# Patient Record
Sex: MALE | Race: BLACK OR AFRICAN AMERICAN | Employment: OTHER | URBAN - METROPOLITAN AREA
[De-identification: names, ages, dates, MRNs, and addresses within clinical notes are randomized per-mention and may not be internally consistent; named-entity substitution may affect disease eponyms.]

---

## 2018-09-12 ENCOUNTER — HOSPITAL ENCOUNTER (EMERGENCY)
Age: 58
Discharge: HOME | End: 2018-09-12
Attending: EMERGENCY MEDICINE
Payer: COMMERCIAL

## 2018-09-12 ENCOUNTER — APPOINTMENT (OUTPATIENT)
Dept: GENERAL RADIOLOGY | Age: 58
End: 2018-09-12
Payer: COMMERCIAL

## 2018-09-12 VITALS
SYSTOLIC BLOOD PRESSURE: 152 MMHG | TEMPERATURE: 97.9 F | HEART RATE: 81 BPM | OXYGEN SATURATION: 99 % | RESPIRATION RATE: 18 BRPM | DIASTOLIC BLOOD PRESSURE: 72 MMHG

## 2018-09-12 DIAGNOSIS — I10 ESSENTIAL HYPERTENSION: Primary | ICD-10-CM

## 2018-09-12 DIAGNOSIS — E11.65 TYPE 2 DIABETES MELLITUS WITH HYPERGLYCEMIA, WITHOUT LONG-TERM CURRENT USE OF INSULIN (HCC): ICD-10-CM

## 2018-09-12 DIAGNOSIS — A59.9 TRICHOMONIASIS: ICD-10-CM

## 2018-09-12 DIAGNOSIS — R60.9 PERIPHERAL EDEMA: ICD-10-CM

## 2018-09-12 LAB
ANION GAP SERPL CALCULATED.3IONS-SCNC: 11 MMOL/L (ref 3–16)
BACTERIA: ABNORMAL /HPF
BASE EXCESS VENOUS: 5 (ref -3–3)
BASOPHILS ABSOLUTE: 0.1 K/UL (ref 0–0.2)
BASOPHILS RELATIVE PERCENT: 0.6 %
BILIRUBIN URINE: NEGATIVE MG/DL
BLOOD, URINE: ABNORMAL
BUN BLDV-MCNC: 15 MG/DL (ref 7–20)
CALCIUM SERPL-MCNC: 9 MG/DL (ref 8.3–10.6)
CHLORIDE BLD-SCNC: 96 MMOL/L (ref 99–110)
CLARITY: ABNORMAL
CO2: 27 MMOL/L (ref 21–32)
COLOR: ABNORMAL
CREAT SERPL-MCNC: 0.8 MG/DL (ref 0.8–1.3)
EOSINOPHILS ABSOLUTE: 0.2 K/UL (ref 0–0.6)
EOSINOPHILS RELATIVE PERCENT: 1.7 %
EPITHELIAL CELLS, UA: ABNORMAL /HPF
GFR AFRICAN AMERICAN: >60
GFR NON-AFRICAN AMERICAN: >60
GLUCOSE BLD-MCNC: 266 MG/DL (ref 70–99)
GLUCOSE BLD-MCNC: 294 MG/DL (ref 70–99)
GLUCOSE BLD-MCNC: 362 MG/DL (ref 70–99)
GLUCOSE URINE: >=1000 MG/DL
HCO3 VENOUS: 30.7 MMOL/L (ref 23–29)
HCT VFR BLD CALC: 39.1 % (ref 40.5–52.5)
HEMOGLOBIN: 12.5 G/DL (ref 13.5–17.5)
INR BLD: 0.93 (ref 0.86–1.14)
KETONES, URINE: NEGATIVE MG/DL
LACTATE: 2.17 MMOL/L (ref 0.4–2)
LEUKOCYTE ESTERASE, URINE: NEGATIVE
LYMPHOCYTES ABSOLUTE: 1.4 K/UL (ref 1–5.1)
LYMPHOCYTES RELATIVE PERCENT: 10.4 %
MCH RBC QN AUTO: 28.2 PG (ref 26–34)
MCHC RBC AUTO-ENTMCNC: 32 G/DL (ref 31–36)
MCV RBC AUTO: 88.1 FL (ref 80–100)
MICROSCOPIC EXAMINATION: YES
MONOCYTES ABSOLUTE: 0.9 K/UL (ref 0–1.3)
MONOCYTES RELATIVE PERCENT: 6.5 %
NEUTROPHILS ABSOLUTE: 11 K/UL (ref 1.7–7.7)
NEUTROPHILS RELATIVE PERCENT: 80.8 %
NITRITE, URINE: NEGATIVE
O2 SAT, VEN: 63 %
PCO2, VEN: 54.2 MM HG (ref 40–50)
PDW BLD-RTO: 14.9 % (ref 12.4–15.4)
PERFORMED ON: ABNORMAL
PERFORMED ON: NORMAL
PH UA: 5.5
PH VENOUS: 7.36 (ref 7.35–7.45)
PLATELET # BLD: 293 K/UL (ref 135–450)
PMV BLD AUTO: 6.8 FL (ref 5–10.5)
PO2, VEN: 35 MM HG
POC SAMPLE TYPE: ABNORMAL
POC SAMPLE TYPE: NORMAL
POC TROPONIN I: 0.04 NG/ML (ref 0–0.1)
POTASSIUM SERPL-SCNC: 4.2 MMOL/L (ref 3.5–5.1)
PRO-BNP: 136 PG/ML (ref 0–124)
PROTEIN UA: 30 MG/DL
PROTHROMBIN TIME: 10.6 SEC (ref 9.8–13)
RBC # BLD: 4.44 M/UL (ref 4.2–5.9)
RBC UA: ABNORMAL /HPF (ref 0–2)
SODIUM BLD-SCNC: 134 MMOL/L (ref 136–145)
SPECIFIC GRAVITY UA: 1.01
TCO2 CALC VENOUS: 32 MMOL/L
TRICHOMONAS: PRESENT /HPF
URINE TRICHOMONAS EVALUATION: PRESENT
UROBILINOGEN, URINE: 0.2 E.U./DL
WBC # BLD: 13.6 K/UL (ref 4–11)
WBC UA: ABNORMAL /HPF (ref 0–5)

## 2018-09-12 PROCEDURE — 81001 URINALYSIS AUTO W/SCOPE: CPT

## 2018-09-12 PROCEDURE — 82803 BLOOD GASES ANY COMBINATION: CPT

## 2018-09-12 PROCEDURE — 84484 ASSAY OF TROPONIN QUANT: CPT

## 2018-09-12 PROCEDURE — 85025 COMPLETE CBC W/AUTO DIFF WBC: CPT

## 2018-09-12 PROCEDURE — 83605 ASSAY OF LACTIC ACID: CPT

## 2018-09-12 PROCEDURE — 99284 EMERGENCY DEPT VISIT MOD MDM: CPT

## 2018-09-12 PROCEDURE — 93005 ELECTROCARDIOGRAM TRACING: CPT | Performed by: EMERGENCY MEDICINE

## 2018-09-12 PROCEDURE — 83880 ASSAY OF NATRIURETIC PEPTIDE: CPT

## 2018-09-12 PROCEDURE — 96375 TX/PRO/DX INJ NEW DRUG ADDON: CPT

## 2018-09-12 PROCEDURE — 96374 THER/PROPH/DIAG INJ IV PUSH: CPT

## 2018-09-12 PROCEDURE — 87491 CHLMYD TRACH DNA AMP PROBE: CPT

## 2018-09-12 PROCEDURE — 85610 PROTHROMBIN TIME: CPT

## 2018-09-12 PROCEDURE — 96372 THER/PROPH/DIAG INJ SC/IM: CPT

## 2018-09-12 PROCEDURE — 71046 X-RAY EXAM CHEST 2 VIEWS: CPT

## 2018-09-12 PROCEDURE — 6360000002 HC RX W HCPCS: Performed by: EMERGENCY MEDICINE

## 2018-09-12 PROCEDURE — 80048 BASIC METABOLIC PNL TOTAL CA: CPT

## 2018-09-12 PROCEDURE — 87591 N.GONORRHOEAE DNA AMP PROB: CPT

## 2018-09-12 PROCEDURE — 6370000000 HC RX 637 (ALT 250 FOR IP): Performed by: EMERGENCY MEDICINE

## 2018-09-12 RX ORDER — CEFTRIAXONE SODIUM 250 MG/1
250 INJECTION, POWDER, FOR SOLUTION INTRAMUSCULAR; INTRAVENOUS ONCE
Status: COMPLETED | OUTPATIENT
Start: 2018-09-12 | End: 2018-09-12

## 2018-09-12 RX ORDER — METRONIDAZOLE 500 MG/1
2000 TABLET ORAL ONCE
Status: COMPLETED | OUTPATIENT
Start: 2018-09-12 | End: 2018-09-12

## 2018-09-12 RX ORDER — HYDROCHLOROTHIAZIDE 25 MG/1
25 TABLET ORAL DAILY
Qty: 30 TABLET | Refills: 1 | Status: SHIPPED | OUTPATIENT
Start: 2018-09-12 | End: 2019-01-11

## 2018-09-12 RX ORDER — FUROSEMIDE 10 MG/ML
20 INJECTION INTRAMUSCULAR; INTRAVENOUS ONCE
Status: COMPLETED | OUTPATIENT
Start: 2018-09-12 | End: 2018-09-12

## 2018-09-12 RX ORDER — AZITHROMYCIN 250 MG/1
1000 TABLET, FILM COATED ORAL ONCE
Status: COMPLETED | OUTPATIENT
Start: 2018-09-12 | End: 2018-09-12

## 2018-09-12 RX ORDER — HYDROCHLOROTHIAZIDE 25 MG/1
50 TABLET ORAL ONCE
Status: COMPLETED | OUTPATIENT
Start: 2018-09-12 | End: 2018-09-12

## 2018-09-12 RX ADMIN — HYDROCHLOROTHIAZIDE 50 MG: 25 TABLET ORAL at 19:29

## 2018-09-12 RX ADMIN — CEFTRIAXONE SODIUM 250 MG: 250 INJECTION, POWDER, FOR SOLUTION INTRAMUSCULAR; INTRAVENOUS at 19:29

## 2018-09-12 RX ADMIN — METRONIDAZOLE 2000 MG: 500 TABLET ORAL at 19:46

## 2018-09-12 RX ADMIN — FUROSEMIDE 20 MG: 10 INJECTION, SOLUTION INTRAVENOUS at 19:28

## 2018-09-12 RX ADMIN — AZITHROMYCIN 1000 MG: 250 TABLET, FILM COATED ORAL at 19:28

## 2018-09-12 RX ADMIN — INSULIN HUMAN 5 UNITS: 100 INJECTION, SOLUTION PARENTERAL at 19:49

## 2018-09-12 ASSESSMENT — PAIN SCALES - GENERAL: PAINLEVEL_OUTOF10: 0

## 2018-09-12 ASSESSMENT — ENCOUNTER SYMPTOMS
CHEST TIGHTNESS: 0
VOMITING: 0
ALLERGIC/IMMUNOLOGIC NEGATIVE: 1
GASTROINTESTINAL NEGATIVE: 1
DIARRHEA: 0
ABDOMINAL PAIN: 0
SHORTNESS OF BREATH: 1
COUGH: 0
NAUSEA: 0
CONSTIPATION: 0
EYES NEGATIVE: 1

## 2018-09-12 NOTE — ED PROVIDER NOTES
hepatosplenomegaly. Skin: Warm and dry, without rashes or ecchymoses, lacerations or abrasions. Neuro: Alert and oriented x3. No focal neurologic deficits are noted. Extremities: Warm and well-perfused, without clubbing or cyanosis. The patient moves all extremities equally. There is 2+ pitting edema in the feet, ankles, and to the mid tibia, symmetric bilaterally, without warmth or erythema. Psych: The patient's mood and affect are generally within normal limits for their presentation. Diagnostic Results     EKG   Normal sinus rhythm with a ventricular rate of 80 beats per minute. Occasional PVCs are noted. Normal axis. Normal intervals, with ID interval 170 ms, QRS duration 96 ms,  ms. There is somewhat poor R-wave progression in the anterior precordial leads. There is T-wave inversion in leads 1 and aVL, with some flattening in leads V5 and V6. No ST elevations are noted. There is no prior EKG in our system to compare to. RADIOLOGY:  XR CHEST STANDARD (2 VW)   Final Result      1. No acute process or active consolidation. 2. Tortuous aorta stable.              LABS:   Results for orders placed or performed during the hospital encounter of 09/12/18   CBC auto differential   Result Value Ref Range    WBC 13.6 (H) 4.0 - 11.0 K/uL    RBC 4.44 4.20 - 5.90 M/uL    Hemoglobin 12.5 (L) 13.5 - 17.5 g/dL    Hematocrit 39.1 (L) 40.5 - 52.5 %    MCV 88.1 80.0 - 100.0 fL    MCH 28.2 26.0 - 34.0 pg    MCHC 32.0 31.0 - 36.0 g/dL    RDW 14.9 12.4 - 15.4 %    Platelets 480 975 - 313 K/uL    MPV 6.8 5.0 - 10.5 fL    Neutrophils % 80.8 %    Lymphocytes % 10.4 %    Monocytes % 6.5 %    Eosinophils % 1.7 %    Basophils % 0.6 %    Neutrophils # 11.0 (H) 1.7 - 7.7 K/uL    Lymphocytes # 1.4 1.0 - 5.1 K/uL    Monocytes # 0.9 0.0 - 1.3 K/uL    Eosinophils # 0.2 0.0 - 0.6 K/uL    Basophils # 0.1 0.0 - 0.2 K/uL   PT - INR   Result Value Ref Range    Protime 10.6 9.8 - 13.0 sec    INR 0.93 0.86 - 1.14 He also has been noticing polydipsia and polyuria, concerning for hyperglycemia, although he does not check his sugars frequently at home. Finally, he complains of some dysuria, and has concerns regarding unprotected episode of intercourse recently. The patient's EKG shows some LVH, but no ischemic changes. Troponin is negative. The patient's BNP is actually only minimally elevated and his chest x-ray shows no evidence of pulmonary edema. The patient's blood pressure was well controlled simply with oral doses of the antihypertensive medication that he is supposed to be on at home. At this time, from a cardiac perspective, there is no indication for inpatient evaluation and management. He was given also given a dose of Lasix for some mild diuresis in the emergency department, as his bilateral lower extremity edema is quite bothersome to him. We will get him set up with close outpatient follow-up, and restart his antihypertensive medication as an outpatient, in order to work on regaining control of his hypertension and hopefully pursuing further cardiac evaluation as an outpatient. The patient's glucose was noted to be relatively significantly elevated in the high 300s, although without other concerning metabolic derangements associated. He was given some IV insulin, and small Jimi's service amounts, as he is typically not on insulin as outpatient. His glucose did improve to just over 250, at which time he was considered stable for discharge. He will be restarted on his home metformin, and again will be referred for primary care outpatient follow-up. Finally, the patient's urinalysis was positive for Trichomonas, which is likely the etiology of his dysuria. Given his unprotected episode of intercourse, he was treated for Trichomonas as well as Chlamydia and gonorrhea, and DNA probes were sent on the patient's urine for those disease processes as well.   He was encouraged to have all partners tested

## 2018-09-13 LAB
C. TRACHOMATIS DNA ,URINE: NEGATIVE
N. GONORRHOEAE DNA, URINE: NEGATIVE

## 2018-09-13 NOTE — ED NOTES
Patient discharged to home with all belongings. All discharge and follow up information discussed. Patient verbalized understanding and denies needs or questions. Patient is alert, oriented x4, no signs of acute distress.       Lolita Jiménez RN  09/12/18 [id number]

## 2018-09-21 LAB
EKG ATRIAL RATE: 80 BPM
EKG DIAGNOSIS: NORMAL
EKG P AXIS: 43 DEGREES
EKG P-R INTERVAL: 170 MS
EKG Q-T INTERVAL: 368 MS
EKG QRS DURATION: 96 MS
EKG QTC CALCULATION (BAZETT): 424 MS
EKG R AXIS: 45 DEGREES
EKG T AXIS: 184 DEGREES
EKG VENTRICULAR RATE: 80 BPM

## 2018-11-27 ENCOUNTER — HOSPITAL ENCOUNTER (EMERGENCY)
Age: 58
Discharge: HOME | End: 2018-11-27
Attending: EMERGENCY MEDICINE
Payer: COMMERCIAL

## 2018-11-27 ENCOUNTER — APPOINTMENT (OUTPATIENT)
Dept: CT IMAGING | Age: 58
End: 2018-11-27
Payer: COMMERCIAL

## 2018-11-27 VITALS
BODY MASS INDEX: 40.63 KG/M2 | HEIGHT: 72 IN | RESPIRATION RATE: 20 BRPM | WEIGHT: 300 LBS | DIASTOLIC BLOOD PRESSURE: 95 MMHG | OXYGEN SATURATION: 97 % | HEART RATE: 109 BPM | SYSTOLIC BLOOD PRESSURE: 149 MMHG | TEMPERATURE: 98 F

## 2018-11-27 DIAGNOSIS — S16.1XXA ACUTE STRAIN OF NECK MUSCLE, INITIAL ENCOUNTER: Primary | ICD-10-CM

## 2018-11-27 PROCEDURE — 72125 CT NECK SPINE W/O DYE: CPT

## 2018-11-27 PROCEDURE — 99284 EMERGENCY DEPT VISIT MOD MDM: CPT

## 2018-11-27 PROCEDURE — 6370000000 HC RX 637 (ALT 250 FOR IP): Performed by: EMERGENCY MEDICINE

## 2018-11-27 RX ORDER — IBUPROFEN 400 MG/1
800 TABLET ORAL ONCE
Status: COMPLETED | OUTPATIENT
Start: 2018-11-27 | End: 2018-11-27

## 2018-11-27 RX ORDER — OXYCODONE HYDROCHLORIDE AND ACETAMINOPHEN 5; 325 MG/1; MG/1
1 TABLET ORAL ONCE
Status: COMPLETED | OUTPATIENT
Start: 2018-11-27 | End: 2018-11-27

## 2018-11-27 RX ORDER — METHYLPREDNISOLONE 4 MG/1
TABLET ORAL
Qty: 1 KIT | Refills: 0 | Status: SHIPPED | OUTPATIENT
Start: 2018-11-27 | End: 2018-12-03

## 2018-11-27 RX ORDER — CYCLOBENZAPRINE HCL 10 MG
10 TABLET ORAL 3 TIMES DAILY PRN
Qty: 30 TABLET | Refills: 0 | Status: SHIPPED | OUTPATIENT
Start: 2018-11-27 | End: 2019-01-11

## 2018-11-27 RX ADMIN — OXYCODONE AND ACETAMINOPHEN 1 TABLET: 5; 325 TABLET ORAL at 22:16

## 2018-11-27 RX ADMIN — IBUPROFEN 800 MG: 400 TABLET, FILM COATED ORAL at 22:16

## 2018-11-27 ASSESSMENT — PAIN SCALES - GENERAL
PAINLEVEL_OUTOF10: 8
PAINLEVEL_OUTOF10: 10

## 2018-11-27 ASSESSMENT — PAIN DESCRIPTION - PAIN TYPE: TYPE: ACUTE PAIN

## 2018-11-27 ASSESSMENT — PAIN DESCRIPTION - LOCATION: LOCATION: ARM;NECK

## 2018-11-27 ASSESSMENT — PAIN DESCRIPTION - ORIENTATION: ORIENTATION: LEFT

## 2018-11-27 ASSESSMENT — PAIN DESCRIPTION - FREQUENCY: FREQUENCY: CONTINUOUS

## 2018-11-27 ASSESSMENT — PAIN DESCRIPTION - DESCRIPTORS: DESCRIPTORS: CONSTANT;DULL;SHARP

## 2018-11-28 NOTE — ED NOTES
Patient discharged to home with family. Patient verbalized understanding of discharge instructions. Advised of when to return to ED and when to call 911. Advised of follow up with PCP. Patient received 2 Rx's with education. Patient verbalized understanding of education. No questions from patient to this RN. Patient left ED without incident.      Radha Prado RN  11/27/18 [id number]
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
## 2019-01-11 ENCOUNTER — APPOINTMENT (OUTPATIENT)
Dept: CT IMAGING | Age: 59
End: 2019-01-11
Payer: COMMERCIAL

## 2019-01-11 ENCOUNTER — HOSPITAL ENCOUNTER (EMERGENCY)
Age: 59
Discharge: HOME | End: 2019-01-12
Attending: EMERGENCY MEDICINE
Payer: COMMERCIAL

## 2019-01-11 VITALS
BODY MASS INDEX: 40.18 KG/M2 | SYSTOLIC BLOOD PRESSURE: 159 MMHG | WEIGHT: 287 LBS | OXYGEN SATURATION: 99 % | DIASTOLIC BLOOD PRESSURE: 77 MMHG | TEMPERATURE: 98.6 F | HEIGHT: 71 IN | RESPIRATION RATE: 12 BRPM | HEART RATE: 71 BPM

## 2019-01-11 DIAGNOSIS — M54.50 ACUTE RIGHT-SIDED LOW BACK PAIN WITHOUT SCIATICA: Primary | ICD-10-CM

## 2019-01-11 DIAGNOSIS — R73.9 HYPERGLYCEMIA: ICD-10-CM

## 2019-01-11 DIAGNOSIS — K08.89 PAIN, DENTAL: ICD-10-CM

## 2019-01-11 LAB
BACTERIA: ABNORMAL /HPF
BASOPHILS ABSOLUTE: 0.1 K/UL (ref 0–0.2)
BASOPHILS RELATIVE PERCENT: 1.3 %
BILIRUBIN URINE: NEGATIVE MG/DL
BLOOD, URINE: ABNORMAL
CALCIUM IONIZED: 1.12 MMOL/L (ref 1.12–1.32)
CLARITY: ABNORMAL
CO2: 29 MMOL/L (ref 21–32)
COLOR: ABNORMAL
EOSINOPHILS ABSOLUTE: 0.2 K/UL (ref 0–0.6)
EOSINOPHILS RELATIVE PERCENT: 1.8 %
EPITHELIAL CELLS, UA: ABNORMAL /HPF
GFR AFRICAN AMERICAN: >60
GFR NON-AFRICAN AMERICAN: >60
GLUCOSE BLD-MCNC: 273 MG/DL (ref 70–99)
GLUCOSE BLD-MCNC: 504 MG/DL (ref 70–99)
GLUCOSE URINE: 500 MG/DL
HCT VFR BLD CALC: 41.5 % (ref 40.5–52.5)
HEMOGLOBIN: 13.5 G/DL (ref 13.5–17.5)
KETONES, URINE: NEGATIVE MG/DL
LEUKOCYTE ESTERASE, URINE: NEGATIVE
LYMPHOCYTES ABSOLUTE: 1.4 K/UL (ref 1–5.1)
LYMPHOCYTES RELATIVE PERCENT: 14.9 %
MCH RBC QN AUTO: 28.7 PG (ref 26–34)
MCHC RBC AUTO-ENTMCNC: 32.6 G/DL (ref 31–36)
MCV RBC AUTO: 88 FL (ref 80–100)
MICROSCOPIC EXAMINATION: YES
MONOCYTES ABSOLUTE: 0.7 K/UL (ref 0–1.3)
MONOCYTES RELATIVE PERCENT: 7.5 %
NEUTROPHILS ABSOLUTE: 6.8 K/UL (ref 1.7–7.7)
NEUTROPHILS RELATIVE PERCENT: 74.5 %
NITRITE, URINE: NEGATIVE
PDW BLD-RTO: 14.5 % (ref 12.4–15.4)
PERFORMED ON: ABNORMAL
PERFORMED ON: ABNORMAL
PH UA: 5.5
PLATELET # BLD: 267 K/UL (ref 135–450)
PMV BLD AUTO: 6.6 FL (ref 5–10.5)
POC ANION GAP: 11 (ref 10–20)
POC BUN: 9 MG/DL (ref 7–18)
POC CHLORIDE: 93 MMOL/L (ref 99–110)
POC CREATININE: 0.9 MG/DL (ref 0.8–1.3)
POC POTASSIUM: 4 MMOL/L (ref 3.5–5.1)
POC SAMPLE TYPE: ABNORMAL
POC SODIUM: 133 MMOL/L (ref 136–145)
PROTEIN UA: NEGATIVE MG/DL
RBC # BLD: 4.72 M/UL (ref 4.2–5.9)
RBC UA: ABNORMAL /HPF (ref 0–2)
SPECIFIC GRAVITY UA: 1.01
UROBILINOGEN, URINE: 0.2 E.U./DL
WBC # BLD: 9.2 K/UL (ref 4–11)
WBC UA: ABNORMAL /HPF (ref 0–5)

## 2019-01-11 PROCEDURE — 96374 THER/PROPH/DIAG INJ IV PUSH: CPT

## 2019-01-11 PROCEDURE — 6360000002 HC RX W HCPCS: Performed by: PHYSICIAN ASSISTANT

## 2019-01-11 PROCEDURE — 74176 CT ABD & PELVIS W/O CONTRAST: CPT

## 2019-01-11 PROCEDURE — 85025 COMPLETE CBC W/AUTO DIFF WBC: CPT

## 2019-01-11 PROCEDURE — 80047 BASIC METABLC PNL IONIZED CA: CPT

## 2019-01-11 PROCEDURE — 96361 HYDRATE IV INFUSION ADD-ON: CPT

## 2019-01-11 PROCEDURE — 99284 EMERGENCY DEPT VISIT MOD MDM: CPT

## 2019-01-11 PROCEDURE — 6370000000 HC RX 637 (ALT 250 FOR IP): Performed by: PHYSICIAN ASSISTANT

## 2019-01-11 PROCEDURE — 81003 URINALYSIS AUTO W/O SCOPE: CPT

## 2019-01-11 PROCEDURE — 96375 TX/PRO/DX INJ NEW DRUG ADDON: CPT

## 2019-01-11 PROCEDURE — 2580000003 HC RX 258: Performed by: PHYSICIAN ASSISTANT

## 2019-01-11 RX ORDER — METHOCARBAMOL 500 MG/1
1000 TABLET, FILM COATED ORAL 3 TIMES DAILY PRN
Qty: 30 TABLET | Refills: 0 | Status: SHIPPED | OUTPATIENT
Start: 2019-01-11 | End: 2019-01-21

## 2019-01-11 RX ORDER — HYDROCHLOROTHIAZIDE 25 MG/1
25 TABLET ORAL DAILY
Qty: 30 TABLET | Refills: 1 | Status: SHIPPED | OUTPATIENT
Start: 2019-01-11 | End: 2019-08-06

## 2019-01-11 RX ORDER — 0.9 % SODIUM CHLORIDE 0.9 %
1000 INTRAVENOUS SOLUTION INTRAVENOUS ONCE
Status: COMPLETED | OUTPATIENT
Start: 2019-01-11 | End: 2019-01-11

## 2019-01-11 RX ORDER — LIDOCAINE 4 G/G
1 PATCH TOPICAL DAILY
Status: DISCONTINUED | OUTPATIENT
Start: 2019-01-12 | End: 2019-01-12 | Stop reason: HOSPADM

## 2019-01-11 RX ORDER — KETOROLAC TROMETHAMINE 30 MG/ML
15 INJECTION, SOLUTION INTRAMUSCULAR; INTRAVENOUS ONCE
Status: COMPLETED | OUTPATIENT
Start: 2019-01-11 | End: 2019-01-11

## 2019-01-11 RX ORDER — LIDOCAINE 4 G/G
1 PATCH TOPICAL ONCE
Status: DISCONTINUED | OUTPATIENT
Start: 2019-01-11 | End: 2019-01-12 | Stop reason: HOSPADM

## 2019-01-11 RX ORDER — IBUPROFEN 600 MG/1
600 TABLET ORAL EVERY 6 HOURS PRN
Qty: 30 TABLET | Refills: 0 | Status: ON HOLD | OUTPATIENT
Start: 2019-01-11 | End: 2020-01-20

## 2019-01-11 RX ORDER — LIDOCAINE 50 MG/G
1 PATCH TOPICAL DAILY
Qty: 30 PATCH | Refills: 0 | Status: SHIPPED | OUTPATIENT
Start: 2019-01-11 | End: 2019-06-04

## 2019-01-11 RX ORDER — PENICILLIN V POTASSIUM 500 MG/1
500 TABLET ORAL 4 TIMES DAILY
Qty: 28 TABLET | Refills: 0 | Status: SHIPPED | OUTPATIENT
Start: 2019-01-11 | End: 2019-01-18

## 2019-01-11 RX ORDER — KETOROLAC TROMETHAMINE 30 MG/ML
15 INJECTION, SOLUTION INTRAMUSCULAR; INTRAVENOUS ONCE
Status: DISCONTINUED | OUTPATIENT
Start: 2019-01-11 | End: 2019-01-11

## 2019-01-11 RX ADMIN — SODIUM CHLORIDE 1000 ML: 9 INJECTION, SOLUTION INTRAVENOUS at 21:56

## 2019-01-11 RX ADMIN — INSULIN HUMAN 8 UNITS: 100 INJECTION, SOLUTION PARENTERAL at 22:00

## 2019-01-11 RX ADMIN — KETOROLAC TROMETHAMINE 15 MG: 30 INJECTION, SOLUTION INTRAMUSCULAR at 21:53

## 2019-01-11 ASSESSMENT — ENCOUNTER SYMPTOMS
FACIAL SWELLING: 0
RHINORRHEA: 0
DIARRHEA: 0
SORE THROAT: 0
ABDOMINAL PAIN: 0
SHORTNESS OF BREATH: 0
BACK PAIN: 1
PHOTOPHOBIA: 0
SINUS PAIN: 0
COUGH: 0
TROUBLE SWALLOWING: 0
WHEEZING: 0
CHEST TIGHTNESS: 0
VOMITING: 0
VOICE CHANGE: 0
NAUSEA: 0

## 2019-01-11 ASSESSMENT — PAIN SCALES - GENERAL
PAINLEVEL_OUTOF10: 8
PAINLEVEL_OUTOF10: 9

## 2019-01-11 ASSESSMENT — PAIN DESCRIPTION - ORIENTATION: ORIENTATION: RIGHT;LOWER;MID

## 2019-01-11 ASSESSMENT — PAIN DESCRIPTION - LOCATION: LOCATION: BACK

## 2019-04-08 ENCOUNTER — APPOINTMENT (OUTPATIENT)
Dept: CT IMAGING | Age: 59
End: 2019-04-08
Payer: COMMERCIAL

## 2019-04-08 ENCOUNTER — HOSPITAL ENCOUNTER (EMERGENCY)
Age: 59
Discharge: HOME | End: 2019-04-08
Attending: EMERGENCY MEDICINE
Payer: COMMERCIAL

## 2019-04-08 VITALS
RESPIRATION RATE: 14 BRPM | HEART RATE: 83 BPM | TEMPERATURE: 98.3 F | SYSTOLIC BLOOD PRESSURE: 157 MMHG | OXYGEN SATURATION: 99 % | DIASTOLIC BLOOD PRESSURE: 83 MMHG
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
## 2019-04-08 DIAGNOSIS — S16.1XXA STRAIN OF NECK MUSCLE, INITIAL ENCOUNTER: ICD-10-CM

## 2019-04-08 DIAGNOSIS — S00.83XA TRAUMATIC HEMATOMA OF FOREHEAD, INITIAL ENCOUNTER: ICD-10-CM

## 2019-04-08 DIAGNOSIS — T14.8XXA ABRASION: ICD-10-CM

## 2019-04-08 PROCEDURE — 99284 EMERGENCY DEPT VISIT MOD MDM: CPT

## 2019-04-08 PROCEDURE — 6360000002 HC RX W HCPCS: Performed by: EMERGENCY MEDICINE

## 2019-04-08 PROCEDURE — 72125 CT NECK SPINE W/O DYE: CPT

## 2019-04-08 PROCEDURE — 90471 IMMUNIZATION ADMIN: CPT | Performed by: PHYSICIAN ASSISTANT

## 2019-04-08 PROCEDURE — 4500000024 HC ED LEVEL 4 PROCEDURE

## 2019-04-08 PROCEDURE — 6360000002 HC RX W HCPCS: Performed by: PHYSICIAN ASSISTANT

## 2019-04-08 PROCEDURE — 90715 TDAP VACCINE 7 YRS/> IM: CPT | Performed by: PHYSICIAN ASSISTANT

## 2019-04-08 PROCEDURE — 96372 THER/PROPH/DIAG INJ SC/IM: CPT

## 2019-04-08 PROCEDURE — 70450 CT HEAD/BRAIN W/O DYE: CPT

## 2019-04-08 RX ORDER — KETOROLAC TROMETHAMINE 30 MG/ML
30 INJECTION, SOLUTION INTRAMUSCULAR; INTRAVENOUS ONCE
Status: COMPLETED | OUTPATIENT
Start: 2019-04-08 | End: 2019-04-08

## 2019-04-08 RX ORDER — CYCLOBENZAPRINE HCL 10 MG
10 TABLET ORAL 3 TIMES DAILY PRN
Qty: 10 TABLET | Refills: 0 | Status: SHIPPED | OUTPATIENT
Start: 2019-04-08 | End: 2019-04-11

## 2019-04-08 RX ADMIN — TETANUS TOXOID, REDUCED DIPHTHERIA TOXOID AND ACELLULAR PERTUSSIS VACCINE, ADSORBED 0.5 ML: 5; 2.5; 8; 8; 2.5 SUSPENSION INTRAMUSCULAR at 19:30

## 2019-04-08 RX ADMIN — KETOROLAC TROMETHAMINE 30 MG: 30 INJECTION, SOLUTION INTRAMUSCULAR; INTRAVENOUS at 20:37

## 2019-04-08 ASSESSMENT — ENCOUNTER SYMPTOMS
BACK PAIN: 0
VOMITING: 0
SHORTNESS OF BREATH: 0
NAUSEA: 0
ABDOMINAL PAIN: 0

## 2019-04-08 ASSESSMENT — PAIN SCALES - GENERAL: PAINLEVEL_OUTOF10: 10

## 2019-04-08 NOTE — ED PROVIDER NOTES
[facility]  EMERGENCY DEPARTMENT ENCOUNTER          PHYSICIAN ASSISTANT NOTE       Date of evaluation: 4/8/2019    Chief Complaint     Fall and Head Laceration      History of Present Illness     Bella Soto is a 59 y.o. male with PMH of Diabetes, HTN, Herniated cervical disc with chronic left shoulder pain who presents with Fall. Patient reports he was walking in his yard this evening. He reports that he slipped in the mud on a downslope and attempted to catch his fall but landed on his face on the asphalt. He denies LOC. He denies any chest pain, shortness of breath, dizziness or other symptoms prior to the fall. He currently complains of facial pain, worsening neck pain and lightheadedness. Denies vision changes, n/t, weakness, chest pain, shortness of breath, abdominal pain, HA, vomiting. Unsure of his last tetanus. He is not on any blood thinners. Review of Systems     Review of Systems   Constitutional: Negative for chills and fever. HENT: Negative for congestion. Eyes: Negative for visual disturbance. Respiratory: Negative for shortness of breath. Cardiovascular: Negative for chest pain. Gastrointestinal: Negative for abdominal pain, nausea and vomiting. Genitourinary: Negative for difficulty urinating. Musculoskeletal: Positive for neck pain. Negative for back pain and gait problem. Skin: Positive for wound. Neurological: Positive for dizziness and headaches. Negative for facial asymmetry, weakness, light-headedness and numbness. Psychiatric/Behavioral: Negative for confusion. Past Medical, Surgical, Family, and Social History     He has a past medical history of Arthritis, Depression, Diabetes mellitus ([address] Herniated disc, cervical, Hypertension, Osteoporosis, and Peripheral neuropathy. He has a past surgical history that includes hernia repair. His family history is not on file. He reports that he has never smoked.  He has never used smokeless tobacco. He Negative pronator drift. FNF intact. Gross sensation and strength intact. Skin: Skin is warm and dry. He is not diaphoretic. Psychiatric: He has a normal mood and affect. His behavior is normal.   Nursing note and vitals reviewed. Diagnostic Results       RADIOLOGY:  CT CERVICAL SPINE WO CONTRAST   Final Result      1. No acute intracranial process. Frontal scalp hematoma. 2. No evidence of acute fracture in the cervical spine. CT Head WO Contrast   Final Result      1. No acute intracranial process. Frontal scalp hematoma. 2. No evidence of acute fracture in the cervical spine. LABS:   No results found for this visit on 04/08/19. RECENT VITALS:  BP: (!) 157/83, Temp: 98.3 °F (36.8 °C), Pulse: 83,Resp: 14, SpO2: 99 %     Lac Repair  Date/Time: 4/8/2019 11:01 PM  Performed by: Joanna Elias PA-C  Authorized by: Micheline Cunha MD     Consent:     Consent obtained:  Verbal    Consent given by:  Patient    Risks discussed:  Pain, infection and poor cosmetic result  Anesthesia (see MAR for exact dosages): Anesthesia method:  None  Laceration details:     Location:  Face  Repair type:     Repair type:  Simple  Pre-procedure details:     Preparation:  Patient was prepped and draped in usual sterile fashion  Treatment:     Area cleansed with:  Saline    Amount of cleaning:  Standard  Skin repair:     Repair method:  Sutures    Suture size:  5-0    Suture material:  Fast-absorbing gut    Number of sutures:  1  Approximation:     Approximation:  Close  Post-procedure details:     Dressing:  Open (no dressing)    Patient tolerance of procedure: Tolerated well, no immediate complications  Comments:      Abrasion to nose repaired with dermabond to prevent oozing. ED Course     Nursing Notes, Past Medical Hx, Past Surgical Hx, Social Hx, Allergies, and Family Hx were reviewed.     The patient was given the followingmedications:  Orders Placed This Encounter initial encounter    2. Strain of neck muscle, initial encounter    3. Abrasion    4.  Traumatic hematoma of forehead, initial encounter        Disposition     PATIENT REFERRED TO:  [facility] [place]  [address]    Schedule an appointment as soon as possible for a visit         DISCHARGE MEDICATIONS:  Discharge Medication List as of 4/8/2019  9:36 PM      START taking these medications    Details   cyclobenzaprine (FLEXERIL) 10 MG tablet Take 1 tablet by mouth 3 times daily as needed for Muscle spasms, Disp-10 tablet, R-0Print              DISPOSITION Decision To Discharge 04/08/2019 09:29:42 PM        Joanna Elias PA-C  04/08/19 [id number]

## 2019-04-09 NOTE — ED PROVIDER NOTES
ED Attending Attestation Note     Date of evaluation: 4/8/2019    This patient was seen by the advance practice provider. I have seen and examined the patient, agree with the workup, evaluation, management and diagnosis. The care plan has been discussed. My assessment reveals here for injuries after a fall. Patient states today he was walking in his yard when he slipped on some mud and landed face first on asphalt. He had no loss of consciousness but did sustain abrasions to his face. He also notes neck pain. He's had a history of chronic neck pain secondary to a herniated cervical disc. On exam patient no acute distress. He does have abrasion forehead nose and then just above his upper lip. He currently is in a c-collar but is moving all his extremities well and has good strength throughout. . Alert and oriented ×3 with clear speech     Lynette Mcconnell MD  04/09/19 2038

## 2019-04-09 NOTE — ED NOTES
Pt discharged from ED in stable, ambulatory condition. Discharge instructions explained, all questions answered. Prescription given. Pt walked to [facility] independently.        [address], RN  04/08/19 3586

## 2019-06-04 ENCOUNTER — HOSPITAL ENCOUNTER (INPATIENT)
Age: 59
LOS: 3 days | Discharge: HOME | DRG: 546 | End: 2019-06-07
Attending: EMERGENCY MEDICINE | Admitting: INTERNAL MEDICINE
Payer: COMMERCIAL

## 2019-06-04 ENCOUNTER — APPOINTMENT (OUTPATIENT)
Dept: CT IMAGING | Age: 59
DRG: 546 | End: 2019-06-04
Payer: COMMERCIAL

## 2019-06-04 DIAGNOSIS — I21.4 NSTEMI (NON-ST ELEVATED MYOCARDIAL INFARCTION) (HCC): Primary | ICD-10-CM

## 2019-06-04 PROBLEM — R53.1 GENERALIZED WEAKNESS: Status: ACTIVE | Noted: 2019-06-04

## 2019-06-04 PROBLEM — G62.9 PERIPHERAL NEUROPATHY: Chronic | Status: ACTIVE | Noted: 2019-06-04

## 2019-06-04 PROBLEM — E11.9 DIABETES MELLITUS (HCC): Chronic | Status: ACTIVE | Noted: 2019-06-04

## 2019-06-04 PROBLEM — I10 HYPERTENSION: Chronic | Status: ACTIVE | Noted: 2019-06-04

## 2019-06-04 LAB
ANION GAP SERPL CALCULATED.3IONS-SCNC: 10 MMOL/L (ref 3–16)
BASOPHILS ABSOLUTE: 0.1 K/UL (ref 0–0.2)
BASOPHILS RELATIVE PERCENT: 0.7 %
BUN BLDV-MCNC: 14 MG/DL (ref 7–20)
CALCIUM SERPL-MCNC: 8.4 MG/DL (ref 8.3–10.6)
CHLORIDE BLD-SCNC: 102 MMOL/L (ref 99–110)
CO2: 26 MMOL/L (ref 21–32)
CREAT SERPL-MCNC: 0.7 MG/DL (ref 0.8–1.3)
EKG ATRIAL RATE: 74 BPM
EKG DIAGNOSIS: NORMAL
EKG P AXIS: 29 DEGREES
EKG P-R INTERVAL: 188 MS
EKG Q-T INTERVAL: 392 MS
EKG QRS DURATION: 102 MS
EKG QTC CALCULATION (BAZETT): 435 MS
EKG R AXIS: 37 DEGREES
EKG T AXIS: 161 DEGREES
EKG VENTRICULAR RATE: 74 BPM
EOSINOPHILS ABSOLUTE: 0.3 K/UL (ref 0–0.6)
EOSINOPHILS RELATIVE PERCENT: 3 %
GFR AFRICAN AMERICAN: >60
GFR NON-AFRICAN AMERICAN: >60
GLUCOSE BLD-MCNC: 116 MG/DL (ref 70–99)
HCT VFR BLD CALC: 34.7 % (ref 40.5–52.5)
HEMOGLOBIN: 11.3 G/DL (ref 13.5–17.5)
LYMPHOCYTES ABSOLUTE: 1.2 K/UL (ref 1–5.1)
LYMPHOCYTES RELATIVE PERCENT: 12.4 %
MCH RBC QN AUTO: 28.3 PG (ref 26–34)
MCHC RBC AUTO-ENTMCNC: 32.5 G/DL (ref 31–36)
MCV RBC AUTO: 86.9 FL (ref 80–100)
MONOCYTES ABSOLUTE: 0.8 K/UL (ref 0–1.3)
MONOCYTES RELATIVE PERCENT: 8.1 %
NEUTROPHILS ABSOLUTE: 7.1 K/UL (ref 1.7–7.7)
NEUTROPHILS RELATIVE PERCENT: 75.8 %
PDW BLD-RTO: 15.3 % (ref 12.4–15.4)
PLATELET # BLD: 292 K/UL (ref 135–450)
PMV BLD AUTO: 6.3 FL (ref 5–10.5)
POTASSIUM REFLEX MAGNESIUM: 3.8 MMOL/L (ref 3.5–5.1)
PRO-BNP: 298 PG/ML (ref 0–124)
RBC # BLD: 3.99 M/UL (ref 4.2–5.9)
SODIUM BLD-SCNC: 138 MMOL/L (ref 136–145)
T4 TOTAL: 5.4 UG/DL (ref 4.5–10.9)
TROPONIN: 0.51 NG/ML
TROPONIN: 0.56 NG/ML
TSH SERPL DL<=0.05 MIU/L-ACNC: 1.31 UIU/ML (ref 0.27–4.2)
WBC # BLD: 9.3 K/UL (ref 4–11)

## 2019-06-04 PROCEDURE — 99285 EMERGENCY DEPT VISIT HI MDM: CPT

## 2019-06-04 PROCEDURE — 1200000000 HC SEMI PRIVATE

## 2019-06-04 PROCEDURE — 80048 BASIC METABOLIC PNL TOTAL CA: CPT

## 2019-06-04 PROCEDURE — 84484 ASSAY OF TROPONIN QUANT: CPT

## 2019-06-04 PROCEDURE — 6370000000 HC RX 637 (ALT 250 FOR IP): Performed by: STUDENT IN AN ORGANIZED HEALTH CARE EDUCATION/TRAINING PROGRAM

## 2019-06-04 PROCEDURE — 70450 CT HEAD/BRAIN W/O DYE: CPT

## 2019-06-04 PROCEDURE — 84443 ASSAY THYROID STIM HORMONE: CPT

## 2019-06-04 PROCEDURE — 83880 ASSAY OF NATRIURETIC PEPTIDE: CPT

## 2019-06-04 PROCEDURE — 84436 ASSAY OF TOTAL THYROXINE: CPT

## 2019-06-04 PROCEDURE — 93005 ELECTROCARDIOGRAM TRACING: CPT | Performed by: STUDENT IN AN ORGANIZED HEALTH CARE EDUCATION/TRAINING PROGRAM

## 2019-06-04 PROCEDURE — 85025 COMPLETE CBC W/AUTO DIFF WBC: CPT

## 2019-06-04 RX ORDER — HEPARIN SODIUM 5000 [USP'U]/ML
60 INJECTION, SOLUTION INTRAVENOUS; SUBCUTANEOUS ONCE
Status: COMPLETED | OUTPATIENT
Start: 2019-06-05 | End: 2019-06-05

## 2019-06-04 RX ORDER — HEPARIN SODIUM 5000 [USP'U]/ML
60 INJECTION, SOLUTION INTRAVENOUS; SUBCUTANEOUS PRN
Status: DISCONTINUED | OUTPATIENT
Start: 2019-06-04 | End: 2019-06-06

## 2019-06-04 RX ORDER — HEPARIN SODIUM 5000 [USP'U]/ML
30 INJECTION, SOLUTION INTRAVENOUS; SUBCUTANEOUS PRN
Status: DISCONTINUED | OUTPATIENT
Start: 2019-06-04 | End: 2019-06-06

## 2019-06-04 RX ORDER — ONDANSETRON 2 MG/ML
4 INJECTION INTRAMUSCULAR; INTRAVENOUS EVERY 6 HOURS PRN
Status: DISCONTINUED | OUTPATIENT
Start: 2019-06-04 | End: 2019-06-07 | Stop reason: HOSPADM

## 2019-06-04 RX ORDER — ATORVASTATIN CALCIUM 40 MG/1
40 TABLET, FILM COATED ORAL NIGHTLY
Status: DISCONTINUED | OUTPATIENT
Start: 2019-06-05 | End: 2019-06-05

## 2019-06-04 RX ORDER — HEPARIN SODIUM 10000 [USP'U]/100ML
11 INJECTION, SOLUTION INTRAVENOUS CONTINUOUS
Status: DISCONTINUED | OUTPATIENT
Start: 2019-06-05 | End: 2019-06-06

## 2019-06-04 RX ORDER — M-VIT,TX,IRON,MINS/CALC/FOLIC 27MG-0.4MG
1 TABLET ORAL DAILY
Status: DISCONTINUED | OUTPATIENT
Start: 2019-06-05 | End: 2019-06-07 | Stop reason: HOSPADM

## 2019-06-04 RX ORDER — SODIUM CHLORIDE 0.9 % (FLUSH) 0.9 %
10 SYRINGE (ML) INJECTION PRN
Status: DISCONTINUED | OUTPATIENT
Start: 2019-06-04 | End: 2019-06-07 | Stop reason: HOSPADM

## 2019-06-04 RX ORDER — NITROGLYCERIN 0.4 MG/1
0.4 TABLET SUBLINGUAL EVERY 5 MIN PRN
Status: DISCONTINUED | OUTPATIENT
Start: 2019-06-04 | End: 2019-06-07 | Stop reason: HOSPADM

## 2019-06-04 RX ORDER — SODIUM CHLORIDE 0.9 % (FLUSH) 0.9 %
10 SYRINGE (ML) INJECTION EVERY 12 HOURS SCHEDULED
Status: DISCONTINUED | OUTPATIENT
Start: 2019-06-05 | End: 2019-06-07 | Stop reason: HOSPADM

## 2019-06-04 RX ORDER — CARVEDILOL 6.25 MG/1
3.12 TABLET ORAL 2 TIMES DAILY WITH MEALS
Status: DISCONTINUED | OUTPATIENT
Start: 2019-06-05 | End: 2019-06-07 | Stop reason: HOSPADM

## 2019-06-04 RX ORDER — ACETAMINOPHEN 325 MG/1
650 TABLET ORAL EVERY 4 HOURS PRN
Status: DISCONTINUED | OUTPATIENT
Start: 2019-06-04 | End: 2019-06-07 | Stop reason: HOSPADM

## 2019-06-04 RX ORDER — ASPIRIN 325 MG
325 TABLET ORAL DAILY
Status: DISCONTINUED | OUTPATIENT
Start: 2019-06-05 | End: 2019-06-05

## 2019-06-04 RX ADMIN — ASPIRIN 325 MG: 325 TABLET, DELAYED RELEASE ORAL at 20:14

## 2019-06-04 ASSESSMENT — PAIN DESCRIPTION - PAIN TYPE: TYPE: ACUTE PAIN

## 2019-06-04 ASSESSMENT — ENCOUNTER SYMPTOMS
VOMITING: 0
NAUSEA: 0
PHOTOPHOBIA: 0
ABDOMINAL PAIN: 0
SORE THROAT: 0
SINUS PRESSURE: 0
SINUS PAIN: 0
SHORTNESS OF BREATH: 1
COUGH: 0

## 2019-06-04 ASSESSMENT — PAIN SCALES - GENERAL: PAINLEVEL_OUTOF10: 9

## 2019-06-04 ASSESSMENT — PAIN DESCRIPTION - ORIENTATION: ORIENTATION: LEFT;RIGHT

## 2019-06-04 ASSESSMENT — PAIN DESCRIPTION - LOCATION: LOCATION: KNEE;NECK;HEAD

## 2019-06-04 NOTE — ED NOTES
Patient SpO2 dropped to 88% when sleeping. Patient placed on 2L O2 NC.        Danyelle Birmingham RN  06/04/19 0250

## 2019-06-04 NOTE — ED NOTES
MD able to place ultrasound guided PIV with blood drawn for lab work.        José Miguel Shaw RN  06/04/19 [id number]

## 2019-06-04 NOTE — ED NOTES
[id number] RESIDENT NOTE       Date of evaluation: 6/4/2019    Chief Complaint     Fall (yesterday at 34, tripped on pavement, ); Head Injury; and Knee Injury (bilateral)      History of Present Illness     Gale Ashton is a 62 y.o. male with DMII, HTN, HLD who presents with mechanical fall while outside that occurred yesterday afternoon. He hit his head, but does not take any anticoagulants. He denies any prodromal symptoms of nausea, vomiting, dizziness, lightheadedness, diaphoresis. He states that he has had generalized weakness for about 1.5 months, worse in the lower extremities. Nothing improves the weakness and it is not worsened with prolonged activity. He does not smoke or drink alcohol and he has not noticed any new focal motor or sensory deficits after the fall. Review of Systems     Review of Systems   Constitutional: Negative for chills, fatigue and fever. HENT: Negative for congestion, sinus pressure, sinus pain and sore throat. Eyes: Negative for photophobia and visual disturbance. Respiratory: Positive for shortness of breath (chronic, unchanged). Negative for cough. Cardiovascular: Negative for chest pain, palpitations and leg swelling. Gastrointestinal: Negative for abdominal pain, nausea and vomiting. Endocrine: Negative for polydipsia and polyphagia. Genitourinary: Negative for dysuria, frequency and urgency. Musculoskeletal: Positive for neck pain. Negative for arthralgias. Skin: Negative for pallor and rash. Neurological: Positive for weakness (Generalized). Negative for dizziness and light-headedness. Psychiatric/Behavioral: Negative for behavioral problems and confusion. Past Medical, Surgical, Family, and Social History     He has a past medical history of Arthritis, Depression, Diabetes mellitus ([address] Herniated disc, cervical, Hypertension, Osteoporosis, and Peripheral neuropathy.   He has a past surgical history that includes hernia repair. His family history is not on file. He reports that he has never smoked. He has never used smokeless tobacco. He reports that he does not drink alcohol or use drugs. Medications     Discharge Medication List as of 6/7/2019 10:42 AM      CONTINUE these medications which have NOT CHANGED    Details   ibuprofen (ADVIL;MOTRIN) 600 MG tablet Take 1 tablet by mouth every 6 hours as needed for Pain, Disp-30 tablet, R-0Print      metFORMIN (GLUCOPHAGE) 500 MG tablet Take 1 tablet by mouth 2 times daily (with meals), Disp-60 tablet, R-1Print      hydrochlorothiazide (HYDRODIURIL) 25 MG tablet Take 1 tablet by mouth daily, Disp-30 tablet, R-1Print      aspirin 325 MG tablet Take 325 mg by mouth dailyHistorical Med      Multiple Vitamins-Minerals (THERAPEUTIC MULTIVITAMIN-MINERALS) tablet Take 1 tablet by mouth dailyHistorical Med             Allergies     He is allergic to lisinopril and bee venom. Physical Exam     INITIAL VITALS: BP: (!) 144/77, Temp: 98.6 °F (37 °C), Pulse: 77, Resp: 18, SpO2: (!) 86 %   Physical Exam   Constitutional: He is oriented to person, place, and time. He appears well-developed and well-nourished. No distress. HENT:   Head: Normocephalic and atraumatic. Mouth/Throat: Oropharynx is clear and moist.   Eyes: Pupils are equal, round, and reactive to light. Conjunctivae and EOM are normal.   Neck: Normal range of motion. Neck supple. No tracheal deviation present. Cardiovascular: Normal rate and regular rhythm. 2/6 systolic murmur RUSB   Pulmonary/Chest: Effort normal and breath sounds normal. He has no wheezes. He has no rales. Abdominal: Soft. He exhibits no distension. There is no tenderness. Musculoskeletal: Normal range of motion. He exhibits edema (2+ pitting edema in bilateral LE). He exhibits no tenderness. Neurological: He is alert and oriented to person, place, and time. He has normal strength.  No cranial nerve deficit or sensory deficit. Skin: Skin is warm and dry. Capillary refill takes less than 2 seconds. He is not diaphoretic. Diagnostic Results     EKG   Interpreted inconjunction with emergency department physician Amanda Del Castillo, *  Rhythm: normal sinus   Rate: normal  Axis: normal  Ectopy: none  Conduction: normal  ST Segments: no acute change  T Waves: no acute change, t wave inversions in lateral leads  Q Waves: none  Clinical Impression: no acute changes  Comparison:  9/12/2018    RADIOLOGY:  XR CHEST PORTABLE   Final Result     No acute findings. XR CHEST 1 VW   Final Result      No acute pulmonary pathology or change from the prior exam                  VL Extremity Venous Bilateral   Final Result      US BREAST LIMITED LEFT   Final Result      Probable gynecomastia. Recommend elective left breast mammogram for correlation.  ACR 0      CT Head WO Contrast   Final Result          LABS:   Results for orders placed or performed during the hospital encounter of 06/04/19   CBC Auto Differential   Result Value Ref Range    WBC 9.3 4.0 - 11.0 K/uL    RBC 3.99 (L) 4.20 - 5.90 M/uL    Hemoglobin 11.3 (L) 13.5 - 17.5 g/dL    Hematocrit 34.7 (L) 40.5 - 52.5 %    MCV 86.9 80.0 - 100.0 fL    MCH 28.3 26.0 - 34.0 pg    MCHC 32.5 31.0 - 36.0 g/dL    RDW 15.3 12.4 - 15.4 %    Platelets 544 544 - 247 K/uL    MPV 6.3 5.0 - 10.5 fL    Neutrophils % 75.8 %    Lymphocytes % 12.4 %    Monocytes % 8.1 %    Eosinophils % 3.0 %    Basophils % 0.7 %    Neutrophils # 7.1 1.7 - 7.7 K/uL    Lymphocytes # 1.2 1.0 - 5.1 K/uL    Monocytes # 0.8 0.0 - 1.3 K/uL    Eosinophils # 0.3 0.0 - 0.6 K/uL    Basophils # 0.1 0.0 - 0.2 K/uL   Basic Metabolic Panel w/ Reflex to MG   Result Value Ref Range    Sodium 138 136 - 145 mmol/L    Potassium reflex Magnesium 3.8 3.5 - 5.1 mmol/L    Chloride 102 99 - 110 mmol/L    CO2 26 21 - 32 mmol/L    Anion Gap 10 3 - 16    Glucose 116 (H) 70 - 99 mg/dL    BUN 14 7 - 20 mg/dL    CREATININE 0.7 (L) 0.8 - 1.3 mg/dL    GFR Non-African American >60 >60    GFR African American >60 >60    Calcium 8.4 8.3 - 10.6 mg/dL   Troponin   Result Value Ref Range    Troponin 0.51 (HH) <0.01 ng/mL   Brain Natriuretic Peptide   Result Value Ref Range    Pro- (H) 0 - 124 pg/mL   TSH without Reflex   Result Value Ref Range    TSH 1.31 0.27 - 4.20 uIU/mL   T4   Result Value Ref Range    T4, Total 5.4 4.5 - 10.9 ug/dL   Troponin   Result Value Ref Range    Troponin 0.56 (HH) <0.01 ng/mL   Troponin   Result Value Ref Range    Troponin 0.55 (HH) <0.01 ng/mL   Troponin   Result Value Ref Range    Troponin 0.68 (HH) <0.01 ng/mL   CBC   Result Value Ref Range    WBC 7.6 4.0 - 11.0 K/uL    RBC 3.88 (L) 4.20 - 5.90 M/uL    Hemoglobin 11.1 (L) 13.5 - 17.5 g/dL    Hematocrit 33.8 (L) 40.5 - 52.5 %    MCV 87.1 80.0 - 100.0 fL    MCH 28.6 26.0 - 34.0 pg    MCHC 32.8 31.0 - 36.0 g/dL    RDW 15.0 12.4 - 15.4 %    Platelets 744 188 - 947 K/uL    MPV 6.1 5.0 - 10.5 fL   CBC   Result Value Ref Range    WBC 7.7 4.0 - 11.0 K/uL    RBC 3.88 (L) 4.20 - 5.90 M/uL    Hemoglobin 11.1 (L) 13.5 - 17.5 g/dL    Hematocrit 34.1 (L) 40.5 - 52.5 %    MCV 88.1 80.0 - 100.0 fL    MCH 28.6 26.0 - 34.0 pg    MCHC 32.4 31.0 - 36.0 g/dL    RDW 14.9 12.4 - 15.4 %    Platelets 067 496 - 959 K/uL    MPV 6.2 5.0 - 10.5 fL   APTT   Result Value Ref Range    aPTT 28.7 26.0 - 36.0 sec   Protime-INR   Result Value Ref Range    Protime 12.2 9.8 - 13.0 sec    INR 1.07 0.86 - 4.22   Basic metabolic panel   Result Value Ref Range    Sodium 137 136 - 145 mmol/L    Potassium 3.9 3.5 - 5.1 mmol/L    Chloride 101 99 - 110 mmol/L    CO2 27 21 - 32 mmol/L    Anion Gap 9 3 - 16    Glucose 177 (H) 70 - 99 mg/dL    BUN 12 7 - 20 mg/dL    CREATININE 0.7 (L) 0.8 - 1.3 mg/dL    GFR Non-African American >60 >60    GFR African American >60 >60    Calcium 8.4 8.3 - 10.6 mg/dL   Lipid, Fasting   Result Value Ref Range    Cholesterol, Fasting 151 0 - 199 mg/dL    Triglyceride, Fasting 167 (H) 0 - 150 mg/dL    HDL 30 (L) 40 - 60 mg/dL    LDL Calculated 88 <100 mg/dL    VLDL Cholesterol Calculated 33 Not Established mg/dL   Anti-Xa Unfract Heparin   Result Value Ref Range    Anti-XA Unfrac Heparin <0.04 (L) 0.30 - 0.70 IU/mL   HEPARIN LEVEL/ANTI-XA   Result Value Ref Range    Anti-XA Unfrac Heparin <0.04 (L) 0.30 - 0.70 IU/mL   CK   Result Value Ref Range    Total CK 1,080 (H) 39 - 308 U/L   EDGARDO Reflex to Antibody Cascade   Result Value Ref Range    EDGARDO POSITIVE (A) Negative    EDGARDO TITER <1:40     EDGARDO Interpretation see below    Sedimentation Rate   Result Value Ref Range    Sed Rate 36 (H) 0 - 20 mm/Hr   C-reactive protein   Result Value Ref Range    CRP 16.0 (H) 0.0 - 5.1 mg/L   Lipid Panel   Result Value Ref Range    Cholesterol, Total 150 0 - 199 mg/dL    Triglycerides 207 (H) 0 - 150 mg/dL    HDL 31 (L) 40 - 60 mg/dL    LDL Calculated 78 <100 mg/dL    VLDL Cholesterol Calculated 41 Not Established mg/dL   TSH with Reflex   Result Value Ref Range    TSH 1.24 0.27 - 4.20 uIU/mL   CBC   Result Value Ref Range    WBC 8.1 4.0 - 11.0 K/uL    RBC 4.21 4.20 - 5.90 M/uL    Hemoglobin 12.0 (L) 13.5 - 17.5 g/dL    Hematocrit 36.3 (L) 40.5 - 52.5 %    MCV 86.4 80.0 - 100.0 fL    MCH 28.5 26.0 - 34.0 pg    MCHC 33.0 31.0 - 36.0 g/dL    RDW 15.0 12.4 - 15.4 %    Platelets 207 845 - 417 K/uL    MPV 6.3 5.0 - 10.5 fL   Basic Metabolic Panel w/ Reflex to MG   Result Value Ref Range    Sodium 139 136 - 145 mmol/L    Potassium reflex Magnesium 3.9 3.5 - 5.1 mmol/L    Chloride 102 99 - 110 mmol/L    CO2 25 21 - 32 mmol/L    Anion Gap 12 3 - 16    Glucose 145 (H) 70 - 99 mg/dL    BUN 11 7 - 20 mg/dL    CREATININE 0.6 (L) 0.8 - 1.3 mg/dL    GFR Non-African American >60 >60    GFR African American >60 >60    Calcium 8.8 8.3 - 10.6 mg/dL   Heparin Level/Anti-XA   Result Value Ref Range    Anti-XA Unfrac Heparin <0.04 (L) 0.30 - 0.70 IU/mL   Heparin Level/Anti-XA   Result Value Ref Range    Anti-XA Unfrac Heparin 0.05 (L) 0.30 - 0.70 IU/mL   Troponin   Result Value Ref Range    Troponin 0.67 (HH) <0.01 ng/mL   EDGARDO Antibody Cascade   Result Value Ref Range    Anti-Centromere B IgG <0.2 0.0 - 0.9 AI    Anti-Chromatin IgG 0.2 0.0 - 0.9 AI    Anti-JO1 IgG <0.2 0.0 - 0.9 AI    Anti-Ribosomal P IgG 0.2 0.0 - 0.9 AI    Anti-RNP IgG 0.2 0.0 - 0.9 AI    Anti-Smith IgG <0.2 0.0 - 0.9 AI    Anti-SmRNP IgG <0.2 0.0 - 0.9 AI    Anti-dsDNA IgG 1 0 - 9 IU/mL    Anti-SCL70 IgG <0.2 0.0 - 0.9 AI    Anti-SS-A IgG >8.0 (H) 0.0 - 0.9 AI    Anti-SS-B IgG <0.2 0.0 - 0.9 AI   CBC   Result Value Ref Range    WBC 9.3 4.0 - 11.0 K/uL    RBC 4.14 (L) 4.20 - 5.90 M/uL    Hemoglobin 11.9 (L) 13.5 - 17.5 g/dL    Hematocrit 36.1 (L) 40.5 - 52.5 %    MCV 87.2 80.0 - 100.0 fL    MCH 28.7 26.0 - 34.0 pg    MCHC 32.8 31.0 - 36.0 g/dL    RDW 14.9 12.4 - 15.4 %    Platelets 003 648 - 188 K/uL    MPV 6.2 5.0 - 10.5 fL   Basic Metabolic Panel w/ Reflex to MG   Result Value Ref Range    Sodium 140 136 - 145 mmol/L    Potassium reflex Magnesium 4.3 3.5 - 5.1 mmol/L    Chloride 101 99 - 110 mmol/L    CO2 27 21 - 32 mmol/L    Anion Gap 12 3 - 16    Glucose 138 (H) 70 - 99 mg/dL    BUN 10 7 - 20 mg/dL    CREATININE 0.6 (L) 0.8 - 1.3 mg/dL    GFR Non-African American >60 >60    GFR African American >60 >60    Calcium 8.9 8.3 - 10.6 mg/dL   Ejection Fraction Percentage   Result Value Ref Range    Left Ventricular Ejection Fraction 55 %    LEFT VENTRICULAR EJECTION FRACTION MODE Cardiac Cath    POCT Glucose   Result Value Ref Range    POC Glucose 114 (H) 70 - 99 mg/dl    Performed on ACCU-CHEK    POCT Glucose   Result Value Ref Range    POC Glucose 126 (H) 70 - 99 mg/dl    Performed on ACCU-CHEK    POCT Glucose   Result Value Ref Range    POC Glucose 106 (H) 70 - 99 mg/dl    Performed on ACCU-CHEK    POCT Glucose   Result Value Ref Range    POC Glucose 168 (H) 70 - 99 mg/dl    Performed on ACCU-CHEK    POCT Glucose   Result Value Ref Range    POC Glucose 126 (H) 70 - 99 mg/dl °C),Pulse: 67, Resp: 16, SpO2: 98 %     Procedures     N/a    ED Course     Nursing Notes, Past Medical Hx, Past Surgical Hx, Social Hx, Allergies, and FamilyHx were reviewed.     The patient was giventhe following medications:  Orders Placed This Encounter   Medications    aspirin EC tablet 325 mg    DISCONTD: aspirin tablet 325 mg    DISCONTD: therapeutic multivitamin-minerals 1 tablet    DISCONTD: sodium chloride flush 0.9 % injection 10 mL    DISCONTD: sodium chloride flush 0.9 % injection 10 mL    DISCONTD: magnesium hydroxide (MILK OF MAGNESIA) 400 MG/5ML suspension 30 mL    DISCONTD: ondansetron (ZOFRAN) injection 4 mg    DISCONTD: atorvastatin (LIPITOR) tablet 40 mg    DISCONTD: acetaminophen (TYLENOL) tablet 650 mg    DISCONTD: carvedilol (COREG) tablet 3.125 mg    heparin (porcine) injection 7,750 Units    DISCONTD: heparin (porcine) injection 7,750 Units    DISCONTD: heparin (porcine) injection 3,900 Units    DISCONTD: heparin 25,000 units in dextrose 5% 250 mL infusion    DISCONTD: nitroGLYCERIN (NITROSTAT) SL tablet 0.4 mg    DISCONTD: insulin lispro (HUMALOG) injection pen 0-6 Units    DISCONTD: insulin lispro (HUMALOG) injection pen 0-3 Units    lidocaine PF 1 % injection 5 mL    DISCONTD: sodium chloride flush 0.9 % injection 10 mL    DISCONTD: sodium chloride flush 0.9 % injection 10 mL    0.9 % sodium chloride infusion    perflutren lipid microspheres (DEFINITY) injection 2.2 mg    ticagrelor (BRILINTA) tablet 180 mg    DISCONTD: ticagrelor (BRILINTA) tablet 90 mg    DISCONTD: aspirin chewable tablet 81 mg    DISCONTD: LORazepam (ATIVAN) tablet 0.5 mg    DISCONTD: heparin 25,000 units in dextrose 5% 250 mL infusion    potassium chloride (KLOR-CON M) extended release tablet 20 mEq    DISCONTD: glucose (GLUTOSE) 40 % oral gel 15 g    DISCONTD: dextrose 50 % IV solution    DISCONTD: glucagon (rDNA) injection 1 mg    DISCONTD: dextrose 5 % solution    ioversol (OPTIRAY) 74 % injection 150 mL    DISCONTD: acetaminophen (TYLENOL) tablet 650 mg    DISCONTD: hydrochlorothiazide (HYDRODIURIL) tablet 25 mg    DISCONTD: enoxaparin (LOVENOX) injection 40 mg    carvedilol (COREG) 3.125 MG tablet     Sig: Take 1 tablet by mouth 2 times daily (with meals)     Dispense:  60 tablet     Refill:  3    furosemide (LASIX) 20 MG tablet     Sig: Take 1 tablet by mouth daily     Dispense:  60 tablet     Refill:  3    potassium chloride (KLOR-CON M) 20 MEQ extended release tablet     Sig: Take 1 tablet by mouth daily     Dispense:  30 tablet     Refill:  5       CONSULTS:  IP CONSULT TO HOSPITALIST  IP CONSULT TO CARDIOLOGY  IP CONSULT TO SOCIAL WORK  IP CONSULT TO Ilsa Perry / LYNN / Hermelindo Wilmer is a 62 y.o. male with DMII, HTN, HLD who presents with mechanical fall while outside that occurred yesterday afternoon. CT head was negative for acute intracranial pathology, however troponin was found elevated to .51 then to .56. EKG was unchanged from prior with T-wave inversions in the lateral leads. Renal function is unchanged as well. He was given  and we will admit for ACS. This patient was also evaluated by the attending physician. All care plans were discussed and agreed upon. Clinical Impression     1.  NSTEMI (non-ST elevated myocardial infarction) [facility])        Disposition     PATIENT REFERRED TO:  Asiya Schwartz MD  [address] [facility] [address]  [phone]    In 1 week  follow up     [facility] AT [address]  [phone]    In 1 week  follow up     Author STACIE Banegas - YVON  [facility] [id number] [facility]  [phone]    In 1 week  For wound re-check, follow up     Sherry Mora, [address]  [phone]    In 1 week  For follow up      DISCHARGE

## 2019-06-05 ENCOUNTER — APPOINTMENT (OUTPATIENT)
Dept: VASCULAR LAB | Age: 59
DRG: 546 | End: 2019-06-05
Payer: COMMERCIAL

## 2019-06-05 ENCOUNTER — APPOINTMENT (OUTPATIENT)
Dept: ULTRASOUND IMAGING | Age: 59
DRG: 546 | End: 2019-06-05
Payer: COMMERCIAL

## 2019-06-05 ENCOUNTER — APPOINTMENT (OUTPATIENT)
Dept: GENERAL RADIOLOGY | Age: 59
DRG: 546 | End: 2019-06-05
Payer: COMMERCIAL

## 2019-06-05 LAB
ANION GAP SERPL CALCULATED.3IONS-SCNC: 9 MMOL/L (ref 3–16)
ANTI-XA UNFRAC HEPARIN: <0.04 IU/ML (ref 0.3–0.7)
APTT: 28.7 SEC (ref 26–36)
BUN BLDV-MCNC: 12 MG/DL (ref 7–20)
C-REACTIVE PROTEIN: 16 MG/L (ref 0–5.1)
CALCIUM SERPL-MCNC: 8.4 MG/DL (ref 8.3–10.6)
CHLORIDE BLD-SCNC: 101 MMOL/L (ref 99–110)
CHOLESTEROL, FASTING: 151 MG/DL (ref 0–199)
CHOLESTEROL, TOTAL: 150 MG/DL (ref 0–199)
CO2: 27 MMOL/L (ref 21–32)
CREAT SERPL-MCNC: 0.7 MG/DL (ref 0.8–1.3)
GFR AFRICAN AMERICAN: >60
GFR NON-AFRICAN AMERICAN: >60
GLUCOSE BLD-MCNC: 106 MG/DL (ref 70–99)
GLUCOSE BLD-MCNC: 114 MG/DL (ref 70–99)
GLUCOSE BLD-MCNC: 126 MG/DL (ref 70–99)
GLUCOSE BLD-MCNC: 168 MG/DL (ref 70–99)
GLUCOSE BLD-MCNC: 177 MG/DL (ref 70–99)
HCT VFR BLD CALC: 33.8 % (ref 40.5–52.5)
HCT VFR BLD CALC: 34.1 % (ref 40.5–52.5)
HDLC SERPL-MCNC: 30 MG/DL (ref 40–60)
HDLC SERPL-MCNC: 31 MG/DL (ref 40–60)
HEMOGLOBIN: 11.1 G/DL (ref 13.5–17.5)
HEMOGLOBIN: 11.1 G/DL (ref 13.5–17.5)
INR BLD: 1.07 (ref 0.86–1.14)
LDL CHOLESTEROL CALCULATED: 78 MG/DL
LDL CHOLESTEROL CALCULATED: 88 MG/DL
LV EF: 55 %
LVEF MODALITY: NORMAL
MCH RBC QN AUTO: 28.6 PG (ref 26–34)
MCH RBC QN AUTO: 28.6 PG (ref 26–34)
MCHC RBC AUTO-ENTMCNC: 32.4 G/DL (ref 31–36)
MCHC RBC AUTO-ENTMCNC: 32.8 G/DL (ref 31–36)
MCV RBC AUTO: 87.1 FL (ref 80–100)
MCV RBC AUTO: 88.1 FL (ref 80–100)
PDW BLD-RTO: 14.9 % (ref 12.4–15.4)
PDW BLD-RTO: 15 % (ref 12.4–15.4)
PERFORMED ON: ABNORMAL
PLATELET # BLD: 276 K/UL (ref 135–450)
PLATELET # BLD: 280 K/UL (ref 135–450)
PMV BLD AUTO: 6.1 FL (ref 5–10.5)
PMV BLD AUTO: 6.2 FL (ref 5–10.5)
POTASSIUM SERPL-SCNC: 3.9 MMOL/L (ref 3.5–5.1)
PROTHROMBIN TIME: 12.2 SEC (ref 9.8–13)
RBC # BLD: 3.88 M/UL (ref 4.2–5.9)
RBC # BLD: 3.88 M/UL (ref 4.2–5.9)
SEDIMENTATION RATE, ERYTHROCYTE: 36 MM/HR (ref 0–20)
SODIUM BLD-SCNC: 137 MMOL/L (ref 136–145)
TOTAL CK: 1080 U/L (ref 39–308)
TRIGL SERPL-MCNC: 207 MG/DL (ref 0–150)
TRIGLYCERIDE, FASTING: 167 MG/DL (ref 0–150)
TROPONIN: 0.55 NG/ML
TROPONIN: 0.68 NG/ML
VLDLC SERPL CALC-MCNC: 33 MG/DL
VLDLC SERPL CALC-MCNC: 41 MG/DL
WBC # BLD: 7.6 K/UL (ref 4–11)
WBC # BLD: 7.7 K/UL (ref 4–11)

## 2019-06-05 PROCEDURE — 6360000004 HC RX CONTRAST MEDICATION: Performed by: INTERNAL MEDICINE

## 2019-06-05 PROCEDURE — 86140 C-REACTIVE PROTEIN: CPT

## 2019-06-05 PROCEDURE — C1751 CATH, INF, PER/CENT/MIDLINE: HCPCS

## 2019-06-05 PROCEDURE — C8929 TTE W OR WO FOL WCON,DOPPLER: HCPCS

## 2019-06-05 PROCEDURE — 86038 ANTINUCLEAR ANTIBODIES: CPT

## 2019-06-05 PROCEDURE — 93005 ELECTROCARDIOGRAM TRACING: CPT | Performed by: FAMILY MEDICINE

## 2019-06-05 PROCEDURE — 36569 INSJ PICC 5 YR+ W/O IMAGING: CPT

## 2019-06-05 PROCEDURE — 6360000002 HC RX W HCPCS: Performed by: FAMILY MEDICINE

## 2019-06-05 PROCEDURE — 82550 ASSAY OF CK (CPK): CPT

## 2019-06-05 PROCEDURE — APPSS45 APP SPLIT SHARED TIME 31-45 MINUTES: Performed by: NURSE PRACTITIONER

## 2019-06-05 PROCEDURE — 2580000003 HC RX 258: Performed by: FAMILY MEDICINE

## 2019-06-05 PROCEDURE — 6370000000 HC RX 637 (ALT 250 FOR IP): Performed by: INTERNAL MEDICINE

## 2019-06-05 PROCEDURE — 80048 BASIC METABOLIC PNL TOTAL CA: CPT

## 2019-06-05 PROCEDURE — 85027 COMPLETE CBC AUTOMATED: CPT

## 2019-06-05 PROCEDURE — 85520 HEPARIN ASSAY: CPT

## 2019-06-05 PROCEDURE — 85610 PROTHROMBIN TIME: CPT

## 2019-06-05 PROCEDURE — 83516 IMMUNOASSAY NONANTIBODY: CPT

## 2019-06-05 PROCEDURE — 76642 ULTRASOUND BREAST LIMITED: CPT

## 2019-06-05 PROCEDURE — 80061 LIPID PANEL: CPT

## 2019-06-05 PROCEDURE — 6370000000 HC RX 637 (ALT 250 FOR IP): Performed by: FAMILY MEDICINE

## 2019-06-05 PROCEDURE — 94760 N-INVAS EAR/PLS OXIMETRY 1: CPT

## 2019-06-05 PROCEDURE — 86225 DNA ANTIBODY NATIVE: CPT

## 2019-06-05 PROCEDURE — 93970 EXTREMITY STUDY: CPT

## 2019-06-05 PROCEDURE — 84484 ASSAY OF TROPONIN QUANT: CPT

## 2019-06-05 PROCEDURE — 84443 ASSAY THYROID STIM HORMONE: CPT

## 2019-06-05 PROCEDURE — 99223 1ST HOSP IP/OBS HIGH 75: CPT | Performed by: INTERNAL MEDICINE

## 2019-06-05 PROCEDURE — 1200000000 HC SEMI PRIVATE

## 2019-06-05 PROCEDURE — 36415 COLL VENOUS BLD VENIPUNCTURE: CPT

## 2019-06-05 PROCEDURE — 85730 THROMBOPLASTIN TIME PARTIAL: CPT

## 2019-06-05 PROCEDURE — 71045 X-RAY EXAM CHEST 1 VIEW: CPT

## 2019-06-05 PROCEDURE — 86039 ANTINUCLEAR ANTIBODIES (ANA): CPT

## 2019-06-05 PROCEDURE — 2500000003 HC RX 250 WO HCPCS: Performed by: STUDENT IN AN ORGANIZED HEALTH CARE EDUCATION/TRAINING PROGRAM

## 2019-06-05 PROCEDURE — 05HD33Z INSERTION OF INFUSION DEVICE INTO RIGHT CEPHALIC VEIN, PERCUTANEOUS APPROACH: ICD-10-PCS | Performed by: INTERNAL MEDICINE

## 2019-06-05 PROCEDURE — 85652 RBC SED RATE AUTOMATED: CPT

## 2019-06-05 PROCEDURE — 2580000003 HC RX 258: Performed by: STUDENT IN AN ORGANIZED HEALTH CARE EDUCATION/TRAINING PROGRAM

## 2019-06-05 PROCEDURE — 86235 NUCLEAR ANTIGEN ANTIBODY: CPT

## 2019-06-05 RX ORDER — SODIUM CHLORIDE 0.9 % (FLUSH) 0.9 %
10 SYRINGE (ML) INJECTION EVERY 12 HOURS SCHEDULED
Status: DISCONTINUED | OUTPATIENT
Start: 2019-06-05 | End: 2019-06-07 | Stop reason: HOSPADM

## 2019-06-05 RX ORDER — SODIUM CHLORIDE 0.9 % (FLUSH) 0.9 %
10 SYRINGE (ML) INJECTION PRN
Status: DISCONTINUED | OUTPATIENT
Start: 2019-06-05 | End: 2019-06-07 | Stop reason: HOSPADM

## 2019-06-05 RX ORDER — SODIUM CHLORIDE 9 MG/ML
INJECTION, SOLUTION INTRAVENOUS ONCE
Status: COMPLETED | OUTPATIENT
Start: 2019-06-05 | End: 2019-06-05

## 2019-06-05 RX ORDER — ASPIRIN 81 MG/1
81 TABLET, CHEWABLE ORAL DAILY
Status: DISCONTINUED | OUTPATIENT
Start: 2019-06-06 | End: 2019-06-07 | Stop reason: HOSPADM

## 2019-06-05 RX ORDER — LIDOCAINE HYDROCHLORIDE 10 MG/ML
5 INJECTION, SOLUTION EPIDURAL; INFILTRATION; INTRACAUDAL; PERINEURAL ONCE
Status: COMPLETED | OUTPATIENT
Start: 2019-06-05 | End: 2019-06-05

## 2019-06-05 RX ADMIN — HEPARIN SODIUM AND DEXTROSE 7 UNITS/KG/HR: 10000; 5 INJECTION INTRAVENOUS at 02:20

## 2019-06-05 RX ADMIN — ACETAMINOPHEN 650 MG: 325 TABLET ORAL at 10:12

## 2019-06-05 RX ADMIN — ASPIRIN 325 MG ORAL TABLET 325 MG: 325 PILL ORAL at 10:12

## 2019-06-05 RX ADMIN — ACETAMINOPHEN 650 MG: 325 TABLET ORAL at 01:24

## 2019-06-05 RX ADMIN — ATORVASTATIN CALCIUM 40 MG: 40 TABLET, FILM COATED ORAL at 02:21

## 2019-06-05 RX ADMIN — SODIUM CHLORIDE: 9 INJECTION, SOLUTION INTRAVENOUS at 13:21

## 2019-06-05 RX ADMIN — Medication 10 ML: at 10:13

## 2019-06-05 RX ADMIN — CARVEDILOL 3.12 MG: 6.25 TABLET, FILM COATED ORAL at 17:45

## 2019-06-05 RX ADMIN — TICAGRELOR 180 MG: 90 TABLET ORAL at 20:00

## 2019-06-05 RX ADMIN — INSULIN LISPRO 1 UNITS: 100 INJECTION, SOLUTION INTRAVENOUS; SUBCUTANEOUS at 22:26

## 2019-06-05 RX ADMIN — LIDOCAINE HYDROCHLORIDE 5 ML: 10 INJECTION, SOLUTION EPIDURAL; INFILTRATION; INTRACAUDAL; PERINEURAL at 13:27

## 2019-06-05 RX ADMIN — PERFLUTREN 2.2 MG: 6.52 INJECTION, SUSPENSION INTRAVENOUS at 12:52

## 2019-06-05 RX ADMIN — CARVEDILOL 3.12 MG: 6.25 TABLET, FILM COATED ORAL at 10:15

## 2019-06-05 RX ADMIN — HEPARIN SODIUM 7750 UNITS: 5000 INJECTION INTRAVENOUS; SUBCUTANEOUS at 02:20

## 2019-06-05 RX ADMIN — MULTIPLE VITAMINS W/ MINERALS TAB 1 TABLET: TAB at 10:12

## 2019-06-05 RX ADMIN — HEPARIN SODIUM AND DEXTROSE 7 UNITS/KG/HR: 10000; 5 INJECTION INTRAVENOUS at 15:27

## 2019-06-05 ASSESSMENT — PAIN DESCRIPTION - PAIN TYPE
TYPE: ACUTE PAIN
TYPE: ACUTE PAIN

## 2019-06-05 ASSESSMENT — PAIN SCALES - GENERAL
PAINLEVEL_OUTOF10: 8
PAINLEVEL_OUTOF10: 8
PAINLEVEL_OUTOF10: 0
PAINLEVEL_OUTOF10: 8
PAINLEVEL_OUTOF10: 0
PAINLEVEL_OUTOF10: 0

## 2019-06-05 ASSESSMENT — PAIN DESCRIPTION - LOCATION
LOCATION: HEAD;KNEE
LOCATION: ARM;SHOULDER

## 2019-06-05 ASSESSMENT — PAIN DESCRIPTION - FREQUENCY
FREQUENCY: CONTINUOUS
FREQUENCY: CONTINUOUS

## 2019-06-05 ASSESSMENT — PAIN DESCRIPTION - DESCRIPTORS
DESCRIPTORS: ACHING
DESCRIPTORS: ACHING

## 2019-06-05 ASSESSMENT — PAIN DESCRIPTION - ONSET
ONSET: ON-GOING
ONSET: ON-GOING

## 2019-06-05 ASSESSMENT — PAIN - FUNCTIONAL ASSESSMENT
PAIN_FUNCTIONAL_ASSESSMENT: PREVENTS OR INTERFERES SOME ACTIVE ACTIVITIES AND ADLS
PAIN_FUNCTIONAL_ASSESSMENT: PREVENTS OR INTERFERES SOME ACTIVE ACTIVITIES AND ADLS

## 2019-06-05 ASSESSMENT — PAIN DESCRIPTION - ORIENTATION
ORIENTATION: RIGHT;LEFT
ORIENTATION: LEFT;RIGHT

## 2019-06-05 ASSESSMENT — PAIN DESCRIPTION - PROGRESSION
CLINICAL_PROGRESSION: NOT CHANGED

## 2019-06-05 NOTE — PROGRESS NOTES
Resident Progress Note    Admit Date: 2019    PCP: No primary care provider on file. : 1960  MRN: [id number]  CC: Fall and lower extremity weakness    Subjective: Interval History:     Patient seen this morning NAD, only complaint is feeling fatigued and weak to the lower extremities. No complaints of chest pain right now. Patient has pain to his left breast and is concerned for breast mass that is painful with palpation. Diet: DIET CLEAR LIQUID; Carb Control: 4 carb choices (60 gms)/meal; No Caffeine      Data:   Scheduled Meds:   lidocaine 1 % injection  5 mL Intradermal Once    sodium chloride flush  10 mL Intravenous 2 times per day    aspirin  325 mg Oral Daily    therapeutic multivitamin-minerals  1 tablet Oral Daily    sodium chloride flush  10 mL Intravenous 2 times per day    atorvastatin  40 mg Oral Nightly    carvedilol  3.125 mg Oral BID WC    insulin lispro  0-6 Units Subcutaneous TID WC    insulin lispro  0-3 Units Subcutaneous Nightly     Continuous Infusions:   heparin (porcine) 7 Units/kg/hr (19 0220)     PRN Meds:sodium chloride flush, sodium chloride flush, magnesium hydroxide, ondansetron, acetaminophen, heparin (porcine), heparin (porcine), nitroGLYCERIN  I/O last 3 completed shifts: In: 360 [P.O.:360]  Out: 300 [Urine:300]  I/O this shift:  In: -   Out: 350 [Urine:350]    Intake/Output Summary (Last 24 hours) at 2019 1055  Last data filed at 2019 1017  Gross per 24 hour   Intake 360 ml   Output 650 ml   Net -290 ml           Objective:     Vitals: /76   Pulse 71   Temp 98.1 °F (36.7 °C) (Oral)   Resp 20   Ht 5' 11\" (1.803 m)   Wt (!) 310 lb 13.6 oz (141 kg)   SpO2 100%   BMI 43.35 kg/m²     Physical Exam   Constitutional: He is oriented to person, place, and time. He appears well-developed and well-nourished. Obesity   HENT:   Head: Normocephalic and atraumatic. Eyes: Pupils are equal, round, and reactive to light.  EOM are normal.   Neck: Normal range of motion. Neck supple. Cardiovascular: Normal rate and regular rhythm. Exam reveals no gallop and no friction rub. No murmur heard. Pulmonary/Chest: Effort normal and breath sounds normal. No stridor. No respiratory distress. He has no wheezes. Abdominal: Soft. Bowel sounds are normal.   Musculoskeletal: Normal range of motion. He exhibits no edema or deformity. Neurological: He is alert and oriented to person, place, and time. Skin: Skin is warm and dry. Capillary refill takes less than 2 seconds. Psychiatric: He has a normal mood and affect. LABS:    CBC:   Recent Labs     06/04/19 1824   WBC 9.3   HGB 11.3*   HCT 34.7*   MCV 86.9                                                                   BMP:    Recent Labs     06/04/19 1824 06/05/19 0219    137   K 3.8 3.9    101   CO2 26 27   BUN 14 12   CREATININE 0.7* 0.7*   GLUCOSE 116* 177*       LFT's: No results for input(s): AST, ALT, ALB, BILITOT, ALKPHOS in the last 72 hours. Troponin:   Recent Labs     06/04/19 1824 06/04/19 2017 06/05/19 0219   TROPONINI 0.51* 0.56* 0.68*       BNP: No results for input(s): BNP in the last 72 hours. Lipids: No results for input(s): CHOL, HDL in the last 72 hours. Invalid input(s): LDLCALCU    ABGs: No results found for: PHART, YQT1EEV, PO2ART    INR:   Recent Labs     06/05/19 0219   INR 1.07       U/A:No results for input(s): NITRITE, COLORU, PHUR, LABCAST, WBCUA, RBCUA, MUCUS, TRICHOMONAS, YEAST, BACTERIA, CLARITYU, SPECGRAV, LEUKOCYTESUR, UROBILINOGEN, BILIRUBINUR, BLOODU, GLUCOSEU, AMORPHOUS in the last 72 hours. Invalid input(s): Valarie Pallas   -----------------------------------------------------------------  RAD:   US BREAST LIMITED LEFT   Final Result      Probable gynecomastia. Recommend elective left breast mammogram for correlation.  ACR 0      CT Head WO Contrast   Final Result      XR CHEST 1 VW    (Results Pending)   VL Extremity Venous Bilateral    (Results Pending)         Assessment/Plan:   Patient Is 61 y/o male presenting for fall and weakness found to have elevated troponin and ECG changes    Weakness and fatigue NSTEMI  - Patient was found to have ECG changes and elevated troponin and started on Heparin ggt in the ED  - Awaiting Cardiac echo  - Patient started on ASA, Statin and BB  - CXR pending  - Cardiology consulted appreciating recs    Lower extremity pain 2/2 DVT vs rhabdo   - Elevated CK but not quite 5x upper limit of normal  - will continue to monitor  - EDGARDO, CRP, ESR  - awaiting Lower extremity dopplers  - will give some fluid    DM2  - On Metformin at home will hold  - Low dose sliding scale   - Hypoglycemic protocol    Left Breast mass  - US of left breast showing a flame shaped hypoechoic mass 0.7 x 2.8 cm  - will need outpatient Mammogram    Code Status: Full  FEN: Clears  PPx: Heparin ggt  Dispo: GMF    Plan discussed with Dr. Mely Hernandez MD  6/5/2019  10:55 AM

## 2019-06-05 NOTE — H&P
[facility]    6/5/2019 4:16 AM    Patient Information:  Disha Dumont is a 62 y.o. male [id number]  PCP:  No primary care provider on file. (Tel: None )    Chief complaint:    Chief Complaint   Patient presents with   Mirna Munoz     yesterday at 34, tripped on pavement,     Head Injury    Knee Injury     bilateral        History of Present Illness:  Elizabeth Moore is a 62 y.o. male who presents with pain in his head and knees after he tripped and fell. He denies dizziness, vomiting. Noted to have trop 0.51 and flipped Ts in lateral leads on EKG which were new. He denies any chest pain, chest pressure, or shortness of breath. Denies syncope. Has some chronic low back pain but no upper back pain, jaw pain, arm pain. He has had some generalized fatigue for the last 1-2 months  No focal weakness or numbness. REVIEW OF SYSTEMS:   Constitutional: Negative for fever,chills or night sweats  ENT: Negative for rhinorrhea, epistaxis, hoarseness, sore throat. Respiratory: Negative for shortness of breath,wheezing  Cardiovascular: Negative for chest pain, palpitations   Gastrointestinal: Negative for nausea, vomiting, diarrhea  Genitourinary: Negative for polyuria, dysuria   Hematologic/Lymphatic: Negative for bleeding tendency, easy bruising  Musculoskeletal: + for myalgias and arthralgias  Neurologic: Negative for confusion,dysarthria. Skin: Negative for itching,rash  Psychiatric: Negative for depression,anxiety, agitation. Endocrine: Negative for polydipsia,polyuria,heat /cold intolerance. Past Medical History:   has a past medical history of Arthritis, Depression, Diabetes mellitus ([address] Herniated disc, cervical, Hypertension, Osteoporosis, and Peripheral neuropathy. Past Surgical History:   has a past surgical history that includes hernia repair.      Medications:       Current Outpatient Medications on File Prior to Encounter   Medication Sig Dispense Refill    ibuprofen (ADVIL;MOTRIN) 600 MG tablet Take 1 tablet by mouth every 6 hours as needed for Pain 30 tablet 0    metFORMIN (GLUCOPHAGE) 500 MG tablet Take 1 tablet by mouth 2 times daily (with meals) 60 tablet 1    hydrochlorothiazide (HYDRODIURIL) 25 MG tablet Take 1 tablet by mouth daily 30 tablet 1    aspirin 325 MG tablet Take 325 mg by mouth daily      Multiple Vitamins-Minerals (THERAPEUTIC MULTIVITAMIN-MINERALS) tablet Take 1 tablet by mouth daily         Allergies: Allergies   Allergen Reactions    Lisinopril Swelling    Bee Venom Swelling        Social History:   reports that he has never smoked. He has never used smokeless tobacco. He reports that he does not drink alcohol or use drugs. Family History:  Denies significant CAD. Physical Exam:  /72   Pulse 68   Temp 98.1 °F (36.7 °C) (Oral)   Resp 16   Ht 5' 11\" (1.803 m)   Wt (!) 310 lb 13.6 oz (141 kg)   SpO2 100%   BMI 43.35 kg/m²     General appearance:  Appears comfortable. Well nourished  Abrasion to forehead. Eyes: conjunctiva clear, sclera anicteric  ENT: Moist mucus membranes  Neck:  Supple, no masses  Cardiovascular: Regular rhythm, normal S1, S2. No murmur, gallop, rub. No edema in lower extremities  Respiratory: Clear to auscultation bilaterally, no wheeze, good inspiratory effort  Gastrointestinal: Abdomen soft, non-tender, not distended, normal bowel sounds  Musculoskeletal: strength symmetric  Neurology: awake and alert; no facial asymmetry, CN 2-12 appear intact  No speech or motor deficits  Psychiatry: Appropriate affect.  Not agitated, cooperative  Skin: Warm, dry, no rash    Labs:  CBC:   Lab Results   Component Value Date    WBC 9.3 06/04/2019    RBC 3.99 06/04/2019    HGB 11.3 06/04/2019    HCT 34.7 06/04/2019    MCV 86.9 06/04/2019    MCH 28.3 06/04/2019    MCHC 32.5 06/04/2019    RDW 15.3 06/04/2019     06/04/2019    MPV 6.3 06/04/2019     BMP: Lab Results   Component Value Date     06/05/2019    K 3.9 06/05/2019    K 3.8 06/04/2019     06/05/2019    CO2 27 06/05/2019    BUN 12 06/05/2019    CREATININE 0.7 06/05/2019    CALCIUM 8.4 06/05/2019    GFRAA >60 06/05/2019    LABGLOM >60 06/05/2019    GLUCOSE 177 06/05/2019     Pro   Trop 0.51, 0.56  TSH 1.31    Imaging:   CT head no acute    EKG:  NSR with inverted T's lateral.  Viewed by me. No ST elevations. Assessment/Plan:   Principal Problem:    NSTEMI (non-ST elevated myocardial infarction) ([facility] [address]  Active Problems:    Diabetes mellitus ([facility] [address]    Hypertension    Peripheral neuropathy    Generalized weakness  Resolved Problems:    * No resolved hospital problems. *      Asymptomatic NSTEMI in diabetic patient with neuropathy. Will continue ASA, add statin, beta blocker. Heparin drip. Consult Cardiology. Hold glucophage while NPO for possible LHC. Sliding scale insulin coverage. Check ECHO. Admit as inpatient. I anticipate hospitalization spanning more than two midnights for investigation and treatment of the above medically necessary diagnoses.       Marialuisa Awan MD    6/5/2019 4:16 AM

## 2019-06-05 NOTE — CONSULTS
CC/HPI:  62 y.o. with HTN, DM and hx left arm DVT who presented with weakness and mechanical fall. He was walking and tripped and fell but denies LOS. He also fell a month ago. He notes increased weakness for 1.5 months. He has chronic SOB with exertion and contributes it to being overweight. States he has occasional LE edema. Denies cp, LH/dizziness, palpitation or syncope. No orthopnea or GI/ bleeding. Denies hx CAD, stroke, HLD, COPD, CHF, A fib, liver, kidney or thyroid disease. Denies tobacco use. States his mother and brother dies in their 63's d/t CHF. Past Medical History:   Diagnosis Date    Arthritis     Depression     [address] mellitus ([facility] [address]     Herniated disc, cervical     Hypertension     Osteoporosis     Peripheral neuropathy      Past Surgical History:   Procedure Laterality Date    HERNIA REPAIR       History reviewed. No pertinent family history. Social History     Tobacco Use    Smoking status: Never Smoker    Smokeless tobacco: Never Used   Substance Use Topics    Alcohol use: No     Comment: occ    Drug use: No     Types: Cocaine     Comment: 50 years ago- no longer     Allergies:Lisinopril and Bee venom    Review of Systems  General: No changes in weight, fatigue, or night sweats. +weakness  HEENT: No blurry or decreased vision. No changes in hearing, nasal discharge or sore throat. Cardiovascular:  See HPI. Respiratory: No cough, hemoptysis, or wheezing. No history of asthma. Gastrointestinal:  No abdominal pain, hematochezia, melana, constipation, diarrhea, or history of GI ulcers. Genito-Urinary: No dysuria or hematuria. No urgency or polyuria. Musculoskeletal:  No complaints of joint pain, joint swelling or muscular weakness/soreness. Neurological:  No dizziness, headaches, numbness/tingling, speech problems or weakness. No history of a stroke or TIA. Psychological: + depression.   Hematological and Lymphatic: No abnormal bleeding or bruising, blood clots, jaundice or swollen lymph nodes. Endocrine:   No malaise/lethargy, palpitations, polydipsia/polyuria, temperature intolerance or unexpected weight changes. +DM   Skin:  No rashes or non-healing ulcers. Physical Exam:  /76   Pulse 71   Temp 98.1 °F (36.7 °C) (Oral)   Resp 20   Ht 5' 11\" (1.803 m)   Wt (!) 310 lb 13.6 oz (141 kg)   SpO2 100%   BMI 43.35 kg/m²    General (appearance):  No acute distress  Eyes: anicteric   Neck: soft, No JVD  Ears/Nose/Mouth/Thorat: No cyanosis  CV: RRR soft, CRUZ   Respiratory:  Clear, diminished in bases  GI: soft, non-tender, non-distended  Skin: Warm, dry. No rashes. Abrasion to forehead and bilateral knees. Neuro/Psych: Alert and oriented x 3. Appropriate behavior  Ext:  No c/c. Mild LE edema  Pulses:  2+ carotid. No bruit     Weight  Discharge: Today: Weight: (!) 310 lb 13.6 oz (141 kg)      CBC:   Lab Results   Component Value Date    WBC 9.3 2019    HGB 11.3 (L) 2019    HCT 34.7 (L) 2019    MCV 86.9 2019     2019     BMP:  Lab Results   Component Value Date    CREATININE 0.7 (L) 2019    BUN 12 2019     2019    K 3.9 2019     2019    CO2 27 2019     Mag: No results found for: MG  LIVER PROFILE: No results found for: ALT, AST, GGT, ALKPHOS, BILITOT  PT/INR:   Lab Results   Component Value Date    INR 1.07 2019    INR 0.93 2018    PROTIME 12.2 2019    PROTIME 10.6 2018     Pro-BNP   Lab Results   Component Value Date    PROBNP 298 2019    PROBNP 136 2018     LIPIDS:  No components found for: CHLPL  No results found for: TRIG  No results found for: HDL  No results found for: LDLCALC  No results found for: LABVLDL  TSH:  Lab Results   Component Value Date    TSH 1.31 2019    C9FOAWS 5.4 2019       IMAGIN/4/2019 CT head:     No acute stroke, mass, or hemorrhage. 2019 ECG: Sinus, Tw inversion.  No significant change from 9/2017      Medications:   Current Facility-Administered Medications   Medication Dose Route Frequency Provider Last Rate Last Dose    lidocaine PF 1 % injection 5 mL  5 mL Intradermal Once Leticia Yañez MD        sodium chloride flush 0.9 % injection 10 mL  10 mL Intravenous 2 times per day Leticia Yañez MD        sodium chloride flush 0.9 % injection 10 mL  10 mL Intravenous PRN Leticia Yañez MD        aspirin tablet 325 mg  325 mg Oral Daily Tea Bustos MD   325 mg at 06/05/19 1012    therapeutic multivitamin-minerals 1 tablet  1 tablet Oral Daily Tea Bustos MD   1 tablet at 06/05/19 1012    sodium chloride flush 0.9 % injection 10 mL  10 mL Intravenous 2 times per day Tea Bustos MD   10 mL at 06/05/19 1013    sodium chloride flush 0.9 % injection 10 mL  10 mL Intravenous PRN Tea Bustos MD        magnesium hydroxide (MILK OF MAGNESIA) 400 MG/5ML suspension 30 mL  30 mL Oral Daily PRN Tea Bustos MD        ondansetron [facility]) injection 4 mg  4 mg Intravenous Q6H PRN Tea Bustos MD        atorvastatin (LIPITOR) tablet 40 mg  40 mg Oral Nightly Tea Bustos MD   40 mg at 06/05/19 0221    acetaminophen (TYLENOL) tablet 650 mg  650 mg Oral Q4H PRN Tea Bustos MD   650 mg at 06/05/19 1012    carvedilol (COREG) tablet 3.125 mg  3.125 mg Oral BID  Tea Bustos MD   3.125 mg at 06/05/19 1015    heparin (porcine) injection 7,750 Units  60 Units/kg Intravenous PRN Tea Bustos MD        heparin (porcine) injection 3,900 Units  30 Units/kg Intravenous PRN Tea Bustos MD        heparin 25,000 units in dextrose 5% 250 mL infusion  7 Units/kg/hr Intravenous Continuous Tea Bustos MD 9.1 mL/hr at 06/05/19 0220 7 Units/kg/hr at 06/05/19 0220    nitroGLYCERIN (NITROSTAT) SL tablet 0.4 mg  0.4 mg Sublingual Q5 Min PRN Tea Bustos MD        insulin lispro (HUMALOG) injection pen 0-6 Units  0-6 Units Subcutaneous TID  Tea Bustos MD        insulin lispro (HUMALOG) injection pen 0-3 Units  0-3 Units Subcutaneous Nightly Agata Triplett MD         Current Outpatient Medications   Medication Sig Dispense Refill    ibuprofen (ADVIL;MOTRIN) 600 MG tablet Take 1 tablet by mouth every 6 hours as needed for Pain 30 tablet 0    metFORMIN (GLUCOPHAGE) 500 MG tablet Take 1 tablet by mouth 2 times daily (with meals) 60 tablet 1    hydrochlorothiazide (HYDRODIURIL) 25 MG tablet Take 1 tablet by mouth daily 30 tablet 1    aspirin 325 MG tablet Take 325 mg by mouth daily      Multiple Vitamins-Minerals (THERAPEUTIC MULTIVITAMIN-MINERALS) tablet Take 1 tablet by mouth daily         Assessment:  62 y.o. who presented with a mechanical fall. Has had increased weakness. Tn 0.51, 0.56, 0.68. Diffuse Tw inversion on ECG.    Issues:  -NSTEMI  -Weakness  -Fall  -HTN  -DM     Plan:  -Keep K>4, Mg>2.  -Echo  -LE venous duplex  -ASA, coreg  -Allergy to lisinopril  -Heparin gtt   -Check Lipids and may need statin  -Will need coronary angiogram.     Will discuss with Dr. Johana Wallace NP

## 2019-06-05 NOTE — CONSULTS
62 y.o. admitted to ER for elevated Tn.  Pt has had 2 falls in last 1.5-2 months. Feels very weak. Has \"weak knees. \"  Fatigued and weak in general.  No chest pain. Has some sob over the last couple of months, but nothing unusual.  Has some intermittent LE edema. No n/v/LH/dizziness. No syncope. Had a troponin checked in the ER (? Reason), but it was elevated in the 0.5's. Cardiology consulted. Past Medical History:   Diagnosis Date    Arthritis     Depression     Diabetes mellitus ([address]     Herniated disc, cervical     Hypertension     Osteoporosis     Peripheral neuropathy      Past Surgical History:   Procedure Laterality Date    HERNIA REPAIR       Social History     Tobacco Use    Smoking status: Never Smoker    Smokeless tobacco: Never Used   Substance Use Topics    Alcohol use: No     Comment: occ    Drug use: No     Types: Cocaine     Comment: 50 years ago- no longer     Allergies   Allergen Reactions    Lisinopril Swelling    Bee Venom Swelling     History reviewed. No pertinent family history. Review of Systems   General: + weak, fatigue. HEENT: No blurry or decreased vision. No changes in hearing, nasal discharge or sore throat. Cardiovascular: See HPI. No cramping in legs or buttocks when walking. Respiratory: No cough, hemoptysis, or wheezing. Gastrointestinal: No abdominal pain, hematochezia, melana, or history of GI ulcers. Genito-Urinary: No dysuria or hematuria. Musculoskeletal: No complaints of joint pain, joint swelling or muscular weakness/soreness. Neurological: No numbness/tingling, speech problems or weakness. No history of a stroke or TIA. Psychological: No anxiety or depression  Hematological and Lymphatic: No abnormal bleeding or bruising, blood clots, jaundice. Endocrine: No malaise/lethargy, palpitations, polydipsia/polyuria, temperature intolerance or unexpected weight changes. Skin: No rashes or non-healing ulcers.     PE:  Blood pressure

## 2019-06-05 NOTE — ED NOTES
Patient placed on continuous ECG monitoring due to elevated troponin.      Kaitlynn Robertson RN  06/04/19 [id number]

## 2019-06-05 NOTE — ED NOTES
Patient is currently sleeping. Will continue to allow patient to sleep undisturbed.       Jennifre Pineda RN  06/04/19 2903

## 2019-06-05 NOTE — PROGRESS NOTES
Difficulty drawing blood from patient. Heparin gtt started after baseline labs able to be drawn. Infusing at this time. Patient denies any chest pain or SOB. Troponin pending. Will monitor.

## 2019-06-05 NOTE — PROGRESS NOTES
Patient is on heparin gtts per MD order. Unable to bloodwork via peripheral access. MD notified and orders placed for central access. Per MD, hold heparin for 2 hours staring now (1100), in anticipation of line placement. Heparin marked as stopped in [facility]. Will need to be restarted around 1300/following placement of central access.

## 2019-06-05 NOTE — PROCEDURES
MIDLINE   PROCEDURE   NOTE      Chart reviewed for allergies, diagnosis, labs, known contraindications, reason for line placement and planned length of treatment. Insertion procedure discussed with patient/family member. Informed consent not required for Midline placement. Three patient identifiers - Patient name,   and MRN -  completed &  confirmed verbally. Hat, mask and eye shield donned. Midline site scrubbed with Chloraprep  One-Step applicator  for 30 seconds x 1. Hand Hygiene  performed with 3% Chlorhexidine surgical scrub x1 min prior to  Sterile gloves,   sterile gown being donned. Patient draped using maximal sterile barrier technique ( head to toe ). Midline site scrubbed a 2nd time with Chloraprep One-Step applicator x 30 sec. Modified Seldinger technique/ultrasound assisted and tip locating system utilized for insertion. 1% Lidocaine 5 ml injected interdermally pre-insertion. Vein located by   Ultra Sound and site marked with sterile pen. Midline  made by:   Bard      3 FR   Single lumen      Positive brisk blood return obtained Midline flushed with 10 mls  0.9% Sterile Sodium Chloride. Midline flushes easily with no resistance. Positive pressure valve placed on all lumens followed by Alcohol Swab Caps on end of each. Skin prep applied to site. Bio-patch in place. Catheter secured with non-sutured locking device per hospital protocol. Sterile Tegaderm applied over Midline site. Sterile field maintained during procedure. Guide wire accounted for post procedure? yes  Pt. Response to procedure, tolerated well. Appearance of site: Clean dry and intact without bleeding or edema. All edges of Tegaderm occlusive. Site marked with date and initials of RN placing line. Top 2 side rails in up position, call button in reach, RN notified of all of the above.

## 2019-06-05 NOTE — PROGRESS NOTES
Pt returned to ED room 28 via stretcher from Echo. Awaiting results/ further instruction to resume Heparin gtt. IV fluids started per order.

## 2019-06-05 NOTE — PROGRESS NOTES
Troponin drawn at  0219 increased to 0.68. [employer] message sent to Dr. Elvira Mendieta and made aware. Next troponin due at 0520. Patient difficult to draw blood from. OK draw next Troponin with Anti Xa at 0820 per Dr. Elvira Mendieta.

## 2019-06-06 ENCOUNTER — APPOINTMENT (OUTPATIENT)
Dept: CARDIAC CATH/INVASIVE PROCEDURES | Age: 59
DRG: 546 | End: 2019-06-06
Payer: COMMERCIAL

## 2019-06-06 ENCOUNTER — APPOINTMENT (OUTPATIENT)
Dept: GENERAL RADIOLOGY | Age: 59
DRG: 546 | End: 2019-06-06
Payer: COMMERCIAL

## 2019-06-06 LAB
ANA INTERPRETATION: ABNORMAL
ANA TITER: ABNORMAL
ANION GAP SERPL CALCULATED.3IONS-SCNC: 12 MMOL/L (ref 3–16)
ANTI-CENTROMERE B IGG: <0.2 AI (ref 0–0.9)
ANTI-CHROMATIN IGG: 0.2 AI (ref 0–0.9)
ANTI-DSDNA IGG: 1 IU/ML (ref 0–9)
ANTI-JO1 IGG: <0.2 AI (ref 0–0.9)
ANTI-NUCLEAR ANTIBODY (ANA): POSITIVE
ANTI-RIBOSOMAL P IGG: 0.2 AI (ref 0–0.9)
ANTI-RNP IGG: 0.2 AI (ref 0–0.9)
ANTI-SCL70 IGG: <0.2 AI (ref 0–0.9)
ANTI-SMITH IGG: <0.2 AI (ref 0–0.9)
ANTI-SMRNP IGG: <0.2 AI (ref 0–0.9)
ANTI-SS-A IGG: >8 AI (ref 0–0.9)
ANTI-SS-B IGG: <0.2 AI (ref 0–0.9)
ANTI-XA UNFRAC HEPARIN: 0.05 IU/ML (ref 0.3–0.7)
BUN BLDV-MCNC: 11 MG/DL (ref 7–20)
CALCIUM SERPL-MCNC: 8.8 MG/DL (ref 8.3–10.6)
CHLORIDE BLD-SCNC: 102 MMOL/L (ref 99–110)
CO2: 25 MMOL/L (ref 21–32)
CREAT SERPL-MCNC: 0.6 MG/DL (ref 0.8–1.3)
EKG ATRIAL RATE: 62 BPM
EKG ATRIAL RATE: 73 BPM
EKG DIAGNOSIS: NORMAL
EKG DIAGNOSIS: NORMAL
EKG P AXIS: 107 DEGREES
EKG P AXIS: 52 DEGREES
EKG P-R INTERVAL: 188 MS
EKG P-R INTERVAL: 202 MS
EKG Q-T INTERVAL: 388 MS
EKG Q-T INTERVAL: 424 MS
EKG QRS DURATION: 102 MS
EKG QRS DURATION: 104 MS
EKG QTC CALCULATION (BAZETT): 427 MS
EKG QTC CALCULATION (BAZETT): 430 MS
EKG R AXIS: 27 DEGREES
EKG R AXIS: 34 DEGREES
EKG T AXIS: 131 DEGREES
EKG T AXIS: 149 DEGREES
EKG VENTRICULAR RATE: 62 BPM
EKG VENTRICULAR RATE: 73 BPM
GFR AFRICAN AMERICAN: >60
GFR NON-AFRICAN AMERICAN: >60
GLUCOSE BLD-MCNC: 124 MG/DL (ref 70–99)
GLUCOSE BLD-MCNC: 126 MG/DL (ref 70–99)
GLUCOSE BLD-MCNC: 145 MG/DL (ref 70–99)
GLUCOSE BLD-MCNC: 197 MG/DL (ref 70–99)
GLUCOSE BLD-MCNC: 217 MG/DL (ref 70–99)
HCT VFR BLD CALC: 36.3 % (ref 40.5–52.5)
HEMOGLOBIN: 12 G/DL (ref 13.5–17.5)
LEFT VENTRICULAR EJECTION FRACTION MODE: NORMAL
LV EF: 55 %
MCH RBC QN AUTO: 28.5 PG (ref 26–34)
MCHC RBC AUTO-ENTMCNC: 33 G/DL (ref 31–36)
MCV RBC AUTO: 86.4 FL (ref 80–100)
PDW BLD-RTO: 15 % (ref 12.4–15.4)
PERFORMED ON: ABNORMAL
PLATELET # BLD: 303 K/UL (ref 135–450)
PMV BLD AUTO: 6.3 FL (ref 5–10.5)
POTASSIUM REFLEX MAGNESIUM: 3.9 MMOL/L (ref 3.5–5.1)
RBC # BLD: 4.21 M/UL (ref 4.2–5.9)
SODIUM BLD-SCNC: 139 MMOL/L (ref 136–145)
TROPONIN: 0.67 NG/ML
TSH REFLEX: 1.24 UIU/ML (ref 0.27–4.2)
WBC # BLD: 8.1 K/UL (ref 4–11)

## 2019-06-06 PROCEDURE — 93010 ELECTROCARDIOGRAM REPORT: CPT | Performed by: INTERNAL MEDICINE

## 2019-06-06 PROCEDURE — B2151ZZ FLUOROSCOPY OF LEFT HEART USING LOW OSMOLAR CONTRAST: ICD-10-PCS | Performed by: INTERNAL MEDICINE

## 2019-06-06 PROCEDURE — 2709999900 HC NON-CHARGEABLE SUPPLY

## 2019-06-06 PROCEDURE — 94761 N-INVAS EAR/PLS OXIMETRY MLT: CPT

## 2019-06-06 PROCEDURE — 6370000000 HC RX 637 (ALT 250 FOR IP): Performed by: FAMILY MEDICINE

## 2019-06-06 PROCEDURE — 2580000003 HC RX 258: Performed by: FAMILY MEDICINE

## 2019-06-06 PROCEDURE — 1200000000 HC SEMI PRIVATE

## 2019-06-06 PROCEDURE — 93458 L HRT ARTERY/VENTRICLE ANGIO: CPT | Performed by: INTERNAL MEDICINE

## 2019-06-06 PROCEDURE — 85027 COMPLETE CBC AUTOMATED: CPT

## 2019-06-06 PROCEDURE — 6370000000 HC RX 637 (ALT 250 FOR IP): Performed by: INTERNAL MEDICINE

## 2019-06-06 PROCEDURE — 71045 X-RAY EXAM CHEST 1 VIEW: CPT

## 2019-06-06 PROCEDURE — 99153 MOD SED SAME PHYS/QHP EA: CPT

## 2019-06-06 PROCEDURE — C1769 GUIDE WIRE: HCPCS

## 2019-06-06 PROCEDURE — 6360000002 HC RX W HCPCS

## 2019-06-06 PROCEDURE — 93458 L HRT ARTERY/VENTRICLE ANGIO: CPT

## 2019-06-06 PROCEDURE — 93005 ELECTROCARDIOGRAM TRACING: CPT | Performed by: FAMILY MEDICINE

## 2019-06-06 PROCEDURE — 4A023N7 MEASUREMENT OF CARDIAC SAMPLING AND PRESSURE, LEFT HEART, PERCUTANEOUS APPROACH: ICD-10-PCS | Performed by: INTERNAL MEDICINE

## 2019-06-06 PROCEDURE — 99152 MOD SED SAME PHYS/QHP 5/>YRS: CPT | Performed by: INTERNAL MEDICINE

## 2019-06-06 PROCEDURE — C1760 CLOSURE DEV, VASC: HCPCS

## 2019-06-06 PROCEDURE — 84484 ASSAY OF TROPONIN QUANT: CPT

## 2019-06-06 PROCEDURE — 2500000003 HC RX 250 WO HCPCS

## 2019-06-06 PROCEDURE — 2580000003 HC RX 258: Performed by: STUDENT IN AN ORGANIZED HEALTH CARE EDUCATION/TRAINING PROGRAM

## 2019-06-06 PROCEDURE — 6360000002 HC RX W HCPCS: Performed by: INTERNAL MEDICINE

## 2019-06-06 PROCEDURE — 6360000004 HC RX CONTRAST MEDICATION: Performed by: INTERNAL MEDICINE

## 2019-06-06 PROCEDURE — B2111ZZ FLUOROSCOPY OF MULTIPLE CORONARY ARTERIES USING LOW OSMOLAR CONTRAST: ICD-10-PCS | Performed by: INTERNAL MEDICINE

## 2019-06-06 PROCEDURE — 99152 MOD SED SAME PHYS/QHP 5/>YRS: CPT

## 2019-06-06 PROCEDURE — APPSS30 APP SPLIT SHARED TIME 16-30 MINUTES: Performed by: NURSE PRACTITIONER

## 2019-06-06 PROCEDURE — 6370000000 HC RX 637 (ALT 250 FOR IP)

## 2019-06-06 PROCEDURE — 80048 BASIC METABOLIC PNL TOTAL CA: CPT

## 2019-06-06 PROCEDURE — 85520 HEPARIN ASSAY: CPT

## 2019-06-06 PROCEDURE — 6370000000 HC RX 637 (ALT 250 FOR IP): Performed by: NURSE PRACTITIONER

## 2019-06-06 PROCEDURE — C1894 INTRO/SHEATH, NON-LASER: HCPCS

## 2019-06-06 RX ORDER — HEPARIN SODIUM 10000 [USP'U]/100ML
15 INJECTION, SOLUTION INTRAVENOUS CONTINUOUS
Status: DISCONTINUED | OUTPATIENT
Start: 2019-06-06 | End: 2019-06-06

## 2019-06-06 RX ORDER — LORAZEPAM 0.5 MG/1
0.5 TABLET ORAL EVERY 4 HOURS PRN
Status: DISCONTINUED | OUTPATIENT
Start: 2019-06-06 | End: 2019-06-07 | Stop reason: HOSPADM

## 2019-06-06 RX ORDER — DEXTROSE MONOHYDRATE 50 MG/ML
100 INJECTION, SOLUTION INTRAVENOUS PRN
Status: DISCONTINUED | OUTPATIENT
Start: 2019-06-06 | End: 2019-06-07 | Stop reason: HOSPADM

## 2019-06-06 RX ORDER — NICOTINE POLACRILEX 4 MG
15 LOZENGE BUCCAL PRN
Status: DISCONTINUED | OUTPATIENT
Start: 2019-06-06 | End: 2019-06-07 | Stop reason: HOSPADM

## 2019-06-06 RX ORDER — HYDROCHLOROTHIAZIDE 25 MG/1
25 TABLET ORAL DAILY
Status: DISCONTINUED | OUTPATIENT
Start: 2019-06-07 | End: 2019-06-07 | Stop reason: HOSPADM

## 2019-06-06 RX ORDER — DEXTROSE MONOHYDRATE 25 G/50ML
12.5 INJECTION, SOLUTION INTRAVENOUS PRN
Status: DISCONTINUED | OUTPATIENT
Start: 2019-06-06 | End: 2019-06-07 | Stop reason: HOSPADM

## 2019-06-06 RX ORDER — ACETAMINOPHEN 325 MG/1
650 TABLET ORAL EVERY 4 HOURS PRN
Status: DISCONTINUED | OUTPATIENT
Start: 2019-06-06 | End: 2019-06-06 | Stop reason: SDUPTHER

## 2019-06-06 RX ORDER — POTASSIUM CHLORIDE 20 MEQ/1
20 TABLET, EXTENDED RELEASE ORAL ONCE
Status: COMPLETED | OUTPATIENT
Start: 2019-06-06 | End: 2019-06-06

## 2019-06-06 RX ADMIN — LORAZEPAM 0.5 MG: 0.5 TABLET ORAL at 05:15

## 2019-06-06 RX ADMIN — POTASSIUM CHLORIDE 20 MEQ: 20 TABLET, EXTENDED RELEASE ORAL at 09:20

## 2019-06-06 RX ADMIN — INSULIN LISPRO 1 UNITS: 100 INJECTION, SOLUTION INTRAVENOUS; SUBCUTANEOUS at 23:33

## 2019-06-06 RX ADMIN — CARVEDILOL 3.12 MG: 6.25 TABLET, FILM COATED ORAL at 17:34

## 2019-06-06 RX ADMIN — ASPIRIN 81 MG 81 MG: 81 TABLET ORAL at 08:19

## 2019-06-06 RX ADMIN — IOVERSOL 150 ML: 741 INJECTION INTRA-ARTERIAL; INTRAVENOUS at 10:56

## 2019-06-06 RX ADMIN — HEPARIN SODIUM AND DEXTROSE 15 UNITS/KG/HR: 10000; 5 INJECTION INTRAVENOUS at 06:45

## 2019-06-06 RX ADMIN — Medication 10 ML: at 23:36

## 2019-06-06 RX ADMIN — INSULIN LISPRO 1 UNITS: 100 INJECTION, SOLUTION INTRAVENOUS; SUBCUTANEOUS at 18:18

## 2019-06-06 RX ADMIN — Medication 10 ML: at 23:33

## 2019-06-06 RX ADMIN — ACETAMINOPHEN 650 MG: 325 TABLET ORAL at 03:24

## 2019-06-06 RX ADMIN — MULTIPLE VITAMINS W/ MINERALS TAB 1 TABLET: TAB at 08:19

## 2019-06-06 RX ADMIN — Medication 10 ML: at 08:20

## 2019-06-06 ASSESSMENT — PAIN DESCRIPTION - PAIN TYPE: TYPE: ACUTE PAIN

## 2019-06-06 ASSESSMENT — PAIN DESCRIPTION - FREQUENCY: FREQUENCY: CONTINUOUS

## 2019-06-06 ASSESSMENT — PAIN DESCRIPTION - PROGRESSION
CLINICAL_PROGRESSION: NOT CHANGED
CLINICAL_PROGRESSION: NOT CHANGED

## 2019-06-06 ASSESSMENT — PAIN SCALES - GENERAL
PAINLEVEL_OUTOF10: 0
PAINLEVEL_OUTOF10: 6
PAINLEVEL_OUTOF10: 0

## 2019-06-06 ASSESSMENT — PAIN DESCRIPTION - ONSET: ONSET: ON-GOING

## 2019-06-06 ASSESSMENT — PAIN DESCRIPTION - ORIENTATION: ORIENTATION: LEFT

## 2019-06-06 ASSESSMENT — PAIN DESCRIPTION - LOCATION: LOCATION: ARM;SHOULDER

## 2019-06-06 ASSESSMENT — PAIN DESCRIPTION - DESCRIPTORS: DESCRIPTORS: ACHING

## 2019-06-06 NOTE — PROCEDURES
Nataly Oteror  62 y.o.  [id number]    Referring MD:  Dr. Jose L Hartley    Indication:  NSTEMI    After informed consent was obtained and history and exam reviewed, the patient was taken to the cardiac cath lab. The patient had both groins prepped and draped in sterile fashion. 1% Xylocaine was used to anesthetize the right groin. A needle was inserted into the femoral artery and a 5 Fr sheath was inserted. Continuous pulse-oximetry and ECG monitoring was performed, and frequent blood pressure assessment was performed. A 4 left Maria Guadalupe catheter was advanced through the sheath over a guide wire and advanced under fluoroscopic guidance into the ascending aorta. The wire was removed and the left main coronary artery was engaged. Digital angiograms were recorded. The JL4 catheter was then removed and a JR4 catheter was then advanced over the wire into the ascending aorta. The wire was removed, and the right coronary artery was engaged. Digital angiograms were recorded. The JR 4 catheter was then removed, and a pigtail catheter was advanced over the wire to the ascending aorta. The pigtail catheter was then placed into the left ventricle. Pressures were recorded. Ventriculography was performed. Post-ventriculography pressures were performed. The pigtail catheter was then removed. A femoral angiogram was performed. Hemostasis was obtained by Perclose proglide    Complications: None    Estimated blood loss: < 50 mL    Sedation: 1 mg Versed, 50 mcg Fentanyl  Sedation start: 1037  Sedation stop: 1059    Angiographic Findings:  Left dominant system  Left Main:  Normal    Left Anterior Descending:  Diffuse irregularities. Mid 20% stenosis. No lesion > 30%. Circumflex:  Very large caliber vessel. No significant disease. Right Coronary:  Non-dominant. No obstructive disease. Left Ventriculogram:  LVEF 55-60%. Femoral Angiogram:  No significant plaque.     Hemodynamics (mm Hg):  Left Ventricular

## 2019-06-06 NOTE — PROGRESS NOTES
Pt c/o labored breathing not improved with administration of oxygen. SpO2 10% on room air, RR 18, /70, HR 68. Pt has no complaints of chest pain, lightheadedness or dizziness. On-call hospitalist ordered. Orders received from STAT EKG, STAT CXR, STAT troponin. STAT EKG result returned, on-call hospitalist notified. STAT troponin 0.67, on-call hospitalist notified. Pt admininistered PRN ativan,upon reassessment pt in bed, eyes closed, respiratory rate >10. Will continue to monitor.  Electronically signed by Bailey River RN on 6/6/2019 at 8:22 AM

## 2019-06-06 NOTE — PROGRESS NOTES
S: s/p coronary angiogram. Denies cp or sob or right groin pain    Tele: Sinus    O:  Physical Exam:  BP (!) 165/76   Pulse 93   Temp 98.1 °F (36.7 °C) (Oral)   Resp 18   Ht 5' 11\" (1.803 m)   Wt (!) 315 lb 11.2 oz (143.2 kg)   SpO2 100%   BMI 44.03 kg/m²    General (appearance):  No acute distress  Eyes: anicteric   Neck: soft, No JVD  Ears/Nose/Mouth/Thorat: No cyanosis  CV: RRR   Respiratory:  Clear  GI: soft, non-tender, non-distended  Skin: Warm, dry. No rashes. Abrasion to forehead and both knees  Neuro/Psych: Alert and oriented x 3. Appropriate behavior  Ext:  No c/c.  No significatn edema  Pulses:  2+ right femoral. Right groin soft, no hematoma, + distal pulses     I.O's=     Weight  Admission: Weight: 285 lb (129.3 kg)   Today: Weight: (!) 315 lb 11.2 oz (143.2 kg)    CBC:   Recent Labs     06/04/19 1824 06/05/19  1445 06/06/19  0545   WBC 9.3 7.7  7.6 8.1   HGB 11.3* 11.1*  11.1* 12.0*   HCT 34.7* 34.1*  33.8* 36.3*   MCV 86.9 88.1  87.1 86.4    280  276 303     BMP:   Recent Labs     06/04/19 1824 06/05/19  0219 06/06/19  0545    137 139   K 3.8 3.9 3.9    101 102   CO2 26 27 25   BUN 14 12 11   CREATININE 0.7* 0.7* 0.6*     Mag: No results found for: MG     PT/INR:   Recent Labs     06/05/19 0219   PROTIME 12.2   INR 1.07     APTT:   Recent Labs     06/05/19 0219   APTT 28.7     Pro-BNP:   Lab Results   Component Value Date    PROBNP 298 06/04/2019    PROBNP 136 09/12/2018     CARDIAC ENZYMES:   Recent Labs     06/05/19 0219 06/05/19  1444 06/06/19  0545   CKTOTAL 1,080*  --   --    TROPONINI 0.68* 0.55* 0.67*     TSH:   Lab Results   Component Value Date    TSH 1.31 06/04/2019     LIPIDS:   Lab Results   Component Value Date    CHOL 150 06/05/2019     Lab Results   Component Value Date    TRIG 207 (H) 06/05/2019     Lab Results   Component Value Date    HDL 31 (L) 06/05/2019    HDL 30 (L) 06/05/2019     Lab Results   Component Value Date    LDLCALC 78 06/05/2019

## 2019-06-06 NOTE — PLAN OF CARE
Brief Pre-Op Note/Sedation Assessment      Eduin Steven  1960  [id number]  [id number]  10:24 AM    Planned Procedure: Cardiac Catheterization Procedure    Post Procedure Plan: Return to same level of care    Consent: I have discussed with the patient and/or the patient representative the indication, alternatives, and the possible risks and/or complications of the planned procedure and the anesthesia methods. The patient and/or patient representative appear to understand and agree to proceed. Chief Complaint: NSTEMI  Dyspnea  Fatigue      Indications for the Procedure:   CAD Presentation:  ACS > 24 hrs  Anginal Classification within 2 weeks:  CCS III - Symptoms with everyday living activities, i.e., moderate limitation (sob as anginal equivalent). NYHA Heart Failure Class within 2 weeks: No symptoms      Anti- Anginal Meds within 2 weeks:   ANTI-ANGINAL MEDS: Yes: Beta Blockers and Aspirin      Stress or Imaging Studies Performed:  None    Vital Signs:  /78   Pulse 68   Temp 97.5 °F (36.4 °C) (Oral)   Resp 16   Ht 5' 11\" (1.803 m)   Wt (!) 315 lb 11.2 oz (143.2 kg)   SpO2 98%   BMI 44.03 kg/m²     Allergies:   Allergies   Allergen Reactions    Lisinopril Swelling    Bee Venom Swelling       Past Medical History:  Past Medical History:   Diagnosis Date    Arthritis     Depression     Diabetes mellitus ([address]     Herniated disc, cervical     Hypertension     Osteoporosis     Peripheral neuropathy          Surgical History:  Past Surgical History:   Procedure Laterality Date    HERNIA REPAIR           Medications:  Current Facility-Administered Medications   Medication Dose Route Frequency Provider Last Rate Last Dose    LORazepam (ATIVAN) tablet 0.5 mg  0.5 mg Oral Q4H PRN Cr Phoenix MD   0.5 mg at 06/06/19 0515    heparin 25,000 units in dextrose 5% 250 mL infusion  15 Units/kg/hr Intravenous Continuous Rachid Serra MD 19.4 mL/hr at 06/06/19 0645 15 Units/kg/hr at 06/06/19 0645    glucose (GLUTOSE) 40 % oral gel 15 g  15 g Oral PRN Giovanni Oliveros MD        dextrose 50 % IV solution  12.5 g Intravenous PRN Giovanni Oliveros MD        glucagon (rDNA) injection 1 mg  1 mg Intramuscular PRN Giovanni Oliveros MD        dextrose 5 % solution  100 mL/hr Intravenous PRN Giovanni Oliveros MD        sodium chloride flush 0.9 % injection 10 mL  10 mL Intravenous 2 times per day Anthony Fabian MD   Stopped at 06/05/19 2205    sodium chloride flush 0.9 % injection 10 mL  10 mL Intravenous PRN Anthony Fabian MD        [facility]) tablet 90 mg  90 mg Oral BID Torsten Shook MD   Stopped at 06/06/19 5049    aspirin chewable tablet 81 mg  81 mg Oral Daily Torsten Shook MD   81 mg at 06/06/19 6389    therapeutic multivitamin-minerals 1 tablet  1 tablet Oral Daily Jayashlie Olivares MD   1 tablet at 06/06/19 0901    sodium chloride flush 0.9 % injection 10 mL  10 mL Intravenous 2 times per day Jayashlie Olivares MD   10 mL at 06/06/19 0820    sodium chloride flush 0.9 % injection 10 mL  10 mL Intravenous PRN Jay Olivares MD        magnesium hydroxide (MILK OF MAGNESIA) 400 MG/5ML suspension 30 mL  30 mL Oral Daily PRN Jayashlie Olivares MD        [facility]etron [facility]) injection 4 mg  4 mg Intravenous Q6H PRN Jayashlie Olivares MD        acetaminophen (TYLENOL) tablet 650 mg  650 mg Oral Q4H PRN Jayashlie Olivares MD   650 mg at 06/06/19 0324    carvedilol (COREG) tablet 3.125 mg  3.125 mg Oral BID  Jay Olivares MD   Stopped at 06/06/19 0746    heparin (porcine) injection 7,750 Units  60 Units/kg Intravenous PRN Jayashlie Olivares MD        heparin (porcine) injection 3,900 Units  30 Units/kg Intravenous PRN Jayashlie Olivares MD        nitroGLYCERIN (NITROSTAT) SL tablet 0.4 mg  0.4 mg Sublingual Q5 Min PRN Jayashlie Olivares MD        insulin lispro (HUMALOG) injection pen 0-6 Units  0-6 Units Subcutaneous TID ROSA Olivares MD   Stopped at 06/06/19 0819    insulin lispro (HUMALOG) injection pen 0-3 Units  0-3 Units Subcutaneous Nightly Irma Haynes MD   1 Units at 06/05/19 4536           Pre-Sedation:    Pre-Sedation Documentation and Exam:  I have personally completed a history, physical exam & review of systems for this patient (see notes). Prior History of Anesthesia Complications:   none    Modified Mallampati:  IV (only hard palate visible)    ASA Classification:  Class 3 - A patient with severe systemic disease that limits activity but is not incapacitating and Class 2 -- A normal healthy patient with mild systemic disease    Neva Scale: Activity:  2 - Able to move 4 extremities voluntarily on command  Respiration:  2 - Able to breathe deeply and cough freely  Circulation:  2 - BP+/- 20mmHg of normal  Consciousness:  2 - Fully awake  Oxygen Saturation (color):  2 - Able to maintain oxygen saturation >92% on room air    Sedation/Anesthesia Plan:  Guard the patient's safety and welfare. Minimize physical discomfort and pain. Minimize negative psychological responses to treatment by providing sedation and analgesia and maximize the potential amnesia. Patient to meet pre-procedure discharge plan.     Medication Planned:  midazolam intravenously, fentanyl intravenously    Patient is an appropriate candidate for plan of sedation: yes      Electronically signed by Robert Main MD on 6/6/2019 at 10:24 AM

## 2019-06-06 NOTE — PROGRESS NOTES
Resident Progress Note    Admit Date: 2019    PCP: No primary care provider on file. : 1960  MRN: [id number]  CC: weakness and fall    Subjective: Interval History:   Patient overnight without any issues. Patient is still tired and states that he did not get much sleep. Patient is oriented x3. Patient denies any fevers chills, SOB, chest pain, lower extremity swelling, abdominal pain. Patient states he is feeling better. Diet: Diet NPO, After Midnight Exceptions are: Sips with Meds      Data:   Scheduled Meds:   sodium chloride flush  10 mL Intravenous 2 times per day    ticagrelor  90 mg Oral BID    aspirin  81 mg Oral Daily    therapeutic multivitamin-minerals  1 tablet Oral Daily    sodium chloride flush  10 mL Intravenous 2 times per day    carvedilol  3.125 mg Oral BID WC    insulin lispro  0-6 Units Subcutaneous TID WC    insulin lispro  0-3 Units Subcutaneous Nightly     Continuous Infusions:   heparin (porcine) 15 Units/kg/hr (19 0645)     PRN Meds:LORazepam, sodium chloride flush, sodium chloride flush, magnesium hydroxide, ondansetron, acetaminophen, heparin (porcine), heparin (porcine), nitroGLYCERIN  I/O last 3 completed shifts: In: 2827 [P.O.:150; I.V.:1063]  Out: 1950 [Urine:1950]  No intake/output data recorded. Intake/Output Summary (Last 24 hours) at 2019 0737  Last data filed at 2019 0424  Gross per 24 hour   Intake 1213 ml   Output 1950 ml   Net -737 ml           Objective:     Vitals: BP (!) 160/70   Pulse 70   Temp 98.2 °F (36.8 °C) (Oral)   Resp 18   Ht 5' 11\" (1.803 m)   Wt (!) 315 lb 11.2 oz (143.2 kg)   SpO2 98%   BMI 44.03 kg/m²     Physical Exam   Constitutional: He is oriented to person, place, and time. He appears well-developed. HENT:   Head: Normocephalic and atraumatic. Eyes: Pupils are equal, round, and reactive to light. EOM are normal.   Neck: Normal range of motion. Neck supple. No JVD present. Cardiovascular: Normal rate, regular rhythm and normal heart sounds. Exam reveals no gallop and no friction rub. No murmur heard. Pulmonary/Chest: Effort normal and breath sounds normal. No stridor. No respiratory distress. He has no wheezes. He has no rales. Abdominal: Soft. Bowel sounds are normal. He exhibits no distension. There is no tenderness. There is no guarding. Musculoskeletal: Normal range of motion. He exhibits tenderness (left foot). He exhibits no edema or deformity. Neurological: He is alert and oriented to person, place, and time. No cranial nerve deficit. Skin: Skin is warm and dry. Capillary refill takes less than 2 seconds. Healing scalp abrasion   Psychiatric: He has a normal mood and affect. LABS:    CBC:   Recent Labs     06/04/19 1824 06/05/19 1445 06/06/19 0545   WBC 9.3 7.7  7.6 8.1   HGB 11.3* 11.1*  11.1* 12.0*   HCT 34.7* 34.1*  33.8* 36.3*   MCV 86.9 88.1  87.1 86.4    280  276 303                                                                BMP:    Recent Labs     06/04/19 1824 06/05/19 0219 06/06/19  0545    137 139   K 3.8 3.9 3.9    101 102   CO2 26 27 25   BUN 14 12 11   CREATININE 0.7* 0.7* 0.6*   GLUCOSE 116* 177* 145*       LFT's: No results for input(s): AST, ALT, ALB, BILITOT, ALKPHOS in the last 72 hours. Troponin:   Recent Labs     06/05/19 0219 06/05/19 1444 06/06/19 0545   TROPONINI 0.68* 0.55* 0.67*       BNP: No results for input(s): BNP in the last 72 hours. Lipids:   Recent Labs     06/05/19  1445   CHOL 150   HDL 31*       ABGs: No results found for: PHART, ESH2FEC, PO2ART    INR:   Recent Labs     06/05/19 0219   INR 1.07       U/A:No results for input(s): NITRITE, COLORU, PHUR, LABCAST, WBCUA, RBCUA, MUCUS, TRICHOMONAS, YEAST, BACTERIA, CLARITYU, SPECGRAV, LEUKOCYTESUR, UROBILINOGEN, BILIRUBINUR, BLOODU, GLUCOSEU, AMORPHOUS in the last 72 hours.     Invalid input(s): Garret Blanco -----------------------------------------------------------------  RAD:   XR CHEST PORTABLE   Final Result     No acute findings. XR CHEST 1 VW   Final Result      No acute pulmonary pathology or change from the prior exam                  VL Extremity Venous Bilateral   Final Result      US BREAST LIMITED LEFT   Final Result      Probable gynecomastia. Recommend elective left breast mammogram for correlation. ACR 0      CT Head WO Contrast   Final Result            Assessment/Plan:   Patient Is 61 y/o male presenting for fall and weakness found to have elevated troponin and ECG changes     Weakness and fatigue likley NSTEMI  - Patient was found to have ECG changes and elevated troponin and started on Heparin ggt in the ED  - Cardiac echo showing LBH ith EF 55% and no regional wall motion abnormalities.  Normal diastolic function  - Patient started on ASA, and BB  - CXR No acute findings  - -TSH pending  - Cardiology consulted Cardiac cath today Continue on Heparin     Lower extremity pain 2/2 DVT vs rhabdo vs myocitis  - Elevated CK but not quite 5x upper limit of normal  - will continue to monitor  -  CRP, ESR elevated, EDGARDO pending  - lower extremity dopplers negative for DVT  - Improvement with fluids     DM2  - On Metformin at home will hold  - Low dose sliding scale   - Hypoglycemic protocol     Left Breast mass  - US of left breast showing a flame shaped hypoechoic mass 0.7 x 2.8 cm  - will need outpatient Mammogram     Code Status: Full  FEN: NPO after midnight  PPx: Heparin ggt  Dispo: [facility]     Plan discussed with Dr. Stacy Cruz MD  6/6/2019  7:37 AM

## 2019-06-06 NOTE — PLAN OF CARE
Problem: Cardiac:  Goal: Hemodynamic stability will improve  Description  Hemodynamic stability will improve  Outcome: Ongoing   Pt has maintained stable vital signs overnight, with no complaints of chest pain, lightheadedness or dizziness. Pt did complain of some dyspnea this morning but this symptom resolved after administration of PRN ativan per MD order. Will continue to monitor.

## 2019-06-07 VITALS
WEIGHT: 315 LBS | SYSTOLIC BLOOD PRESSURE: 130 MMHG | HEART RATE: 67 BPM | BODY MASS INDEX: 44.1 KG/M2 | HEIGHT: 71 IN | OXYGEN SATURATION: 98 % | DIASTOLIC BLOOD PRESSURE: 62 MMHG | RESPIRATION RATE: 16 BRPM | TEMPERATURE: 98.2 F

## 2019-06-07 LAB
ANION GAP SERPL CALCULATED.3IONS-SCNC: 12 MMOL/L (ref 3–16)
BUN BLDV-MCNC: 10 MG/DL (ref 7–20)
CALCIUM SERPL-MCNC: 8.9 MG/DL (ref 8.3–10.6)
CHLORIDE BLD-SCNC: 101 MMOL/L (ref 99–110)
CO2: 27 MMOL/L (ref 21–32)
CREAT SERPL-MCNC: 0.6 MG/DL (ref 0.8–1.3)
GFR AFRICAN AMERICAN: >60
GFR NON-AFRICAN AMERICAN: >60
GLUCOSE BLD-MCNC: 138 MG/DL (ref 70–99)
GLUCOSE BLD-MCNC: 177 MG/DL (ref 70–99)
HCT VFR BLD CALC: 36.1 % (ref 40.5–52.5)
HEMOGLOBIN: 11.9 G/DL (ref 13.5–17.5)
MCH RBC QN AUTO: 28.7 PG (ref 26–34)
MCHC RBC AUTO-ENTMCNC: 32.8 G/DL (ref 31–36)
MCV RBC AUTO: 87.2 FL (ref 80–100)
PDW BLD-RTO: 14.9 % (ref 12.4–15.4)
PERFORMED ON: ABNORMAL
PLATELET # BLD: 318 K/UL (ref 135–450)
PMV BLD AUTO: 6.2 FL (ref 5–10.5)
POTASSIUM REFLEX MAGNESIUM: 4.3 MMOL/L (ref 3.5–5.1)
RBC # BLD: 4.14 M/UL (ref 4.2–5.9)
SODIUM BLD-SCNC: 140 MMOL/L (ref 136–145)
WBC # BLD: 9.3 K/UL (ref 4–11)

## 2019-06-07 PROCEDURE — 99232 SBSQ HOSP IP/OBS MODERATE 35: CPT | Performed by: NURSE PRACTITIONER

## 2019-06-07 PROCEDURE — 6370000000 HC RX 637 (ALT 250 FOR IP): Performed by: NURSE PRACTITIONER

## 2019-06-07 PROCEDURE — 85027 COMPLETE CBC AUTOMATED: CPT

## 2019-06-07 PROCEDURE — 6370000000 HC RX 637 (ALT 250 FOR IP): Performed by: INTERNAL MEDICINE

## 2019-06-07 PROCEDURE — 80048 BASIC METABOLIC PNL TOTAL CA: CPT

## 2019-06-07 PROCEDURE — 6370000000 HC RX 637 (ALT 250 FOR IP): Performed by: FAMILY MEDICINE

## 2019-06-07 RX ORDER — CARVEDILOL 3.12 MG/1
3.12 TABLET ORAL 2 TIMES DAILY WITH MEALS
Qty: 60 TABLET | Refills: 3 | Status: ON HOLD | OUTPATIENT
Start: 2019-06-07 | End: 2020-01-20

## 2019-06-07 RX ORDER — FUROSEMIDE 20 MG/1
20 TABLET ORAL DAILY
Qty: 60 TABLET | Refills: 3 | Status: SHIPPED | OUTPATIENT
Start: 2019-06-07 | End: 2019-10-03 | Stop reason: CLARIF

## 2019-06-07 RX ORDER — POTASSIUM CHLORIDE 20 MEQ/1
20 TABLET, EXTENDED RELEASE ORAL DAILY
Qty: 30 TABLET | Refills: 5 | Status: ON HOLD | OUTPATIENT
Start: 2019-06-07 | End: 2022-03-20 | Stop reason: ALTCHOICE

## 2019-06-07 RX ADMIN — HYDROCHLOROTHIAZIDE 25 MG: 25 TABLET ORAL at 07:53

## 2019-06-07 RX ADMIN — INSULIN LISPRO 1 UNITS: 100 INJECTION, SOLUTION INTRAVENOUS; SUBCUTANEOUS at 07:55

## 2019-06-07 RX ADMIN — CARVEDILOL 3.12 MG: 6.25 TABLET, FILM COATED ORAL at 07:54

## 2019-06-07 RX ADMIN — MULTIPLE VITAMINS W/ MINERALS TAB 1 TABLET: TAB at 07:54

## 2019-06-07 RX ADMIN — ASPIRIN 81 MG 81 MG: 81 TABLET ORAL at 07:54

## 2019-06-07 ASSESSMENT — PAIN SCALES - GENERAL
PAINLEVEL_OUTOF10: 0
PAINLEVEL_OUTOF10: 0

## 2019-06-07 NOTE — DISCHARGE SUMMARY
INTERNAL MEDICINE DEPARTMENT AT [address] [place] [address]  DISCHARGE SUMMARY    Patient ID: Gale Ashton  Discharge Date: 6/7/19  Patient's PCP: No primary care provider on file. Discharge Physician: Shayy Rooney MD  Admit Date: 6/4/2019  Admitting Physician: Nasreen Valencia MD      DISCHARGE DIAGNOSES:  1- Elevated troponin  2- Polymyositis  3- Breast mass  4- HTN  5- DM2    Hospital Course:    Patient is a 62 y.o. male who presents with pain in his head and knees after he tripped and fell. Patient had been feeling weak recently with muscle aches for the past week. Patient in the ED was found to have an elevated troponin and inverted t waves on ECG but no ACS complaints. Patient was promptly started on Heparin and cardiac meds and cardiology was consulted for angiogram which did not show coronary artery disease. Patient however did have an elevated CK, positive EDGARDO, and Positive Anti SSA. Patient has a family history of polymyositis which he likely has as well. Patient was also complaining of a painful left breast mass which was suspicious on US. Patient will need a mammogram as outpatient ASAP as his brother also had breast cancer. Patient was given referral to see Rheumatology in 1 weeks, Resident Medicine clinic in 1 weeks, and Cardiology in 1 week. Physical Exam:  /62   Pulse 67   Temp 98.2 °F (36.8 °C) (Oral)   Resp 16   Ht 5' 11\" (1.803 m)   Wt (!) 315 lb 0.6 oz (142.9 kg)   SpO2 98%   BMI 43.94 kg/m²   General appearance: alert, appears stated age and cooperative  Physical Exam   Constitutional: He is oriented to person, place, and time. He appears well-developed and well-nourished. HENT:   Head: Normocephalic and atraumatic. Eyes: Pupils are equal, round, and reactive to light. EOM are normal.   Neck: Normal range of motion. Neck supple. No JVD present. Cardiovascular: Normal rate, regular rhythm and normal heart sounds. Exam reveals no friction rub. No murmur heard.   Pulmonary/Chest: Effort normal and breath sounds normal. No stridor. No respiratory distress. He has no wheezes. Abdominal: Soft. Bowel sounds are normal. He exhibits no distension. There is no tenderness. There is no guarding. Musculoskeletal: Normal range of motion. He exhibits no edema or deformity. Neurological: He is alert and oriented to person, place, and time. No cranial nerve deficit. Skin: Skin is warm and dry. No erythema. Psychiatric: He has a normal mood and affect. Consults: cardiology  Significant Diagnostic Studies:  CXR, Breast US  Treatments: IV hydration and procedures: Cardiac Cath  Disposition: home  Discharged Condition: Stable  Follow Up: [facility], Cardiology, and Rheumatology  in one week    DISCHARGE MEDICATION:     Medication List      START taking these medications    carvedilol 3.125 MG tablet  Commonly known as:  COREG  Take 1 tablet by mouth 2 times daily (with meals)     furosemide 20 MG tablet  Commonly known as:  LASIX  Take 1 tablet by mouth daily     potassium chloride 20 MEQ extended release tablet  Commonly known as:  KLOR-CON M  Take 1 tablet by mouth daily        CONTINUE taking these medications    aspirin 325 MG tablet     hydrochlorothiazide 25 MG tablet  Commonly known as:  HYDRODIURIL  Take 1 tablet by mouth daily     ibuprofen 600 MG tablet  Commonly known as:  ADVIL;MOTRIN  Take 1 tablet by mouth every 6 hours as needed for Pain     metFORMIN 500 MG tablet  Commonly known as:  GLUCOPHAGE  Take 1 tablet by mouth 2 times daily (with meals)     therapeutic multivitamin-minerals tablet           Where to Get Your Medications      These medications were sent to [facility] [place], [address] Katlin Mott [phone] - F [phone] [address] [facility] [address]    Phone:  [phone]   · carvedilol 3.125 MG tablet  · furosemide 20 MG tablet  · potassium chloride 20 MEQ extended release tablet       Activity: activity as tolerated  Diet: cardiac diet  Wound Care: none needed    Time Spent on discharge is more than 45 minutes    Signed:  Jamel Alcantar MD   6/7/2019

## 2019-06-11 ENCOUNTER — OFFICE VISIT (OUTPATIENT)
Dept: CARDIOLOGY CLINIC | Age: 59
End: 2019-06-11
Payer: COMMERCIAL

## 2019-06-11 VITALS
BODY MASS INDEX: 41.84 KG/M2 | WEIGHT: 300 LBS | SYSTOLIC BLOOD PRESSURE: 128 MMHG | OXYGEN SATURATION: 99 % | HEART RATE: 79 BPM | DIASTOLIC BLOOD PRESSURE: 62 MMHG

## 2019-06-11 DIAGNOSIS — I10 ESSENTIAL HYPERTENSION: ICD-10-CM

## 2019-06-11 DIAGNOSIS — I21.4 NSTEMI (NON-ST ELEVATED MYOCARDIAL INFARCTION) (HCC): Primary | ICD-10-CM

## 2019-06-11 DIAGNOSIS — R94.30 ELEVATED LEFT VENTRICULAR END-DIASTOLIC PRESSURE (LVEDP): ICD-10-CM

## 2019-06-11 DIAGNOSIS — Z98.890 S/P CORONARY ANGIOGRAM: ICD-10-CM

## 2019-06-11 PROCEDURE — 99214 OFFICE O/P EST MOD 30 MIN: CPT | Performed by: NURSE PRACTITIONER

## 2019-06-11 PROCEDURE — 1036F TOBACCO NON-USER: CPT | Performed by: NURSE PRACTITIONER

## 2019-06-11 PROCEDURE — G8598 ASA/ANTIPLAT THER USED: HCPCS | Performed by: NURSE PRACTITIONER

## 2019-06-11 PROCEDURE — G8427 DOCREV CUR MEDS BY ELIG CLIN: HCPCS | Performed by: NURSE PRACTITIONER

## 2019-06-11 PROCEDURE — 3017F COLORECTAL CA SCREEN DOC REV: CPT | Performed by: NURSE PRACTITIONER

## 2019-06-11 PROCEDURE — 1111F DSCHRG MED/CURRENT MED MERGE: CPT | Performed by: NURSE PRACTITIONER

## 2019-06-11 PROCEDURE — G8417 CALC BMI ABV UP PARAM F/U: HCPCS | Performed by: NURSE PRACTITIONER

## 2019-06-11 NOTE — PROGRESS NOTES
CC/HPI:  62 y.o. patient of Dr. Ben Lu with HTN, DM and weakness who was hospitalized with weakness and fall and had NSTEMI. Coronary angiogram demonstrated no obstructive CAD and an elevated LVEDP. He continues to have weakness, LH/dizziness and SOB. No CP, palpitations, LE edema or GI/ bleeding. Denies falls. Tolerating medications. Denies right groin pain. Past Medical History:   Diagnosis Date    Arthritis     Depression     Diabetes mellitus ([address]     Herniated disc, cervical     Hypertension     Osteoporosis     Peripheral neuropathy      Past Surgical History:   Procedure Laterality Date    HERNIA REPAIR       No family history on file. Social History     Tobacco Use    Smoking status: Never Smoker    Smokeless tobacco: Never Used   Substance Use Topics    Alcohol use: No     Comment: occ    Drug use: No     Types: Cocaine     Comment: 50 years ago- no longer     Allergies:Lisinopril and Bee venom    Review of Systems  General: No changes in weight, or night sweats. + weakness  HEENT: No blurry or decreased vision. No changes in hearing, nasal discharge or sore throat. Cardiovascular:  See HPI. Respiratory: No cough, hemoptysis, or wheezing. No history of asthma. +SOB  Gastrointestinal:  No abdominal pain, hematochezia, melana, constipation, diarrhea, or history of GI ulcers. Genito-Urinary: No dysuria or hematuria. No urgency or polyuria. Musculoskeletal:  No complaints of joint pain, joint swelling or muscular weakness/soreness. + arthritis   Neurological:  No  headaches, numbness/tingling, speech problems. No history of a stroke or TIA. + weakness, LH/dizziness. + neuropathy  Psychological:  + depression. Hematological and Lymphatic: No abnormal bleeding or bruising, blood clots, jaundice or swollen lymph nodes. Endocrine:   No malaise/lethargy, palpitations, polydipsia/polyuria, temperature intolerance or unexpected weight changes.  +DM   Skin:  No rashes or non-healing ulcers. Physical Exam:  /62   Pulse 79   Wt 300 lb (136.1 kg)   SpO2 99%   BMI 41.84 kg/m²    General (appearance):  No acute distress  Eyes: anicteric   Neck: soft, No JVD  Ears/Nose/Mouth/Thorat: No cyanosis  CV: RRR   Respiratory:  Clear  GI: soft, non-tender, non-distended  Skin: Warm, dry. No rashes. Abrasion to forehead. Neuro/Psych: Alert and oriented x 3. Appropriate behavior  Ext:  No c/c. No significant edema  Pulses:  2+ right femoral. Right groin soft, no hematoma. Weight  Discharge: Today: Weight: 300 lb (136.1 kg)      CBC:   Lab Results   Component Value Date    WBC 9.3 2019    HGB 11.9 (L) 2019    HCT 36.1 (L) 2019    MCV 87.2 2019     2019     BMP:  Lab Results   Component Value Date    CREATININE 0.6 (L) 2019    BUN 10 2019     2019    K 4.3 2019     2019    CO2 27 2019     Mag: No results found for: MG  LIVER PROFILE: No results found for: ALT, AST, GGT, ALKPHOS, BILITOT  PT/INR:   Lab Results   Component Value Date    INR 1.07 2019    INR 0.93 2018    PROTIME 12.2 2019    PROTIME 10.6 2018     Pro-BNP   Lab Results   Component Value Date    PROBNP 298 2019    PROBNP 136 2018     LIPIDS:  No components found for: CHLPL  Lab Results   Component Value Date    TRIG 207 (H) 2019     Lab Results   Component Value Date    HDL 31 (L) 2019    HDL 30 (L) 2019     Lab Results   Component Value Date    LDLCALC 78 2019    [address] 2019     Lab Results   Component Value Date    LABVLDL 41 2019    LABVLDL 33 2019     TSH:  Lab Results   Component Value Date    TSH 1.31 2019    [id number] 5.4 2019       IMAGIN/6/2019 Coronary angiogram  Angiographic Findings:  Left dominant system  Left Main:  Normal  Left Anterior Descending:  Diffuse irregularities. Mid 20% stenosis. No lesion > 30%.   Circumflex:  Very large caliber vessel. No significant disease. Right Coronary:  Non-dominant. No obstructive disease. Left Ventriculogram:  LVEF 55-60%. Femoral Angiogram:  No significant plaque. Hemodynamics (mm Hg):  Left Ventricular Pressure:  156/6, 27  Central Aortic Pressure:  139/67 (91)     6/5/2019 Echo   Technically difficult study. Definity contrast administered. Left   ventricular cavity size appears mildly enlarged. There is mild concentric   left ventricular hypertrophy. Overall left ventricular systolic function   appears normal with an estimated ejection fraction of 55%. No regional wall   motion abnormalities are noted. Diastolic filling parameters suggests normal   diastolic function. Bobby Rumble mitral regurgitation is present. Estimated   pulmonary artery systolic pressure is at 15 mmHg assuming a right atrial  pressure of 3 mmHg     ECG: NSR with TWI anterolaterally but also lead II     6/4/2019 CT Head: No acute stroke, mass, or hemorrhage     6/5/19 CXR: No obvious infiltrates    Medications:   Current Outpatient Medications   Medication Sig Dispense Refill    carvedilol (COREG) 3.125 MG tablet Take 1 tablet by mouth 2 times daily (with meals) 60 tablet 3    furosemide (LASIX) 20 MG tablet Take 1 tablet by mouth daily 60 tablet 3    potassium chloride (KLOR-CON M) 20 MEQ extended release tablet Take 1 tablet by mouth daily 30 tablet 5    ibuprofen (ADVIL;MOTRIN) 600 MG tablet Take 1 tablet by mouth every 6 hours as needed for Pain 30 tablet 0    metFORMIN (GLUCOPHAGE) 500 MG tablet Take 1 tablet by mouth 2 times daily (with meals) 60 tablet 1    hydrochlorothiazide (HYDRODIURIL) 25 MG tablet Take 1 tablet by mouth daily 30 tablet 1    aspirin 325 MG tablet Take 325 mg by mouth daily      Multiple Vitamins-Minerals (THERAPEUTIC MULTIVITAMIN-MINERALS) tablet Take 1 tablet by mouth daily       No current facility-administered medications for this visit. Assessment:  1.  NSTEMI (non-ST elevated myocardial infarction) ([facility] [address]    2. Essential hypertension    3. Elevated left ventricular end-diastolic pressure (LVEDP)    4. S/P coronary angiogram        Plan:  Cont coreg, HCTZ, ASA  Cont lasix and KCL  Follow up with Dr. Chester Layton in 1 month     No statin.  CK elevated and LDL 78

## 2019-06-22 NOTE — ED PROVIDER NOTES
[facility] RESIDENT NOTE       Date of evaluation: 6/4/2019    Chief Complaint     Fall (yesterday at 34, tripped on pavement, ); Head Injury; and Knee Injury (bilateral)      History of Present Illness     Santy Negron is a 62 y.o. male with DMII, HTN, HLD who presents with mechanical fall while outside that occurred yesterday afternoon. He hit his head, but does not take any anticoagulants. He denies any prodromal symptoms of nausea, vomiting, dizziness, lightheadedness, diaphoresis. He states that he has had generalized weakness for about 1.5 months, worse in the lower extremities. Nothing improves the weakness and it is not worsened with prolonged activity. He does not smoke or drink alcohol and he has not noticed any new focal motor or sensory deficits after the fall. Review of Systems     Review of Systems  Constitutional: Negative for chills, fatigue and fever. HENT: Negative for congestion, sinus pressure, sinus pain and sore throat. Eyes: Negative for photophobia and visual disturbance. Respiratory: Positive for shortness of breath (chronic, unchanged). Negative for cough. Cardiovascular: Negative for chest pain, palpitations and leg swelling. Gastrointestinal: Negative for abdominal pain, nausea and vomiting. Endocrine: Negative for polydipsia and polyphagia. Genitourinary: Negative for dysuria, frequency and urgency. Musculoskeletal: Positive for neck pain. Negative for arthralgias. Skin: Negative for pallor and rash. Neurological: Positive for weakness (Generalized). Negative for dizziness and light-headedness. Psychiatric/Behavioral: Negative for behavioral problems and confusion. Past Medical, Surgical, Family, and Social History     He has a past medical history of Arthritis, Depression, Diabetes mellitus ([address] Herniated disc, cervical, Hypertension, Osteoporosis, and Peripheral neuropathy.   He has a past surgical history that includes hernia repair. His family history is not on file. He reports that he has never smoked. He has never used smokeless tobacco. He reports that he does not drink alcohol or use drugs. Medications     Discharge Medication List as of 6/7/2019 10:42 AM      CONTINUE these medications which have NOT CHANGED    Details   ibuprofen (ADVIL;MOTRIN) 600 MG tablet Take 1 tablet by mouth every 6 hours as needed for Pain, Disp-30 tablet, R-0Print      metFORMIN (GLUCOPHAGE) 500 MG tablet Take 1 tablet by mouth 2 times daily (with meals), Disp-60 tablet, R-1Print      hydrochlorothiazide (HYDRODIURIL) 25 MG tablet Take 1 tablet by mouth daily, Disp-30 tablet, R-1Print      aspirin 325 MG tablet Take 325 mg by mouth dailyHistorical Med      Multiple Vitamins-Minerals (THERAPEUTIC MULTIVITAMIN-MINERALS) tablet Take 1 tablet by mouth dailyHistorical Med             Allergies     He is allergic to lisinopril and bee venom. Physical Exam     INITIAL VITALS: BP: (!) 144/77, Temp: 98.6 °F (37 °C), Pulse: 77, Resp: 18, SpO2: (!) 86 %   Physical Exam  Constitutional: He is oriented to person, place, and time. He appears well-developed and well-nourished. No distress. HENT:   Head: Normocephalic and atraumatic. Mouth/Throat: Oropharynx is clear and moist.   Eyes: Pupils are equal, round, and reactive to light. Conjunctivae and EOM are normal.   Neck: Normal range of motion. Neck supple. No tracheal deviation present. Cardiovascular: Normal rate and regular rhythm. 2/6 systolic murmur RUSB   Pulmonary/Chest: Effort normal and breath sounds normal. He has no wheezes. He has no rales. Abdominal: Soft. He exhibits no distension. There is no tenderness. Musculoskeletal: Normal range of motion. He exhibits edema (2+ pitting edema in bilateral LE). He exhibits no tenderness. Neurological: He is alert and oriented to person, place, and time. He has normal strength.  No cranial nerve deficit or sensory deficit. Skin: Skin is warm and dry. Capillary refill takes less than 2 seconds. He is not diaphoretic. Diagnostic Results     EKG   Interpreted inconjunction with emergency department physician Danya Davila, *  Rhythm: normal sinus   Rate: normal  Axis: normal  Ectopy: none  Conduction: normal  ST Segments: no acute change  T Waves: no acute change, t wave inversions in lateral leads  Q Waves: none  Clinical Impression: no acute changes  Comparison:  9/12/2018      RADIOLOGY:  XR CHEST PORTABLE   Final Result     No acute findings. XR CHEST 1 VW   Final Result      No acute pulmonary pathology or change from the prior exam                  VL Extremity Venous Bilateral   Final Result      US BREAST LIMITED LEFT   Final Result      Probable gynecomastia. Recommend elective left breast mammogram for correlation.  ACR 0      CT Head WO Contrast   Final Result          LABS:   Results for orders placed or performed during the hospital encounter of 06/04/19   CBC Auto Differential   Result Value Ref Range    WBC 9.3 4.0 - 11.0 K/uL    RBC 3.99 (L) 4.20 - 5.90 M/uL    Hemoglobin 11.3 (L) 13.5 - 17.5 g/dL    Hematocrit 34.7 (L) 40.5 - 52.5 %    MCV 86.9 80.0 - 100.0 fL    MCH 28.3 26.0 - 34.0 pg    MCHC 32.5 31.0 - 36.0 g/dL    RDW 15.3 12.4 - 15.4 %    Platelets 054 709 - 147 K/uL    MPV 6.3 5.0 - 10.5 fL    Neutrophils % 75.8 %    Lymphocytes % 12.4 %    Monocytes % 8.1 %    Eosinophils % 3.0 %    Basophils % 0.7 %    Neutrophils # 7.1 1.7 - 7.7 K/uL    Lymphocytes # 1.2 1.0 - 5.1 K/uL    Monocytes # 0.8 0.0 - 1.3 K/uL    Eosinophils # 0.3 0.0 - 0.6 K/uL    Basophils # 0.1 0.0 - 0.2 K/uL   Basic Metabolic Panel w/ Reflex to MG   Result Value Ref Range    Sodium 138 136 - 145 mmol/L    Potassium reflex Magnesium 3.8 3.5 - 5.1 mmol/L    Chloride 102 99 - 110 mmol/L    CO2 26 21 - 32 mmol/L    Anion Gap 10 3 - 16    Glucose 116 (H) 70 - 99 mg/dL    BUN 14 7 - 20 mg/dL    CREATININE Fasting 167 (H) 0 - 150 mg/dL    HDL 30 (L) 40 - 60 mg/dL    LDL Calculated 88 <100 mg/dL    VLDL Cholesterol Calculated 33 Not Established mg/dL   Anti-Xa Unfract Heparin   Result Value Ref Range    Anti-XA Unfrac Heparin <0.04 (L) 0.30 - 0.70 IU/mL   HEPARIN LEVEL/ANTI-XA   Result Value Ref Range    Anti-XA Unfrac Heparin <0.04 (L) 0.30 - 0.70 IU/mL   CK   Result Value Ref Range    Total CK 1,080 (H) 39 - 308 U/L   EDGARDO Reflex to Antibody Cascade   Result Value Ref Range    EDGARDO POSITIVE (A) Negative    EDGARDO TITER <1:40     EDGARDO Interpretation see below    Sedimentation Rate   Result Value Ref Range    Sed Rate 36 (H) 0 - 20 mm/Hr   C-reactive protein   Result Value Ref Range    CRP 16.0 (H) 0.0 - 5.1 mg/L   Lipid Panel   Result Value Ref Range    Cholesterol, Total 150 0 - 199 mg/dL    Triglycerides 207 (H) 0 - 150 mg/dL    HDL 31 (L) 40 - 60 mg/dL    LDL Calculated 78 <100 mg/dL    VLDL Cholesterol Calculated 41 Not Established mg/dL   TSH with Reflex   Result Value Ref Range    TSH 1.24 0.27 - 4.20 uIU/mL   CBC   Result Value Ref Range    WBC 8.1 4.0 - 11.0 K/uL    RBC 4.21 4.20 - 5.90 M/uL    Hemoglobin 12.0 (L) 13.5 - 17.5 g/dL    Hematocrit 36.3 (L) 40.5 - 52.5 %    MCV 86.4 80.0 - 100.0 fL    MCH 28.5 26.0 - 34.0 pg    MCHC 33.0 31.0 - 36.0 g/dL    RDW 15.0 12.4 - 15.4 %    Platelets 666 343 - 491 K/uL    MPV 6.3 5.0 - 10.5 fL   Basic Metabolic Panel w/ Reflex to MG   Result Value Ref Range    Sodium 139 136 - 145 mmol/L    Potassium reflex Magnesium 3.9 3.5 - 5.1 mmol/L    Chloride 102 99 - 110 mmol/L    CO2 25 21 - 32 mmol/L    Anion Gap 12 3 - 16    Glucose 145 (H) 70 - 99 mg/dL    BUN 11 7 - 20 mg/dL    CREATININE 0.6 (L) 0.8 - 1.3 mg/dL    GFR Non-African American >60 >60    GFR African American >60 >60    Calcium 8.8 8.3 - 10.6 mg/dL   Heparin Level/Anti-XA   Result Value Ref Range    Anti-XA Unfrac Heparin <0.04 (L) 0.30 - 0.70 IU/mL   Heparin Level/Anti-XA   Result Value Ref Range    Anti-XA Unfrac Heparin 0.05 (L) 0.30 - 0.70 IU/mL   Troponin   Result Value Ref Range    Troponin 0.67 (HH) <0.01 ng/mL   EDGARDO Antibody Cascade   Result Value Ref Range    Anti-Centromere B IgG <0.2 0.0 - 0.9 AI    Anti-Chromatin IgG 0.2 0.0 - 0.9 AI    Anti-JO1 IgG <0.2 0.0 - 0.9 AI    Anti-Ribosomal P IgG 0.2 0.0 - 0.9 AI    Anti-RNP IgG 0.2 0.0 - 0.9 AI    Anti-Smith IgG <0.2 0.0 - 0.9 AI    Anti-SmRNP IgG <0.2 0.0 - 0.9 AI    Anti-dsDNA IgG 1 0 - 9 IU/mL    Anti-SCL70 IgG <0.2 0.0 - 0.9 AI    Anti-SS-A IgG >8.0 (H) 0.0 - 0.9 AI    Anti-SS-B IgG <0.2 0.0 - 0.9 AI   CBC   Result Value Ref Range    WBC 9.3 4.0 - 11.0 K/uL    RBC 4.14 (L) 4.20 - 5.90 M/uL    Hemoglobin 11.9 (L) 13.5 - 17.5 g/dL    Hematocrit 36.1 (L) 40.5 - 52.5 %    MCV 87.2 80.0 - 100.0 fL    MCH 28.7 26.0 - 34.0 pg    MCHC 32.8 31.0 - 36.0 g/dL    RDW 14.9 12.4 - 15.4 %    Platelets 562 715 - 658 K/uL    MPV 6.2 5.0 - 10.5 fL   Basic Metabolic Panel w/ Reflex to MG   Result Value Ref Range    Sodium 140 136 - 145 mmol/L    Potassium reflex Magnesium 4.3 3.5 - 5.1 mmol/L    Chloride 101 99 - 110 mmol/L    CO2 27 21 - 32 mmol/L    Anion Gap 12 3 - 16    Glucose 138 (H) 70 - 99 mg/dL    BUN 10 7 - 20 mg/dL    CREATININE 0.6 (L) 0.8 - 1.3 mg/dL    GFR Non-African American >60 >60    GFR African American >60 >60    Calcium 8.9 8.3 - 10.6 mg/dL   Ejection Fraction Percentage   Result Value Ref Range    Left Ventricular Ejection Fraction 55 %    LEFT VENTRICULAR EJECTION FRACTION MODE Cardiac Cath    POCT Glucose   Result Value Ref Range    POC Glucose 114 (H) 70 - 99 mg/dl    Performed on ACCU-CHEK    POCT Glucose   Result Value Ref Range    POC Glucose 126 (H) 70 - 99 mg/dl    Performed on ACCU-CHEK    POCT Glucose   Result Value Ref Range    POC Glucose 106 (H) 70 - 99 mg/dl    Performed on ACCU-CHEK    POCT Glucose   Result Value Ref Range    POC Glucose 168 (H) 70 - 99 mg/dl    Performed on ACCU-CHEK    POCT Glucose   Result Value Ref Range    POC Glucose 126 (H) (36.8 °C),Pulse: 67, Resp: 16, SpO2: 98 %     Procedures     N/a    ED Course     Nursing Notes, Past Medical Hx, Past Surgical Hx, Social Hx, Allergies, and FamilyHx were reviewed.     The patient was giventhe following medications:  Orders Placed This Encounter   Medications    aspirin EC tablet 325 mg    DISCONTD: aspirin tablet 325 mg    DISCONTD: therapeutic multivitamin-minerals 1 tablet    DISCONTD: sodium chloride flush 0.9 % injection 10 mL    DISCONTD: sodium chloride flush 0.9 % injection 10 mL    DISCONTD: magnesium hydroxide (MILK OF MAGNESIA) 400 MG/5ML suspension 30 mL    DISCONTD: ondansetron (ZOFRAN) injection 4 mg    DISCONTD: atorvastatin (LIPITOR) tablet 40 mg    DISCONTD: acetaminophen (TYLENOL) tablet 650 mg    DISCONTD: carvedilol (COREG) tablet 3.125 mg    heparin (porcine) injection 7,750 Units    DISCONTD: heparin (porcine) injection 7,750 Units    DISCONTD: heparin (porcine) injection 3,900 Units    DISCONTD: heparin 25,000 units in dextrose 5% 250 mL infusion    DISCONTD: nitroGLYCERIN (NITROSTAT) SL tablet 0.4 mg    DISCONTD: insulin lispro (HUMALOG) injection pen 0-6 Units    DISCONTD: insulin lispro (HUMALOG) injection pen 0-3 Units    lidocaine PF 1 % injection 5 mL    DISCONTD: sodium chloride flush 0.9 % injection 10 mL    DISCONTD: sodium chloride flush 0.9 % injection 10 mL    0.9 % sodium chloride infusion    perflutren lipid microspheres (DEFINITY) injection 2.2 mg    ticagrelor (BRILINTA) tablet 180 mg    DISCONTD: ticagrelor (BRILINTA) tablet 90 mg    DISCONTD: aspirin chewable tablet 81 mg    DISCONTD: LORazepam (ATIVAN) tablet 0.5 mg    DISCONTD: heparin 25,000 units in dextrose 5% 250 mL infusion    potassium chloride (KLOR-CON M) extended release tablet 20 mEq    DISCONTD: glucose (GLUTOSE) 40 % oral gel 15 g    DISCONTD: dextrose 50 % IV solution    DISCONTD: glucagon (rDNA) injection 1 mg    DISCONTD: dextrose 5 % solution    ioversol (OPTIRAY) 74 % injection 150 mL    DISCONTD: acetaminophen (TYLENOL) tablet 650 mg    DISCONTD: hydrochlorothiazide (HYDRODIURIL) tablet 25 mg    DISCONTD: enoxaparin (LOVENOX) injection 40 mg    carvedilol (COREG) 3.125 MG tablet     Sig: Take 1 tablet by mouth 2 times daily (with meals)     Dispense:  60 tablet     Refill:  3    furosemide (LASIX) 20 MG tablet     Sig: Take 1 tablet by mouth daily     Dispense:  60 tablet     Refill:  3    potassium chloride (KLOR-CON M) 20 MEQ extended release tablet     Sig: Take 1 tablet by mouth daily     Dispense:  30 tablet     Refill:  5       CONSULTS:  IP CONSULT TO HOSPITALIST  IP CONSULT TO CARDIOLOGY  IP CONSULT TO SOCIAL WORK  IP CONSULT TO Ilsa Perry / LYNN / Rick Taras is a 62 y.o. male with DMII, HTN, HLD who presents with mechanical fall while outside that occurred yesterday afternoon. CT head was negative for acute intracranial pathology, however troponin was found elevated to .51 then to .56. EKG was unchanged from prior with T-wave inversions in the lateral leads. Renal function is unchanged as well. He was given  and we will admit for ACS.      This patient was also evaluated by the attending physician. All care plans were discussed and agreed upon. Clinical Impression     1.  NSTEMI (non-ST elevated myocardial infarction) [facility])        Disposition     PATIENT REFERRED TO:  Grabiel Julian MD  [address] [facility] [address]  [phone]    In 1 week  follow up     [facility] AT [address]  [phone]    In 1 week  follow up     Jose Rose, APRN - CNP  [address] [place] [address] [address]  [phone]    In 1 week  For wound re-check, follow up     Svetlana Iverson, [address]  [phone]    In 1 week  For follow up      DISCHARGE MEDICATIONS:  Discharge Medication List as of 6/7/2019 10:42 AM      START taking these medications    Details   carvedilol (COREG) 3.125 MG tablet Take 1 tablet by mouth 2 times daily (with meals), Disp-60 tablet, R-3Normal      furosemide (LASIX) 20 MG tablet Take 1 tablet by mouth daily, Disp-60 tablet, R-3Normal      potassium chloride (KLOR-CON M) 20 MEQ extended release tablet Take 1 tablet by mouth daily, Disp-30 tablet, R-5Normal             DISPOSITION Admitted 06/04/2019 10:33:48 PM     Alis Eagle MD  Resident  06/22/19 1040

## 2019-06-24 ENCOUNTER — HOSPITAL ENCOUNTER (EMERGENCY)
Age: 59
Discharge: HOME | End: 2019-06-24
Attending: EMERGENCY MEDICINE
Payer: COMMERCIAL

## 2019-06-24 VITALS
SYSTOLIC BLOOD PRESSURE: 119 MMHG | OXYGEN SATURATION: 97 % | WEIGHT: 300 LBS | BODY MASS INDEX: 42 KG/M2 | DIASTOLIC BLOOD PRESSURE: 68 MMHG | HEART RATE: 74 BPM | TEMPERATURE: 98.2 F | RESPIRATION RATE: 19 BRPM | HEIGHT: 71 IN

## 2019-06-24 DIAGNOSIS — R53.83 MALAISE AND FATIGUE: Primary | ICD-10-CM

## 2019-06-24 DIAGNOSIS — R53.81 MALAISE AND FATIGUE: Primary | ICD-10-CM

## 2019-06-24 LAB
A/G RATIO: 1.1 (ref 1.1–2.2)
ALBUMIN SERPL-MCNC: 3.7 G/DL (ref 3.4–5)
ALP BLD-CCNC: 81 U/L (ref 40–129)
ALT SERPL-CCNC: 33 U/L (ref 10–40)
ANION GAP SERPL CALCULATED.3IONS-SCNC: 11 MMOL/L (ref 3–16)
AST SERPL-CCNC: 47 U/L (ref 15–37)
BASOPHILS ABSOLUTE: 0.1 K/UL (ref 0–0.2)
BASOPHILS RELATIVE PERCENT: 0.7 %
BILIRUB SERPL-MCNC: <0.2 MG/DL (ref 0–1)
BUN BLDV-MCNC: 20 MG/DL (ref 7–20)
CALCIUM SERPL-MCNC: 8.8 MG/DL (ref 8.3–10.6)
CHLORIDE BLD-SCNC: 103 MMOL/L (ref 99–110)
CO2: 26 MMOL/L (ref 21–32)
CREAT SERPL-MCNC: 1.2 MG/DL (ref 0.8–1.3)
EKG ATRIAL RATE: 77 BPM
EKG DIAGNOSIS: NORMAL
EKG P AXIS: 29 DEGREES
EKG P-R INTERVAL: 180 MS
EKG Q-T INTERVAL: 374 MS
EKG QRS DURATION: 104 MS
EKG QTC CALCULATION (BAZETT): 423 MS
EKG R AXIS: 52 DEGREES
EKG T AXIS: 160 DEGREES
EKG VENTRICULAR RATE: 77 BPM
EOSINOPHILS ABSOLUTE: 0.3 K/UL (ref 0–0.6)
EOSINOPHILS RELATIVE PERCENT: 3.8 %
GFR AFRICAN AMERICAN: >60
GFR NON-AFRICAN AMERICAN: >60
GLOBULIN: 3.5 G/DL
GLUCOSE BLD-MCNC: 128 MG/DL (ref 70–99)
HCT VFR BLD CALC: 36.7 % (ref 40.5–52.5)
HEMOGLOBIN: 12.2 G/DL (ref 13.5–17.5)
LYMPHOCYTES ABSOLUTE: 1.5 K/UL (ref 1–5.1)
LYMPHOCYTES RELATIVE PERCENT: 17.3 %
MCH RBC QN AUTO: 29.1 PG (ref 26–34)
MCHC RBC AUTO-ENTMCNC: 33.3 G/DL (ref 31–36)
MCV RBC AUTO: 87.3 FL (ref 80–100)
MONOCYTES ABSOLUTE: 0.7 K/UL (ref 0–1.3)
MONOCYTES RELATIVE PERCENT: 8.4 %
NEUTROPHILS ABSOLUTE: 6 K/UL (ref 1.7–7.7)
NEUTROPHILS RELATIVE PERCENT: 69.8 %
PDW BLD-RTO: 15.2 % (ref 12.4–15.4)
PLATELET # BLD: 350 K/UL (ref 135–450)
PMV BLD AUTO: 6.4 FL (ref 5–10.5)
POTASSIUM REFLEX MAGNESIUM: 4.9 MMOL/L (ref 3.5–5.1)
RBC # BLD: 4.2 M/UL (ref 4.2–5.9)
SODIUM BLD-SCNC: 140 MMOL/L (ref 136–145)
TOTAL PROTEIN: 7.2 G/DL (ref 6.4–8.2)
TROPONIN: 0.56 NG/ML
TROPONIN: 0.64 NG/ML
WBC # BLD: 8.6 K/UL (ref 4–11)

## 2019-06-24 PROCEDURE — 99285 EMERGENCY DEPT VISIT HI MDM: CPT

## 2019-06-24 PROCEDURE — 84484 ASSAY OF TROPONIN QUANT: CPT

## 2019-06-24 PROCEDURE — 80053 COMPREHEN METABOLIC PANEL: CPT

## 2019-06-24 PROCEDURE — 93005 ELECTROCARDIOGRAM TRACING: CPT | Performed by: EMERGENCY MEDICINE

## 2019-06-24 PROCEDURE — 36415 COLL VENOUS BLD VENIPUNCTURE: CPT

## 2019-06-24 PROCEDURE — 85025 COMPLETE CBC W/AUTO DIFF WBC: CPT

## 2019-06-24 ASSESSMENT — PAIN DESCRIPTION - FREQUENCY: FREQUENCY: CONTINUOUS

## 2019-06-24 ASSESSMENT — PAIN SCALES - GENERAL: PAINLEVEL_OUTOF10: 8

## 2019-06-24 NOTE — ED NOTES
Peripheral IV placed. Positive blood return and flushed with 10 ml sterile saline without difficulty. Patient tolerated procedure well. Site intact with no redness, swelling, or pain at site.         Leopoldo Garcia RN  06/24/19 [id number]

## 2019-06-24 NOTE — ED PROVIDER NOTES
[address] ENCOUNTER          ATTENDING PHYSICIAN NOTE       Date of evaluation: 6/24/2019    Chief Complaint     Extremity Weakness (x 2 wks. Here one week ago, worked up for MI. Continues to fall @ home. )      History of Present Illness     Britt Garcia is a 62 y.o. male who presents with ongoing generalized weakness. Patient was just released from the hospital after being admitted for elevated troponins. He states that he simply has no energy and is so weak that he cannot complete his daily activities. He has not any falls. He denies any chills or fevers. Denies any chest pain or difficulty breathing. No cough. Review of Systems     Review of Systems   Constitutional: Positive for activity change, appetite change and fatigue. Negative for chills and fever. Respiratory: Negative for shortness of breath. Cardiovascular: Negative for chest pain and leg swelling. Gastrointestinal: Negative for abdominal pain, diarrhea and vomiting. Neurological: Positive for weakness. Negative for dizziness, light-headedness and headaches. All other systems reviewed and are negative. Past Medical, Surgical, Family, and Social History     He has a past medical history of Arthritis, Depression, Diabetes mellitus ([address] Dyslipidemia, Herniated disc, cervical, Hypertension, Osteoporosis, and Peripheral neuropathy. He has a past surgical history that includes hernia repair. His family history includes Breast Cancer in his brother; Heart Disease in his brother and mother; High Blood Pressure in his brother, father, mother, and sister. He reports that he has never smoked. He has never used smokeless tobacco. He reports that he drinks alcohol. He reports that he does not use drugs.     Medications     Discharge Medication List as of 6/24/2019  9:43 PM      CONTINUE these medications which have NOT CHANGED    Details   carvedilol (COREG) 3.125 MG tablet Take 1 tablet by mouth 2

## 2019-06-26 ENCOUNTER — OFFICE VISIT (OUTPATIENT)
Dept: INTERNAL MEDICINE CLINIC | Age: 59
End: 2019-06-26
Payer: COMMERCIAL

## 2019-06-26 VITALS
BODY MASS INDEX: 40.1 KG/M2 | OXYGEN SATURATION: 91 % | TEMPERATURE: 98.8 F | WEIGHT: 286.4 LBS | DIASTOLIC BLOOD PRESSURE: 70 MMHG | SYSTOLIC BLOOD PRESSURE: 110 MMHG | HEIGHT: 71 IN | HEART RATE: 90 BPM

## 2019-06-26 DIAGNOSIS — R77.8 ELEVATED TROPONIN: ICD-10-CM

## 2019-06-26 DIAGNOSIS — R53.83 OTHER FATIGUE: ICD-10-CM

## 2019-06-26 DIAGNOSIS — I10 ESSENTIAL HYPERTENSION: ICD-10-CM

## 2019-06-26 DIAGNOSIS — E11.69 TYPE 2 DIABETES MELLITUS WITH OTHER SPECIFIED COMPLICATION, WITHOUT LONG-TERM CURRENT USE OF INSULIN (HCC): ICD-10-CM

## 2019-06-26 DIAGNOSIS — R26.81 UNSTEADY GAIT: ICD-10-CM

## 2019-06-26 DIAGNOSIS — R53.1 WEAKNESS: ICD-10-CM

## 2019-06-26 DIAGNOSIS — R74.8 ELEVATED CPK: ICD-10-CM

## 2019-06-26 DIAGNOSIS — N63.0 BREAST MASS: ICD-10-CM

## 2019-06-26 DIAGNOSIS — E78.5 DYSLIPIDEMIA: ICD-10-CM

## 2019-06-26 DIAGNOSIS — E66.9 OBESITY (BMI 30-39.9): ICD-10-CM

## 2019-06-26 DIAGNOSIS — R53.1 WEAKNESS: Primary | ICD-10-CM

## 2019-06-26 LAB
BILIRUBIN, POC: NORMAL
BLOOD URINE, POC: NORMAL
CHP ED QC CHECK: NORMAL
CHP ED QC CHECK: NORMAL
CLARITY, POC: NORMAL
COLOR, POC: YELLOW
GLUCOSE BLD-MCNC: 141 MG/DL
GLUCOSE BLD-MCNC: 141 MG/DL
GLUCOSE URINE, POC: NORMAL
HBA1C MFR BLD: 7 %
KETONES, POC: 15
LEUKOCYTE EST, POC: NORMAL
NITRITE, POC: NORMAL
PH, POC: 5.5
PROTEIN, POC: 30
SPECIFIC GRAVITY, POC: 1.02
UROBILINOGEN, POC: 0.2

## 2019-06-26 PROCEDURE — 3045F PR MOST RECENT HEMOGLOBIN A1C LEVEL 7.0-9.0%: CPT | Performed by: INTERNAL MEDICINE

## 2019-06-26 PROCEDURE — 1111F DSCHRG MED/CURRENT MED MERGE: CPT | Performed by: INTERNAL MEDICINE

## 2019-06-26 PROCEDURE — 82962 GLUCOSE BLOOD TEST: CPT | Performed by: INTERNAL MEDICINE

## 2019-06-26 PROCEDURE — G8427 DOCREV CUR MEDS BY ELIG CLIN: HCPCS | Performed by: INTERNAL MEDICINE

## 2019-06-26 PROCEDURE — G8417 CALC BMI ABV UP PARAM F/U: HCPCS | Performed by: INTERNAL MEDICINE

## 2019-06-26 PROCEDURE — 3017F COLORECTAL CA SCREEN DOC REV: CPT | Performed by: INTERNAL MEDICINE

## 2019-06-26 PROCEDURE — 99204 OFFICE O/P NEW MOD 45 MIN: CPT | Performed by: INTERNAL MEDICINE

## 2019-06-26 PROCEDURE — 1036F TOBACCO NON-USER: CPT | Performed by: INTERNAL MEDICINE

## 2019-06-26 PROCEDURE — 2022F DILAT RTA XM EVC RTNOPTHY: CPT | Performed by: INTERNAL MEDICINE

## 2019-06-26 PROCEDURE — 81002 URINALYSIS NONAUTO W/O SCOPE: CPT | Performed by: INTERNAL MEDICINE

## 2019-06-26 PROCEDURE — 83036 HEMOGLOBIN GLYCOSYLATED A1C: CPT | Performed by: INTERNAL MEDICINE

## 2019-06-26 PROCEDURE — G8598 ASA/ANTIPLAT THER USED: HCPCS | Performed by: INTERNAL MEDICINE

## 2019-06-26 RX ORDER — MINERAL OIL 100 MG/100ML
1 OIL ORAL; TOPICAL PRN
Qty: 1 DEVICE | Refills: 0 | Status: SHIPPED | OUTPATIENT
Start: 2019-06-26 | End: 2019-10-03 | Stop reason: CLARIF

## 2019-06-26 ASSESSMENT — PATIENT HEALTH QUESTIONNAIRE - PHQ9
SUM OF ALL RESPONSES TO PHQ QUESTIONS 1-9: 0
2. FEELING DOWN, DEPRESSED OR HOPELESS: 0
SUM OF ALL RESPONSES TO PHQ QUESTIONS 1-9: 0
SUM OF ALL RESPONSES TO PHQ9 QUESTIONS 1 & 2: 0
1. LITTLE INTEREST OR PLEASURE IN DOING THINGS: 0

## 2019-06-26 NOTE — PATIENT INSTRUCTIONS
TAKE MED AS ADVISED    DIET/ EXERCISE.     FOLLOW UP WITHIN  6 WEEKS / AS NEEDED    FOLLOW UP FOR LABS, PHYSICAL THERAPY, RHEUMATOLOGY, MAMMOGRAM

## 2019-06-26 NOTE — PROGRESS NOTES
SUBJECTIVE:  Julia Cho is a 62 y.o. male HERE FOR   Chief Complaint   Patient presents with    New Patient        PT HERE TO INITIATE CARE           LAST PCP - UNSURE OF NAME        PT RECENTLY  ADMITTED TO [facility] WITH WEAKNESS    C/O [facility]. STATES NICHOLE LE AND UE. STATES + PAIN - ? Lily Balling. ? PAIN SCALE. + FH POLYMYOSITIS. PT SATES PRIOR FALLS DUE TO WEAKNESS. ELEVATED CPK, ABN LABS NOTE DURING HOSPITAL STAY. PT REFERRED TO [facility] - APPT PENDING    DM - STATES NOT TAKING MED. ? DIET / EXERCISE COMPLIANCE. HBS - NOT CHECKING . LAST EYE EXAM > 3 YRS  HTN - ? MED COMPLIANCE. HEADACHE No, DIZZINESS No  DYSLIPIDEMIA - DIET / EXERCISE REVIEWED. LAB D/W PT.  LT BREAST MASS  - STATES NOTED 1 MONTH AGO. ? PAIN. NO NIPPLE DISCHARGE. NO FEVER. ? CHILLS  ELEVATED TROPONIN - S/P CAT  - NO ACUTE FINDINGS. FOLLOWING ALSO WITH CARDIOLOGY  C/O FATIGUE - PAST 2 MONTHS. DENIES COLD / NO EAT INTOLERANCE  OBESITY - DIET/ EXERCISE REVIEWED. WT NOTED      DENIES CP, No SOB, ? No PALPITATIONS, No COUGH  No ABD PAIN, No N/V, ? DIARRHEA, OCC CONSTIPATION, No MELENA, No HEMATOCHEZIA. No DYSURIA, No FREQ, No URGENCY, No HEMATURIA    PMH: REVIEWED AND UPDATED TODAY    PSH: REVIEWED AND UPDATED TODAY    SOCIAL HX: REVIEWED AND UPDATED TODAY    FAMILY HX: REVIEWED AND UPDATED TODAY    ALLERGY:  Lisinopril and Bee venom    MEDS: REVIEWED  Prior to Visit Medications    Medication Sig Taking?  Authorizing Provider   ibuprofen (ADVIL;MOTRIN) 600 MG tablet Take 1 tablet by mouth every 6 hours as needed for Pain  SANJAY Armenta   hydrochlorothiazide (HYDRODIURIL) 25 MG tablet Take 1 tablet by mouth daily  SANJAY Armenta   carvedilol (COREG) 3.125 MG tablet Take 1 tablet by mouth 2 times daily (with meals)  Eileen Xiao MD   furosemide (LASIX) 20 MG tablet Take 1 tablet by mouth daily  Eileen Xiao MD   potassium chloride (KLOR-CON M) 20 MEQ extended release tablet Take 1 tablet by mouth daily  Eileen Xiao MD metFORMIN (GLUCOPHAGE) 500 MG tablet Take 1 tablet by mouth 2 times daily (with meals)  SANJAY Craig   aspirin 325 MG tablet Take 325 mg by mouth daily  Historical Provider, MD   Multiple Vitamins-Minerals (THERAPEUTIC MULTIVITAMIN-MINERALS) tablet Take 1 tablet by mouth daily  Historical Provider, MD       ROS: COMPREHENSIVE ROS AS IN HX, REST -VE  History obtained from chart review and the patient    OBJECTIVE:   NURSING NOTE AND VITALS REVIEWED  /62 (Site: Left Upper Arm, Position: Sitting, Cuff Size: Large Adult)   Pulse 98   Temp 98.8 °F (37.1 °C)   Ht 5' 11\" (1.803 m)   Wt 286 lb 6.4 oz (129.9 kg)   SpO2 91%   BMI 39.94 kg/m²     NO ACUTE RESPY DISTRESS. + OCC PAIN DISTRESS    REPEAT BP: 110/70 (NICHOLE), NO ORTHOSTASIS     REPEAT PULSE: 90 - MANUAL    Body mass index is 39.94 kg/m². HEENT: NO PALLOR, ANICTERIC, PERRLA, EOMI, NO CONJUNCTIVAL ERYTHEMA,                 NO SINUS TENDERNESS  NECK:  SUPPLE, TRACHEA MIDLINE, NT, NO JVD, NO CB, NO LA, NO TM, NO STIFFNESS  CHEST: RESPY EFFORT NL, GOOD AE, NO W/R/C  HEART: S1S2+ REG, NO M/G/R  ABD: OBESE, SOFT, NT, NO HSM, BS+  EXT: + EDEMA, NT, PULSES +. PACO'S -VE  NEURO: ALERT AND ORIENTED X 3, NO MENINGEAL SIGNS, NO TREMORS, UNSTEADY GAIT, WEAKNESS NOTED LOWER EXT > UE, NO OTHER FOCAL DEFICITS  PSYCH: FAIRLY GOOD AFFECT  BACK: NT, NO ROM, NO CVA TENDERNESS  MSK: + TENDERNESS NICHOLE SHOULDER - PAIN WITH MVT SHOULDERS. +  RO NICHOLE SHOULDER            HIPS: NT + ROM NICHOLE            NO OTHER LOCALIZED MUSCLE TENDERNESS      PREVIOUS LABS  / CT REVIEWED AND D/W PT     ACCUCHECK 141    POC HBA1C: 7.0    UA: SMALL BILI, KETONE 15, PROT 30,  U RO 0.2,  BLOOD NEGATIVE, NEGATIVE FOR UTI      ASSESSMENT / PLAN:     Diagnosis Orders   1. Weakness  COUNSELLED. PERSISTENT.  + FH POLYMYOSITIS  ADVISED [facility] EVAL - HAS APPT  REFER PHYSICAL TERAPY  MONITOR LABS  MAKE CHANGES AS NEEDED. 2. Unsteady gait  COUNSELLED.  NO ACUTE FINDING ON RECENT HEAD CT  REFER PHYSICAL TERAPY  MONITOR LABS  MAKE CHANGES AS NEEDED. 3. Elevated CPK  COUNSELLED. ? POLYMYOSITIS  F/U [facility]  MAINTAIN HYDRATION  MONITOR LAB  MAKE CHANGES AS NEEDED. 4. Type 2 diabetes mellitus with other specified complication, without long-term current use of insulin ([facility] [address]  COUNSELLED. ADVISED MED COMPLIANCE  GLUCOPHAGE 500 MG REFILLED  READDRESS DIET/ EXERCISE. MONITOR. GOAL FBS 80- 120, HBA1C < 7  ADVISED ON HOME BLOOD SUGAR MONITORING  F/U DM EYE EXAM - READDRESS  F/U LABS  MAKE CHANGES AS NEEDED. 5. Essential hypertension  COUNSELLED. BP AS ABOVE.   LOW NA+ / DASH DIET/ EXERCISE. MONITOR. GOAL </= 120/80  MONITO FOR HYPOTENSION  HOLD MEDS FOR BP <100/60  F/U LABS  MAKE CHANGES AS NEEDED. 6. Dyslipidemia  COUNSELLED. ADVISED LOW FAT / CHOL DIET/ EXERCISE.  MONITOR. F/U LAB  GOALS D/W PT.  MAKE CHANGES AS NEEDED. 7. Breast mass  COUNSELLED. + FH BREAST CA.   MARC DIGITAL DIAGNOSTIC W OR WO CAD LEFT TO EVAL  MAKE CHANGES AS NEEDED. 8. Elevated troponin  COUNSELLED. S/P CARDIOLOGY EVAL  FOLLOW UP AS ADVISED  MAKE CHANGES AS NEEDED. 9. Other fatigue  COUNSELLED. LABS TO EVAL. R/O ANEMIA  TSH NORMAL ON RECENT LABS  LIFESTYLE MODIFICATION. MAKE CHANGES AS NEEDED. 10. Obesity (BMI 30-39. 9)  COUNSELLED. DIET/ EXERCISE ADVISED. LIFESTYLE MODIFICATION. WT LOSS ADVISED. MONITOR AND MAKE CHANGES AS NEEDED. Mary Perez received counseling on the following healthy behaviors: nutrition, exercise and medication adherence    Patient given educational materials on Diabetes, Hyperlipidemia, Nutrition, Exercise and Hypertension    I have instructed Mary Perez to complete a self tracking handout on Blood Sugars , Blood Pressures  and Weights and instructed them to bring it with them to his next appointment. Discussed use, benefit, and side effects of prescribed medications. Barriers to medication compliance addressed. All patient questions answered.   Pt voiced understanding.                MEDICATION SIDE EFFECTS D/W PATIENT    PT STABLE AT TIME OF D/C.    RETURN TO CLINIC WITHIN 6 WEEKS / PRN    FOLLOW UP FOR LABS, PHYSICAL THERAPY, RHEUMATOLOGY, MAMMOGRAM

## 2019-06-27 ENCOUNTER — HOSPITAL ENCOUNTER (OUTPATIENT)
Dept: GENERAL RADIOLOGY | Age: 59
Discharge: HOME | End: 2019-06-27
Payer: COMMERCIAL

## 2019-06-27 ENCOUNTER — TELEPHONE (OUTPATIENT)
Dept: INTERNAL MEDICINE CLINIC | Age: 59
End: 2019-06-27

## 2019-06-27 ENCOUNTER — OFFICE VISIT (OUTPATIENT)
Dept: RHEUMATOLOGY | Age: 59
End: 2019-06-27
Payer: COMMERCIAL

## 2019-06-27 ENCOUNTER — HOSPITAL ENCOUNTER (OUTPATIENT)
Dept: ULTRASOUND IMAGING | Age: 59
Discharge: HOME | End: 2019-06-27
Payer: COMMERCIAL

## 2019-06-27 ENCOUNTER — HOSPITAL ENCOUNTER (OUTPATIENT)
Dept: MAMMOGRAPHY | Age: 59
Discharge: HOME | End: 2019-06-27
Payer: COMMERCIAL

## 2019-06-27 ENCOUNTER — HOSPITAL ENCOUNTER (OUTPATIENT)
Age: 59
Discharge: HOME | End: 2019-06-27
Payer: COMMERCIAL

## 2019-06-27 VITALS
DIASTOLIC BLOOD PRESSURE: 87 MMHG | SYSTOLIC BLOOD PRESSURE: 120 MMHG | HEIGHT: 71 IN | WEIGHT: 292 LBS | BODY MASS INDEX: 40.88 KG/M2

## 2019-06-27 DIAGNOSIS — M25.50 POLYARTHRALGIA: ICD-10-CM

## 2019-06-27 DIAGNOSIS — N63.0 BREAST MASS: ICD-10-CM

## 2019-06-27 DIAGNOSIS — Z13.828: ICD-10-CM

## 2019-06-27 DIAGNOSIS — M25.562 CHRONIC PAIN OF BOTH KNEES: ICD-10-CM

## 2019-06-27 DIAGNOSIS — G89.29 CHRONIC PAIN OF BOTH KNEES: ICD-10-CM

## 2019-06-27 DIAGNOSIS — M25.561 CHRONIC PAIN OF BOTH KNEES: ICD-10-CM

## 2019-06-27 DIAGNOSIS — R74.8 ELEVATED CPK: ICD-10-CM

## 2019-06-27 DIAGNOSIS — N63.0 BREAST LUMP: ICD-10-CM

## 2019-06-27 DIAGNOSIS — R74.8 ELEVATED CPK: Primary | ICD-10-CM

## 2019-06-27 LAB
A/G RATIO: 1.2 (ref 1.1–2.2)
ALBUMIN SERPL-MCNC: 4.2 G/DL (ref 3.4–5)
ALP BLD-CCNC: 64 U/L (ref 40–129)
ALT SERPL-CCNC: 28 U/L (ref 10–40)
AMPHETAMINE SCREEN, URINE: ABNORMAL
ANION GAP SERPL CALCULATED.3IONS-SCNC: 16 MMOL/L (ref 3–16)
AST SERPL-CCNC: 39 U/L (ref 15–37)
BARBITURATE SCREEN URINE: ABNORMAL
BASOPHILS ABSOLUTE: 0.1 K/UL (ref 0–0.2)
BASOPHILS RELATIVE PERCENT: 1 %
BENZODIAZEPINE SCREEN, URINE: ABNORMAL
BILIRUB SERPL-MCNC: <0.2 MG/DL (ref 0–1)
BUN BLDV-MCNC: 26 MG/DL (ref 7–20)
CALCIUM SERPL-MCNC: 9.6 MG/DL (ref 8.3–10.6)
CANNABINOID SCREEN URINE: ABNORMAL
CHLORIDE BLD-SCNC: 101 MMOL/L (ref 99–110)
CO2: 24 MMOL/L (ref 21–32)
COCAINE METABOLITE SCREEN URINE: POSITIVE
CREAT SERPL-MCNC: 1 MG/DL (ref 0.8–1.3)
EOSINOPHILS ABSOLUTE: 0.3 K/UL (ref 0–0.6)
EOSINOPHILS RELATIVE PERCENT: 3.3 %
FOLATE: 6.21 NG/ML (ref 4.78–24.2)
GFR AFRICAN AMERICAN: >60
GFR NON-AFRICAN AMERICAN: >60
GLOBULIN: 3.5 G/DL
GLUCOSE BLD-MCNC: 125 MG/DL (ref 70–99)
HCT VFR BLD CALC: 39.9 % (ref 40.5–52.5)
HEMOGLOBIN: 13.1 G/DL (ref 13.5–17.5)
LYMPHOCYTES ABSOLUTE: 1.5 K/UL (ref 1–5.1)
LYMPHOCYTES RELATIVE PERCENT: 14.1 %
Lab: ABNORMAL
MAGNESIUM: 2.3 MG/DL (ref 1.8–2.4)
MCH RBC QN AUTO: 28.7 PG (ref 26–34)
MCHC RBC AUTO-ENTMCNC: 32.7 G/DL (ref 31–36)
MCV RBC AUTO: 87.5 FL (ref 80–100)
METHADONE SCREEN, URINE: ABNORMAL
MONOCYTES ABSOLUTE: 0.7 K/UL (ref 0–1.3)
MONOCYTES RELATIVE PERCENT: 6.9 %
NEUTROPHILS ABSOLUTE: 7.7 K/UL (ref 1.7–7.7)
NEUTROPHILS RELATIVE PERCENT: 74.7 %
OPIATE SCREEN URINE: ABNORMAL
OXYCODONE URINE: ABNORMAL
PDW BLD-RTO: 15.6 % (ref 12.4–15.4)
PH UA: 5
PHENCYCLIDINE SCREEN URINE: ABNORMAL
PLATELET # BLD: 397 K/UL (ref 135–450)
PMV BLD AUTO: 6.6 FL (ref 5–10.5)
POTASSIUM SERPL-SCNC: 4.8 MMOL/L (ref 3.5–5.1)
PROPOXYPHENE SCREEN: ABNORMAL
RBC # BLD: 4.56 M/UL (ref 4.2–5.9)
SODIUM BLD-SCNC: 141 MMOL/L (ref 136–145)
TOTAL CK: 1337 U/L (ref 39–308)
TOTAL PROTEIN: 7.7 G/DL (ref 6.4–8.2)
VITAMIN B-12: 206 PG/ML (ref 211–911)
VITAMIN D 25-HYDROXY: 7.6 NG/ML
WBC # BLD: 10.3 K/UL (ref 4–11)

## 2019-06-27 PROCEDURE — G8427 DOCREV CUR MEDS BY ELIG CLIN: HCPCS | Performed by: INTERNAL MEDICINE

## 2019-06-27 PROCEDURE — 73560 X-RAY EXAM OF KNEE 1 OR 2: CPT

## 2019-06-27 PROCEDURE — 99244 OFF/OP CNSLTJ NEW/EST MOD 40: CPT | Performed by: INTERNAL MEDICINE

## 2019-06-27 PROCEDURE — 77065 DX MAMMO INCL CAD UNI: CPT

## 2019-06-27 PROCEDURE — G8417 CALC BMI ABV UP PARAM F/U: HCPCS | Performed by: INTERNAL MEDICINE

## 2019-06-27 PROCEDURE — 77066 DX MAMMO INCL CAD BI: CPT

## 2019-06-27 RX ORDER — CHOLECALCIFEROL (VITAMIN D3) 125 MCG
500 CAPSULE ORAL DAILY
Qty: 30 TABLET | Refills: 2 | Status: SHIPPED | OUTPATIENT
Start: 2019-06-27 | End: 2019-10-03 | Stop reason: CLARIF

## 2019-06-27 NOTE — PROGRESS NOTES
[address], [place]                                                           [address], [address] [place] [address]                                                              (B) [phone] (F)      Dear Dr. Reno Lee MD:  Please find Rheumatology assessment. Thank you for giving me the opportunity to be involved in Stone Macdonald's care and I look forward following Stone robles along with you. If you have any questions or concerns please feel free to reach me. Note is transcribed using voice recognition software. Inadvertent computerized transcription errors may be present. Patient identification: Stone Macdonald,: 1960,59 y.o. Sex: male     A/P  Stone robles was seen today for establish care and joint pain. Diagnoses and all orders for this visit:    Elevated CPK  -     Hepatitis B Surface Antibody; Future  -     Miscellaneous Sendout 1; Future  -     PM 1 Antibodies; Future  -     EMG; Future    Polyarthralgia  -     Rheumatoid Factor; Future  -     Cyclic Citrul Peptide Antibody, IgG; Future  -     Hepatitis C Antibody; Future  -     Hepatitis B Surface Antigen; Future  -     Hepatitis B Core Antibody, IgM; Future  -     Hepatitis B Surface Antibody; Future    Screening for other rheumatic disorder    Chronic pain of both knees  -     Cancel: XR KNEE LEFT (3 VIEWS); Future  -     Cancel: XR KNEE RIGHT (3 VIEWS); Future      Elevated CPK with muscle pain and weakness affecting his upper and lower extremities mainly proximal muscle groups. History of recurrent falls. CPK-1337 from yesterday. Elevated inflammatory markers. He uses cocaine, last cocaine use was 7 days ago. Also drinks alcohol. Polyarthralgias-affecting shoulders, knees, at times finger and feet joints. Limited shoulder range of motion bilaterally active and passive associated with shoulder joint line tenderness. Strong family history of polymyositis. Given all of the above, inflammatory polymyositis is in differential diagnosis, however it can also be induced by cocaine and alcohol abuse. Lower extremity weakness is likely multifactorial from osteoarthritis knees and myositis. Plan-  Obtain work-up as above for idiopathic inflammatory polymyositis. Meantime, also obtain EMG to locate the muscle for biopsy. Also check x-rays knees to assess the degree of osteoarthritis, he does have effusion in the right knee, discussed about arthrocentesis, patient does not want it  Advised patient not to drink alcohol or cocaine or any other substances. We will meet back in 2 to 3 weeks to go over test results. Meantime, he was advised to call me if he notices diplopia, dysphagia, breathing difficulties or overt muscle weakness. Patient indicates understanding and agrees with the management plan. I reviewed patient's history, referral documents and electronic medical records. Copy of consult note is being routed electronically/faxed to referring physician. #######################################################################    REASON FOR CONSULTATION:  Patient is being seen at the request of  Erika Richardson MD for evaluation of joint pain, muscle weakness and recurrent falls. HISTORY OF PRESENT ILLNESS:  62 y.o. male, very poor historian,  been experiencing worsening musculoskeletal discomfort and weakness for last 3 months. Complaints of pain and weakness everywhere head to toe. States that he had mild symptoms in his finger joints, knees, however muscles in his arms, legs feel very weak, stiff, painful, and has fallen 3 times in the last couple of months. Most affected areas are shoulders, thighs, knees-constant pain, stiffness, weakness. Knees sometimes swell up.   Losing  seen in his hands as well. No swollen joints other than knees. Paresthesias in his hands and feet, states that he has diabetic neuropathy. He works as a traffic controller, is having trouble and prolonged standing because of lower extremity muscle weakness and knee pain. No specific morning stiffness, symptoms are persistent throughout the day and night. Drinks alcohol 3-4 times a week, and also uses cocaine. Last use of cocaine was last weekend. Denies dysphagia, diplopia, neck muscle weakness. Intestinal weight loss about 90 pounds in the last 1 year. Pertinent ROS: Denies objective fever, skin rashes or skin thickening, photosensitivity Raynauds, focal alopecia, recurrent ocular congestion, dry eyes or mouth, or mucosal ulcers, tinnitus or recent hearing loss. Denies chest pain, palpitations, cough, pleurisy, dysphagia, or features of inflammatory bowel diseases. No h/o blood clots or bleeding disorders. No renal or genitourinary problems. No focal weakness or persistent paresthesia. All other ROS are negative. Labs-elevated CPK, sedimentation rate, CRP, positive UDS for cocaine.     Past Medical History:   Diagnosis Date    Arthritis     Depression     Diabetes mellitus (HCC)     Dyslipidemia     Herniated disc, cervical     Hypertension     Osteoporosis     Peripheral neuropathy      Past Surgical History:   Procedure Laterality Date    HERNIA REPAIR      UMBILICAL     Social History     Socioeconomic History    Marital status: Single     Spouse name: Not on file    Number of children: Not on file    Years of education: Not on file    Highest education level: Not on file   Occupational History    Not on file   Social Needs    Financial resource strain: Not on file    Food insecurity:     Worry: Not on file     Inability: Not on file    Transportation needs:     Medical: Not on file     Non-medical: Not on file   Tobacco Use    Smoking status: Never Smoker    Smokeless tobacco: Never Used   Substance and Sexual Activity    Alcohol use: Yes     Comment: occ    Drug use: No     Types: Cocaine     Comment: 50 years ago- no longer    Sexual activity: Yes     Partners: Female     Comment:    Lifestyle    Physical activity:     Days per week: Not on file     Minutes per session: Not on file    Stress: Not on file   Relationships    Social connections:     Talks on phone: Not on file     Gets together: Not on file     Attends Pentecostalism service: Not on file     Active member of club or organization: Not on file     Attends meetings of clubs or organizations: Not on file     Relationship status: Not on file    Intimate partner violence:     Fear of current or ex partner: Not on file     Emotionally abused: Not on file     Physically abused: Not on file     Forced sexual activity: Not on file   Other Topics Concern    Not on file   Social History Narrative    Not on file       FH- mother, brother - polymyositis     Current Outpatient Medications   Medication Sig Dispense Refill    metFORMIN (GLUCOPHAGE) 500 MG tablet Take 1 tablet by mouth 2 times daily (with meals) 60 tablet 1    blood glucose test strips (ONE TOUCH ULTRA TEST) strip 1 each by In Vitro route 2 times daily As needed.  100 each 3    Blood Pressure Monitor MANSI 1 Device by Does not apply route as needed (PRN) 1 Device 0    carvedilol (COREG) 3.125 MG tablet Take 1 tablet by mouth 2 times daily (with meals) 60 tablet 3    furosemide (LASIX) 20 MG tablet Take 1 tablet by mouth daily 60 tablet 3    potassium chloride (KLOR-CON M) 20 MEQ extended release tablet Take 1 tablet by mouth daily 30 tablet 5    ibuprofen (ADVIL;MOTRIN) 600 MG tablet Take 1 tablet by mouth every 6 hours as needed for Pain 30 tablet 0    hydrochlorothiazide (HYDRODIURIL) 25 MG tablet Take 1 tablet by mouth daily 30 tablet 1    aspirin 325 MG tablet Take 325 mg by mouth daily      Multiple Vitamins-Minerals (THERAPEUTIC MULTIVITAMIN-MINERALS) tablet Take 1 tablet by 06/26/2019         Chemistry        Component Value Date/Time     06/26/2019 1643    K 4.8 06/26/2019 1643    K 4.9 06/24/2019 1923     06/26/2019 1643    CO2 24 06/26/2019 1643    BUN 26 (H) 06/26/2019 1643    CREATININE 1.0 06/26/2019 1643        Component Value Date/Time    CALCIUM 9.6 06/26/2019 1643    ALKPHOS 64 06/26/2019 1643    AST 39 (H) 06/26/2019 1643    ALT 28 06/26/2019 1643    BILITOT <0.2 06/26/2019 1643          No results found for: [facility]  Lab Results   Component Value Date    SEDRATE 36 (H) 06/05/2019     Lab Results   Component Value Date    CRP 16.0 (H) 06/05/2019     Lab Results   Component Value Date    EDGARDO POSITIVE (A) 06/05/2019    SEDRATE 36 (H) 06/05/2019     Lab Results   Component Value Date    CKTOTAL 1,337 (H) 06/26/2019     Lab Results   Component Value Date    TSH 1.31 06/04/2019     Lab Results   Component Value Date    VITD25 7.6 (L) 06/26/2019            A/P- See above.

## 2019-06-27 NOTE — TELEPHONE ENCOUNTER
CALLED AND DISCUSSED LABS WITH PT  Elevated cpk - f/u rheum  Vit d def - needs supplement - readdress  b12 def - needs supplement - start on med  Cocaine use - cessation advised  Advised f/u appt  PT VERBALIZED UNDERSTANDING

## 2019-06-28 ENCOUNTER — TELEPHONE (OUTPATIENT)
Dept: INTERNAL MEDICINE CLINIC | Age: 59
End: 2019-06-28

## 2019-06-28 LAB
CYCLIC CITRULLINATED PEPTIDE ANTIBODY IGG: 0.5 U/ML (ref 0–2.9)
HBV SURFACE AB TITR SER: 5.26 MIU/ML
HEPATITIS B CORE IGM ANTIBODY: NORMAL
HEPATITIS B SURFACE ANTIGEN INTERPRETATION: NORMAL
HEPATITIS C ANTIBODY INTERPRETATION: NORMAL
RHEUMATOID FACTOR: <10 IU/ML

## 2019-07-03 ENCOUNTER — HOSPITAL ENCOUNTER (OUTPATIENT)
Dept: ULTRASOUND IMAGING | Age: 59
Discharge: HOME | End: 2019-07-03
Payer: COMMERCIAL

## 2019-07-03 ENCOUNTER — OFFICE VISIT (OUTPATIENT)
Dept: CARDIOLOGY CLINIC | Age: 59
End: 2019-07-03
Payer: COMMERCIAL

## 2019-07-03 ENCOUNTER — TELEPHONE (OUTPATIENT)
Dept: INTERNAL MEDICINE CLINIC | Age: 59
End: 2019-07-03

## 2019-07-03 VITALS
BODY MASS INDEX: 40.05 KG/M2 | HEART RATE: 79 BPM | DIASTOLIC BLOOD PRESSURE: 76 MMHG | WEIGHT: 287 LBS | OXYGEN SATURATION: 96 % | SYSTOLIC BLOOD PRESSURE: 118 MMHG

## 2019-07-03 DIAGNOSIS — I25.10 CORONARY ARTERY DISEASE INVOLVING NATIVE CORONARY ARTERY OF NATIVE HEART WITHOUT ANGINA PECTORIS: ICD-10-CM

## 2019-07-03 DIAGNOSIS — I10 ESSENTIAL HYPERTENSION: Primary | ICD-10-CM

## 2019-07-03 DIAGNOSIS — R92.8 ABNORMAL MAMMOGRAM: ICD-10-CM

## 2019-07-03 DIAGNOSIS — M79.10 MUSCLE ACHE: ICD-10-CM

## 2019-07-03 PROCEDURE — 1111F DSCHRG MED/CURRENT MED MERGE: CPT | Performed by: INTERNAL MEDICINE

## 2019-07-03 PROCEDURE — 3017F COLORECTAL CA SCREEN DOC REV: CPT | Performed by: INTERNAL MEDICINE

## 2019-07-03 PROCEDURE — 76882 US LMTD JT/FCL EVL NVASC XTR: CPT

## 2019-07-03 PROCEDURE — G8417 CALC BMI ABV UP PARAM F/U: HCPCS | Performed by: INTERNAL MEDICINE

## 2019-07-03 PROCEDURE — G8598 ASA/ANTIPLAT THER USED: HCPCS | Performed by: INTERNAL MEDICINE

## 2019-07-03 PROCEDURE — 1036F TOBACCO NON-USER: CPT | Performed by: INTERNAL MEDICINE

## 2019-07-03 PROCEDURE — 99213 OFFICE O/P EST LOW 20 MIN: CPT | Performed by: INTERNAL MEDICINE

## 2019-07-03 PROCEDURE — G8427 DOCREV CUR MEDS BY ELIG CLIN: HCPCS | Performed by: INTERNAL MEDICINE

## 2019-07-03 RX ORDER — ASPIRIN 81 MG/1
81 TABLET ORAL DAILY
Qty: 90 TABLET | Refills: 3 | Status: SHIPPED | OUTPATIENT
Start: 2019-07-03 | End: 2019-10-03 | Stop reason: CLARIF

## 2019-07-08 LAB — MISCELLANEOUS LAB TEST ORDER: ABNORMAL

## 2019-07-09 ENCOUNTER — TELEPHONE (OUTPATIENT)
Dept: RHEUMATOLOGY | Age: 59
End: 2019-07-09

## 2019-07-10 ASSESSMENT — ENCOUNTER SYMPTOMS
SHORTNESS OF BREATH: 0
ABDOMINAL PAIN: 0
DIARRHEA: 0
VOMITING: 0

## 2019-07-10 NOTE — TELEPHONE ENCOUNTER
PT states that he haven't seen his rheumatologist yet and the DR still haven't fill out his disability forms yet pt has a appt on 7-18-19 pt will bring paper work with him to his appt

## 2019-07-18 ENCOUNTER — OFFICE VISIT (OUTPATIENT)
Dept: RHEUMATOLOGY | Age: 59
End: 2019-07-18
Payer: COMMERCIAL

## 2019-07-18 ENCOUNTER — TELEPHONE (OUTPATIENT)
Dept: INTERNAL MEDICINE CLINIC | Age: 59
End: 2019-07-18

## 2019-07-18 VITALS
BODY MASS INDEX: 39.62 KG/M2 | SYSTOLIC BLOOD PRESSURE: 120 MMHG | WEIGHT: 283 LBS | DIASTOLIC BLOOD PRESSURE: 80 MMHG | HEIGHT: 71 IN

## 2019-07-18 DIAGNOSIS — N63.20 BREAST MASS, LEFT: ICD-10-CM

## 2019-07-18 DIAGNOSIS — N63.20 LEFT BREAST MASS: ICD-10-CM

## 2019-07-18 DIAGNOSIS — R89.4 SEROLOGIC ABNORMALITY: ICD-10-CM

## 2019-07-18 DIAGNOSIS — R22.32 AXILLARY MASS, LEFT: ICD-10-CM

## 2019-07-18 DIAGNOSIS — R74.8 ELEVATED CPK: Primary | ICD-10-CM

## 2019-07-18 PROCEDURE — G8417 CALC BMI ABV UP PARAM F/U: HCPCS | Performed by: INTERNAL MEDICINE

## 2019-07-18 PROCEDURE — 1036F TOBACCO NON-USER: CPT | Performed by: INTERNAL MEDICINE

## 2019-07-18 PROCEDURE — 99215 OFFICE O/P EST HI 40 MIN: CPT | Performed by: INTERNAL MEDICINE

## 2019-07-18 PROCEDURE — G8598 ASA/ANTIPLAT THER USED: HCPCS | Performed by: INTERNAL MEDICINE

## 2019-07-18 PROCEDURE — 3017F COLORECTAL CA SCREEN DOC REV: CPT | Performed by: INTERNAL MEDICINE

## 2019-07-18 PROCEDURE — G8427 DOCREV CUR MEDS BY ELIG CLIN: HCPCS | Performed by: INTERNAL MEDICINE

## 2019-07-18 NOTE — PROGRESS NOTES
Blood Pressure Monitor MANSI 1 Device by Does not apply route as needed (PRN) 1 Device 0    carvedilol (COREG) 3.125 MG tablet Take 1 tablet by mouth 2 times daily (with meals) 60 tablet 3    furosemide (LASIX) 20 MG tablet Take 1 tablet by mouth daily 60 tablet 3    potassium chloride (KLOR-CON M) 20 MEQ extended release tablet Take 1 tablet by mouth daily 30 tablet 5    ibuprofen (ADVIL;MOTRIN) 600 MG tablet Take 1 tablet by mouth every 6 hours as needed for Pain 30 tablet 0    hydrochlorothiazide (HYDRODIURIL) 25 MG tablet Take 1 tablet by mouth daily 30 tablet 1    Multiple Vitamins-Minerals (THERAPEUTIC MULTIVITAMIN-MINERALS) tablet Take 1 tablet by mouth daily       No current facility-administered medications for this visit. Allergies   Allergen Reactions    Lisinopril Swelling    Bee Venom Swelling       PHYSICAL EXAM:    Vitals:    /80   Ht 5' 10.98\" (1.803 m)   Wt 283 lb (128.4 kg)   BMI 39.49 kg/m²   General appearance/ Psychiatric: well nourished, and well groomed, normal judgement, alert, appears stated age and cooperative. MKS:   I do not appreciate any tender, swollen or inflamed joints in upper or lower extremities. Left shoulder-approximately 20% limitation in range of motion and extreme degrees extension, internal or external rotation. Unable to raise legs because of thigh muscle weakness. Unable to get up from chair without assistance. Walks with a wide gait. focal tenderness present in all thigh muscles. Generalized hyperesthesia in upper and lower ext. Muscle strength slightly decreased in biceps and triceps,  distal muscles in upper and lower extremities have normal strength. DTRs present knees, brachioradialis, biceps. Skin: No rashes, no induration or skin thickening or nodules. No evidence ischemia or deformities noted in digits or nails. HEENT: Puffiness of upper eyelids with enlargement of lacrimal glands. Left parotid gland looks full.   Mild conjunctival

## 2019-07-18 NOTE — TELEPHONE ENCOUNTER
Pt saw you today and forgot to ask for something for pain Right knee and left shoulder  Advil not working       [employer]

## 2019-07-20 ENCOUNTER — HOSPITAL ENCOUNTER (EMERGENCY)
Age: 59
Discharge: HOME | End: 2019-07-20
Attending: EMERGENCY MEDICINE
Payer: COMMERCIAL

## 2019-07-20 ENCOUNTER — APPOINTMENT (OUTPATIENT)
Dept: GENERAL RADIOLOGY | Age: 59
End: 2019-07-20
Payer: COMMERCIAL

## 2019-07-20 VITALS
WEIGHT: 283 LBS | HEART RATE: 88 BPM | SYSTOLIC BLOOD PRESSURE: 134 MMHG | OXYGEN SATURATION: 99 % | RESPIRATION RATE: 18 BRPM | DIASTOLIC BLOOD PRESSURE: 78 MMHG | HEIGHT: 72 IN | TEMPERATURE: 98.6 F | BODY MASS INDEX: 38.33 KG/M2

## 2019-07-20 DIAGNOSIS — J06.9 VIRAL URI WITH COUGH: Primary | ICD-10-CM

## 2019-07-20 PROCEDURE — 99283 EMERGENCY DEPT VISIT LOW MDM: CPT

## 2019-07-20 PROCEDURE — 71046 X-RAY EXAM CHEST 2 VIEWS: CPT

## 2019-07-20 RX ORDER — FLUTICASONE PROPIONATE 50 MCG
1 SPRAY, SUSPENSION (ML) NASAL 2 TIMES DAILY
Qty: 1 BOTTLE | Refills: 0 | Status: SHIPPED | OUTPATIENT
Start: 2019-07-20 | End: 2019-10-03 | Stop reason: CLARIF

## 2019-07-20 RX ORDER — LORATADINE 10 MG/1
10 TABLET ORAL DAILY
Qty: 30 TABLET | Refills: 0 | Status: SHIPPED | OUTPATIENT
Start: 2019-07-20 | End: 2019-10-03 | Stop reason: CLARIF

## 2019-07-20 RX ORDER — GUAIFENESIN/DEXTROMETHORPHAN 100-10MG/5
5 SYRUP ORAL 3 TIMES DAILY PRN
Qty: 120 ML | Refills: 0 | Status: SHIPPED | OUTPATIENT
Start: 2019-07-20 | End: 2019-07-30

## 2019-07-20 ASSESSMENT — ENCOUNTER SYMPTOMS
SORE THROAT: 0
SINUS PAIN: 0
ABDOMINAL PAIN: 0
FACIAL SWELLING: 0
SINUS PRESSURE: 0
COUGH: 1
SHORTNESS OF BREATH: 0
VOMITING: 0
NAUSEA: 0

## 2019-07-20 ASSESSMENT — PAIN DESCRIPTION - PAIN TYPE: TYPE: ACUTE PAIN

## 2019-07-20 ASSESSMENT — PAIN SCALES - GENERAL: PAINLEVEL_OUTOF10: 7

## 2019-07-20 NOTE — ED TRIAGE NOTES
Pt. Comes to ED with c/o cough and side pain for the past 3-4 days. He denies fevers, he also states that he has s swollen lymph node in his L armpit.

## 2019-07-23 ENCOUNTER — OFFICE VISIT (OUTPATIENT)
Dept: SURGERY | Age: 59
End: 2019-07-23
Payer: COMMERCIAL

## 2019-07-23 VITALS
HEART RATE: 80 BPM | TEMPERATURE: 99.3 F | BODY MASS INDEX: 39.76 KG/M2 | OXYGEN SATURATION: 98 % | HEIGHT: 71 IN | WEIGHT: 284 LBS | RESPIRATION RATE: 18 BRPM | DIASTOLIC BLOOD PRESSURE: 81 MMHG | SYSTOLIC BLOOD PRESSURE: 134 MMHG

## 2019-07-23 DIAGNOSIS — R59.0 AXILLARY LYMPHADENOPATHY: Primary | ICD-10-CM

## 2019-07-23 PROCEDURE — 3017F COLORECTAL CA SCREEN DOC REV: CPT | Performed by: SURGERY

## 2019-07-23 PROCEDURE — G8417 CALC BMI ABV UP PARAM F/U: HCPCS | Performed by: SURGERY

## 2019-07-23 PROCEDURE — G8427 DOCREV CUR MEDS BY ELIG CLIN: HCPCS | Performed by: SURGERY

## 2019-07-23 PROCEDURE — 1036F TOBACCO NON-USER: CPT | Performed by: SURGERY

## 2019-07-23 PROCEDURE — 99203 OFFICE O/P NEW LOW 30 MIN: CPT | Performed by: SURGERY

## 2019-07-23 PROCEDURE — G8598 ASA/ANTIPLAT THER USED: HCPCS | Performed by: SURGERY

## 2019-07-24 DIAGNOSIS — R59.0 LYMPHADENOPATHY, AXILLARY: Primary | ICD-10-CM

## 2019-07-30 ENCOUNTER — HOSPITAL ENCOUNTER (OUTPATIENT)
Dept: ULTRASOUND IMAGING | Age: 59
Discharge: HOME | End: 2019-07-30
Payer: COMMERCIAL

## 2019-07-30 DIAGNOSIS — R59.0 LYMPHADENOPATHY, AXILLARY: ICD-10-CM

## 2019-07-30 PROCEDURE — 88342 IMHCHEM/IMCYTCHM 1ST ANTB: CPT

## 2019-07-30 PROCEDURE — 88341 IMHCHEM/IMCYTCHM EA ADD ANTB: CPT

## 2019-07-30 PROCEDURE — 88305 TISSUE EXAM BY PATHOLOGIST: CPT

## 2019-07-30 PROCEDURE — 2709999900 US BIOPSY LYMPH NODE

## 2019-08-04 ENCOUNTER — NURSE TRIAGE (OUTPATIENT)
Dept: OTHER | Facility: CLINIC | Age: 59
End: 2019-08-04

## 2019-08-05 ENCOUNTER — TELEPHONE (OUTPATIENT)
Dept: SURGERY | Age: 59
End: 2019-08-05

## 2019-08-05 ENCOUNTER — OFFICE VISIT (OUTPATIENT)
Dept: RHEUMATOLOGY | Age: 59
End: 2019-08-05
Payer: COMMERCIAL

## 2019-08-05 VITALS
SYSTOLIC BLOOD PRESSURE: 122 MMHG | BODY MASS INDEX: 40.12 KG/M2 | DIASTOLIC BLOOD PRESSURE: 72 MMHG | WEIGHT: 286.6 LBS | HEIGHT: 71 IN

## 2019-08-05 DIAGNOSIS — R20.2 PARESTHESIAS: ICD-10-CM

## 2019-08-05 DIAGNOSIS — G72.49 INFLAMMATORY MYOPATHY: ICD-10-CM

## 2019-08-05 DIAGNOSIS — N63.20 LEFT BREAST MASS: Primary | ICD-10-CM

## 2019-08-05 DIAGNOSIS — M79.18 PAIN OF RIGHT DELTOID: ICD-10-CM

## 2019-08-05 PROCEDURE — G8427 DOCREV CUR MEDS BY ELIG CLIN: HCPCS | Performed by: INTERNAL MEDICINE

## 2019-08-05 PROCEDURE — 99214 OFFICE O/P EST MOD 30 MIN: CPT | Performed by: INTERNAL MEDICINE

## 2019-08-05 PROCEDURE — G8417 CALC BMI ABV UP PARAM F/U: HCPCS | Performed by: INTERNAL MEDICINE

## 2019-08-05 PROCEDURE — G8598 ASA/ANTIPLAT THER USED: HCPCS | Performed by: INTERNAL MEDICINE

## 2019-08-05 PROCEDURE — 1036F TOBACCO NON-USER: CPT | Performed by: INTERNAL MEDICINE

## 2019-08-05 PROCEDURE — 3017F COLORECTAL CA SCREEN DOC REV: CPT | Performed by: INTERNAL MEDICINE

## 2019-08-05 RX ORDER — PREGABALIN 50 MG/1
CAPSULE ORAL
Qty: 60 CAPSULE | Refills: 2 | Status: SHIPPED | OUTPATIENT
Start: 2019-08-05 | End: 2019-10-03 | Stop reason: CLARIF

## 2019-08-05 NOTE — PROGRESS NOTES
[address], [place]                                                           [address], [address]                                                             [phone] (c) [phone] (F)      Dear Dr. Jake Perez MD:  Please find Rheumatology assessment. Thank you for giving me the opportunity to be involved in Stone Macdonald's care and I look forward following Stone robles along with you. If you have any questions or concerns please feel free to reach me. Note is transcribed using voice recognition software. Inadvertent computerized transcription errors may be present. Patient identification: Stone Macdonald,: 1960,59 y.o. Sex: male     A/P  Stone robles was seen today for follow-up. Diagnoses and all orders for this visit:    Left breast mass    Paresthesias  -     pregabalin (LYRICA) 50 MG capsule; Take 1 tab po BID    Pain of right deltoid    Inflammatory myopathy  -     Bailey Boyd MD, Surgical Oncology, [facility] (colorectal, general, breast)        Today's visit-  Paresthesias in upper and lower extremity-worse. Has diabetic neuropathy. 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 Labs-elevated CPK, weak positive SRP, elevated sed rate and CRP. Left axillary mass-nonmalignant lymphocytic infiltrate. Left breast mass-tender, persistent. Dora Aguirre's- need to r/o breast cancer- plan to do excision biopsy of left breast mass, and at the mean time, will obtain R Deltoid muscle biopsy for possible inflammatory myopathy. Add Lyrica 50 mg twice daily for peripheral neuropathy. Tylenol arthritis for arthralgias.     Meantime, he was advised to call me if he notices diplopia, dysphagia, breathing Not on file   Tobacco Use    Smoking status: Never Smoker    Smokeless tobacco: Never Used   Substance and Sexual Activity    Alcohol use: Yes     Comment: occ    Drug use: No     Types: Cocaine     Comment: 50 years ago- no longer    Sexual activity: Yes     Partners: Female     Comment:    Lifestyle    Physical activity:     Days per week: Not on file     Minutes per session: Not on file    Stress: Not on file   Relationships    Social connections:     Talks on phone: Not on file     Gets together: Not on file     Attends Islam service: Not on file     Active member of club or organization: Not on file     Attends meetings of clubs or organizations: Not on file     Relationship status: Not on file    Intimate partner violence:     Fear of current or ex partner: Not on file     Emotionally abused: Not on file     Physically abused: Not on file     Forced sexual activity: Not on file   Other Topics Concern    Not on file   Social History Narrative    Not on file       FH- mother, brother - polymyositis     Current Outpatient Medications   Medication Sig Dispense Refill    pregabalin (LYRICA) 50 MG capsule Take 1 tab po BID 60 capsule 2    loratadine (CLARITIN) 10 MG tablet Take 1 tablet by mouth daily 30 tablet 0    fluticasone (FLONASE) 50 MCG/ACT nasal spray 1 spray by Nasal route 2 times daily 1 Bottle 0    aspirin EC 81 MG EC tablet Take 1 tablet by mouth daily 90 tablet 3    vitamin B-12 (CYANOCOBALAMIN) 500 MCG tablet Take 1 tablet by mouth daily 30 tablet 2    metFORMIN (GLUCOPHAGE) 500 MG tablet Take 1 tablet by mouth 2 times daily (with meals) 60 tablet 1    blood glucose test strips (ONE TOUCH ULTRA TEST) strip 1 each by In Vitro route 2 times daily As needed.  100 each 3    Blood Pressure Monitor MANSI 1 Device by Does not apply route as needed (PRN) 1 Device 0    carvedilol (COREG) 3.125 MG tablet Take 1 tablet by mouth 2 times daily (with meals) 60 tablet 3    furosemide (LASIX) 20 MG tablet Take 1 tablet by mouth daily 60 tablet 3    potassium chloride (KLOR-CON M) 20 MEQ extended release tablet Take 1 tablet by mouth daily 30 tablet 5    ibuprofen (ADVIL;MOTRIN) 600 MG tablet Take 1 tablet by mouth every 6 hours as needed for Pain 30 tablet 0    hydrochlorothiazide (HYDRODIURIL) 25 MG tablet Take 1 tablet by mouth daily 30 tablet 1    Multiple Vitamins-Minerals (THERAPEUTIC MULTIVITAMIN-MINERALS) tablet Take 1 tablet by mouth daily       No current facility-administered medications for this visit. Allergies   Allergen Reactions    Lisinopril Swelling    Bee Venom Swelling       PHYSICAL EXAM:    Vitals:    /72   Ht 5' 10.98\" (1.803 m)   Wt 286 lb 9.6 oz (130 kg)   BMI 39.99 kg/m²   General appearance/ Psychiatric: well nourished, and well groomed, normal judgement, alert, appears stated age and cooperative. MKS: Physical findings are unchanged since last visit. I do not appreciate any tender, swollen or inflamed joints in upper or lower extremities. Nearly full passive range of motion in his shoulders, approximately 20 to 30% limitation in range of motion especially abduction and extension. Unable to raise legs because of thigh muscle weakness. Unable to get up from chair without assistance. Walks with a wide gait. focal tenderness present in all thigh muscles. Generalized hyperesthesia in upper and lower ext. Muscle strength slightly decreased in biceps and triceps,  distal muscles in upper and lower extremities have normal strength. DTRs present knees, brachioradialis, biceps. Skin: No rashes, no induration or skin thickening or nodules. No evidence ischemia or deformities noted in digits or nails. HEENT: Puffiness of upper eyelids with enlargement of lacrimal glands. External inspection of the ears and nose within normal limits. Normal oral cavity with plenty of saliva. Neck: No masses or asymmetry. No thyroid enlargement.   Left axilla-2-3

## 2019-08-06 ENCOUNTER — ANESTHESIA EVENT (OUTPATIENT)
Dept: OPERATING ROOM | Age: 59
End: 2019-08-06
Payer: COMMERCIAL

## 2019-08-06 NOTE — PROGRESS NOTES
[address]                          Date of Procedure 08-06-19 Time of Procedure     PRIOR TO PROCEDURE DATE:  1. Please follow any guidelines/instructions prior to your procedure as advised by your surgeon. 2. Arrange for someone to drive you home and be with you for the first 24 hours after discharge for your safety after your procedure for which you received sedation. Ensure it is someone we can share information with regarding your discharge. 3. You must contact your surgeon for instructions IF:   You are taking any blood thinners, aspirin, anti-inflammatory or vitamin E.   There is a change in your physical condition such as a cold, fever, rash, cuts, sores or any other infection, especially near your surgical site. 4. Do not drink alcohol the day before or day of your procedure. 5. A Pre-op History and Physical for surgery MUST be completed by your Physician or Urgent Care within 30 days of your procedure date. Please bring a copy with you on the day of your procedure and along with any other testing performed. THE DAY OF YOUR PROCEDURE:  1. Follow instructions for ARRIVAL TIME as DIRECTED BY YOUR SURGEON. If your surgeon does not give you a specific arrival time, please arrive at 900.    2. Enter the MAIN entrance from [employer] and follow the signs to the free [employer] or [employer] parking (offered free of charge 6am-5pm). 3. Enter the Main Entrance of the hospital (do not enter from the lower level of the parking garage). Upon entrance, check in with the  at the main desk on your left. If no one is available at the desk, proceed into the [facility] Waiting Room and go through the door directly into the [facility]. There is a Check-in desk ACROSS from Room 5 (marked with a sign hanging from the ceiling). The phone number for the surgery center is [phone].     4. Please call [phone] option #2 option #2 if
Dr Bernard's office notified of pt not starting K+ even though started lasix a month ago.
in effect during my hospitalization, the order may or may not be in effect during this procedure. I give my doctor permission to give me blood or blood products. I understand that there are risks with receiving blood such as hepatitis, AIDS, fever, or allergic reaction. I acknowledge that the risks, benefits, and alternatives of this treatment have been explained to me and that no express or implied warranty has been given by the hospital, any blood bank, or any person or entity as to the blood or blood components transfused. At the discretion of my doctor, I agree to allow observers, equipment/product representatives and allow photographing, and/or televising of the procedure, provided my name or identity is maintained confidentially. I agree the hospital may dispose of or use for scientific or educational purposes any tissue, fluid, or body parts which may be removed.     ________________________________Date________Time______ am/pm  ([place] [facility])  Patient or Signature of Closest Relative or Legal Guardian    ________________________________Date________Time______am/pm      Page 1 of  1  Witness

## 2019-08-07 ENCOUNTER — HOSPITAL ENCOUNTER (OUTPATIENT)
Age: 59
Setting detail: OUTPATIENT SURGERY
Discharge: HOME | End: 2019-08-07
Attending: SURGERY | Admitting: SURGERY
Payer: COMMERCIAL

## 2019-08-07 ENCOUNTER — ANESTHESIA (OUTPATIENT)
Dept: OPERATING ROOM | Age: 59
End: 2019-08-07
Payer: COMMERCIAL

## 2019-08-07 VITALS — SYSTOLIC BLOOD PRESSURE: 168 MMHG | DIASTOLIC BLOOD PRESSURE: 67 MMHG | OXYGEN SATURATION: 99 %

## 2019-08-07 VITALS
OXYGEN SATURATION: 94 % | RESPIRATION RATE: 14 BRPM | HEART RATE: 80 BPM | BODY MASS INDEX: 39.62 KG/M2 | WEIGHT: 283 LBS | DIASTOLIC BLOOD PRESSURE: 68 MMHG | SYSTOLIC BLOOD PRESSURE: 126 MMHG | TEMPERATURE: 97.4 F | HEIGHT: 71 IN

## 2019-08-07 DIAGNOSIS — G89.18 POST-OPERATIVE PAIN: Primary | ICD-10-CM

## 2019-08-07 LAB
ANION GAP SERPL CALCULATED.3IONS-SCNC: 14 MMOL/L (ref 3–16)
BUN BLDV-MCNC: 14 MG/DL (ref 7–20)
CALCIUM SERPL-MCNC: 8.9 MG/DL (ref 8.3–10.6)
CHLORIDE BLD-SCNC: 105 MMOL/L (ref 99–110)
CO2: 23 MMOL/L (ref 21–32)
CREAT SERPL-MCNC: 0.7 MG/DL (ref 0.8–1.3)
GFR AFRICAN AMERICAN: >60
GFR NON-AFRICAN AMERICAN: >60
GLUCOSE BLD-MCNC: 108 MG/DL (ref 70–99)
GLUCOSE BLD-MCNC: 128 MG/DL (ref 70–99)
GLUCOSE BLD-MCNC: 88 MG/DL (ref 70–99)
HCT VFR BLD CALC: 39 % (ref 40.5–52.5)
HEMOGLOBIN: 12.7 G/DL (ref 13.5–17.5)
MCH RBC QN AUTO: 28.4 PG (ref 26–34)
MCHC RBC AUTO-ENTMCNC: 32.7 G/DL (ref 31–36)
MCV RBC AUTO: 87 FL (ref 80–100)
PDW BLD-RTO: 15 % (ref 12.4–15.4)
PERFORMED ON: ABNORMAL
PERFORMED ON: NORMAL
PLATELET # BLD: 342 K/UL (ref 135–450)
PMV BLD AUTO: 6.3 FL (ref 5–10.5)
POTASSIUM SERPL-SCNC: 4.1 MMOL/L (ref 3.5–5.1)
RBC # BLD: 4.48 M/UL (ref 4.2–5.9)
SODIUM BLD-SCNC: 142 MMOL/L (ref 136–145)
WBC # BLD: 10 K/UL (ref 4–11)

## 2019-08-07 PROCEDURE — 88275 CYTOGENETICS 100-300: CPT

## 2019-08-07 PROCEDURE — 88184 FLOWCYTOMETRY/ TC 1 MARKER: CPT

## 2019-08-07 PROCEDURE — 2500000003 HC RX 250 WO HCPCS: Performed by: SURGERY

## 2019-08-07 PROCEDURE — 7100000011 HC PHASE II RECOVERY - ADDTL 15 MIN: Performed by: SURGERY

## 2019-08-07 PROCEDURE — 88239 TISSUE CULTURE TUMOR: CPT

## 2019-08-07 PROCEDURE — 80048 BASIC METABOLIC PNL TOTAL CA: CPT

## 2019-08-07 PROCEDURE — 19301 PARTIAL MASTECTOMY: CPT | Performed by: SURGERY

## 2019-08-07 PROCEDURE — 3700000001 HC ADD 15 MINUTES (ANESTHESIA): Performed by: SURGERY

## 2019-08-07 PROCEDURE — 3700000000 HC ANESTHESIA ATTENDED CARE: Performed by: SURGERY

## 2019-08-07 PROCEDURE — 88341 IMHCHEM/IMCYTCHM EA ADD ANTB: CPT

## 2019-08-07 PROCEDURE — 2709999900 HC NON-CHARGEABLE SUPPLY: Performed by: SURGERY

## 2019-08-07 PROCEDURE — C2617 STENT, NON-COR, TEM W/O DEL: HCPCS | Performed by: SURGERY

## 2019-08-07 PROCEDURE — 88300 SURGICAL PATH GROSS: CPT

## 2019-08-07 PROCEDURE — 7100000001 HC PACU RECOVERY - ADDTL 15 MIN: Performed by: SURGERY

## 2019-08-07 PROCEDURE — 6360000002 HC RX W HCPCS: Performed by: ANESTHESIOLOGY

## 2019-08-07 PROCEDURE — 88360 TUMOR IMMUNOHISTOCHEM/MANUAL: CPT

## 2019-08-07 PROCEDURE — 88305 TISSUE EXAM BY PATHOLOGIST: CPT

## 2019-08-07 PROCEDURE — 2580000003 HC RX 258: Performed by: ANESTHESIOLOGY

## 2019-08-07 PROCEDURE — 7100000000 HC PACU RECOVERY - FIRST 15 MIN: Performed by: SURGERY

## 2019-08-07 PROCEDURE — 7100000010 HC PHASE II RECOVERY - FIRST 15 MIN: Performed by: SURGERY

## 2019-08-07 PROCEDURE — 20205 DEEP MUSCLE BIOPSY: CPT | Performed by: SURGERY

## 2019-08-07 PROCEDURE — 85027 COMPLETE CBC AUTOMATED: CPT

## 2019-08-07 PROCEDURE — 6360000002 HC RX W HCPCS: Performed by: NURSE ANESTHETIST, CERTIFIED REGISTERED

## 2019-08-07 PROCEDURE — 3600000014 HC SURGERY LEVEL 4 ADDTL 15MIN: Performed by: SURGERY

## 2019-08-07 PROCEDURE — 3600000004 HC SURGERY LEVEL 4 BASE: Performed by: SURGERY

## 2019-08-07 PROCEDURE — 2580000003 HC RX 258: Performed by: SURGERY

## 2019-08-07 PROCEDURE — 88185 FLOWCYTOMETRY/TC ADD-ON: CPT

## 2019-08-07 PROCEDURE — 6360000002 HC RX W HCPCS: Performed by: SURGERY

## 2019-08-07 PROCEDURE — 88271 CYTOGENETICS DNA PROBE: CPT

## 2019-08-07 PROCEDURE — 6370000000 HC RX 637 (ALT 250 FOR IP): Performed by: SURGERY

## 2019-08-07 PROCEDURE — 38525 BIOPSY/REMOVAL LYMPH NODES: CPT | Performed by: SURGERY

## 2019-08-07 PROCEDURE — 88307 TISSUE EXAM BY PATHOLOGIST: CPT

## 2019-08-07 PROCEDURE — 88342 IMHCHEM/IMCYTCHM 1ST ANTB: CPT

## 2019-08-07 DEVICE — URETERAL STENT
Type: IMPLANTABLE DEVICE | Site: URETER | Status: FUNCTIONAL
Brand: POLARIS™ ULTRA

## 2019-08-07 RX ORDER — FENTANYL CITRATE 50 UG/ML
25 INJECTION, SOLUTION INTRAMUSCULAR; INTRAVENOUS EVERY 5 MIN PRN
Status: DISCONTINUED | OUTPATIENT
Start: 2019-08-07 | End: 2019-08-07 | Stop reason: HOSPADM

## 2019-08-07 RX ORDER — BUPIVACAINE HYDROCHLORIDE AND EPINEPHRINE 5; 5 MG/ML; UG/ML
INJECTION, SOLUTION EPIDURAL; INTRACAUDAL; PERINEURAL PRN
Status: DISCONTINUED | OUTPATIENT
Start: 2019-08-07 | End: 2019-08-07 | Stop reason: ALTCHOICE

## 2019-08-07 RX ORDER — HYDROMORPHONE HCL 110MG/55ML
PATIENT CONTROLLED ANALGESIA SYRINGE INTRAVENOUS PRN
Status: DISCONTINUED | OUTPATIENT
Start: 2019-08-07 | End: 2019-08-07 | Stop reason: SDUPTHER

## 2019-08-07 RX ORDER — FENTANYL CITRATE 50 UG/ML
50 INJECTION, SOLUTION INTRAMUSCULAR; INTRAVENOUS EVERY 5 MIN PRN
Status: DISCONTINUED | OUTPATIENT
Start: 2019-08-07 | End: 2019-08-07 | Stop reason: HOSPADM

## 2019-08-07 RX ORDER — LIDOCAINE HYDROCHLORIDE 10 MG/ML
1 INJECTION, SOLUTION EPIDURAL; INFILTRATION; INTRACAUDAL; PERINEURAL
Status: DISCONTINUED | OUTPATIENT
Start: 2019-08-07 | End: 2019-08-07 | Stop reason: HOSPADM

## 2019-08-07 RX ORDER — MIDAZOLAM HYDROCHLORIDE 1 MG/ML
INJECTION INTRAMUSCULAR; INTRAVENOUS PRN
Status: DISCONTINUED | OUTPATIENT
Start: 2019-08-07 | End: 2019-08-07 | Stop reason: SDUPTHER

## 2019-08-07 RX ORDER — FENTANYL CITRATE 50 UG/ML
INJECTION, SOLUTION INTRAMUSCULAR; INTRAVENOUS PRN
Status: DISCONTINUED | OUTPATIENT
Start: 2019-08-07 | End: 2019-08-07 | Stop reason: SDUPTHER

## 2019-08-07 RX ORDER — OXYCODONE HYDROCHLORIDE AND ACETAMINOPHEN 5; 325 MG/1; MG/1
1 TABLET ORAL EVERY 6 HOURS PRN
Qty: 10 TABLET | Refills: 0 | Status: SHIPPED | OUTPATIENT
Start: 2019-08-07 | End: 2019-08-10

## 2019-08-07 RX ORDER — ONDANSETRON 2 MG/ML
4 INJECTION INTRAMUSCULAR; INTRAVENOUS
Status: DISCONTINUED | OUTPATIENT
Start: 2019-08-07 | End: 2019-08-07 | Stop reason: HOSPADM

## 2019-08-07 RX ORDER — PROMETHAZINE HYDROCHLORIDE 25 MG/ML
6.25 INJECTION, SOLUTION INTRAMUSCULAR; INTRAVENOUS
Status: DISCONTINUED | OUTPATIENT
Start: 2019-08-07 | End: 2019-08-07 | Stop reason: HOSPADM

## 2019-08-07 RX ORDER — SODIUM CHLORIDE 0.9 % (FLUSH) 0.9 %
10 SYRINGE (ML) INJECTION EVERY 12 HOURS SCHEDULED
Status: DISCONTINUED | OUTPATIENT
Start: 2019-08-07 | End: 2019-08-07 | Stop reason: HOSPADM

## 2019-08-07 RX ORDER — LIDOCAINE HYDROCHLORIDE 20 MG/ML
INJECTION, SOLUTION INTRAVENOUS PRN
Status: DISCONTINUED | OUTPATIENT
Start: 2019-08-07 | End: 2019-08-07 | Stop reason: SDUPTHER

## 2019-08-07 RX ORDER — MAGNESIUM HYDROXIDE 1200 MG/15ML
LIQUID ORAL CONTINUOUS PRN
Status: COMPLETED | OUTPATIENT
Start: 2019-08-07 | End: 2019-08-07

## 2019-08-07 RX ORDER — SODIUM CHLORIDE 0.9 % (FLUSH) 0.9 %
10 SYRINGE (ML) INJECTION PRN
Status: DISCONTINUED | OUTPATIENT
Start: 2019-08-07 | End: 2019-08-07 | Stop reason: HOSPADM

## 2019-08-07 RX ORDER — SODIUM CHLORIDE, SODIUM LACTATE, POTASSIUM CHLORIDE, CALCIUM CHLORIDE 600; 310; 30; 20 MG/100ML; MG/100ML; MG/100ML; MG/100ML
INJECTION, SOLUTION INTRAVENOUS CONTINUOUS
Status: DISCONTINUED | OUTPATIENT
Start: 2019-08-07 | End: 2019-08-07 | Stop reason: HOSPADM

## 2019-08-07 RX ORDER — OXYCODONE HYDROCHLORIDE AND ACETAMINOPHEN 5; 325 MG/1; MG/1
1 TABLET ORAL ONCE
Status: COMPLETED | OUTPATIENT
Start: 2019-08-07 | End: 2019-08-07

## 2019-08-07 RX ORDER — PROPOFOL 10 MG/ML
INJECTION, EMULSION INTRAVENOUS PRN
Status: DISCONTINUED | OUTPATIENT
Start: 2019-08-07 | End: 2019-08-07 | Stop reason: SDUPTHER

## 2019-08-07 RX ADMIN — FENTANYL CITRATE 50 MCG: 50 INJECTION INTRAMUSCULAR; INTRAVENOUS at 12:41

## 2019-08-07 RX ADMIN — HYDROMORPHONE HYDROCHLORIDE 0.5 MG: 2 INJECTION, SOLUTION INTRAMUSCULAR; INTRAVENOUS; SUBCUTANEOUS at 13:01

## 2019-08-07 RX ADMIN — PROPOFOL 200 MG: 10 INJECTION, EMULSION INTRAVENOUS at 11:56

## 2019-08-07 RX ADMIN — FENTANYL CITRATE 25 MCG: 50 INJECTION INTRAMUSCULAR; INTRAVENOUS at 14:30

## 2019-08-07 RX ADMIN — FENTANYL CITRATE 25 MCG: 50 INJECTION INTRAMUSCULAR; INTRAVENOUS at 14:17

## 2019-08-07 RX ADMIN — FENTANYL CITRATE 50 MCG: 50 INJECTION INTRAMUSCULAR; INTRAVENOUS at 11:59

## 2019-08-07 RX ADMIN — FENTANYL CITRATE 50 MCG: 50 INJECTION INTRAMUSCULAR; INTRAVENOUS at 12:24

## 2019-08-07 RX ADMIN — OXYCODONE HYDROCHLORIDE AND ACETAMINOPHEN 1 TABLET: 5; 325 TABLET ORAL at 17:44

## 2019-08-07 RX ADMIN — LIDOCAINE HYDROCHLORIDE 60 MG: 20 INJECTION, SOLUTION INTRAVENOUS at 11:56

## 2019-08-07 RX ADMIN — MIDAZOLAM HYDROCHLORIDE 2 MG: 2 INJECTION, SOLUTION INTRAMUSCULAR; INTRAVENOUS at 11:53

## 2019-08-07 RX ADMIN — CEFAZOLIN SODIUM 3 G: 10 INJECTION, POWDER, FOR SOLUTION INTRAVENOUS at 12:08

## 2019-08-07 RX ADMIN — HYDROMORPHONE HYDROCHLORIDE 0.5 MG: 2 INJECTION, SOLUTION INTRAMUSCULAR; INTRAVENOUS; SUBCUTANEOUS at 13:13

## 2019-08-07 RX ADMIN — SODIUM CHLORIDE, POTASSIUM CHLORIDE, SODIUM LACTATE AND CALCIUM CHLORIDE: 600; 310; 30; 20 INJECTION, SOLUTION INTRAVENOUS at 10:10

## 2019-08-07 RX ADMIN — FENTANYL CITRATE 50 MCG: 50 INJECTION INTRAMUSCULAR; INTRAVENOUS at 11:56

## 2019-08-07 ASSESSMENT — PULMONARY FUNCTION TESTS
PIF_VALUE: 7
PIF_VALUE: 2
PIF_VALUE: 2
PIF_VALUE: 13
PIF_VALUE: 13
PIF_VALUE: 2
PIF_VALUE: 13
PIF_VALUE: 12
PIF_VALUE: 2
PIF_VALUE: 0
PIF_VALUE: 13
PIF_VALUE: 2
PIF_VALUE: 1
PIF_VALUE: 13
PIF_VALUE: 2
PIF_VALUE: 2
PIF_VALUE: 14
PIF_VALUE: 2
PIF_VALUE: 13
PIF_VALUE: 0
PIF_VALUE: 13
PIF_VALUE: 13
PIF_VALUE: 2
PIF_VALUE: 4
PIF_VALUE: 2
PIF_VALUE: 1
PIF_VALUE: 2
PIF_VALUE: 2
PIF_VALUE: 0
PIF_VALUE: 0
PIF_VALUE: 2
PIF_VALUE: 0
PIF_VALUE: 1
PIF_VALUE: 2
PIF_VALUE: 21
PIF_VALUE: 2
PIF_VALUE: 2
PIF_VALUE: 0
PIF_VALUE: 2
PIF_VALUE: 13
PIF_VALUE: 13
PIF_VALUE: 2
PIF_VALUE: 3
PIF_VALUE: 2
PIF_VALUE: 13
PIF_VALUE: 2
PIF_VALUE: 2
PIF_VALUE: 3
PIF_VALUE: 2
PIF_VALUE: 3
PIF_VALUE: 2
PIF_VALUE: 2
PIF_VALUE: 20
PIF_VALUE: 2
PIF_VALUE: 12
PIF_VALUE: 2
PIF_VALUE: 13
PIF_VALUE: 13
PIF_VALUE: 2
PIF_VALUE: 13
PIF_VALUE: 2
PIF_VALUE: 21
PIF_VALUE: 2
PIF_VALUE: 1
PIF_VALUE: 18
PIF_VALUE: 2
PIF_VALUE: 0
PIF_VALUE: 13
PIF_VALUE: 2
PIF_VALUE: 12
PIF_VALUE: 0
PIF_VALUE: 1
PIF_VALUE: 2
PIF_VALUE: 3
PIF_VALUE: 13
PIF_VALUE: 2
PIF_VALUE: 0
PIF_VALUE: 13
PIF_VALUE: 2
PIF_VALUE: 1
PIF_VALUE: 0
PIF_VALUE: 12
PIF_VALUE: 25
PIF_VALUE: 2
PIF_VALUE: 2
PIF_VALUE: 12
PIF_VALUE: 2
PIF_VALUE: 3
PIF_VALUE: 2
PIF_VALUE: 13
PIF_VALUE: 2
PIF_VALUE: 12
PIF_VALUE: 2
PIF_VALUE: 2
PIF_VALUE: 3
PIF_VALUE: 3
PIF_VALUE: 2
PIF_VALUE: 0
PIF_VALUE: 8
PIF_VALUE: 2
PIF_VALUE: 2

## 2019-08-07 ASSESSMENT — PAIN SCALES - GENERAL
PAINLEVEL_OUTOF10: 5
PAINLEVEL_OUTOF10: 4
PAINLEVEL_OUTOF10: 4

## 2019-08-07 ASSESSMENT — PAIN SCALES - WONG BAKER: WONGBAKER_NUMERICALRESPONSE: 0

## 2019-08-07 ASSESSMENT — LIFESTYLE VARIABLES: SMOKING_STATUS: 0

## 2019-08-07 NOTE — H&P
Palma Kim    [id number]    [facility] Same Day Surgery Update H & P  Department of General Surgery   Surgical Service   Pre-operative History and Physical  Last H & P within the last 30 days. DIAGNOSIS:   LEFT BREAST MASS, AXILLARY LYMPHADENOPATHY AND INFLAMMATORY MYOPATHY    PROCEDURE:  ND REMOVAL OF BREAST LESION [19120] (LEFT BREAST MASS EXCISION;)  ND BIOPSY/EXCISION, LYMPH NODE(S) [30400] (LEFT AXILLIARY LYMPH NODE EXCISION AND DELTOID MUSCLE BIOPSY)     HISTORY OF PRESENT ILLNESS:    Patient with left axillary lymph nodes which are enlarged and tender and left breast mass presents today for the above procedures.      Past Medical History:        Diagnosis Date    Arthritis     Depression     Diabetes mellitus ([address]     Difficult intubation     throat swelled    Dyslipidemia     Herniated disc, cervical     Hypertension     Osteoporosis     Peripheral neuropathy      Past Surgical History:        Procedure Laterality Date    HERNIA REPAIR      UMBILICAL     Past Social History:  Social History     Socioeconomic History    Marital status: Single     Spouse name: None    Number of children: None    Years of education: None    Highest education level: None   Occupational History    None   Social Needs    Financial resource strain: None    Food insecurity:     Worry: None     Inability: None    Transportation needs:     Medical: None     Non-medical: None   Tobacco Use    Smoking status: Never Smoker    Smokeless tobacco: Never Used   Substance and Sexual Activity    Alcohol use: Yes     Comment: occ    Drug use: No     Types: Cocaine     Comment: 50 years ago- no longer    Sexual activity: Yes     Partners: Female     Comment:    Lifestyle    Physical activity:     Days per week: None     Minutes per session: None    Stress: None   Relationships    Social connections:     Talks on phone: None     Gets together: None     Attends Anglican service: None     Active member

## 2019-08-07 NOTE — ANESTHESIA PRE PROCEDURE
Department of Anesthesiology  Preprocedure Note       Name:  Mamie Mata   Age:  62 y.o.  :  1960                                          MRN:  [id number]         Date:  2019      Surgeon: Horace Irwin):  Clare Driver MD    Procedure: LEFT BREAST MASS EXCISION; (Left )  LEFT AXILLIARY LYMPH NODE EXCISION AND DELTOID MUSCLE BIOPSY (Left )    Medications prior to admission:   Prior to Admission medications    Medication Sig Start Date End Date Taking? Authorizing Provider   pregabalin (LYRICA) 50 MG capsule Take 1 tab po BID 19  Gene Pastor MD   loratadine (CLARITIN) 10 MG tablet Take 1 tablet by mouth daily 19   Stacy Aleman PA-C   fluticasone (FLONASE) 50 MCG/ACT nasal spray 1 spray by Nasal route 2 times daily 19   Lauri Thorpe PA-C   aspirin EC 81 MG EC tablet Take 1 tablet by mouth daily 7/3/19   Tommie Bay MD   vitamin B-12 (CYANOCOBALAMIN) 500 MCG tablet Take 1 tablet by mouth daily 19   Norm Vinson MD   blood glucose test strips (ONE TOUCH ULTRA TEST) strip 1 each by In Vitro route 2 times daily As needed.  19   Norm Vinson MD   Blood Pressure Monitor MANSI 1 Device by Does not apply route as needed (PRN) 19   Norm Vinson MD   carvedilol (COREG) 3.125 MG tablet Take 1 tablet by mouth 2 times daily (with meals) 19   Jia Urias MD   furosemide (LASIX) 20 MG tablet Take 1 tablet by mouth daily 19   Jia Urias MD   potassium chloride (KLOR-CON M) 20 MEQ extended release tablet Take 1 tablet by mouth daily 19   Jia Urias MD   ibuprofen (ADVIL;MOTRIN) 600 MG tablet Take 1 tablet by mouth every 6 hours as needed for Pain 19   SANJAY Zacarias   Multiple Vitamins-Minerals (THERAPEUTIC MULTIVITAMIN-MINERALS) tablet Take 1 tablet by mouth daily    Historical Provider, MD       Current medications:    Current Facility-Administered Medications   Medication Dose Route Frequency Provider Last Rate Last

## 2019-08-14 ENCOUNTER — TELEPHONE (OUTPATIENT)
Dept: SURGERY | Age: 59
End: 2019-08-14

## 2019-08-14 ENCOUNTER — OFFICE VISIT (OUTPATIENT)
Dept: RHEUMATOLOGY | Age: 59
End: 2019-08-14
Payer: COMMERCIAL

## 2019-08-14 VITALS
HEIGHT: 71 IN | SYSTOLIC BLOOD PRESSURE: 120 MMHG | WEIGHT: 277 LBS | BODY MASS INDEX: 38.78 KG/M2 | DIASTOLIC BLOOD PRESSURE: 78 MMHG

## 2019-08-14 DIAGNOSIS — M17.0 PRIMARY OSTEOARTHRITIS OF BOTH KNEES: ICD-10-CM

## 2019-08-14 DIAGNOSIS — N63.20 LEFT BREAST MASS: Primary | ICD-10-CM

## 2019-08-14 DIAGNOSIS — G72.49 INFLAMMATORY MYOPATHY: ICD-10-CM

## 2019-08-14 PROCEDURE — 99213 OFFICE O/P EST LOW 20 MIN: CPT | Performed by: INTERNAL MEDICINE

## 2019-08-14 PROCEDURE — G8427 DOCREV CUR MEDS BY ELIG CLIN: HCPCS | Performed by: INTERNAL MEDICINE

## 2019-08-14 PROCEDURE — 1036F TOBACCO NON-USER: CPT | Performed by: INTERNAL MEDICINE

## 2019-08-14 PROCEDURE — G8598 ASA/ANTIPLAT THER USED: HCPCS | Performed by: INTERNAL MEDICINE

## 2019-08-14 PROCEDURE — 3017F COLORECTAL CA SCREEN DOC REV: CPT | Performed by: INTERNAL MEDICINE

## 2019-08-14 PROCEDURE — G8417 CALC BMI ABV UP PARAM F/U: HCPCS | Performed by: INTERNAL MEDICINE

## 2019-08-14 RX ORDER — PREDNISONE 20 MG/1
TABLET ORAL
Qty: 30 TABLET | Refills: 0 | Status: SHIPPED | OUTPATIENT
Start: 2019-08-14 | End: 2019-10-03 | Stop reason: CLARIF

## 2019-08-14 NOTE — PROGRESS NOTES
[address], [place]                                                           [address] [place] [address]                                                             [phone] (F) [phone] (F)      Dear Dr. Jake Perez MD:  Please find Rheumatology assessment. Thank you for giving me the opportunity to be involved in Stone Macdonald's care and I look forward following Stone robles along with you. If you have any questions or concerns please feel free to reach me. Note is transcribed using voice recognition software. Inadvertent computerized transcription errors may be present. Patient identification: Stone Macdonald,: 1960,59 y.o. Sex: male     A/P  Stone robles was seen today for follow-up. Diagnoses and all orders for this visit:    Left breast mass    Inflammatory myopathy    Primary osteoarthritis of both knees        Today's visit-  Muscle biopsy and breast biopsy report is still pending. Outsourcing lab where the muscle biopsy was sent-said that it will take the next 2 to 3 weeks for the muscle biopsy results to be available. Symptom wise, feels little bit better in terms of muscle weakness in his legs, still pretty weak. Has advanced knee osteoarthritis bilaterally, will need replacement at some point of time. Diabetic neuropathy causing paresthesias-improved on Lyrica. 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 Labs-elevated CPK, weak positive SRP, elevated sed rate and CRP. Left axillary mass-nonmalignant lymphocytic infiltrate. Left breast mass-tender, persistent. Plan-  Empirically-I gave him 20 mg of prednisone daily. We will plan for DMARDs if needed after muscle biopsy results. Continue Lyrica for paresthesias.   We will plan for physical therapy after muscle enzymes normalize. In few months time, once muscle enzymes normalize,  he will also need referral to see orthopedics for surgical management of advanced knee osteoarthritis. Tylenol arthritis for arthralgias. Meantime, he was advised to call me if he notices diplopia, dysphagia, breathing difficulties or overt muscle weakness. Patient indicates understanding and agrees with the management plan. I reviewed patient's history, referral documents and electronic medical records. Copy of consult note is being routed electronically/faxed to referring physician. #######################################################################    Mnbeadlhig-ochhbw-kd for suspected inflammatory poly-myositis, left breast mass, and osteoarthritis. Interval changes-  Right deltoid and left breast mass biopsy was performed on 8/7/2019, we are still awaiting these biopsy results. His musculoskeletal symptoms have unchanged since last seen, other than some improvement in his weakness in lower extremities. He is still using cane to ambulate, legs and arms feels weak. Parethesias in legs and arms are better on Lyrica. No swollen or inflamed joints. Denies any dry eyes, dry mouth, rashes, dysphagia, diplopia, cough or sob. 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          Past Medical History:   Diagnosis Date    Arthritis     Depression     Diabetes mellitus ([address]     Difficult intubation     throat swelled    Dyslipidemia     Herniated disc, cervical     Hypertension     Osteoporosis     Peripheral neuropathy      Past Surgical History:   Procedure Laterality Date    BREAST SURGERY Left 8/7/2019    LEFT BREAST MASS EXCISION; LEFT AXILLIARY LYMPH NODE EXCISION performed by Dora Terrell MD at [address]     Social History     Socioeconomic History  Marital status: Single     Spouse name: Not on file    Number of children: Not on file    Years of education: Not on file    Highest education level: Not on file   Occupational History    Not on file   Social Needs    Financial resource strain: Not on file    Food insecurity:     Worry: Not on file     Inability: Not on file    Transportation needs:     Medical: Not on file     Non-medical: Not on file   Tobacco Use    Smoking status: Never Smoker    Smokeless tobacco: Never Used   Substance and Sexual Activity    Alcohol use: Yes     Comment: occ    Drug use: No     Types: Cocaine     Comment: 50 years ago- no longer    Sexual activity: Yes     Partners: Female     Comment:    Lifestyle    Physical activity:     Days per week: Not on file     Minutes per session: Not on file    Stress: Not on file   Relationships    Social connections:     Talks on phone: Not on file     Gets together: Not on file     Attends Episcopal service: Not on file     Active member of club or organization: Not on file     Attends meetings of clubs or organizations: Not on file     Relationship status: Not on file    Intimate partner violence:     Fear of current or ex partner: Not on file     Emotionally abused: Not on file     Physically abused: Not on file     Forced sexual activity: Not on file   Other Topics Concern    Not on file   Social History Narrative    Not on file       FH- mother, brother - polymyositis     Current Outpatient Medications   Medication Sig Dispense Refill    pregabalin (LYRICA) 50 MG capsule Take 1 tab po BID 60 capsule 2    loratadine (CLARITIN) 10 MG tablet Take 1 tablet by mouth daily 30 tablet 0    fluticasone (FLONASE) 50 MCG/ACT nasal spray 1 spray by Nasal route 2 times daily 1 Bottle 0    aspirin EC 81 MG EC tablet Take 1 tablet by mouth daily 90 tablet 3    vitamin B-12 (CYANOCOBALAMIN) 500 MCG tablet Take 1 tablet by mouth daily 30 tablet 2    blood glucose test strips (ONE TOUCH ULTRA TEST) strip 1 each by In Vitro route 2 times daily As needed. 100 each 3    Blood Pressure Monitor MANSI 1 Device by Does not apply route as needed (PRN) 1 Device 0    carvedilol (COREG) 3.125 MG tablet Take 1 tablet by mouth 2 times daily (with meals) 60 tablet 3    furosemide (LASIX) 20 MG tablet Take 1 tablet by mouth daily 60 tablet 3    potassium chloride (KLOR-CON M) 20 MEQ extended release tablet Take 1 tablet by mouth daily 30 tablet 5    ibuprofen (ADVIL;MOTRIN) 600 MG tablet Take 1 tablet by mouth every 6 hours as needed for Pain 30 tablet 0    Multiple Vitamins-Minerals (THERAPEUTIC MULTIVITAMIN-MINERALS) tablet Take 1 tablet by mouth daily       No current facility-administered medications for this visit. Allergies   Allergen Reactions    Lisinopril Swelling    Bee Venom Swelling       PHYSICAL EXAM:    Vitals:    /78   Ht 5' 10.98\" (1.803 m)   Wt 277 lb (125.6 kg)   BMI 38.65 kg/m²   General appearance/ Psychiatric: well nourished, and well groomed, normal judgement, alert, appears stated age and cooperative. MKS:   He does not have any swollen or inflamed joints in upper or lower extremities. Active range of motion are nearly full in his shoulders, passive range of motion not limited by approximately 30% in all directions. He is unable to raise his legs from thigh muscle weakness. Unable to get up from chair without assistance. Walks slowly with a wide gait. Weakness noted in proximal muscles in upper and lower extremities 4/5 muscle strength in large muscle groups. Small muscles in his hands-strength is preserved. Calf muscles-normal strength. DTRs normal.  Skin: No rashes, no induration or skin thickening or nodules. No evidence ischemia or deformities noted in digits or nails. HEENT: Puffiness of upper eyelids with enlargement of lacrimal glands. External inspection of the ears and nose within normal limits.   Normal oral cavity with plenty of

## 2019-08-15 ENCOUNTER — TELEPHONE (OUTPATIENT)
Dept: INTERNAL MEDICINE CLINIC | Age: 59
End: 2019-08-15

## 2019-08-16 ENCOUNTER — TELEPHONE (OUTPATIENT)
Dept: SURGERY | Age: 59
End: 2019-08-16

## 2019-08-16 NOTE — OP NOTE
Sandor Arango  1960    PREOPERATIVE DIAGNOSIS: Left breast mass, left axillary adenopathy and Inflammatory myopathy  POSTOPERATIVE DIAGNOSIS: Same     PROCEDURE PERFORMED:  1. Left breast partial mastectomy  2. Left axillary lymph node excision  3. Right deltoid muscle biopsy     ATTENDING SURGEON: MD America Tucker MD  ANESTHESIA: General.     BLOOD LOSS: 5 ml   COMPLICATIONS : None  DRAINS: None   SPECIMENS REMOVED: Left breast mass, lymph node and muscle    Findings: removal of left breast tissue along with mass. and enlarged lymph nodes  INDICATIONS FOR OPERATION: Discussed with patient about surgical options for breast mass and lymph node. Pt also needs muscle biopsy. The risks of surgery include infection, bleeding, recurrence of the lesion and seroma formation post-operatively. Patient verbalized understanding of the risks and benefits and wishes to proceed with surgery. DETAILS OF PROCEDURE: Patient was identified in the preoperative area and brought to the operating room. General anesthesia given. Operative site is prepped and draped in a sterile manner. Timeout procedure was performed confirming the procedure, site and administration of antibiotics. Skin incision given in left axilla. Subcutaneous tissues divided. Axillary fascia divided. Dissection done all around the deep axillary enlarged node. Lymphatics and fatty tissue controlled and divided with ligasure device. Careful attention was given not to damage any nerves or named blood vessels. Entire node excised and sent to pathology for lymphoma protocol. Hemostasis checked and achieved. Wound was closed with subcutaneous interrupted vicryl sutures and dermal vicryl sutures. Derma flex placed. An incision was given in the the breast over the mass lateral to areola. Incision was made with the scalpel. Further dissection was done with diathermy.  As I am going through the breast tissue, hemostasis carefully checked and

## 2019-08-21 ENCOUNTER — TELEPHONE (OUTPATIENT)
Dept: RHEUMATOLOGY | Age: 59
End: 2019-08-21

## 2019-08-28 ENCOUNTER — TELEPHONE (OUTPATIENT)
Dept: SURGERY | Age: 59
End: 2019-08-28

## 2019-09-05 ENCOUNTER — TELEPHONE (OUTPATIENT)
Dept: SURGERY | Age: 59
End: 2019-09-05

## 2019-09-05 NOTE — TELEPHONE ENCOUNTER
The patient would like to be called with the results of his muscle biopsy. The patient said that he had 3 biopsies and he has received the results from 2 of them. Please call.

## 2019-09-06 ENCOUNTER — TELEPHONE (OUTPATIENT)
Dept: SURGERY | Age: 59
End: 2019-09-06

## 2019-09-10 ENCOUNTER — TELEPHONE (OUTPATIENT)
Dept: SURGERY | Age: 59
End: 2019-09-10

## 2019-09-10 ENCOUNTER — OFFICE VISIT (OUTPATIENT)
Dept: RHEUMATOLOGY | Age: 59
End: 2019-09-10
Payer: COMMERCIAL

## 2019-09-10 VITALS
DIASTOLIC BLOOD PRESSURE: 82 MMHG | HEIGHT: 71 IN | WEIGHT: 286 LBS | BODY MASS INDEX: 40.04 KG/M2 | SYSTOLIC BLOOD PRESSURE: 110 MMHG

## 2019-09-10 DIAGNOSIS — M17.0 PRIMARY OSTEOARTHRITIS OF BOTH KNEES: Primary | ICD-10-CM

## 2019-09-10 DIAGNOSIS — R74.8 ELEVATED CPK: ICD-10-CM

## 2019-09-10 DIAGNOSIS — G72.9 MYOPATHY: ICD-10-CM

## 2019-09-10 PROCEDURE — G8417 CALC BMI ABV UP PARAM F/U: HCPCS | Performed by: INTERNAL MEDICINE

## 2019-09-10 PROCEDURE — 3017F COLORECTAL CA SCREEN DOC REV: CPT | Performed by: INTERNAL MEDICINE

## 2019-09-10 PROCEDURE — G8427 DOCREV CUR MEDS BY ELIG CLIN: HCPCS | Performed by: INTERNAL MEDICINE

## 2019-09-10 PROCEDURE — G8598 ASA/ANTIPLAT THER USED: HCPCS | Performed by: INTERNAL MEDICINE

## 2019-09-10 PROCEDURE — 99214 OFFICE O/P EST MOD 30 MIN: CPT | Performed by: INTERNAL MEDICINE

## 2019-09-10 PROCEDURE — 1036F TOBACCO NON-USER: CPT | Performed by: INTERNAL MEDICINE

## 2019-09-10 NOTE — PROGRESS NOTES
[address], [place]                                                           [address], [address] [facility]                                                             [phone] (C) [phone] (F)      Dear Dr. Medardo Rivas MD:  Please find Rheumatology assessment. Thank you for giving me the opportunity to be involved in Stone Macdonald's care and I look forward following Stone robles along with you. If you have any questions or concerns please feel free to reach me. Note is transcribed using voice recognition software. Inadvertent computerized transcription errors may be present. Patient identification: Stone Macdonald,: 1960,59 y.o. Sex: male     A/P  Stone robles was seen today for follow-up and results. 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 Clinically, distribution of muscle weakness does not appear to be in inclusion body myositis. His mother and sister also had muscle disease. I wonder, if he has some kind of genetic myopathy. Serologies are all negative except weak positive SRP. His CPK elevated ranging from 7703-6970. I started prednisone after muscle biopsy, does not believe that it is helping. I believe that majority  of lower extremity weakness is from severe bone-on-bone osteoarthritis of knees.   I would like him to be evaluated by neurology to rule out genetic noninflammatory

## 2019-10-03 ENCOUNTER — APPOINTMENT (OUTPATIENT)
Dept: MRI IMAGING | Age: 59
DRG: 500 | End: 2019-10-03
Payer: COMMERCIAL

## 2019-10-03 ENCOUNTER — APPOINTMENT (OUTPATIENT)
Dept: GENERAL RADIOLOGY | Age: 59
DRG: 500 | End: 2019-10-03
Payer: COMMERCIAL

## 2019-10-03 ENCOUNTER — HOSPITAL ENCOUNTER (INPATIENT)
Age: 59
LOS: 8 days | Discharge: NURSING, REHAB OR LONG-TERM CARE | DRG: 500 | End: 2019-10-11
Attending: EMERGENCY MEDICINE | Admitting: INTERNAL MEDICINE
Payer: COMMERCIAL

## 2019-10-03 DIAGNOSIS — I21.4 NSTEMI (NON-ST ELEVATED MYOCARDIAL INFARCTION) (HCC): ICD-10-CM

## 2019-10-03 DIAGNOSIS — E66.01 CLASS 3 SEVERE OBESITY DUE TO EXCESS CALORIES WITH SERIOUS COMORBIDITY AND BODY MASS INDEX (BMI) OF 40.0 TO 44.9 IN ADULT (HCC): ICD-10-CM

## 2019-10-03 DIAGNOSIS — I10 ESSENTIAL HYPERTENSION: Chronic | ICD-10-CM

## 2019-10-03 DIAGNOSIS — G62.9 PERIPHERAL POLYNEUROPATHY: Chronic | ICD-10-CM

## 2019-10-03 DIAGNOSIS — R53.1 GENERALIZED WEAKNESS: ICD-10-CM

## 2019-10-03 DIAGNOSIS — M79.89 LEG SWELLING: Primary | ICD-10-CM

## 2019-10-03 DIAGNOSIS — E11.69 TYPE 2 DIABETES MELLITUS WITH OTHER SPECIFIED COMPLICATION, WITHOUT LONG-TERM CURRENT USE OF INSULIN (HCC): Chronic | ICD-10-CM

## 2019-10-03 PROBLEM — I50.9 HEART FAILURE (HCC): Status: ACTIVE | Noted: 2019-10-03

## 2019-10-03 LAB
ALBUMIN SERPL-MCNC: 3.9 G/DL (ref 3.4–5)
ALP BLD-CCNC: 88 U/L (ref 40–129)
ALT SERPL-CCNC: 18 U/L (ref 10–40)
AMPHETAMINE SCREEN, URINE: ABNORMAL
ANION GAP SERPL CALCULATED.3IONS-SCNC: 15 MMOL/L (ref 3–16)
AST SERPL-CCNC: 28 U/L (ref 15–37)
BARBITURATE SCREEN URINE: ABNORMAL
BASOPHILS ABSOLUTE: 0.1 K/UL (ref 0–0.2)
BASOPHILS RELATIVE PERCENT: 1 %
BENZODIAZEPINE SCREEN, URINE: ABNORMAL
BILIRUB SERPL-MCNC: 0.4 MG/DL (ref 0–1)
BILIRUBIN DIRECT: <0.2 MG/DL (ref 0–0.3)
BILIRUBIN, INDIRECT: NORMAL MG/DL (ref 0–1)
BUN BLDV-MCNC: 9 MG/DL (ref 7–20)
CALCIUM SERPL-MCNC: 8.8 MG/DL (ref 8.3–10.6)
CANNABINOID SCREEN URINE: ABNORMAL
CHLORIDE BLD-SCNC: 99 MMOL/L (ref 99–110)
CHOLESTEROL, TOTAL: 169 MG/DL (ref 0–199)
CO2: 22 MMOL/L (ref 21–32)
COCAINE METABOLITE SCREEN URINE: POSITIVE
CREAT SERPL-MCNC: 0.9 MG/DL (ref 0.8–1.3)
EKG ATRIAL RATE: 100 BPM
EKG DIAGNOSIS: NORMAL
EKG P AXIS: -36 DEGREES
EKG P-R INTERVAL: 222 MS
EKG Q-T INTERVAL: 370 MS
EKG QRS DURATION: 102 MS
EKG QTC CALCULATION (BAZETT): 477 MS
EKG R AXIS: 11 DEGREES
EKG T AXIS: 151 DEGREES
EKG VENTRICULAR RATE: 100 BPM
EOSINOPHILS ABSOLUTE: 0.4 K/UL (ref 0–0.6)
EOSINOPHILS RELATIVE PERCENT: 3.6 %
GFR AFRICAN AMERICAN: >60
GFR NON-AFRICAN AMERICAN: >60
GLUCOSE BLD-MCNC: 114 MG/DL (ref 70–99)
GLUCOSE BLD-MCNC: 121 MG/DL (ref 70–99)
GLUCOSE BLD-MCNC: 80 MG/DL (ref 70–99)
HCT VFR BLD CALC: 37.6 % (ref 40.5–52.5)
HDLC SERPL-MCNC: 46 MG/DL (ref 40–60)
HEMOGLOBIN: 12.1 G/DL (ref 13.5–17.5)
INR BLD: 1.03 (ref 0.86–1.14)
LDL CHOLESTEROL CALCULATED: 85 MG/DL
LYMPHOCYTES ABSOLUTE: 1.2 K/UL (ref 1–5.1)
LYMPHOCYTES RELATIVE PERCENT: 10 %
Lab: ABNORMAL
MCH RBC QN AUTO: 28.3 PG (ref 26–34)
MCHC RBC AUTO-ENTMCNC: 32.2 G/DL (ref 31–36)
MCV RBC AUTO: 88.1 FL (ref 80–100)
METHADONE SCREEN, URINE: ABNORMAL
MONOCYTES ABSOLUTE: 0.9 K/UL (ref 0–1.3)
MONOCYTES RELATIVE PERCENT: 7.4 %
NEUTROPHILS ABSOLUTE: 9.5 K/UL (ref 1.7–7.7)
NEUTROPHILS RELATIVE PERCENT: 78 %
OPIATE SCREEN URINE: ABNORMAL
OXYCODONE URINE: ABNORMAL
PDW BLD-RTO: 16.4 % (ref 12.4–15.4)
PERFORMED ON: ABNORMAL
PERFORMED ON: NORMAL
PH UA: 6
PHENCYCLIDINE SCREEN URINE: ABNORMAL
PHOSPHORUS: 3.7 MG/DL (ref 2.5–4.9)
PLATELET # BLD: 346 K/UL (ref 135–450)
PMV BLD AUTO: 6.3 FL (ref 5–10.5)
POTASSIUM SERPL-SCNC: 3.9 MMOL/L (ref 3.5–5.1)
PRO-BNP: 622 PG/ML (ref 0–124)
PROPOXYPHENE SCREEN: ABNORMAL
PROTHROMBIN TIME: 11.7 SEC (ref 9.8–13)
RBC # BLD: 4.27 M/UL (ref 4.2–5.9)
SODIUM BLD-SCNC: 136 MMOL/L (ref 136–145)
TOTAL CK: 823 U/L (ref 39–308)
TOTAL PROTEIN: 7.9 G/DL (ref 6.4–8.2)
TRIGL SERPL-MCNC: 189 MG/DL (ref 0–150)
TROPONIN: 0.85 NG/ML
TSH REFLEX: 1.47 UIU/ML (ref 0.27–4.2)
VLDLC SERPL CALC-MCNC: 38 MG/DL
WBC # BLD: 12.1 K/UL (ref 4–11)

## 2019-10-03 PROCEDURE — 83880 ASSAY OF NATRIURETIC PEPTIDE: CPT

## 2019-10-03 PROCEDURE — 80061 LIPID PANEL: CPT

## 2019-10-03 PROCEDURE — 87591 N.GONORRHOEAE DNA AMP PROB: CPT

## 2019-10-03 PROCEDURE — 71046 X-RAY EXAM CHEST 2 VIEWS: CPT

## 2019-10-03 PROCEDURE — 72146 MRI CHEST SPINE W/O DYE: CPT

## 2019-10-03 PROCEDURE — 83036 HEMOGLOBIN GLYCOSYLATED A1C: CPT

## 2019-10-03 PROCEDURE — 51798 US URINE CAPACITY MEASURE: CPT

## 2019-10-03 PROCEDURE — 72148 MRI LUMBAR SPINE W/O DYE: CPT

## 2019-10-03 PROCEDURE — 72141 MRI NECK SPINE W/O DYE: CPT

## 2019-10-03 PROCEDURE — 84484 ASSAY OF TROPONIN QUANT: CPT

## 2019-10-03 PROCEDURE — 6360000002 HC RX W HCPCS: Performed by: INTERNAL MEDICINE

## 2019-10-03 PROCEDURE — 0064U ANTB TP TOTAL&RPR IA QUAL: CPT

## 2019-10-03 PROCEDURE — 85610 PROTHROMBIN TIME: CPT

## 2019-10-03 PROCEDURE — 99285 EMERGENCY DEPT VISIT HI MDM: CPT

## 2019-10-03 PROCEDURE — 84443 ASSAY THYROID STIM HORMONE: CPT

## 2019-10-03 PROCEDURE — 85025 COMPLETE CBC W/AUTO DIFF WBC: CPT

## 2019-10-03 PROCEDURE — 1200000000 HC SEMI PRIVATE

## 2019-10-03 PROCEDURE — 6370000000 HC RX 637 (ALT 250 FOR IP): Performed by: EMERGENCY MEDICINE

## 2019-10-03 PROCEDURE — 6370000000 HC RX 637 (ALT 250 FOR IP): Performed by: INTERNAL MEDICINE

## 2019-10-03 PROCEDURE — 87491 CHLMYD TRACH DNA AMP PROBE: CPT

## 2019-10-03 PROCEDURE — 80069 RENAL FUNCTION PANEL: CPT

## 2019-10-03 PROCEDURE — 80307 DRUG TEST PRSMV CHEM ANLYZR: CPT

## 2019-10-03 PROCEDURE — 36415 COLL VENOUS BLD VENIPUNCTURE: CPT

## 2019-10-03 PROCEDURE — 72100 X-RAY EXAM L-S SPINE 2/3 VWS: CPT

## 2019-10-03 PROCEDURE — 2580000003 HC RX 258: Performed by: INTERNAL MEDICINE

## 2019-10-03 PROCEDURE — 99255 IP/OBS CONSLTJ NEW/EST HI 80: CPT | Performed by: INTERNAL MEDICINE

## 2019-10-03 PROCEDURE — 82550 ASSAY OF CK (CPK): CPT

## 2019-10-03 PROCEDURE — 93005 ELECTROCARDIOGRAM TRACING: CPT | Performed by: EMERGENCY MEDICINE

## 2019-10-03 PROCEDURE — 80076 HEPATIC FUNCTION PANEL: CPT

## 2019-10-03 RX ORDER — ASPIRIN 325 MG
325 TABLET ORAL ONCE
Status: COMPLETED | OUTPATIENT
Start: 2019-10-03 | End: 2019-10-03

## 2019-10-03 RX ORDER — ONDANSETRON 2 MG/ML
4 INJECTION INTRAMUSCULAR; INTRAVENOUS EVERY 6 HOURS PRN
Status: DISCONTINUED | OUTPATIENT
Start: 2019-10-03 | End: 2019-10-11 | Stop reason: HOSPADM

## 2019-10-03 RX ORDER — FUROSEMIDE 10 MG/ML
40 INJECTION INTRAMUSCULAR; INTRAVENOUS ONCE
Status: COMPLETED | OUTPATIENT
Start: 2019-10-03 | End: 2019-10-03

## 2019-10-03 RX ORDER — PREGABALIN 50 MG/1
50 CAPSULE ORAL DAILY
Status: ON HOLD | COMMUNITY
End: 2019-10-10 | Stop reason: HOSPADM

## 2019-10-03 RX ORDER — M-VIT,TX,IRON,MINS/CALC/FOLIC 27MG-0.4MG
1 TABLET ORAL DAILY
Status: DISCONTINUED | OUTPATIENT
Start: 2019-10-04 | End: 2019-10-11 | Stop reason: HOSPADM

## 2019-10-03 RX ORDER — CHOLECALCIFEROL (VITAMIN D3) 125 MCG
500 CAPSULE ORAL DAILY
Status: ON HOLD | COMMUNITY
End: 2019-10-10 | Stop reason: SDUPTHER

## 2019-10-03 RX ORDER — SODIUM CHLORIDE 0.9 % (FLUSH) 0.9 %
10 SYRINGE (ML) INJECTION EVERY 12 HOURS SCHEDULED
Status: DISCONTINUED | OUTPATIENT
Start: 2019-10-03 | End: 2019-10-11 | Stop reason: HOSPADM

## 2019-10-03 RX ORDER — PREGABALIN 50 MG/1
50 CAPSULE ORAL 2 TIMES DAILY
Status: DISCONTINUED | OUTPATIENT
Start: 2019-10-03 | End: 2019-10-11 | Stop reason: HOSPADM

## 2019-10-03 RX ORDER — SODIUM CHLORIDE 0.9 % (FLUSH) 0.9 %
10 SYRINGE (ML) INJECTION PRN
Status: DISCONTINUED | OUTPATIENT
Start: 2019-10-03 | End: 2019-10-11 | Stop reason: HOSPADM

## 2019-10-03 RX ORDER — ACETAMINOPHEN 325 MG/1
650 TABLET ORAL EVERY 4 HOURS PRN
Status: DISCONTINUED | OUTPATIENT
Start: 2019-10-03 | End: 2019-10-08

## 2019-10-03 RX ORDER — POTASSIUM CHLORIDE 20 MEQ/1
20 TABLET, EXTENDED RELEASE ORAL DAILY
Status: DISCONTINUED | OUTPATIENT
Start: 2019-10-03 | End: 2019-10-11 | Stop reason: HOSPADM

## 2019-10-03 RX ORDER — FUROSEMIDE 20 MG/1
20 TABLET ORAL DAILY
Status: ON HOLD | COMMUNITY
End: 2019-10-10 | Stop reason: HOSPADM

## 2019-10-03 RX ORDER — CARVEDILOL 3.12 MG/1
3.12 TABLET ORAL 2 TIMES DAILY WITH MEALS
Status: DISCONTINUED | OUTPATIENT
Start: 2019-10-03 | End: 2019-10-11 | Stop reason: HOSPADM

## 2019-10-03 RX ADMIN — ACETAMINOPHEN 650 MG: 325 TABLET ORAL at 22:10

## 2019-10-03 RX ADMIN — ASPIRIN 325 MG ORAL TABLET 325 MG: 325 PILL ORAL at 12:52

## 2019-10-03 RX ADMIN — Medication 10 ML: at 22:12

## 2019-10-03 RX ADMIN — FUROSEMIDE 40 MG: 10 INJECTION, SOLUTION INTRAMUSCULAR; INTRAVENOUS at 18:45

## 2019-10-03 RX ADMIN — POTASSIUM CHLORIDE 20 MEQ: 20 TABLET, EXTENDED RELEASE ORAL at 22:09

## 2019-10-03 RX ADMIN — Medication 10 ML: at 18:46

## 2019-10-03 RX ADMIN — PREGABALIN 50 MG: 50 CAPSULE ORAL at 22:10

## 2019-10-03 RX ADMIN — CARVEDILOL 3.12 MG: 3.12 TABLET, FILM COATED ORAL at 22:10

## 2019-10-03 ASSESSMENT — ENCOUNTER SYMPTOMS
CHOKING: 0
EYE DISCHARGE: 1
CHEST TIGHTNESS: 0
ABDOMINAL PAIN: 0
BLOOD IN STOOL: 0
PHOTOPHOBIA: 0
BACK PAIN: 1
COLOR CHANGE: 0
VOICE CHANGE: 0
EYE PAIN: 0
SHORTNESS OF BREATH: 0
FACIAL SWELLING: 0
EYE REDNESS: 0
SORE THROAT: 0
ABDOMINAL DISTENTION: 0
TROUBLE SWALLOWING: 0
COUGH: 1
APNEA: 0
EYE ITCHING: 1

## 2019-10-03 ASSESSMENT — PAIN SCALES - GENERAL
PAINLEVEL_OUTOF10: 0
PAINLEVEL_OUTOF10: 8
PAINLEVEL_OUTOF10: 0
PAINLEVEL_OUTOF10: 4
PAINLEVEL_OUTOF10: 2
PAINLEVEL_OUTOF10: 0

## 2019-10-03 ASSESSMENT — PAIN - FUNCTIONAL ASSESSMENT: PAIN_FUNCTIONAL_ASSESSMENT: PREVENTS OR INTERFERES SOME ACTIVE ACTIVITIES AND ADLS

## 2019-10-03 ASSESSMENT — PAIN DESCRIPTION - PROGRESSION: CLINICAL_PROGRESSION: GRADUALLY IMPROVING

## 2019-10-03 ASSESSMENT — PAIN DESCRIPTION - PAIN TYPE
TYPE: ACUTE PAIN

## 2019-10-03 ASSESSMENT — PAIN DESCRIPTION - DESCRIPTORS
DESCRIPTORS: DISCOMFORT
DESCRIPTORS: SORE;THROBBING

## 2019-10-03 ASSESSMENT — PAIN DESCRIPTION - ONSET: ONSET: ON-GOING

## 2019-10-03 ASSESSMENT — PAIN DESCRIPTION - ORIENTATION: ORIENTATION: OTHER (COMMENT)

## 2019-10-03 ASSESSMENT — PAIN DESCRIPTION - LOCATION
LOCATION: SCROTUM
LOCATION: KNEE;SHOULDER

## 2019-10-03 ASSESSMENT — PAIN DESCRIPTION - FREQUENCY: FREQUENCY: INTERMITTENT

## 2019-10-04 ENCOUNTER — APPOINTMENT (OUTPATIENT)
Dept: VASCULAR LAB | Age: 59
DRG: 500 | End: 2019-10-04
Payer: COMMERCIAL

## 2019-10-04 LAB
C. TRACHOMATIS DNA ,URINE: NEGATIVE
C. TRACHOMATIS DNA ,URINE: NEGATIVE
ESTIMATED AVERAGE GLUCOSE: 114 MG/DL
GLUCOSE BLD-MCNC: 109 MG/DL (ref 70–99)
HBA1C MFR BLD: 5.6 %
LV EF: 55 %
LVEF MODALITY: NORMAL
N. GONORRHOEAE DNA, URINE: NEGATIVE
N. GONORRHOEAE DNA, URINE: NEGATIVE
PERFORMED ON: ABNORMAL
RPR CONFIRMATORY: NORMAL
TOTAL SYPHILLIS IGG/IGM: REACTIVE

## 2019-10-04 PROCEDURE — C8929 TTE W OR WO FOL WCON,DOPPLER: HCPCS

## 2019-10-04 PROCEDURE — 99233 SBSQ HOSP IP/OBS HIGH 50: CPT | Performed by: INTERNAL MEDICINE

## 2019-10-04 PROCEDURE — 97530 THERAPEUTIC ACTIVITIES: CPT

## 2019-10-04 PROCEDURE — 6370000000 HC RX 637 (ALT 250 FOR IP): Performed by: INTERNAL MEDICINE

## 2019-10-04 PROCEDURE — 6360000004 HC RX CONTRAST MEDICATION: Performed by: INTERNAL MEDICINE

## 2019-10-04 PROCEDURE — APPSS30 APP SPLIT SHARED TIME 16-30 MINUTES: Performed by: NURSE PRACTITIONER

## 2019-10-04 PROCEDURE — 6360000002 HC RX W HCPCS: Performed by: INTERNAL MEDICINE

## 2019-10-04 PROCEDURE — 97116 GAIT TRAINING THERAPY: CPT

## 2019-10-04 PROCEDURE — 97162 PT EVAL MOD COMPLEX 30 MIN: CPT

## 2019-10-04 PROCEDURE — 2580000003 HC RX 258: Performed by: INTERNAL MEDICINE

## 2019-10-04 PROCEDURE — 93970 EXTREMITY STUDY: CPT

## 2019-10-04 PROCEDURE — 97166 OT EVAL MOD COMPLEX 45 MIN: CPT

## 2019-10-04 PROCEDURE — 1200000000 HC SEMI PRIVATE

## 2019-10-04 PROCEDURE — 6370000000 HC RX 637 (ALT 250 FOR IP): Performed by: STUDENT IN AN ORGANIZED HEALTH CARE EDUCATION/TRAINING PROGRAM

## 2019-10-04 RX ORDER — ASPIRIN 81 MG/1
81 TABLET, CHEWABLE ORAL DAILY
Status: DISCONTINUED | OUTPATIENT
Start: 2019-10-04 | End: 2019-10-11 | Stop reason: HOSPADM

## 2019-10-04 RX ADMIN — ASPIRIN 81 MG 81 MG: 81 TABLET ORAL at 17:30

## 2019-10-04 RX ADMIN — CARVEDILOL 3.12 MG: 3.12 TABLET, FILM COATED ORAL at 11:14

## 2019-10-04 RX ADMIN — PREGABALIN 50 MG: 50 CAPSULE ORAL at 11:15

## 2019-10-04 RX ADMIN — ENOXAPARIN SODIUM 40 MG: 40 INJECTION SUBCUTANEOUS at 11:15

## 2019-10-04 RX ADMIN — Medication 10 ML: at 11:16

## 2019-10-04 RX ADMIN — MULTIPLE VITAMINS W/ MINERALS TAB 1 TABLET: TAB at 11:14

## 2019-10-04 RX ADMIN — IBUPROFEN 600 MG: 400 TABLET, FILM COATED ORAL at 11:27

## 2019-10-04 RX ADMIN — POTASSIUM CHLORIDE 20 MEQ: 20 TABLET, EXTENDED RELEASE ORAL at 11:15

## 2019-10-04 RX ADMIN — ACETAMINOPHEN 650 MG: 325 TABLET ORAL at 20:30

## 2019-10-04 RX ADMIN — PERFLUTREN 2.2 MG: 6.52 INJECTION, SUSPENSION INTRAVENOUS at 10:01

## 2019-10-04 RX ADMIN — PREGABALIN 50 MG: 50 CAPSULE ORAL at 20:30

## 2019-10-04 RX ADMIN — ACETAMINOPHEN 650 MG: 325 TABLET ORAL at 02:02

## 2019-10-04 RX ADMIN — Medication 10 ML: at 20:30

## 2019-10-04 RX ADMIN — CARVEDILOL 3.12 MG: 3.12 TABLET, FILM COATED ORAL at 18:09

## 2019-10-04 ASSESSMENT — PAIN DESCRIPTION - LOCATION
LOCATION: KNEE;SHOULDER
LOCATION: SHOULDER;KNEE
LOCATION: KNEE;SHOULDER
LOCATION: KNEE
LOCATION: KNEE;SHOULDER

## 2019-10-04 ASSESSMENT — PAIN DESCRIPTION - PROGRESSION
CLINICAL_PROGRESSION: NOT CHANGED
CLINICAL_PROGRESSION: NOT CHANGED
CLINICAL_PROGRESSION: GRADUALLY IMPROVING
CLINICAL_PROGRESSION: NOT CHANGED

## 2019-10-04 ASSESSMENT — PAIN DESCRIPTION - FREQUENCY
FREQUENCY: CONTINUOUS
FREQUENCY: INTERMITTENT
FREQUENCY: CONTINUOUS
FREQUENCY: INTERMITTENT

## 2019-10-04 ASSESSMENT — PAIN DESCRIPTION - ONSET
ONSET: ON-GOING
ONSET: AWAKENED FROM SLEEP
ONSET: ON-GOING

## 2019-10-04 ASSESSMENT — PAIN DESCRIPTION - PAIN TYPE
TYPE: CHRONIC PAIN
TYPE: ACUTE PAIN
TYPE: ACUTE PAIN
TYPE: CHRONIC PAIN

## 2019-10-04 ASSESSMENT — PAIN DESCRIPTION - ORIENTATION
ORIENTATION: RIGHT
ORIENTATION: RIGHT;LEFT

## 2019-10-04 ASSESSMENT — PAIN SCALES - GENERAL
PAINLEVEL_OUTOF10: 0
PAINLEVEL_OUTOF10: 6
PAINLEVEL_OUTOF10: 0
PAINLEVEL_OUTOF10: 3
PAINLEVEL_OUTOF10: 9

## 2019-10-04 ASSESSMENT — PAIN DESCRIPTION - DESCRIPTORS
DESCRIPTORS: ACHING
DESCRIPTORS: ACHING
DESCRIPTORS: SORE;THROBBING
DESCRIPTORS: ACHING;THROBBING

## 2019-10-05 LAB
GLUCOSE BLD-MCNC: 115 MG/DL (ref 70–99)
GLUCOSE BLD-MCNC: 119 MG/DL (ref 70–99)
GLUCOSE BLD-MCNC: 158 MG/DL (ref 70–99)
GLUCOSE BLD-MCNC: 97 MG/DL (ref 70–99)
PERFORMED ON: ABNORMAL
PERFORMED ON: NORMAL

## 2019-10-05 PROCEDURE — 2580000003 HC RX 258: Performed by: INTERNAL MEDICINE

## 2019-10-05 PROCEDURE — 6370000000 HC RX 637 (ALT 250 FOR IP): Performed by: INTERNAL MEDICINE

## 2019-10-05 PROCEDURE — 6370000000 HC RX 637 (ALT 250 FOR IP): Performed by: NURSE PRACTITIONER

## 2019-10-05 PROCEDURE — 1200000000 HC SEMI PRIVATE

## 2019-10-05 PROCEDURE — 6370000000 HC RX 637 (ALT 250 FOR IP): Performed by: STUDENT IN AN ORGANIZED HEALTH CARE EDUCATION/TRAINING PROGRAM

## 2019-10-05 PROCEDURE — 6360000002 HC RX W HCPCS: Performed by: INTERNAL MEDICINE

## 2019-10-05 PROCEDURE — 99233 SBSQ HOSP IP/OBS HIGH 50: CPT | Performed by: NURSE PRACTITIONER

## 2019-10-05 RX ORDER — POTASSIUM CHLORIDE 750 MG/1
10 TABLET, EXTENDED RELEASE ORAL ONCE
Status: COMPLETED | OUTPATIENT
Start: 2019-10-05 | End: 2019-10-05

## 2019-10-05 RX ORDER — SODIUM CHLORIDE 0.9 % (FLUSH) 0.9 %
10 SYRINGE (ML) INJECTION EVERY 12 HOURS SCHEDULED
Status: DISCONTINUED | OUTPATIENT
Start: 2019-10-05 | End: 2019-10-05 | Stop reason: SDUPTHER

## 2019-10-05 RX ORDER — FUROSEMIDE 40 MG/1
40 TABLET ORAL ONCE
Status: COMPLETED | OUTPATIENT
Start: 2019-10-05 | End: 2019-10-05

## 2019-10-05 RX ORDER — LIDOCAINE HYDROCHLORIDE 10 MG/ML
5 INJECTION, SOLUTION EPIDURAL; INFILTRATION; INTRACAUDAL; PERINEURAL ONCE
Status: DISCONTINUED | OUTPATIENT
Start: 2019-10-05 | End: 2019-10-11 | Stop reason: HOSPADM

## 2019-10-05 RX ORDER — SODIUM CHLORIDE 0.9 % (FLUSH) 0.9 %
10 SYRINGE (ML) INJECTION PRN
Status: DISCONTINUED | OUTPATIENT
Start: 2019-10-05 | End: 2019-10-05 | Stop reason: SDUPTHER

## 2019-10-05 RX ADMIN — MULTIPLE VITAMINS W/ MINERALS TAB 1 TABLET: TAB at 08:34

## 2019-10-05 RX ADMIN — CARVEDILOL 3.12 MG: 3.12 TABLET, FILM COATED ORAL at 17:24

## 2019-10-05 RX ADMIN — PREGABALIN 50 MG: 50 CAPSULE ORAL at 08:34

## 2019-10-05 RX ADMIN — ACETAMINOPHEN 650 MG: 325 TABLET ORAL at 21:34

## 2019-10-05 RX ADMIN — POTASSIUM CHLORIDE 10 MEQ: 10 TABLET, EXTENDED RELEASE ORAL at 11:28

## 2019-10-05 RX ADMIN — ASPIRIN 81 MG 81 MG: 81 TABLET ORAL at 08:34

## 2019-10-05 RX ADMIN — ENOXAPARIN SODIUM 40 MG: 40 INJECTION SUBCUTANEOUS at 08:33

## 2019-10-05 RX ADMIN — Medication 10 ML: at 08:35

## 2019-10-05 RX ADMIN — PREGABALIN 50 MG: 50 CAPSULE ORAL at 21:34

## 2019-10-05 RX ADMIN — POTASSIUM CHLORIDE 20 MEQ: 20 TABLET, EXTENDED RELEASE ORAL at 08:34

## 2019-10-05 RX ADMIN — FUROSEMIDE 40 MG: 40 TABLET ORAL at 11:28

## 2019-10-05 RX ADMIN — CARVEDILOL 3.12 MG: 3.12 TABLET, FILM COATED ORAL at 08:34

## 2019-10-05 RX ADMIN — Medication 10 ML: at 21:35

## 2019-10-05 ASSESSMENT — PAIN SCALES - GENERAL
PAINLEVEL_OUTOF10: 1
PAINLEVEL_OUTOF10: 4
PAINLEVEL_OUTOF10: 4
PAINLEVEL_OUTOF10: 1

## 2019-10-05 ASSESSMENT — PAIN DESCRIPTION - FREQUENCY: FREQUENCY: INTERMITTENT

## 2019-10-05 ASSESSMENT — PAIN DESCRIPTION - PROGRESSION
CLINICAL_PROGRESSION: NOT CHANGED
CLINICAL_PROGRESSION: NOT CHANGED

## 2019-10-05 ASSESSMENT — PAIN DESCRIPTION - LOCATION: LOCATION: KNEE;SHOULDER

## 2019-10-05 ASSESSMENT — PAIN DESCRIPTION - ORIENTATION: ORIENTATION: LEFT

## 2019-10-05 ASSESSMENT — PAIN DESCRIPTION - ONSET: ONSET: ON-GOING

## 2019-10-05 ASSESSMENT — PAIN DESCRIPTION - PAIN TYPE: TYPE: CHRONIC PAIN

## 2019-10-05 ASSESSMENT — PAIN DESCRIPTION - DESCRIPTORS: DESCRIPTORS: ACHING

## 2019-10-06 ENCOUNTER — APPOINTMENT (OUTPATIENT)
Dept: GENERAL RADIOLOGY | Age: 59
DRG: 500 | End: 2019-10-06
Payer: COMMERCIAL

## 2019-10-06 LAB
GLUCOSE BLD-MCNC: 100 MG/DL (ref 70–99)
GLUCOSE BLD-MCNC: 102 MG/DL (ref 70–99)
GLUCOSE BLD-MCNC: 126 MG/DL (ref 70–99)
GLUCOSE BLD-MCNC: 146 MG/DL (ref 70–99)
PERFORMED ON: ABNORMAL

## 2019-10-06 PROCEDURE — 1200000000 HC SEMI PRIVATE

## 2019-10-06 PROCEDURE — 99232 SBSQ HOSP IP/OBS MODERATE 35: CPT | Performed by: NURSE PRACTITIONER

## 2019-10-06 PROCEDURE — 6370000000 HC RX 637 (ALT 250 FOR IP): Performed by: INTERNAL MEDICINE

## 2019-10-06 PROCEDURE — 6360000002 HC RX W HCPCS: Performed by: INTERNAL MEDICINE

## 2019-10-06 PROCEDURE — 71046 X-RAY EXAM CHEST 2 VIEWS: CPT

## 2019-10-06 PROCEDURE — 6370000000 HC RX 637 (ALT 250 FOR IP): Performed by: STUDENT IN AN ORGANIZED HEALTH CARE EDUCATION/TRAINING PROGRAM

## 2019-10-06 PROCEDURE — 2580000003 HC RX 258: Performed by: INTERNAL MEDICINE

## 2019-10-06 PROCEDURE — 6370000000 HC RX 637 (ALT 250 FOR IP): Performed by: NURSE PRACTITIONER

## 2019-10-06 PROCEDURE — 94762 N-INVAS EAR/PLS OXIMTRY CONT: CPT

## 2019-10-06 RX ORDER — FUROSEMIDE 20 MG/1
20 TABLET ORAL DAILY
Status: DISCONTINUED | OUTPATIENT
Start: 2019-10-06 | End: 2019-10-10

## 2019-10-06 RX ADMIN — CARVEDILOL 3.12 MG: 3.12 TABLET, FILM COATED ORAL at 09:00

## 2019-10-06 RX ADMIN — Medication 10 ML: at 23:12

## 2019-10-06 RX ADMIN — FUROSEMIDE 20 MG: 20 TABLET ORAL at 12:01

## 2019-10-06 RX ADMIN — PREGABALIN 50 MG: 50 CAPSULE ORAL at 09:00

## 2019-10-06 RX ADMIN — CARVEDILOL 3.12 MG: 3.12 TABLET, FILM COATED ORAL at 17:57

## 2019-10-06 RX ADMIN — ACETAMINOPHEN 650 MG: 325 TABLET ORAL at 04:24

## 2019-10-06 RX ADMIN — ACETAMINOPHEN 650 MG: 325 TABLET ORAL at 22:30

## 2019-10-06 RX ADMIN — Medication 10 ML: at 22:31

## 2019-10-06 RX ADMIN — ENOXAPARIN SODIUM 40 MG: 40 INJECTION SUBCUTANEOUS at 08:59

## 2019-10-06 RX ADMIN — POTASSIUM CHLORIDE 20 MEQ: 20 TABLET, EXTENDED RELEASE ORAL at 09:00

## 2019-10-06 RX ADMIN — ASPIRIN 81 MG 81 MG: 81 TABLET ORAL at 09:00

## 2019-10-06 RX ADMIN — Medication 10 ML: at 09:01

## 2019-10-06 RX ADMIN — MULTIPLE VITAMINS W/ MINERALS TAB 1 TABLET: TAB at 09:00

## 2019-10-06 RX ADMIN — PREGABALIN 50 MG: 50 CAPSULE ORAL at 22:30

## 2019-10-06 ASSESSMENT — PAIN SCALES - GENERAL
PAINLEVEL_OUTOF10: 8
PAINLEVEL_OUTOF10: 1
PAINLEVEL_OUTOF10: 2
PAINLEVEL_OUTOF10: 0
PAINLEVEL_OUTOF10: 4
PAINLEVEL_OUTOF10: 5
PAINLEVEL_OUTOF10: 0

## 2019-10-06 ASSESSMENT — PAIN - FUNCTIONAL ASSESSMENT: PAIN_FUNCTIONAL_ASSESSMENT: ACTIVITIES ARE NOT PREVENTED

## 2019-10-07 ENCOUNTER — APPOINTMENT (OUTPATIENT)
Dept: GENERAL RADIOLOGY | Age: 59
DRG: 500 | End: 2019-10-07
Payer: COMMERCIAL

## 2019-10-07 LAB
A/G RATIO: 1 (ref 1.1–2.2)
ALBUMIN SERPL-MCNC: 3.5 G/DL (ref 3.4–5)
ALP BLD-CCNC: 79 U/L (ref 40–129)
ALT SERPL-CCNC: 14 U/L (ref 10–40)
ANION GAP SERPL CALCULATED.3IONS-SCNC: 9 MMOL/L (ref 3–16)
ANION GAP SERPL CALCULATED.3IONS-SCNC: 9 MMOL/L (ref 3–16)
AST SERPL-CCNC: 32 U/L (ref 15–37)
BILIRUB SERPL-MCNC: <0.2 MG/DL (ref 0–1)
BUN BLDV-MCNC: 13 MG/DL (ref 7–20)
BUN BLDV-MCNC: 13 MG/DL (ref 7–20)
CALCIUM SERPL-MCNC: 8.9 MG/DL (ref 8.3–10.6)
CALCIUM SERPL-MCNC: 8.9 MG/DL (ref 8.3–10.6)
CHLORIDE BLD-SCNC: 98 MMOL/L (ref 99–110)
CHLORIDE BLD-SCNC: 99 MMOL/L (ref 99–110)
CHOLESTEROL, TOTAL: 171 MG/DL (ref 0–199)
CO2: 28 MMOL/L (ref 21–32)
CO2: 29 MMOL/L (ref 21–32)
CREAT SERPL-MCNC: 0.8 MG/DL (ref 0.8–1.3)
CREAT SERPL-MCNC: 0.8 MG/DL (ref 0.8–1.3)
GFR AFRICAN AMERICAN: >60
GFR AFRICAN AMERICAN: >60
GFR NON-AFRICAN AMERICAN: >60
GFR NON-AFRICAN AMERICAN: >60
GLOBULIN: 3.6 G/DL
GLUCOSE BLD-MCNC: 111 MG/DL (ref 70–99)
GLUCOSE BLD-MCNC: 121 MG/DL (ref 70–99)
GLUCOSE BLD-MCNC: 130 MG/DL (ref 70–99)
GLUCOSE BLD-MCNC: 130 MG/DL (ref 70–99)
GLUCOSE BLD-MCNC: 166 MG/DL (ref 70–99)
GLUCOSE BLD-MCNC: 87 MG/DL (ref 70–99)
HDLC SERPL-MCNC: 39 MG/DL (ref 40–60)
LDL CHOLESTEROL CALCULATED: 73 MG/DL
MAGNESIUM: 1.9 MG/DL (ref 1.8–2.4)
PERFORMED ON: ABNORMAL
PERFORMED ON: NORMAL
POTASSIUM SERPL-SCNC: 4.3 MMOL/L (ref 3.5–5.1)
POTASSIUM SERPL-SCNC: 4.4 MMOL/L (ref 3.5–5.1)
PRO-BNP: 444 PG/ML (ref 0–124)
SEDIMENTATION RATE, ERYTHROCYTE: 46 MM/HR (ref 0–20)
SODIUM BLD-SCNC: 136 MMOL/L (ref 136–145)
SODIUM BLD-SCNC: 136 MMOL/L (ref 136–145)
TOTAL CK: 730 U/L (ref 39–308)
TREPONEMA PALLIDUM ANTIBODIES: REACTIVE
TRIGL SERPL-MCNC: 296 MG/DL (ref 0–150)
VITAMIN B-12: 186 PG/ML (ref 211–911)
VLDLC SERPL CALC-MCNC: 59 MG/DL

## 2019-10-07 PROCEDURE — C1751 CATH, INF, PER/CENT/MIDLINE: HCPCS

## 2019-10-07 PROCEDURE — 82550 ASSAY OF CK (CPK): CPT

## 2019-10-07 PROCEDURE — 97110 THERAPEUTIC EXERCISES: CPT

## 2019-10-07 PROCEDURE — 6370000000 HC RX 637 (ALT 250 FOR IP): Performed by: STUDENT IN AN ORGANIZED HEALTH CARE EDUCATION/TRAINING PROGRAM

## 2019-10-07 PROCEDURE — 2580000003 HC RX 258: Performed by: INTERNAL MEDICINE

## 2019-10-07 PROCEDURE — 85652 RBC SED RATE AUTOMATED: CPT

## 2019-10-07 PROCEDURE — 84155 ASSAY OF PROTEIN SERUM: CPT

## 2019-10-07 PROCEDURE — 99232 SBSQ HOSP IP/OBS MODERATE 35: CPT | Performed by: NURSE PRACTITIONER

## 2019-10-07 PROCEDURE — 83789 MASS SPECTROMETRY QUAL/QUAN: CPT

## 2019-10-07 PROCEDURE — 92526 ORAL FUNCTION THERAPY: CPT

## 2019-10-07 PROCEDURE — 92610 EVALUATE SWALLOWING FUNCTION: CPT

## 2019-10-07 PROCEDURE — 80053 COMPREHEN METABOLIC PANEL: CPT

## 2019-10-07 PROCEDURE — 82607 VITAMIN B-12: CPT

## 2019-10-07 PROCEDURE — 1200000000 HC SEMI PRIVATE

## 2019-10-07 PROCEDURE — 74230 X-RAY XM SWLNG FUNCJ C+: CPT

## 2019-10-07 PROCEDURE — 84165 PROTEIN E-PHORESIS SERUM: CPT

## 2019-10-07 PROCEDURE — 36415 COLL VENOUS BLD VENIPUNCTURE: CPT

## 2019-10-07 PROCEDURE — 02HV33Z INSERTION OF INFUSION DEVICE INTO SUPERIOR VENA CAVA, PERCUTANEOUS APPROACH: ICD-10-PCS | Performed by: INTERNAL MEDICINE

## 2019-10-07 PROCEDURE — 6370000000 HC RX 637 (ALT 250 FOR IP): Performed by: INTERNAL MEDICINE

## 2019-10-07 PROCEDURE — 36569 INSJ PICC 5 YR+ W/O IMAGING: CPT

## 2019-10-07 PROCEDURE — 6360000002 HC RX W HCPCS: Performed by: INTERNAL MEDICINE

## 2019-10-07 PROCEDURE — 83735 ASSAY OF MAGNESIUM: CPT

## 2019-10-07 PROCEDURE — 86701 HIV-1ANTIBODY: CPT

## 2019-10-07 PROCEDURE — 6370000000 HC RX 637 (ALT 250 FOR IP): Performed by: NURSE PRACTITIONER

## 2019-10-07 PROCEDURE — 92611 MOTION FLUOROSCOPY/SWALLOW: CPT

## 2019-10-07 PROCEDURE — 87390 HIV-1 AG IA: CPT

## 2019-10-07 PROCEDURE — 83880 ASSAY OF NATRIURETIC PEPTIDE: CPT

## 2019-10-07 PROCEDURE — 80061 LIPID PANEL: CPT

## 2019-10-07 PROCEDURE — 86702 HIV-2 ANTIBODY: CPT

## 2019-10-07 RX ADMIN — CARVEDILOL 3.12 MG: 3.12 TABLET, FILM COATED ORAL at 08:57

## 2019-10-07 RX ADMIN — PREGABALIN 50 MG: 50 CAPSULE ORAL at 08:57

## 2019-10-07 RX ADMIN — Medication 10 ML: at 09:01

## 2019-10-07 RX ADMIN — Medication 10 ML: at 20:13

## 2019-10-07 RX ADMIN — FUROSEMIDE 20 MG: 20 TABLET ORAL at 08:57

## 2019-10-07 RX ADMIN — POTASSIUM CHLORIDE 20 MEQ: 20 TABLET, EXTENDED RELEASE ORAL at 08:57

## 2019-10-07 RX ADMIN — ASPIRIN 81 MG 81 MG: 81 TABLET ORAL at 08:57

## 2019-10-07 RX ADMIN — PREGABALIN 50 MG: 50 CAPSULE ORAL at 20:12

## 2019-10-07 RX ADMIN — ENOXAPARIN SODIUM 40 MG: 40 INJECTION SUBCUTANEOUS at 09:01

## 2019-10-07 RX ADMIN — MULTIPLE VITAMINS W/ MINERALS TAB 1 TABLET: TAB at 08:57

## 2019-10-07 RX ADMIN — CARVEDILOL 3.12 MG: 3.12 TABLET, FILM COATED ORAL at 17:52

## 2019-10-07 ASSESSMENT — PAIN DESCRIPTION - PAIN TYPE: TYPE: ACUTE PAIN

## 2019-10-07 ASSESSMENT — ENCOUNTER SYMPTOMS
SORE THROAT: 0
EYE DISCHARGE: 1
ALLERGIC/IMMUNOLOGIC NEGATIVE: 1
GASTROINTESTINAL NEGATIVE: 1
RHINORRHEA: 0
TROUBLE SWALLOWING: 1
EYE REDNESS: 0
RESPIRATORY NEGATIVE: 1
EYE ITCHING: 1
EYE PAIN: 0

## 2019-10-07 ASSESSMENT — PAIN DESCRIPTION - LOCATION: LOCATION: KNEE;SHOULDER

## 2019-10-07 ASSESSMENT — PAIN DESCRIPTION - PROGRESSION
CLINICAL_PROGRESSION: NOT CHANGED

## 2019-10-07 ASSESSMENT — PAIN SCALES - GENERAL
PAINLEVEL_OUTOF10: 2
PAINLEVEL_OUTOF10: 8

## 2019-10-07 ASSESSMENT — PAIN DESCRIPTION - ONSET: ONSET: ON-GOING

## 2019-10-07 ASSESSMENT — PAIN DESCRIPTION - DESCRIPTORS: DESCRIPTORS: ACHING;SORE;THROBBING

## 2019-10-07 ASSESSMENT — PAIN DESCRIPTION - FREQUENCY: FREQUENCY: INTERMITTENT

## 2019-10-08 ENCOUNTER — APPOINTMENT (OUTPATIENT)
Dept: MRI IMAGING | Age: 59
DRG: 500 | End: 2019-10-08
Payer: COMMERCIAL

## 2019-10-08 LAB
ANION GAP SERPL CALCULATED.3IONS-SCNC: 9 MMOL/L (ref 3–16)
BUN BLDV-MCNC: 14 MG/DL (ref 7–20)
CALCIUM SERPL-MCNC: 8.6 MG/DL (ref 8.3–10.6)
CHLORIDE BLD-SCNC: 100 MMOL/L (ref 99–110)
CO2: 29 MMOL/L (ref 21–32)
CREAT SERPL-MCNC: 0.7 MG/DL (ref 0.8–1.3)
GFR AFRICAN AMERICAN: >60
GFR NON-AFRICAN AMERICAN: >60
GLUCOSE BLD-MCNC: 102 MG/DL (ref 70–99)
GLUCOSE BLD-MCNC: 112 MG/DL (ref 70–99)
GLUCOSE BLD-MCNC: 125 MG/DL (ref 70–99)
HIV AG/AB: NORMAL
HIV ANTIGEN: NORMAL
HIV-1 ANTIBODY: NORMAL
HIV-2 AB: NORMAL
HOMOCYSTEINE: 17 UMOL/L (ref 0–10)
PERFORMED ON: ABNORMAL
PERFORMED ON: ABNORMAL
POTASSIUM REFLEX MAGNESIUM: 4.3 MMOL/L (ref 3.5–5.1)
SODIUM BLD-SCNC: 138 MMOL/L (ref 136–145)

## 2019-10-08 PROCEDURE — 6370000000 HC RX 637 (ALT 250 FOR IP): Performed by: INTERNAL MEDICINE

## 2019-10-08 PROCEDURE — 83921 ORGANIC ACID SINGLE QUANT: CPT

## 2019-10-08 PROCEDURE — 2580000003 HC RX 258: Performed by: INTERNAL MEDICINE

## 2019-10-08 PROCEDURE — 6360000002 HC RX W HCPCS: Performed by: INTERNAL MEDICINE

## 2019-10-08 PROCEDURE — 97530 THERAPEUTIC ACTIVITIES: CPT

## 2019-10-08 PROCEDURE — 92526 ORAL FUNCTION THERAPY: CPT

## 2019-10-08 PROCEDURE — 1200000000 HC SEMI PRIVATE

## 2019-10-08 PROCEDURE — 83090 ASSAY OF HOMOCYSTEINE: CPT

## 2019-10-08 PROCEDURE — 97112 NEUROMUSCULAR REEDUCATION: CPT

## 2019-10-08 PROCEDURE — 86790 VIRUS ANTIBODY NOS: CPT

## 2019-10-08 PROCEDURE — 6370000000 HC RX 637 (ALT 250 FOR IP): Performed by: NURSE PRACTITIONER

## 2019-10-08 PROCEDURE — 80048 BASIC METABOLIC PNL TOTAL CA: CPT

## 2019-10-08 PROCEDURE — 97535 SELF CARE MNGMENT TRAINING: CPT

## 2019-10-08 PROCEDURE — 6360000002 HC RX W HCPCS: Performed by: STUDENT IN AN ORGANIZED HEALTH CARE EDUCATION/TRAINING PROGRAM

## 2019-10-08 PROCEDURE — 6370000000 HC RX 637 (ALT 250 FOR IP): Performed by: STUDENT IN AN ORGANIZED HEALTH CARE EDUCATION/TRAINING PROGRAM

## 2019-10-08 PROCEDURE — 99232 SBSQ HOSP IP/OBS MODERATE 35: CPT | Performed by: NURSE PRACTITIONER

## 2019-10-08 PROCEDURE — 97116 GAIT TRAINING THERAPY: CPT

## 2019-10-08 RX ORDER — CYANOCOBALAMIN 1000 UG/ML
2000 INJECTION INTRAMUSCULAR; SUBCUTANEOUS DAILY
Status: DISCONTINUED | OUTPATIENT
Start: 2019-10-08 | End: 2019-10-11 | Stop reason: HOSPADM

## 2019-10-08 RX ORDER — CYANOCOBALAMIN 1000 UG/ML
1000 INJECTION INTRAMUSCULAR; SUBCUTANEOUS 2 TIMES DAILY
Status: DISCONTINUED | OUTPATIENT
Start: 2019-10-08 | End: 2019-10-08 | Stop reason: SDUPTHER

## 2019-10-08 RX ORDER — ACETAMINOPHEN 325 MG/1
650 TABLET ORAL EVERY 4 HOURS PRN
Status: DISCONTINUED | OUTPATIENT
Start: 2019-10-08 | End: 2019-10-10

## 2019-10-08 RX ADMIN — POTASSIUM CHLORIDE 20 MEQ: 20 TABLET, EXTENDED RELEASE ORAL at 08:13

## 2019-10-08 RX ADMIN — MULTIPLE VITAMINS W/ MINERALS TAB 1 TABLET: TAB at 08:15

## 2019-10-08 RX ADMIN — CYANOCOBALAMIN 2000 MCG: 1000 INJECTION, SOLUTION INTRAMUSCULAR; SUBCUTANEOUS at 11:10

## 2019-10-08 RX ADMIN — FUROSEMIDE 20 MG: 20 TABLET ORAL at 08:13

## 2019-10-08 RX ADMIN — ASPIRIN 81 MG 81 MG: 81 TABLET ORAL at 08:13

## 2019-10-08 RX ADMIN — IBUPROFEN 600 MG: 400 TABLET, FILM COATED ORAL at 08:13

## 2019-10-08 RX ADMIN — CARVEDILOL 3.12 MG: 3.12 TABLET, FILM COATED ORAL at 08:13

## 2019-10-08 RX ADMIN — PREGABALIN 50 MG: 50 CAPSULE ORAL at 08:13

## 2019-10-08 RX ADMIN — Medication 10 ML: at 08:18

## 2019-10-08 RX ADMIN — PREGABALIN 50 MG: 50 CAPSULE ORAL at 21:29

## 2019-10-08 RX ADMIN — Medication 10 ML: at 22:04

## 2019-10-08 RX ADMIN — ACETAMINOPHEN 650 MG: 325 TABLET ORAL at 22:12

## 2019-10-08 RX ADMIN — CARVEDILOL 3.12 MG: 3.12 TABLET, FILM COATED ORAL at 18:03

## 2019-10-08 RX ADMIN — ENOXAPARIN SODIUM 40 MG: 40 INJECTION SUBCUTANEOUS at 08:18

## 2019-10-08 ASSESSMENT — PAIN DESCRIPTION - ONSET
ONSET: ON-GOING

## 2019-10-08 ASSESSMENT — PAIN DESCRIPTION - LOCATION
LOCATION: KNEE;SHOULDER
LOCATION: SHOULDER;KNEE

## 2019-10-08 ASSESSMENT — PAIN - FUNCTIONAL ASSESSMENT
PAIN_FUNCTIONAL_ASSESSMENT: ACTIVITIES ARE NOT PREVENTED

## 2019-10-08 ASSESSMENT — PAIN DESCRIPTION - ORIENTATION
ORIENTATION: RIGHT;LEFT
ORIENTATION: MID
ORIENTATION: LEFT;RIGHT

## 2019-10-08 ASSESSMENT — PAIN DESCRIPTION - PROGRESSION
CLINICAL_PROGRESSION: NOT CHANGED

## 2019-10-08 ASSESSMENT — PAIN DESCRIPTION - DESCRIPTORS
DESCRIPTORS: ACHING
DESCRIPTORS: CONSTANT;THROBBING
DESCRIPTORS: ACHING
DESCRIPTORS: ACHING
DESCRIPTORS: ACHING;THROBBING;SORE

## 2019-10-08 ASSESSMENT — PAIN DESCRIPTION - PAIN TYPE
TYPE: CHRONIC PAIN
TYPE: ACUTE PAIN
TYPE: CHRONIC PAIN

## 2019-10-08 ASSESSMENT — PAIN SCALES - GENERAL
PAINLEVEL_OUTOF10: 8
PAINLEVEL_OUTOF10: 3
PAINLEVEL_OUTOF10: 8

## 2019-10-08 ASSESSMENT — PAIN DESCRIPTION - FREQUENCY
FREQUENCY: INTERMITTENT
FREQUENCY: CONTINUOUS
FREQUENCY: INTERMITTENT
FREQUENCY: INTERMITTENT
FREQUENCY: CONTINUOUS

## 2019-10-09 ENCOUNTER — APPOINTMENT (OUTPATIENT)
Dept: MRI IMAGING | Age: 59
DRG: 500 | End: 2019-10-09
Payer: COMMERCIAL

## 2019-10-09 LAB
ALBUMIN SERPL-MCNC: 3 G/DL (ref 3.1–4.9)
ALPHA-1-GLOBULIN: 0.3 G/DL (ref 0.2–0.4)
ALPHA-2-GLOBULIN: 1 G/DL (ref 0.4–1.1)
ANION GAP SERPL CALCULATED.3IONS-SCNC: 8 MMOL/L (ref 3–16)
BETA GLOBULIN: 1.1 G/DL (ref 0.9–1.6)
BUN BLDV-MCNC: 15 MG/DL (ref 7–20)
CALCIUM SERPL-MCNC: 8.5 MG/DL (ref 8.3–10.6)
CHLORIDE BLD-SCNC: 100 MMOL/L (ref 99–110)
CO2: 28 MMOL/L (ref 21–32)
CREAT SERPL-MCNC: 0.7 MG/DL (ref 0.8–1.3)
GAMMA GLOBULIN: 1.7 G/DL (ref 0.6–1.8)
GFR AFRICAN AMERICAN: >60
GFR NON-AFRICAN AMERICAN: >60
GLUCOSE BLD-MCNC: 133 MG/DL (ref 70–99)
POTASSIUM REFLEX MAGNESIUM: 4.2 MMOL/L (ref 3.5–5.1)
PRO-BNP: 331 PG/ML (ref 0–124)
SODIUM BLD-SCNC: 136 MMOL/L (ref 136–145)
SPE/IFE INTERPRETATION: NORMAL
TOTAL PROTEIN: 7.1 G/DL (ref 6.4–8.2)

## 2019-10-09 PROCEDURE — 6370000000 HC RX 637 (ALT 250 FOR IP): Performed by: INTERNAL MEDICINE

## 2019-10-09 PROCEDURE — 80048 BASIC METABOLIC PNL TOTAL CA: CPT

## 2019-10-09 PROCEDURE — 6360000004 HC RX CONTRAST MEDICATION: Performed by: STUDENT IN AN ORGANIZED HEALTH CARE EDUCATION/TRAINING PROGRAM

## 2019-10-09 PROCEDURE — 6360000002 HC RX W HCPCS: Performed by: STUDENT IN AN ORGANIZED HEALTH CARE EDUCATION/TRAINING PROGRAM

## 2019-10-09 PROCEDURE — 83880 ASSAY OF NATRIURETIC PEPTIDE: CPT

## 2019-10-09 PROCEDURE — 73720 MRI LWR EXTREMITY W/O&W/DYE: CPT

## 2019-10-09 PROCEDURE — 2580000003 HC RX 258: Performed by: INTERNAL MEDICINE

## 2019-10-09 PROCEDURE — A9579 GAD-BASE MR CONTRAST NOS,1ML: HCPCS | Performed by: STUDENT IN AN ORGANIZED HEALTH CARE EDUCATION/TRAINING PROGRAM

## 2019-10-09 PROCEDURE — 1200000000 HC SEMI PRIVATE

## 2019-10-09 PROCEDURE — 6370000000 HC RX 637 (ALT 250 FOR IP): Performed by: NURSE PRACTITIONER

## 2019-10-09 PROCEDURE — 6360000002 HC RX W HCPCS: Performed by: INTERNAL MEDICINE

## 2019-10-09 PROCEDURE — 92526 ORAL FUNCTION THERAPY: CPT

## 2019-10-09 PROCEDURE — 99232 SBSQ HOSP IP/OBS MODERATE 35: CPT | Performed by: NURSE PRACTITIONER

## 2019-10-09 PROCEDURE — 6370000000 HC RX 637 (ALT 250 FOR IP): Performed by: STUDENT IN AN ORGANIZED HEALTH CARE EDUCATION/TRAINING PROGRAM

## 2019-10-09 PROCEDURE — 82657 ENZYME CELL ACTIVITY: CPT

## 2019-10-09 RX ORDER — MORPHINE SULFATE 4 MG/ML
4 INJECTION, SOLUTION INTRAMUSCULAR; INTRAVENOUS
Status: COMPLETED | OUTPATIENT
Start: 2019-10-09 | End: 2019-10-09

## 2019-10-09 RX ADMIN — PREGABALIN 50 MG: 50 CAPSULE ORAL at 09:17

## 2019-10-09 RX ADMIN — CARVEDILOL 3.12 MG: 3.12 TABLET, FILM COATED ORAL at 18:39

## 2019-10-09 RX ADMIN — POTASSIUM CHLORIDE 20 MEQ: 20 TABLET, EXTENDED RELEASE ORAL at 09:16

## 2019-10-09 RX ADMIN — ASPIRIN 81 MG 81 MG: 81 TABLET ORAL at 09:17

## 2019-10-09 RX ADMIN — CYANOCOBALAMIN 2000 MCG: 1000 INJECTION, SOLUTION INTRAMUSCULAR; SUBCUTANEOUS at 09:17

## 2019-10-09 RX ADMIN — MULTIPLE VITAMINS W/ MINERALS TAB 1 TABLET: TAB at 09:16

## 2019-10-09 RX ADMIN — ACETAMINOPHEN 650 MG: 325 TABLET ORAL at 21:45

## 2019-10-09 RX ADMIN — ENOXAPARIN SODIUM 40 MG: 40 INJECTION SUBCUTANEOUS at 09:17

## 2019-10-09 RX ADMIN — Medication 10 ML: at 09:17

## 2019-10-09 RX ADMIN — CARVEDILOL 3.12 MG: 3.12 TABLET, FILM COATED ORAL at 09:17

## 2019-10-09 RX ADMIN — DICLOFENAC 4 G: 10 GEL TOPICAL at 12:26

## 2019-10-09 RX ADMIN — MORPHINE SULFATE 4 MG: 4 INJECTION INTRAVENOUS at 12:48

## 2019-10-09 RX ADMIN — Medication 10 ML: at 21:45

## 2019-10-09 RX ADMIN — PREGABALIN 50 MG: 50 CAPSULE ORAL at 21:45

## 2019-10-09 RX ADMIN — FUROSEMIDE 20 MG: 20 TABLET ORAL at 09:16

## 2019-10-09 RX ADMIN — GADOTERIDOL 20 ML: 279.3 INJECTION, SOLUTION INTRAVENOUS at 14:48

## 2019-10-09 ASSESSMENT — PAIN DESCRIPTION - PAIN TYPE: TYPE: ACUTE PAIN

## 2019-10-09 ASSESSMENT — PAIN DESCRIPTION - DESCRIPTORS: DESCRIPTORS: ACHING

## 2019-10-09 ASSESSMENT — PAIN SCALES - GENERAL
PAINLEVEL_OUTOF10: 6
PAINLEVEL_OUTOF10: 0
PAINLEVEL_OUTOF10: 7

## 2019-10-09 ASSESSMENT — PAIN DESCRIPTION - LOCATION: LOCATION: SHOULDER

## 2019-10-09 ASSESSMENT — PAIN DESCRIPTION - FREQUENCY: FREQUENCY: INTERMITTENT

## 2019-10-10 ENCOUNTER — ANESTHESIA (OUTPATIENT)
Dept: OPERATING ROOM | Age: 59
DRG: 500 | End: 2019-10-10
Payer: COMMERCIAL

## 2019-10-10 ENCOUNTER — ANESTHESIA EVENT (OUTPATIENT)
Dept: OPERATING ROOM | Age: 59
DRG: 500 | End: 2019-10-10
Payer: COMMERCIAL

## 2019-10-10 VITALS — DIASTOLIC BLOOD PRESSURE: 67 MMHG | OXYGEN SATURATION: 100 % | TEMPERATURE: 95.4 F | SYSTOLIC BLOOD PRESSURE: 135 MMHG

## 2019-10-10 LAB
ANION GAP SERPL CALCULATED.3IONS-SCNC: 7 MMOL/L (ref 3–16)
BASOPHILS ABSOLUTE: 0.1 K/UL (ref 0–0.2)
BASOPHILS RELATIVE PERCENT: 1.2 %
BUN BLDV-MCNC: 17 MG/DL (ref 7–20)
CALCIUM SERPL-MCNC: 8.7 MG/DL (ref 8.3–10.6)
CHLORIDE BLD-SCNC: 106 MMOL/L (ref 99–110)
CO2: 28 MMOL/L (ref 21–32)
CREAT SERPL-MCNC: 0.7 MG/DL (ref 0.8–1.3)
EOSINOPHILS ABSOLUTE: 0.5 K/UL (ref 0–0.6)
EOSINOPHILS RELATIVE PERCENT: 5.3 %
GFR AFRICAN AMERICAN: >60
GFR NON-AFRICAN AMERICAN: >60
GLUCOSE BLD-MCNC: 114 MG/DL (ref 70–99)
GLUCOSE BLD-MCNC: 73 MG/DL (ref 70–99)
HCT VFR BLD CALC: 35.7 % (ref 40.5–52.5)
HEMOGLOBIN: 11.7 G/DL (ref 13.5–17.5)
INR BLD: 1.04 (ref 0.86–1.14)
LYMPHOCYTES ABSOLUTE: 1.5 K/UL (ref 1–5.1)
LYMPHOCYTES RELATIVE PERCENT: 14.7 %
MCH RBC QN AUTO: 29.1 PG (ref 26–34)
MCHC RBC AUTO-ENTMCNC: 32.8 G/DL (ref 31–36)
MCV RBC AUTO: 88.7 FL (ref 80–100)
MONOCYTES ABSOLUTE: 1 K/UL (ref 0–1.3)
MONOCYTES RELATIVE PERCENT: 9.6 %
NEUTROPHILS ABSOLUTE: 7.1 K/UL (ref 1.7–7.7)
NEUTROPHILS RELATIVE PERCENT: 69.2 %
PDW BLD-RTO: 16 % (ref 12.4–15.4)
PERFORMED ON: NORMAL
PLATELET # BLD: 338 K/UL (ref 135–450)
PMV BLD AUTO: 5.9 FL (ref 5–10.5)
POTASSIUM REFLEX MAGNESIUM: 4.4 MMOL/L (ref 3.5–5.1)
PROTHROMBIN TIME: 11.8 SEC (ref 9.8–13)
RBC # BLD: 4.02 M/UL (ref 4.2–5.9)
SODIUM BLD-SCNC: 141 MMOL/L (ref 136–145)
WBC # BLD: 10.2 K/UL (ref 4–11)

## 2019-10-10 PROCEDURE — 6370000000 HC RX 637 (ALT 250 FOR IP): Performed by: NURSE PRACTITIONER

## 2019-10-10 PROCEDURE — 2709999900 HC NON-CHARGEABLE SUPPLY: Performed by: SURGERY

## 2019-10-10 PROCEDURE — 6370000000 HC RX 637 (ALT 250 FOR IP): Performed by: INTERNAL MEDICINE

## 2019-10-10 PROCEDURE — 6360000002 HC RX W HCPCS: Performed by: STUDENT IN AN ORGANIZED HEALTH CARE EDUCATION/TRAINING PROGRAM

## 2019-10-10 PROCEDURE — 85610 PROTHROMBIN TIME: CPT

## 2019-10-10 PROCEDURE — 1200000000 HC SEMI PRIVATE

## 2019-10-10 PROCEDURE — 6360000002 HC RX W HCPCS: Performed by: NURSE ANESTHETIST, CERTIFIED REGISTERED

## 2019-10-10 PROCEDURE — 2500000003 HC RX 250 WO HCPCS: Performed by: NURSE ANESTHETIST, CERTIFIED REGISTERED

## 2019-10-10 PROCEDURE — 97530 THERAPEUTIC ACTIVITIES: CPT

## 2019-10-10 PROCEDURE — 99223 1ST HOSP IP/OBS HIGH 75: CPT | Performed by: SURGERY

## 2019-10-10 PROCEDURE — 2580000003 HC RX 258: Performed by: STUDENT IN AN ORGANIZED HEALTH CARE EDUCATION/TRAINING PROGRAM

## 2019-10-10 PROCEDURE — 6370000000 HC RX 637 (ALT 250 FOR IP): Performed by: STUDENT IN AN ORGANIZED HEALTH CARE EDUCATION/TRAINING PROGRAM

## 2019-10-10 PROCEDURE — 7100000001 HC PACU RECOVERY - ADDTL 15 MIN: Performed by: SURGERY

## 2019-10-10 PROCEDURE — 3600000014 HC SURGERY LEVEL 4 ADDTL 15MIN: Performed by: SURGERY

## 2019-10-10 PROCEDURE — 85025 COMPLETE CBC W/AUTO DIFF WBC: CPT

## 2019-10-10 PROCEDURE — 7100000000 HC PACU RECOVERY - FIRST 15 MIN: Performed by: SURGERY

## 2019-10-10 PROCEDURE — 2500000003 HC RX 250 WO HCPCS: Performed by: SURGERY

## 2019-10-10 PROCEDURE — 97116 GAIT TRAINING THERAPY: CPT

## 2019-10-10 PROCEDURE — 3700000001 HC ADD 15 MINUTES (ANESTHESIA): Performed by: SURGERY

## 2019-10-10 PROCEDURE — 99232 SBSQ HOSP IP/OBS MODERATE 35: CPT | Performed by: INTERNAL MEDICINE

## 2019-10-10 PROCEDURE — 94150 VITAL CAPACITY TEST: CPT

## 2019-10-10 PROCEDURE — 2580000003 HC RX 258: Performed by: SURGERY

## 2019-10-10 PROCEDURE — 0KBR0ZX EXCISION OF LEFT UPPER LEG MUSCLE, OPEN APPROACH, DIAGNOSTIC: ICD-10-PCS | Performed by: SURGERY

## 2019-10-10 PROCEDURE — 88300 SURGICAL PATH GROSS: CPT

## 2019-10-10 PROCEDURE — 3600000004 HC SURGERY LEVEL 4 BASE: Performed by: SURGERY

## 2019-10-10 PROCEDURE — 80048 BASIC METABOLIC PNL TOTAL CA: CPT

## 2019-10-10 PROCEDURE — 3700000000 HC ANESTHESIA ATTENDED CARE: Performed by: SURGERY

## 2019-10-10 RX ORDER — FENTANYL CITRATE 50 UG/ML
INJECTION, SOLUTION INTRAMUSCULAR; INTRAVENOUS PRN
Status: DISCONTINUED | OUTPATIENT
Start: 2019-10-10 | End: 2019-10-10 | Stop reason: SDUPTHER

## 2019-10-10 RX ORDER — LABETALOL 20 MG/4 ML (5 MG/ML) INTRAVENOUS SYRINGE
5 EVERY 10 MIN PRN
Status: DISCONTINUED | OUTPATIENT
Start: 2019-10-10 | End: 2019-10-10

## 2019-10-10 RX ORDER — LIDOCAINE HYDROCHLORIDE 20 MG/ML
INJECTION, SOLUTION EPIDURAL; INFILTRATION; INTRACAUDAL; PERINEURAL PRN
Status: DISCONTINUED | OUTPATIENT
Start: 2019-10-10 | End: 2019-10-10 | Stop reason: SDUPTHER

## 2019-10-10 RX ORDER — MORPHINE SULFATE 4 MG/ML
1 INJECTION, SOLUTION INTRAMUSCULAR; INTRAVENOUS EVERY 5 MIN PRN
Status: DISCONTINUED | OUTPATIENT
Start: 2019-10-10 | End: 2019-10-10

## 2019-10-10 RX ORDER — ONDANSETRON 2 MG/ML
INJECTION INTRAMUSCULAR; INTRAVENOUS PRN
Status: DISCONTINUED | OUTPATIENT
Start: 2019-10-10 | End: 2019-10-10 | Stop reason: SDUPTHER

## 2019-10-10 RX ORDER — MEPERIDINE HYDROCHLORIDE 25 MG/ML
12.5 INJECTION INTRAMUSCULAR; INTRAVENOUS; SUBCUTANEOUS EVERY 5 MIN PRN
Status: DISCONTINUED | OUTPATIENT
Start: 2019-10-10 | End: 2019-10-10

## 2019-10-10 RX ORDER — HYDRALAZINE HYDROCHLORIDE 20 MG/ML
5 INJECTION INTRAMUSCULAR; INTRAVENOUS EVERY 10 MIN PRN
Status: DISCONTINUED | OUTPATIENT
Start: 2019-10-10 | End: 2019-10-10

## 2019-10-10 RX ORDER — OXYCODONE HYDROCHLORIDE AND ACETAMINOPHEN 5; 325 MG/1; MG/1
1 TABLET ORAL EVERY 6 HOURS PRN
Qty: 5 TABLET | Refills: 0 | Status: SHIPPED | OUTPATIENT
Start: 2019-10-10 | End: 2019-10-13

## 2019-10-10 RX ORDER — LANOLIN ALCOHOL/MO/W.PET/CERES
1000 CREAM (GRAM) TOPICAL DAILY
Qty: 30 TABLET | Refills: 0 | Status: ON HOLD | OUTPATIENT
Start: 2019-10-10 | End: 2022-03-20 | Stop reason: CLARIF

## 2019-10-10 RX ORDER — MAGNESIUM HYDROXIDE 1200 MG/15ML
LIQUID ORAL CONTINUOUS PRN
Status: COMPLETED | OUTPATIENT
Start: 2019-10-10 | End: 2019-10-10

## 2019-10-10 RX ORDER — BUPIVACAINE HYDROCHLORIDE AND EPINEPHRINE 5; 5 MG/ML; UG/ML
INJECTION, SOLUTION EPIDURAL; INTRACAUDAL; PERINEURAL PRN
Status: DISCONTINUED | OUTPATIENT
Start: 2019-10-10 | End: 2019-10-10 | Stop reason: ALTCHOICE

## 2019-10-10 RX ORDER — PROPOFOL 10 MG/ML
INJECTION, EMULSION INTRAVENOUS PRN
Status: DISCONTINUED | OUTPATIENT
Start: 2019-10-10 | End: 2019-10-10 | Stop reason: SDUPTHER

## 2019-10-10 RX ORDER — ACETAMINOPHEN 325 MG/1
650 TABLET ORAL EVERY 8 HOURS SCHEDULED
Status: DISCONTINUED | OUTPATIENT
Start: 2019-10-10 | End: 2019-10-11 | Stop reason: HOSPADM

## 2019-10-10 RX ORDER — FUROSEMIDE 40 MG/1
40 TABLET ORAL DAILY
Status: DISCONTINUED | OUTPATIENT
Start: 2019-10-11 | End: 2019-10-11 | Stop reason: HOSPADM

## 2019-10-10 RX ORDER — PREGABALIN 50 MG/1
50 CAPSULE ORAL 2 TIMES DAILY
Qty: 60 CAPSULE | Refills: 0 | Status: ON HOLD | OUTPATIENT
Start: 2019-10-10 | End: 2022-03-20 | Stop reason: CLARIF

## 2019-10-10 RX ORDER — OXYCODONE HYDROCHLORIDE 5 MG/1
10 TABLET ORAL PRN
Status: DISCONTINUED | OUTPATIENT
Start: 2019-10-10 | End: 2019-10-10

## 2019-10-10 RX ORDER — METOCLOPRAMIDE HYDROCHLORIDE 5 MG/ML
10 INJECTION INTRAMUSCULAR; INTRAVENOUS
Status: DISCONTINUED | OUTPATIENT
Start: 2019-10-10 | End: 2019-10-10

## 2019-10-10 RX ORDER — DIPHENHYDRAMINE HYDROCHLORIDE 50 MG/ML
12.5 INJECTION INTRAMUSCULAR; INTRAVENOUS
Status: DISCONTINUED | OUTPATIENT
Start: 2019-10-10 | End: 2019-10-10

## 2019-10-10 RX ORDER — SODIUM CHLORIDE, SODIUM LACTATE, POTASSIUM CHLORIDE, CALCIUM CHLORIDE 600; 310; 30; 20 MG/100ML; MG/100ML; MG/100ML; MG/100ML
INJECTION, SOLUTION INTRAVENOUS CONTINUOUS
Status: DISCONTINUED | OUTPATIENT
Start: 2019-10-10 | End: 2019-10-11 | Stop reason: HOSPADM

## 2019-10-10 RX ORDER — CEFAZOLIN SODIUM 2 G/50ML
SOLUTION INTRAVENOUS PRN
Status: DISCONTINUED | OUTPATIENT
Start: 2019-10-10 | End: 2019-10-10 | Stop reason: SDUPTHER

## 2019-10-10 RX ORDER — FUROSEMIDE 40 MG/1
40 TABLET ORAL DAILY
Qty: 60 TABLET | Refills: 3 | Status: ON HOLD | OUTPATIENT
Start: 2019-10-11 | End: 2020-01-24 | Stop reason: HOSPADM

## 2019-10-10 RX ORDER — OXYCODONE HYDROCHLORIDE 5 MG/1
5 TABLET ORAL PRN
Status: DISCONTINUED | OUTPATIENT
Start: 2019-10-10 | End: 2019-10-10

## 2019-10-10 RX ORDER — PROMETHAZINE HYDROCHLORIDE 25 MG/ML
6.25 INJECTION, SOLUTION INTRAMUSCULAR; INTRAVENOUS
Status: DISCONTINUED | OUTPATIENT
Start: 2019-10-10 | End: 2019-10-10

## 2019-10-10 RX ADMIN — FUROSEMIDE 20 MG: 20 TABLET ORAL at 09:18

## 2019-10-10 RX ADMIN — FENTANYL CITRATE 25 MCG: 50 INJECTION INTRAMUSCULAR; INTRAVENOUS at 17:14

## 2019-10-10 RX ADMIN — SODIUM CHLORIDE, SODIUM LACTATE, POTASSIUM CHLORIDE, AND CALCIUM CHLORIDE: 600; 310; 30; 20 INJECTION, SOLUTION INTRAVENOUS at 17:33

## 2019-10-10 RX ADMIN — PHENYLEPHRINE HYDROCHLORIDE 80 MCG: 10 INJECTION, SOLUTION INTRAMUSCULAR; INTRAVENOUS; SUBCUTANEOUS at 17:09

## 2019-10-10 RX ADMIN — FENTANYL CITRATE 25 MCG: 50 INJECTION INTRAMUSCULAR; INTRAVENOUS at 17:12

## 2019-10-10 RX ADMIN — CARVEDILOL 3.12 MG: 3.12 TABLET, FILM COATED ORAL at 18:54

## 2019-10-10 RX ADMIN — PHENYLEPHRINE HYDROCHLORIDE 80 MCG: 10 INJECTION, SOLUTION INTRAMUSCULAR; INTRAVENOUS; SUBCUTANEOUS at 17:05

## 2019-10-10 RX ADMIN — SODIUM CHLORIDE, SODIUM LACTATE, POTASSIUM CHLORIDE, AND CALCIUM CHLORIDE: 600; 310; 30; 20 INJECTION, SOLUTION INTRAVENOUS at 06:03

## 2019-10-10 RX ADMIN — ONDANSETRON 4 MG: 2 INJECTION INTRAMUSCULAR; INTRAVENOUS at 17:06

## 2019-10-10 RX ADMIN — Medication 10 ML: at 21:25

## 2019-10-10 RX ADMIN — ACETAMINOPHEN 650 MG: 325 TABLET ORAL at 14:38

## 2019-10-10 RX ADMIN — PROPOFOL 150 MG: 10 INJECTION, EMULSION INTRAVENOUS at 16:56

## 2019-10-10 RX ADMIN — FENTANYL CITRATE 25 MCG: 50 INJECTION INTRAMUSCULAR; INTRAVENOUS at 17:32

## 2019-10-10 RX ADMIN — ACETAMINOPHEN 650 MG: 325 TABLET ORAL at 21:18

## 2019-10-10 RX ADMIN — PREGABALIN 50 MG: 50 CAPSULE ORAL at 09:18

## 2019-10-10 RX ADMIN — POTASSIUM CHLORIDE 20 MEQ: 20 TABLET, EXTENDED RELEASE ORAL at 09:18

## 2019-10-10 RX ADMIN — FENTANYL CITRATE 25 MCG: 50 INJECTION INTRAMUSCULAR; INTRAVENOUS at 16:56

## 2019-10-10 RX ADMIN — MULTIPLE VITAMINS W/ MINERALS TAB 1 TABLET: TAB at 09:18

## 2019-10-10 RX ADMIN — CEFAZOLIN SODIUM 3 G: 2 SOLUTION INTRAVENOUS at 16:59

## 2019-10-10 RX ADMIN — SODIUM CHLORIDE, SODIUM LACTATE, POTASSIUM CHLORIDE, AND CALCIUM CHLORIDE 990 ML: 600; 310; 30; 20 INJECTION, SOLUTION INTRAVENOUS at 18:13

## 2019-10-10 RX ADMIN — CYANOCOBALAMIN 2000 MCG: 1000 INJECTION, SOLUTION INTRAMUSCULAR; SUBCUTANEOUS at 09:18

## 2019-10-10 RX ADMIN — PROPOFOL 30 MG: 10 INJECTION, EMULSION INTRAVENOUS at 17:14

## 2019-10-10 RX ADMIN — LIDOCAINE HYDROCHLORIDE 100 MG: 20 INJECTION, SOLUTION EPIDURAL; INFILTRATION; INTRACAUDAL; PERINEURAL at 16:56

## 2019-10-10 RX ADMIN — ACETAMINOPHEN 650 MG: 325 TABLET ORAL at 09:17

## 2019-10-10 RX ADMIN — CARVEDILOL 3.12 MG: 3.12 TABLET, FILM COATED ORAL at 09:18

## 2019-10-10 RX ADMIN — PREGABALIN 50 MG: 50 CAPSULE ORAL at 21:18

## 2019-10-10 ASSESSMENT — PAIN DESCRIPTION - LOCATION
LOCATION: LEG
LOCATION: NECK
LOCATION: SHOULDER
LOCATION: LEG
LOCATION: LEG

## 2019-10-10 ASSESSMENT — PULMONARY FUNCTION TESTS
PIF_VALUE: 11
PIF_VALUE: 28
PIF_VALUE: 17
PIF_VALUE: 6
PIF_VALUE: 4
PIF_VALUE: 0
PIF_VALUE: 11
PIF_VALUE: 3
PIF_VALUE: 11
PIF_VALUE: 11
PIF_VALUE: 8
PIF_VALUE: 11
PIF_VALUE: 5
PIF_VALUE: 3
PIF_VALUE: 3
PIF_VALUE: 7
PIF_VALUE: 11
PIF_VALUE: 2
PIF_VALUE: 11
PIF_VALUE: 20
PIF_VALUE: 11
PIF_VALUE: 11
PIF_VALUE: 1
PIF_VALUE: 3
PIF_VALUE: 11
PIF_VALUE: 3
PIF_VALUE: 2
PIF_VALUE: 11
PIF_VALUE: 0
PIF_VALUE: 7
PIF_VALUE: 3
PIF_VALUE: 12
PIF_VALUE: 5
PIF_VALUE: 20
PIF_VALUE: 0
PIF_VALUE: 6
PIF_VALUE: 11
PIF_VALUE: 2
PIF_VALUE: 12
PIF_VALUE: 0
PIF_VALUE: 1
PIF_VALUE: 11
PIF_VALUE: 0
PIF_VALUE: 0
PIF_VALUE: 3
PIF_VALUE: 2
PIF_VALUE: 10
PIF_VALUE: 7
PIF_VALUE: 11
PIF_VALUE: 3
PIF_VALUE: 0
PIF_VALUE: 11
PIF_VALUE: 10
PIF_VALUE: 6
PIF_VALUE: 20
PIF_VALUE: 2
PIF_VALUE: 11

## 2019-10-10 ASSESSMENT — PAIN DESCRIPTION - PAIN TYPE
TYPE: SURGICAL PAIN
TYPE: CHRONIC PAIN
TYPE: CHRONIC PAIN
TYPE: SURGICAL PAIN
TYPE: SURGICAL PAIN

## 2019-10-10 ASSESSMENT — PAIN DESCRIPTION - FREQUENCY
FREQUENCY: CONTINUOUS

## 2019-10-10 ASSESSMENT — PAIN SCALES - GENERAL
PAINLEVEL_OUTOF10: 3
PAINLEVEL_OUTOF10: 0
PAINLEVEL_OUTOF10: 3
PAINLEVEL_OUTOF10: 0
PAINLEVEL_OUTOF10: 3
PAINLEVEL_OUTOF10: 7
PAINLEVEL_OUTOF10: 3
PAINLEVEL_OUTOF10: 3
PAINLEVEL_OUTOF10: 6
PAINLEVEL_OUTOF10: 8

## 2019-10-10 ASSESSMENT — PAIN DESCRIPTION - DESCRIPTORS
DESCRIPTORS: ACHING
DESCRIPTORS: BURNING
DESCRIPTORS: ACHING
DESCRIPTORS: BURNING

## 2019-10-10 ASSESSMENT — PAIN DESCRIPTION - ORIENTATION
ORIENTATION: LEFT
ORIENTATION: POSTERIOR
ORIENTATION: LEFT

## 2019-10-10 ASSESSMENT — PAIN DESCRIPTION - PROGRESSION
CLINICAL_PROGRESSION: NOT CHANGED
CLINICAL_PROGRESSION: NOT CHANGED

## 2019-10-10 ASSESSMENT — PAIN DESCRIPTION - ONSET
ONSET: ON-GOING
ONSET: ON-GOING

## 2019-10-10 ASSESSMENT — PAIN DESCRIPTION - DIRECTION: RADIATING_TOWARDS: SHOULDER

## 2019-10-11 VITALS
HEART RATE: 67 BPM | OXYGEN SATURATION: 93 % | SYSTOLIC BLOOD PRESSURE: 119 MMHG | BODY MASS INDEX: 39.94 KG/M2 | TEMPERATURE: 97.7 F | WEIGHT: 285.27 LBS | HEIGHT: 71 IN | DIASTOLIC BLOOD PRESSURE: 76 MMHG | RESPIRATION RATE: 16 BRPM

## 2019-10-11 LAB
ANION GAP SERPL CALCULATED.3IONS-SCNC: 10 MMOL/L (ref 3–16)
BASOPHILS ABSOLUTE: 0.1 K/UL (ref 0–0.2)
BASOPHILS RELATIVE PERCENT: 1 %
BUN BLDV-MCNC: 16 MG/DL (ref 7–20)
CALCIUM SERPL-MCNC: 8.4 MG/DL (ref 8.3–10.6)
CHLORIDE BLD-SCNC: 101 MMOL/L (ref 99–110)
CO2: 28 MMOL/L (ref 21–32)
CREAT SERPL-MCNC: 0.8 MG/DL (ref 0.8–1.3)
EOSINOPHILS ABSOLUTE: 0.5 K/UL (ref 0–0.6)
EOSINOPHILS RELATIVE PERCENT: 5.7 %
GFR AFRICAN AMERICAN: >60
GFR NON-AFRICAN AMERICAN: >60
GLUCOSE BLD-MCNC: 106 MG/DL (ref 70–99)
HCT VFR BLD CALC: 31.9 % (ref 40.5–52.5)
HEMOGLOBIN: 10.5 G/DL (ref 13.5–17.5)
HTLV I/II AB: NEGATIVE
LYMPHOCYTES ABSOLUTE: 1.2 K/UL (ref 1–5.1)
LYMPHOCYTES RELATIVE PERCENT: 13.8 %
MCH RBC QN AUTO: 28.9 PG (ref 26–34)
MCHC RBC AUTO-ENTMCNC: 33.1 G/DL (ref 31–36)
MCV RBC AUTO: 87.3 FL (ref 80–100)
METHYLMALONIC ACID: 0.49 UMOL/L (ref 0–0.4)
MONOCYTES ABSOLUTE: 0.6 K/UL (ref 0–1.3)
MONOCYTES RELATIVE PERCENT: 7.5 %
NEUTROPHILS ABSOLUTE: 6.2 K/UL (ref 1.7–7.7)
NEUTROPHILS RELATIVE PERCENT: 72 %
PDW BLD-RTO: 15.9 % (ref 12.4–15.4)
PLATELET # BLD: 317 K/UL (ref 135–450)
PMV BLD AUTO: 6.1 FL (ref 5–10.5)
POTASSIUM REFLEX MAGNESIUM: 4.5 MMOL/L (ref 3.5–5.1)
RBC # BLD: 3.65 M/UL (ref 4.2–5.9)
SODIUM BLD-SCNC: 139 MMOL/L (ref 136–145)
WBC # BLD: 8.6 K/UL (ref 4–11)

## 2019-10-11 PROCEDURE — 6360000002 HC RX W HCPCS: Performed by: STUDENT IN AN ORGANIZED HEALTH CARE EDUCATION/TRAINING PROGRAM

## 2019-10-11 PROCEDURE — 99232 SBSQ HOSP IP/OBS MODERATE 35: CPT | Performed by: NURSE PRACTITIONER

## 2019-10-11 PROCEDURE — 6370000000 HC RX 637 (ALT 250 FOR IP): Performed by: STUDENT IN AN ORGANIZED HEALTH CARE EDUCATION/TRAINING PROGRAM

## 2019-10-11 PROCEDURE — 2580000003 HC RX 258: Performed by: STUDENT IN AN ORGANIZED HEALTH CARE EDUCATION/TRAINING PROGRAM

## 2019-10-11 PROCEDURE — 85025 COMPLETE CBC W/AUTO DIFF WBC: CPT

## 2019-10-11 PROCEDURE — 80048 BASIC METABOLIC PNL TOTAL CA: CPT

## 2019-10-11 PROCEDURE — 99024 POSTOP FOLLOW-UP VISIT: CPT | Performed by: SURGERY

## 2019-10-11 PROCEDURE — 92526 ORAL FUNCTION THERAPY: CPT

## 2019-10-11 RX ORDER — OXYCODONE HYDROCHLORIDE 5 MG/1
5 TABLET ORAL EVERY 4 HOURS PRN
Status: DISCONTINUED | OUTPATIENT
Start: 2019-10-11 | End: 2019-10-11 | Stop reason: HOSPADM

## 2019-10-11 RX ADMIN — CARVEDILOL 3.12 MG: 3.12 TABLET, FILM COATED ORAL at 09:10

## 2019-10-11 RX ADMIN — ACETAMINOPHEN 650 MG: 325 TABLET ORAL at 05:25

## 2019-10-11 RX ADMIN — MULTIPLE VITAMINS W/ MINERALS TAB 1 TABLET: TAB at 09:09

## 2019-10-11 RX ADMIN — POTASSIUM CHLORIDE 20 MEQ: 20 TABLET, EXTENDED RELEASE ORAL at 09:10

## 2019-10-11 RX ADMIN — SODIUM CHLORIDE, SODIUM LACTATE, POTASSIUM CHLORIDE, AND CALCIUM CHLORIDE: 600; 310; 30; 20 INJECTION, SOLUTION INTRAVENOUS at 06:25

## 2019-10-11 RX ADMIN — CYANOCOBALAMIN 2000 MCG: 1000 INJECTION, SOLUTION INTRAMUSCULAR; SUBCUTANEOUS at 09:10

## 2019-10-11 RX ADMIN — FUROSEMIDE 40 MG: 40 TABLET ORAL at 09:10

## 2019-10-11 RX ADMIN — Medication 10 ML: at 09:11

## 2019-10-11 RX ADMIN — PREGABALIN 50 MG: 50 CAPSULE ORAL at 09:10

## 2019-10-11 RX ADMIN — ENOXAPARIN SODIUM 40 MG: 40 INJECTION SUBCUTANEOUS at 09:11

## 2019-10-11 ASSESSMENT — PAIN DESCRIPTION - DESCRIPTORS: DESCRIPTORS: ACHING

## 2019-10-11 ASSESSMENT — PAIN DESCRIPTION - PAIN TYPE: TYPE: CHRONIC PAIN

## 2019-10-11 ASSESSMENT — PAIN DESCRIPTION - PROGRESSION: CLINICAL_PROGRESSION: NOT CHANGED

## 2019-10-11 ASSESSMENT — PAIN DESCRIPTION - ONSET: ONSET: ON-GOING

## 2019-10-11 ASSESSMENT — PAIN DESCRIPTION - FREQUENCY: FREQUENCY: CONTINUOUS

## 2019-10-11 ASSESSMENT — PAIN SCALES - GENERAL
PAINLEVEL_OUTOF10: 0
PAINLEVEL_OUTOF10: 6
PAINLEVEL_OUTOF10: 2

## 2019-10-11 ASSESSMENT — PAIN DESCRIPTION - ORIENTATION: ORIENTATION: LEFT;RIGHT

## 2019-10-11 ASSESSMENT — PAIN - FUNCTIONAL ASSESSMENT: PAIN_FUNCTIONAL_ASSESSMENT: PREVENTS OR INTERFERES SOME ACTIVE ACTIVITIES AND ADLS

## 2019-10-11 ASSESSMENT — PAIN DESCRIPTION - LOCATION: LOCATION: SHOULDER;KNEE

## 2019-10-14 ENCOUNTER — TELEPHONE (OUTPATIENT)
Dept: RHEUMATOLOGY | Age: 59
End: 2019-10-14

## 2019-10-14 DIAGNOSIS — M17.0 PRIMARY OSTEOARTHRITIS OF BOTH KNEES: Primary | ICD-10-CM

## 2019-10-16 ENCOUNTER — TELEPHONE (OUTPATIENT)
Dept: SURGERY | Age: 59
End: 2019-10-16

## 2019-10-17 ENCOUNTER — OFFICE VISIT (OUTPATIENT)
Dept: CARDIOLOGY CLINIC | Age: 59
End: 2019-10-17
Payer: COMMERCIAL

## 2019-10-17 VITALS
WEIGHT: 291.6 LBS | SYSTOLIC BLOOD PRESSURE: 132 MMHG | HEART RATE: 72 BPM | BODY MASS INDEX: 40.67 KG/M2 | DIASTOLIC BLOOD PRESSURE: 70 MMHG

## 2019-10-17 DIAGNOSIS — I10 ESSENTIAL HYPERTENSION: Primary | Chronic | ICD-10-CM

## 2019-10-17 DIAGNOSIS — I50.33 ACUTE ON CHRONIC DIASTOLIC HEART FAILURE (HCC): ICD-10-CM

## 2019-10-17 DIAGNOSIS — M79.89 LEG SWELLING: ICD-10-CM

## 2019-10-17 DIAGNOSIS — I21.4 NSTEMI (NON-ST ELEVATED MYOCARDIAL INFARCTION) (HCC): ICD-10-CM

## 2019-10-17 PROCEDURE — G8427 DOCREV CUR MEDS BY ELIG CLIN: HCPCS | Performed by: NURSE PRACTITIONER

## 2019-10-17 PROCEDURE — 3017F COLORECTAL CA SCREEN DOC REV: CPT | Performed by: NURSE PRACTITIONER

## 2019-10-17 PROCEDURE — G8417 CALC BMI ABV UP PARAM F/U: HCPCS | Performed by: NURSE PRACTITIONER

## 2019-10-17 PROCEDURE — G8598 ASA/ANTIPLAT THER USED: HCPCS | Performed by: NURSE PRACTITIONER

## 2019-10-17 PROCEDURE — 99214 OFFICE O/P EST MOD 30 MIN: CPT | Performed by: NURSE PRACTITIONER

## 2019-10-17 PROCEDURE — 1036F TOBACCO NON-USER: CPT | Performed by: NURSE PRACTITIONER

## 2019-10-17 PROCEDURE — G8484 FLU IMMUNIZE NO ADMIN: HCPCS | Performed by: NURSE PRACTITIONER

## 2019-10-17 PROCEDURE — 1111F DSCHRG MED/CURRENT MED MERGE: CPT | Performed by: NURSE PRACTITIONER

## 2020-01-06 ENCOUNTER — TELEPHONE (OUTPATIENT)
Dept: CARDIOLOGY CLINIC | Age: 60
End: 2020-01-06

## 2020-01-20 ENCOUNTER — HOSPITAL ENCOUNTER (INPATIENT)
Age: 60
LOS: 4 days | Discharge: NURSING, REHAB OR LONG-TERM CARE | DRG: 281 | End: 2020-01-24
Attending: EMERGENCY MEDICINE | Admitting: FAMILY MEDICINE
Payer: COMMERCIAL

## 2020-01-20 ENCOUNTER — APPOINTMENT (OUTPATIENT)
Dept: GENERAL RADIOLOGY | Age: 60
DRG: 281 | End: 2020-01-20
Payer: COMMERCIAL

## 2020-01-20 PROBLEM — M35.00 SJOGREN'S SYNDROME (HCC): Status: ACTIVE | Noted: 2020-01-20

## 2020-01-20 PROBLEM — R73.9 HYPERGLYCEMIA: Status: ACTIVE | Noted: 2020-01-20

## 2020-01-20 PROBLEM — I77.819 AORTIC DILATATION (HCC): Status: ACTIVE | Noted: 2020-01-20

## 2020-01-20 LAB
A/G RATIO: 1 (ref 1.1–2.2)
ALBUMIN SERPL-MCNC: 3.3 G/DL (ref 3.4–5)
ALP BLD-CCNC: 141 U/L (ref 40–129)
ALT SERPL-CCNC: 12 U/L (ref 10–40)
ANION GAP SERPL CALCULATED.3IONS-SCNC: 14 MMOL/L (ref 3–16)
ANION GAP SERPL CALCULATED.3IONS-SCNC: 15 MMOL/L (ref 3–16)
APTT: 46.2 SEC (ref 24.2–36.2)
AST SERPL-CCNC: 11 U/L (ref 15–37)
BASE EXCESS VENOUS: -0.2 MMOL/L
BILIRUB SERPL-MCNC: <0.2 MG/DL (ref 0–1)
BILIRUBIN URINE: NEGATIVE
BLOOD, URINE: ABNORMAL
BUN BLDV-MCNC: 11 MG/DL (ref 7–20)
BUN BLDV-MCNC: 8 MG/DL (ref 7–20)
CALCIUM SERPL-MCNC: 8.5 MG/DL (ref 8.3–10.6)
CALCIUM SERPL-MCNC: 8.8 MG/DL (ref 8.3–10.6)
CARBOXYHEMOGLOBIN: 2.3 %
CHLORIDE BLD-SCNC: 89 MMOL/L (ref 99–110)
CHLORIDE BLD-SCNC: 93 MMOL/L (ref 99–110)
CLARITY: CLEAR
CO2: 23 MMOL/L (ref 21–32)
CO2: 24 MMOL/L (ref 21–32)
COLOR: YELLOW
CREAT SERPL-MCNC: 0.7 MG/DL (ref 0.8–1.3)
CREAT SERPL-MCNC: 0.9 MG/DL (ref 0.8–1.3)
EKG ATRIAL RATE: 75 BPM
EKG ATRIAL RATE: 78 BPM
EKG DIAGNOSIS: NORMAL
EKG DIAGNOSIS: NORMAL
EKG P AXIS: -19 DEGREES
EKG P AXIS: -8 DEGREES
EKG P-R INTERVAL: 194 MS
EKG P-R INTERVAL: 206 MS
EKG Q-T INTERVAL: 360 MS
EKG Q-T INTERVAL: 374 MS
EKG QRS DURATION: 106 MS
EKG QRS DURATION: 112 MS
EKG QTC CALCULATION (BAZETT): 410 MS
EKG QTC CALCULATION (BAZETT): 417 MS
EKG R AXIS: -15 DEGREES
EKG R AXIS: -8 DEGREES
EKG T AXIS: 96 DEGREES
EKG T AXIS: 99 DEGREES
EKG VENTRICULAR RATE: 75 BPM
EKG VENTRICULAR RATE: 78 BPM
EPITHELIAL CELLS, UA: ABNORMAL /HPF
ESTIMATED AVERAGE GLUCOSE: 386.7 MG/DL
GFR AFRICAN AMERICAN: >60
GFR AFRICAN AMERICAN: >60
GFR NON-AFRICAN AMERICAN: >60
GFR NON-AFRICAN AMERICAN: >60
GLOBULIN: 3.4 G/DL
GLUCOSE BLD-MCNC: 252 MG/DL (ref 70–99)
GLUCOSE BLD-MCNC: 294 MG/DL (ref 70–99)
GLUCOSE BLD-MCNC: 379 MG/DL (ref 70–99)
GLUCOSE BLD-MCNC: 467 MG/DL (ref 70–99)
GLUCOSE BLD-MCNC: 486 MG/DL (ref 70–99)
GLUCOSE BLD-MCNC: 576 MG/DL (ref 70–99)
GLUCOSE BLD-MCNC: 630 MG/DL (ref 70–99)
GLUCOSE URINE: >=1000 MG/DL
HBA1C MFR BLD: 15.1 %
HCO3 VENOUS: 27 MMOL/L (ref 23–29)
HCT VFR BLD CALC: 40.2 % (ref 40.5–52.5)
HEMOGLOBIN: 13.2 G/DL (ref 13.5–17.5)
KETONES, URINE: NEGATIVE MG/DL
LEUKOCYTE ESTERASE, URINE: NEGATIVE
LIPASE: 39 U/L (ref 13–60)
MAGNESIUM: 1.8 MG/DL (ref 1.8–2.4)
MCH RBC QN AUTO: 28.1 PG (ref 26–34)
MCHC RBC AUTO-ENTMCNC: 32.8 G/DL (ref 31–36)
MCV RBC AUTO: 85.7 FL (ref 80–100)
METHEMOGLOBIN VENOUS: 0.7 %
MICROSCOPIC EXAMINATION: YES
NITRITE, URINE: NEGATIVE
O2 CONTENT, VEN: 17 ML/DL
O2 SAT, VEN: 88 %
O2 THERAPY: ABNORMAL
PCO2, VEN: 56.8 MMHG (ref 40–50)
PDW BLD-RTO: 17.2 % (ref 12.4–15.4)
PERFORMED ON: ABNORMAL
PH UA: 5.5 (ref 5–8)
PH VENOUS: 7.3 (ref 7.35–7.45)
PHOSPHORUS: 2.9 MG/DL (ref 2.5–4.9)
PLATELET # BLD: 221 K/UL (ref 135–450)
PMV BLD AUTO: 7.5 FL (ref 5–10.5)
PO2, VEN: 60 MMHG
POTASSIUM REFLEX MAGNESIUM: 4.2 MMOL/L (ref 3.5–5.1)
POTASSIUM SERPL-SCNC: 4.3 MMOL/L (ref 3.5–5.1)
PROTEIN UA: NEGATIVE MG/DL
RBC # BLD: 4.69 M/UL (ref 4.2–5.9)
RBC UA: ABNORMAL /HPF (ref 0–2)
SODIUM BLD-SCNC: 127 MMOL/L (ref 136–145)
SODIUM BLD-SCNC: 131 MMOL/L (ref 136–145)
SPECIFIC GRAVITY UA: >1.03 (ref 1–1.03)
TCO2 CALC VENOUS: 29 MMOL/L
TOTAL PROTEIN: 6.7 G/DL (ref 6.4–8.2)
TROPONIN: 0.19 NG/ML
TROPONIN: 0.21 NG/ML
TSH REFLEX: 0.45 UIU/ML (ref 0.27–4.2)
URINE REFLEX TO CULTURE: YES
URINE TYPE: ABNORMAL
UROBILINOGEN, URINE: 0.2 E.U./DL
WBC # BLD: 8 K/UL (ref 4–11)
WBC UA: ABNORMAL /HPF (ref 0–5)

## 2020-01-20 PROCEDURE — 83735 ASSAY OF MAGNESIUM: CPT

## 2020-01-20 PROCEDURE — 84443 ASSAY THYROID STIM HORMONE: CPT

## 2020-01-20 PROCEDURE — 80053 COMPREHEN METABOLIC PANEL: CPT

## 2020-01-20 PROCEDURE — 6370000000 HC RX 637 (ALT 250 FOR IP): Performed by: FAMILY MEDICINE

## 2020-01-20 PROCEDURE — 94760 N-INVAS EAR/PLS OXIMETRY 1: CPT

## 2020-01-20 PROCEDURE — 87040 BLOOD CULTURE FOR BACTERIA: CPT

## 2020-01-20 PROCEDURE — 93010 ELECTROCARDIOGRAM REPORT: CPT | Performed by: INTERNAL MEDICINE

## 2020-01-20 PROCEDURE — 92610 EVALUATE SWALLOWING FUNCTION: CPT

## 2020-01-20 PROCEDURE — 2060000000 HC ICU INTERMEDIATE R&B

## 2020-01-20 PROCEDURE — 6360000002 HC RX W HCPCS: Performed by: FAMILY MEDICINE

## 2020-01-20 PROCEDURE — 82803 BLOOD GASES ANY COMBINATION: CPT

## 2020-01-20 PROCEDURE — 71046 X-RAY EXAM CHEST 2 VIEWS: CPT

## 2020-01-20 PROCEDURE — 6360000002 HC RX W HCPCS: Performed by: EMERGENCY MEDICINE

## 2020-01-20 PROCEDURE — 2580000003 HC RX 258: Performed by: EMERGENCY MEDICINE

## 2020-01-20 PROCEDURE — 6370000000 HC RX 637 (ALT 250 FOR IP): Performed by: INTERNAL MEDICINE

## 2020-01-20 PROCEDURE — 2580000003 HC RX 258: Performed by: INTERNAL MEDICINE

## 2020-01-20 PROCEDURE — 85027 COMPLETE CBC AUTOMATED: CPT

## 2020-01-20 PROCEDURE — 99291 CRITICAL CARE FIRST HOUR: CPT

## 2020-01-20 PROCEDURE — 83036 HEMOGLOBIN GLYCOSYLATED A1C: CPT

## 2020-01-20 PROCEDURE — 87086 URINE CULTURE/COLONY COUNT: CPT

## 2020-01-20 PROCEDURE — 99255 IP/OBS CONSLTJ NEW/EST HI 80: CPT | Performed by: INTERNAL MEDICINE

## 2020-01-20 PROCEDURE — 36415 COLL VENOUS BLD VENIPUNCTURE: CPT

## 2020-01-20 PROCEDURE — 6370000000 HC RX 637 (ALT 250 FOR IP): Performed by: EMERGENCY MEDICINE

## 2020-01-20 PROCEDURE — 83690 ASSAY OF LIPASE: CPT

## 2020-01-20 PROCEDURE — 96374 THER/PROPH/DIAG INJ IV PUSH: CPT

## 2020-01-20 PROCEDURE — 85730 THROMBOPLASTIN TIME PARTIAL: CPT

## 2020-01-20 PROCEDURE — 84100 ASSAY OF PHOSPHORUS: CPT

## 2020-01-20 PROCEDURE — 93005 ELECTROCARDIOGRAM TRACING: CPT | Performed by: EMERGENCY MEDICINE

## 2020-01-20 PROCEDURE — 2580000003 HC RX 258: Performed by: FAMILY MEDICINE

## 2020-01-20 PROCEDURE — 81001 URINALYSIS AUTO W/SCOPE: CPT

## 2020-01-20 PROCEDURE — 84484 ASSAY OF TROPONIN QUANT: CPT

## 2020-01-20 RX ORDER — FUROSEMIDE 40 MG/1
40 TABLET ORAL DAILY
Status: DISCONTINUED | OUTPATIENT
Start: 2020-01-20 | End: 2020-01-20

## 2020-01-20 RX ORDER — HEPARIN SODIUM 1000 [USP'U]/ML
2000 INJECTION, SOLUTION INTRAVENOUS; SUBCUTANEOUS ONCE
Status: COMPLETED | OUTPATIENT
Start: 2020-01-20 | End: 2020-01-20

## 2020-01-20 RX ORDER — 0.9 % SODIUM CHLORIDE 0.9 %
1000 INTRAVENOUS SOLUTION INTRAVENOUS ONCE
Status: COMPLETED | OUTPATIENT
Start: 2020-01-20 | End: 2020-01-20

## 2020-01-20 RX ORDER — MAGNESIUM SULFATE 1 G/100ML
1 INJECTION INTRAVENOUS PRN
Status: DISCONTINUED | OUTPATIENT
Start: 2020-01-20 | End: 2020-01-24 | Stop reason: HOSPADM

## 2020-01-20 RX ORDER — DEXTROSE MONOHYDRATE 25 G/50ML
12.5 INJECTION, SOLUTION INTRAVENOUS PRN
Status: DISCONTINUED | OUTPATIENT
Start: 2020-01-20 | End: 2020-01-24 | Stop reason: HOSPADM

## 2020-01-20 RX ORDER — ASPIRIN 325 MG
325 TABLET ORAL ONCE
Status: COMPLETED | OUTPATIENT
Start: 2020-01-20 | End: 2020-01-20

## 2020-01-20 RX ORDER — HEPARIN SODIUM 1000 [USP'U]/ML
4000 INJECTION, SOLUTION INTRAVENOUS; SUBCUTANEOUS ONCE
Status: COMPLETED | OUTPATIENT
Start: 2020-01-20 | End: 2020-01-20

## 2020-01-20 RX ORDER — NITROGLYCERIN 0.4 MG/1
0.4 TABLET SUBLINGUAL EVERY 5 MIN PRN
Status: DISCONTINUED | OUTPATIENT
Start: 2020-01-20 | End: 2020-01-24 | Stop reason: HOSPADM

## 2020-01-20 RX ORDER — INSULIN GLARGINE 100 [IU]/ML
25 INJECTION, SOLUTION SUBCUTANEOUS DAILY
Status: DISCONTINUED | OUTPATIENT
Start: 2020-01-20 | End: 2020-01-20

## 2020-01-20 RX ORDER — AZATHIOPRINE 50 MG/1
50 TABLET ORAL 2 TIMES DAILY
Status: ON HOLD | COMMUNITY
End: 2022-03-20 | Stop reason: ALTCHOICE

## 2020-01-20 RX ORDER — INSULIN GLARGINE 100 [IU]/ML
30 INJECTION, SOLUTION SUBCUTANEOUS DAILY
Status: DISCONTINUED | OUTPATIENT
Start: 2020-01-21 | End: 2020-01-23

## 2020-01-20 RX ORDER — ATORVASTATIN CALCIUM 40 MG/1
40 TABLET, FILM COATED ORAL NIGHTLY
Status: DISCONTINUED | OUTPATIENT
Start: 2020-01-20 | End: 2020-01-24 | Stop reason: HOSPADM

## 2020-01-20 RX ORDER — CARVEDILOL 3.12 MG/1
3.12 TABLET ORAL 2 TIMES DAILY WITH MEALS
Status: DISCONTINUED | OUTPATIENT
Start: 2020-01-20 | End: 2020-01-20

## 2020-01-20 RX ORDER — ONDANSETRON 2 MG/ML
4 INJECTION INTRAMUSCULAR; INTRAVENOUS EVERY 6 HOURS PRN
Status: DISCONTINUED | OUTPATIENT
Start: 2020-01-20 | End: 2020-01-24 | Stop reason: HOSPADM

## 2020-01-20 RX ORDER — SPIRONOLACTONE 25 MG/1
25 TABLET ORAL DAILY
Status: ON HOLD | COMMUNITY
End: 2020-01-24 | Stop reason: HOSPADM

## 2020-01-20 RX ORDER — SODIUM CHLORIDE 0.9 % (FLUSH) 0.9 %
10 SYRINGE (ML) INJECTION EVERY 12 HOURS SCHEDULED
Status: DISCONTINUED | OUTPATIENT
Start: 2020-01-20 | End: 2020-01-24 | Stop reason: HOSPADM

## 2020-01-20 RX ORDER — HEPARIN SODIUM 10000 [USP'U]/100ML
12.4 INJECTION, SOLUTION INTRAVENOUS CONTINUOUS
Status: DISCONTINUED | OUTPATIENT
Start: 2020-01-20 | End: 2020-01-20

## 2020-01-20 RX ORDER — PREGABALIN 50 MG/1
50 CAPSULE ORAL 2 TIMES DAILY
Status: DISCONTINUED | OUTPATIENT
Start: 2020-01-20 | End: 2020-01-24 | Stop reason: HOSPADM

## 2020-01-20 RX ORDER — NICOTINE POLACRILEX 4 MG
15 LOZENGE BUCCAL PRN
Status: DISCONTINUED | OUTPATIENT
Start: 2020-01-20 | End: 2020-01-24 | Stop reason: HOSPADM

## 2020-01-20 RX ORDER — POTASSIUM CHLORIDE 20 MEQ/1
40 TABLET, EXTENDED RELEASE ORAL PRN
Status: DISCONTINUED | OUTPATIENT
Start: 2020-01-20 | End: 2020-01-24 | Stop reason: HOSPADM

## 2020-01-20 RX ORDER — POTASSIUM CHLORIDE 7.45 MG/ML
10 INJECTION INTRAVENOUS PRN
Status: DISCONTINUED | OUTPATIENT
Start: 2020-01-20 | End: 2020-01-24 | Stop reason: HOSPADM

## 2020-01-20 RX ORDER — PANTOPRAZOLE SODIUM 40 MG/1
40 TABLET, DELAYED RELEASE ORAL DAILY
Status: DISCONTINUED | OUTPATIENT
Start: 2020-01-20 | End: 2020-01-24 | Stop reason: HOSPADM

## 2020-01-20 RX ORDER — SODIUM CHLORIDE 0.9 % (FLUSH) 0.9 %
10 SYRINGE (ML) INJECTION PRN
Status: DISCONTINUED | OUTPATIENT
Start: 2020-01-20 | End: 2020-01-24 | Stop reason: HOSPADM

## 2020-01-20 RX ORDER — AZATHIOPRINE 50 MG/1
50 TABLET ORAL 2 TIMES DAILY
Status: DISCONTINUED | OUTPATIENT
Start: 2020-01-20 | End: 2020-01-24 | Stop reason: HOSPADM

## 2020-01-20 RX ORDER — ASPIRIN 81 MG/1
81 TABLET, CHEWABLE ORAL DAILY
Status: DISCONTINUED | OUTPATIENT
Start: 2020-01-21 | End: 2020-01-20

## 2020-01-20 RX ORDER — PANTOPRAZOLE SODIUM 40 MG/1
40 TABLET, DELAYED RELEASE ORAL DAILY
Status: ON HOLD | COMMUNITY
End: 2022-03-20 | Stop reason: ALTCHOICE

## 2020-01-20 RX ORDER — OXYCODONE HYDROCHLORIDE 10 MG/1
10 TABLET ORAL EVERY 4 HOURS PRN
Status: DISCONTINUED | OUTPATIENT
Start: 2020-01-20 | End: 2020-01-24 | Stop reason: HOSPADM

## 2020-01-20 RX ORDER — ASPIRIN 81 MG/1
81 TABLET, CHEWABLE ORAL DAILY
Status: DISCONTINUED | OUTPATIENT
Start: 2020-01-21 | End: 2020-01-24 | Stop reason: HOSPADM

## 2020-01-20 RX ORDER — PREDNISONE 10 MG/1
15 TABLET ORAL DAILY
COMMUNITY
Start: 2020-01-08 | End: 2020-02-18

## 2020-01-20 RX ORDER — OXYCODONE HYDROCHLORIDE 5 MG/1
5 TABLET ORAL EVERY 4 HOURS PRN
Status: DISCONTINUED | OUTPATIENT
Start: 2020-01-20 | End: 2020-01-24 | Stop reason: HOSPADM

## 2020-01-20 RX ORDER — OXYCODONE HYDROCHLORIDE 5 MG/1
5-10 TABLET ORAL EVERY 4 HOURS PRN
Status: ON HOLD | COMMUNITY
End: 2020-01-24 | Stop reason: SDUPTHER

## 2020-01-20 RX ORDER — SODIUM CHLORIDE 9 MG/ML
INJECTION, SOLUTION INTRAVENOUS CONTINUOUS
Status: ACTIVE | OUTPATIENT
Start: 2020-01-20 | End: 2020-01-21

## 2020-01-20 RX ORDER — DEXTROSE MONOHYDRATE 50 MG/ML
100 INJECTION, SOLUTION INTRAVENOUS PRN
Status: DISCONTINUED | OUTPATIENT
Start: 2020-01-20 | End: 2020-01-24 | Stop reason: HOSPADM

## 2020-01-20 RX ADMIN — INSULIN LISPRO 5 UNITS: 100 INJECTION, SOLUTION INTRAVENOUS; SUBCUTANEOUS at 22:28

## 2020-01-20 RX ADMIN — HEPARIN SODIUM 2000 UNITS: 1000 INJECTION INTRAVENOUS; SUBCUTANEOUS at 13:55

## 2020-01-20 RX ADMIN — PREDNISONE 15 MG: 10 TABLET ORAL at 12:52

## 2020-01-20 RX ADMIN — AZATHIOPRINE 50 MG: 50 TABLET ORAL at 14:00

## 2020-01-20 RX ADMIN — HEPARIN SODIUM 4000 UNITS: 1000 INJECTION INTRAVENOUS; SUBCUTANEOUS at 04:38

## 2020-01-20 RX ADMIN — SODIUM CHLORIDE, PRESERVATIVE FREE 10 ML: 5 INJECTION INTRAVENOUS at 22:32

## 2020-01-20 RX ADMIN — ATORVASTATIN CALCIUM 40 MG: 40 TABLET, FILM COATED ORAL at 22:29

## 2020-01-20 RX ADMIN — NYSTATIN 500000 UNITS: 100000 SUSPENSION ORAL at 18:29

## 2020-01-20 RX ADMIN — ASPIRIN 325 MG ORAL TABLET 325 MG: 325 PILL ORAL at 04:05

## 2020-01-20 RX ADMIN — SODIUM CHLORIDE 1000 ML: 9 INJECTION, SOLUTION INTRAVENOUS at 02:10

## 2020-01-20 RX ADMIN — SODIUM CHLORIDE 1000 ML: 9 INJECTION, SOLUTION INTRAVENOUS at 04:05

## 2020-01-20 RX ADMIN — INSULIN GLARGINE 25 UNITS: 100 INJECTION, SOLUTION SUBCUTANEOUS at 12:52

## 2020-01-20 RX ADMIN — PREGABALIN 50 MG: 50 CAPSULE ORAL at 22:31

## 2020-01-20 RX ADMIN — NYSTATIN 500000 UNITS: 100000 SUSPENSION ORAL at 22:29

## 2020-01-20 RX ADMIN — HEPARIN SODIUM 10 ML/HR: 10000 INJECTION, SOLUTION INTRAVENOUS at 04:39

## 2020-01-20 RX ADMIN — PANTOPRAZOLE SODIUM 40 MG: 40 TABLET, DELAYED RELEASE ORAL at 14:13

## 2020-01-20 RX ADMIN — SODIUM CHLORIDE: 9 INJECTION, SOLUTION INTRAVENOUS at 13:12

## 2020-01-20 RX ADMIN — FUROSEMIDE 40 MG: 40 TABLET ORAL at 11:23

## 2020-01-20 RX ADMIN — PREGABALIN 50 MG: 50 CAPSULE ORAL at 14:13

## 2020-01-20 RX ADMIN — INSULIN LISPRO 9 UNITS: 100 INJECTION, SOLUTION INTRAVENOUS; SUBCUTANEOUS at 18:29

## 2020-01-20 RX ADMIN — OXYCODONE HYDROCHLORIDE 10 MG: 10 TABLET ORAL at 12:52

## 2020-01-20 RX ADMIN — INSULIN LISPRO 18 UNITS: 100 INJECTION, SOLUTION INTRAVENOUS; SUBCUTANEOUS at 12:52

## 2020-01-20 RX ADMIN — AZATHIOPRINE 50 MG: 50 TABLET ORAL at 22:29

## 2020-01-20 RX ADMIN — INSULIN LISPRO 15 UNITS: 100 INJECTION, SOLUTION INTRAVENOUS; SUBCUTANEOUS at 18:29

## 2020-01-20 ASSESSMENT — ENCOUNTER SYMPTOMS
CONSTIPATION: 0
PHOTOPHOBIA: 0
SHORTNESS OF BREATH: 0
VOMITING: 0
COLOR CHANGE: 0
WHEEZING: 0
SORE THROAT: 0
BACK PAIN: 0
NAUSEA: 0
DIARRHEA: 0
CHEST TIGHTNESS: 0
ABDOMINAL PAIN: 0
COUGH: 0
RHINORRHEA: 0

## 2020-01-20 ASSESSMENT — PAIN SCALES - GENERAL
PAINLEVEL_OUTOF10: 0
PAINLEVEL_OUTOF10: 8
PAINLEVEL_OUTOF10: 0
PAINLEVEL_OUTOF10: 4
PAINLEVEL_OUTOF10: 0

## 2020-01-20 ASSESSMENT — PAIN DESCRIPTION - PROGRESSION: CLINICAL_PROGRESSION: NOT CHANGED

## 2020-01-20 ASSESSMENT — PAIN - FUNCTIONAL ASSESSMENT: PAIN_FUNCTIONAL_ASSESSMENT: ACTIVITIES ARE NOT PREVENTED

## 2020-01-20 ASSESSMENT — PAIN DESCRIPTION - ONSET: ONSET: SUDDEN

## 2020-01-20 ASSESSMENT — PAIN DESCRIPTION - ORIENTATION: ORIENTATION: MID

## 2020-01-20 ASSESSMENT — PAIN DESCRIPTION - PAIN TYPE: TYPE: ACUTE PAIN

## 2020-01-20 ASSESSMENT — PAIN DESCRIPTION - LOCATION: LOCATION: ABDOMEN

## 2020-01-20 ASSESSMENT — PAIN DESCRIPTION - DESCRIPTORS: DESCRIPTORS: DISCOMFORT

## 2020-01-20 ASSESSMENT — PAIN DESCRIPTION - FREQUENCY: FREQUENCY: INTERMITTENT

## 2020-01-20 NOTE — ED PROVIDER NOTES
Negative for color change and rash. Neurological: Negative for light-headedness and headaches. Psychiatric/Behavioral: Negative for confusion. The patient is not nervous/anxious. All other systems reviewed and are negative. Except as noted above the remainder of the review of systems was reviewed and negative. PAST MEDICAL HISTORY     Past Medical History:   Diagnosis Date    Arthritis     Depression     Diabetes mellitus ([address]     Difficult intubation     throat swelled    Dyslipidemia     Herniated disc, cervical     Hypertension     Osteoporosis     Peripheral neuropathy          SURGICALHISTORY       Past Surgical History:   Procedure Laterality Date    BREAST SURGERY Left 8/7/2019    LEFT BREAST MASS EXCISION; LEFT AXILLIARY LYMPH NODE EXCISION performed by Jaylene Sneed MD at [address] Left 10/10/2019    MUSCLE BIOPSY LOWER EXTREMITY LEFT performed by Jaylene Sneed MD at [address]       Previous Medications    ASPIRIN 81 MG TABLET    Take 81 mg by mouth daily    CARVEDILOL (COREG) 3.125 MG TABLET    Take 1 tablet by mouth 2 times daily (with meals)    DICLOFENAC SODIUM 1 % GEL    Apply 4 g topically 4 times daily as needed for Pain    FUROSEMIDE (LASIX) 40 MG TABLET    Take 1 tablet by mouth daily    IBUPROFEN (ADVIL;MOTRIN) 600 MG TABLET    Take 1 tablet by mouth every 6 hours as needed for Pain    MULTIPLE VITAMINS-MINERALS (THERAPEUTIC MULTIVITAMIN-MINERALS) TABLET    Take 1 tablet by mouth daily    POTASSIUM CHLORIDE (KLOR-CON M) 20 MEQ EXTENDED RELEASE TABLET    Take 1 tablet by mouth daily    PREGABALIN (LYRICA) 50 MG CAPSULE    Take 1 capsule by mouth 2 times daily for 30 days.     VITAMIN B-12 (CYANOCOBALAMIN) 1000 MCG TABLET    Take 1 tablet by mouth daily       ALLERGIES     Lisinopril and Bee venom    FAMILY HISTORY       Family History   Problem Relation Age of Onset    Heart Disease Mother     High Blood Pressure Mother     High Blood Pressure Father     High Blood Pressure Sister     Breast Cancer Brother     Heart Disease Brother     High Blood Pressure Brother           SOCIAL HISTORY       Social History     Socioeconomic History    Marital status: Single     Spouse name: None    Number of children: None    Years of education: None    Highest education level: None   Occupational History    None   Social Needs    Financial resource strain: None    Food insecurity:     Worry: None     Inability: None    Transportation needs:     Medical: None     Non-medical: None   Tobacco Use    Smoking status: Never Smoker    Smokeless tobacco: Never Used   Substance and Sexual Activity    Alcohol use: Yes     Comment: occ    Drug use: No     Types: Cocaine     Comment: 50 years ago- no longer    Sexual activity: Yes     Partners: Female     Comment:    Lifestyle    Physical activity:     Days per week: None     Minutes per session: None    Stress: None   Relationships    Social connections:     Talks on phone: None     Gets together: None     Attends Zoroastrianism service: None     Active member of club or organization: None     Attends meetings of clubs or organizations: None     Relationship status: None    Intimate partner violence:     Fear of current or ex partner: None     Emotionally abused: None     Physically abused: None     Forced sexual activity: None   Other Topics Concern    None   Social History Narrative    None       SCREENINGS      @FLOW([id number])@      PHYSICAL EXAM    (up to 7 for level 4, 8 or more for level 5)     ED Triage Vitals [01/20/20 0055]   BP Temp Temp Source Pulse Resp SpO2 Height Weight   120/69 98.3 °F (36.8 °C) Oral 81 19 96 % 5' 11\" (1.803 m) 263 lb 10.7 oz (119.6 kg)       Physical Exam  Vitals signs and nursing note reviewed. Constitutional:       General: He is not in acute distress. Appearance: He is well-developed.    HENT: Head: Normocephalic and atraumatic. Eyes:      Conjunctiva/sclera: Conjunctivae normal.   Neck:      Musculoskeletal: Normal range of motion. Trachea: No tracheal deviation. Cardiovascular:      Rate and Rhythm: Normal rate and regular rhythm. Pulmonary:      Effort: Pulmonary effort is normal.      Breath sounds: Normal breath sounds. No wheezing or rales. Abdominal:      General: There is no distension. Palpations: Abdomen is soft. Tenderness: There is no tenderness. Musculoskeletal: Normal range of motion. General: No deformity. Skin:     General: Skin is warm and dry. Neurological:      Mental Status: He is alert and oriented to person, place, and time. DIAGNOSTIC RESULTS     EKG: All EKG's are interpreted by the Emergency Department Physician who either signs or Co-signsthis chart in the absence of a cardiologist.    EKG shows a sinus rhythm with a ventricular rate of 78 bpm.  Patient's AR interval is prolonged and QTc interval is within normal limits. Patient has normal axis. There are no significant ST lobation depressions EKG is nondiagnostic for ACS. Patient has nonspecific QRS changes in the lateral leads but there are no significant ST changes compared to EKG from 10/3/2019. RADIOLOGY:   Non-plain filmimages such as CT, Ultrasound and MRI are read by the radiologist. Plain radiographic images are visualized and preliminarily interpreted by the emergency physician with the below findings:    Interpretation per the Radiologist below, if available at the time ofthis note:    XR CHEST STANDARD (2 VW)   Final Result   No acute cardiopulmonary findings on this exam limited by shallow inspiratory   effort.                ED BEDSIDE ULTRASOUND:   Performed by ED Physician - none    LABS:  Labs Reviewed   COMPREHENSIVE METABOLIC PANEL W/ REFLEX TO MG FOR LOW K - Abnormal; Notable for the following components:       Result Value    Sodium 127 (*)     Chloride 89 (*)     Glucose 630 (*)     Alb 3.3 (*)     Albumin/Globulin Ratio 1.0 (*)     Alkaline Phosphatase 141 (*)     AST 11 (*)     All other components within normal limits    Narrative:     José Wong tel. [phone],  Chemistry results called to and read back by Maverick Lacey rn,   03:30, by [facility]  Performed at:  [facility]   [address], [place] [facility]   Phone [phone]   BLOOD GAS, VENOUS - Abnormal; Notable for the following components:    pH, Gerry 7.297 (*)     pCO2, Gerry 56.8 (*)     All other components within normal limits    Narrative:     Performed at:  [address] [facility] [facility]   Phone [phone]   URINE RT REFLEX TO CULTURE - Abnormal; Notable for the following components:    Glucose, Ur >=1000 (*)     Blood, Urine TRACE (*)     All other components within normal limits    Narrative:     Performed at:  [address] [facility] [facility]   Phone [phone]   TROPONIN - Abnormal; Notable for the following components:    Troponin 0.21 (*)     All other components within normal limits    Narrative:     Performed at:  [address] [facility]   Phone [phone]   MICROSCOPIC URINALYSIS - Abnormal; Notable for the following components:    WBC, UA 6-10 (*)     All other components within normal limits    Narrative:     Performed at:  [place] [address], [place] [facility] [facility]   Phone [phone]   POCT GLUCOSE - Abnormal; Notable for the following components:    POC Glucose 576 (*)     All other components within normal limits    Narrative:     Performed at:  [address] [place]   Phone [phone]   URINE CULTURE   LIPASE    Narrative: limits. Patient did have pseudohyponatremia patient started on ACS dosing of heparin and will be admitted to the hospital for medical management evaluation. . Result discussed with the patient is amenable to treatment plan. CRITICAL CARE TIME   Total Critical Care time was 45 minutes, excluding separatelyreportable procedures. There was a high probability ofclinically significant/life threatening deterioration in the patient's condition which required my urgent intervention. CONSULTS:  None    PROCEDURES:  Unless otherwise noted below, none     Procedures    FINAL IMPRESSION      1. NSTEMI (non-ST elevated myocardial infarction) ([facility] [address] [facility]    2.  Hyperglycemia          DISPOSITION/PLAN   DISPOSITION Admitted 01/20/2020 05:15:05 AM      PATIENT REFERREDTO:  Dylan Do MD            DISCHARGEMEDICATIONS:  New Prescriptions    No medications on file          (Please note that portions of this note were completed with a voice recognition program.  Efforts were made to edit the dictations but occasionally words are mis-transcribed.)    Caitlyn Dawson MD (electronically signed)  Attending Emergency Physician          Caitlyn Dawson MD  01/20/20 0446

## 2020-01-20 NOTE — CONSULTS
Clinical Pharmacy Note  Heparin Dosing Consult    Veronica Morris is a 62 y.o. male ordered heparin per low dose nomogram by Dr. Monique Newsome. Lab Results   Component Value Date    APTT 28.7 06/05/2019     Lab Results   Component Value Date    HGB 10.5 10/11/2019    HCT 31.9 10/11/2019     10/11/2019    INR 1.04 10/10/2019       Ht Readings from Last 1 Encounters:   01/20/20 5' 11\" (1.803 m)        Wt Readings from Last 1 Encounters:   01/20/20 263 lb 10.7 oz (119.6 kg)     Dosing weight: 120 kg    Assessment/Plan:  Initial bolus: 4000 units  Initial infusion rate: 10 mL/hr  Next aPTT: 1100 1/20/20    Pharmacy will continue to monitor adjust heparin based on aPTT results using nomogram below:     LOW DOSE HEPARIN PROTOCOL (ACS/STEMI/A FIB)     Initial Bolus: 60 units/kg Max Bolus: 4,000 units       Initial Rate: 12 units/kg/hr Max Initial Rate: 1,000 units/hr     aPTT < 36   Heparin 60 units/kg bolus Increase infusion by 4 units/kg/hr       (maximum 4,000 units)   aPTT 37-48   Heparin 30 units/kg bolus Increase infusion by 2 units/kg/hr       (maximum 2,000 units)   aPTT 49-76   No bolus   No change   aPTT 77-85   No bolus   Decrease infusion by 2 units/kg/hr   aPTT 86-94   Hold heparin for 1 hour Decrease infusion by 3 units/kg/hr   aPTT     Hold heparin for 1 hour Decrease infusion by 4 units/kg/hr   aPTT > 103   Hold heparin for 1 hour Decrease infusion by 6 units/kg/hr    Obtain aPTT 6 hours after initial bolus and 6 hours after any dose change until two consecutive therapeutic aPTTs are achieved - then daily.     Marcella Dalal, PharmD

## 2020-01-20 NOTE — H&P
Hospital Medicine History and Physical    1/20/2020    Date of Admission: 1/20/2020    Date of Service: Pt seen/examined on 1/20/2020 and admitted to inpatient. Chief complaint:  Chief Complaint   Patient presents with    Blood Sugar Problem     running high since last friday was seen at OSH and that gave meds to lower but has not been rechecked       History of Presenting Illness: This is a pleasant 62 y.o. male who presented to the ER with complaints of hyperglycemia, increased malaise and fatigue over the past several weeks, polydipsia and polyuria the past couple weeks. He has a complex medical history of Sjogren's syndrome with inflammatory myositis and has been on imuron and steroids per his rheumatologist, has a history of diabetes mellitus in the past which resolved after he had weight loss, congestive heart failure with preserved EF, mitral valve insufficiency, aortic dilatation secondary to history of syphilis, hypertension, obesity. He also had some chest tightness waxing and waning over the past several days. He denies fever, chills, nausea, vomiting, diarrhea, dysuria, abdominal pain, shortness of breath. Past Medical History:      Diagnosis Date    Arthritis     Depression     Diabetes mellitus ([address]     Difficult intubation     throat swelled    Dyslipidemia     Herniated disc, cervical     Hypertension     Osteoporosis     Peripheral neuropathy        Past Surgical History:      Procedure Laterality Date    BREAST SURGERY Left 8/7/2019    LEFT BREAST MASS EXCISION; LEFT AXILLIARY LYMPH NODE EXCISION performed by Shlomo Conklin MD at [address] Left 10/10/2019    MUSCLE BIOPSY LOWER EXTREMITY LEFT performed by Shlomo Conklin MD at [address]       Medications (prior to admission):  Prior to Admission medications    Medication Sig Start Date End Date Taking?  Authorizing Provider   spironolactone (ALDACTONE) 25 MG tablet Take 25 mg by mouth daily   Yes Historical Provider, MD   azaTHIOprine (IMURAN) 50 MG tablet Take 50 mg by mouth 2 times daily   Yes Historical Provider, MD   nystatin (MYCOSTATIN) 376456 UNIT/ML suspension Take 500,000 Units by mouth 4 times daily 1/17/20 1/24/20 Yes Historical Provider, MD   oxyCODONE (ROXICODONE) 5 MG immediate release tablet Take 5-10 mg by mouth every 4 hours as needed for Pain. Yes Historical Provider, MD   pantoprazole (PROTONIX) 40 MG tablet Take 40 mg by mouth daily   Yes Historical Provider, MD   predniSONE (DELTASONE) 10 MG tablet Take 15 mg by mouth daily 1/8/20 2/18/20 Yes Historical Provider, MD   furosemide (LASIX) 40 MG tablet Take 1 tablet by mouth daily  Patient taking differently: Take 20 mg by mouth daily  10/11/19   Ashlyn Riley MD   pregabalin (LYRICA) 50 MG capsule Take 1 capsule by mouth 2 times daily for 30 days. 10/10/19 11/9/19  Ashlyn Riley MD   vitamin B-12 (CYANOCOBALAMIN) 1000 MCG tablet Take 1 tablet by mouth daily 10/10/19 11/9/19  Ashlyn Riley MD   aspirin 81 MG tablet Take 81 mg by mouth daily    Historical Provider, MD   potassium chloride (KLOR-CON M) 20 MEQ extended release tablet Take 1 tablet by mouth daily 6/7/19   Homero Bridges MD       Allergy(ies):  Lisinopril and Bee venom    Social History:  TOBACCO:  reports that he has never smoked. He has never used smokeless tobacco.  ETOH:  reports current alcohol use. Family History:      Problem Relation Age of Onset    Heart Disease Mother     High Blood Pressure Mother     High Blood Pressure Father     High Blood Pressure Sister     Breast Cancer Brother     Heart Disease Brother     High Blood Pressure Brother        Review of Systems:  All other systems are reviewed, positive and negative are noted in HPI.     Vitals and physical examination:  BP 96/64   Pulse 77   Temp 98 °F (36.7 °C) (Oral)   Resp 18   Ht 5' 11\" (1.803 m)   Wt 266 lb 12.1 oz (121 kg)   SpO2 95%   BMI 37.21 *      Plan:  NSTEMI  -Had normal troponins back in December 2019  -Continue heparin drip, trend troponins  -Consult cardiology  -Continue aspirin, statin      Uncontrolled diabetes mellitus with hyperglycemia  -A1c of 15.1, start Lantus 25 units daily, continue sliding scale insulin high, start light IV fluids for 24 hours      Sjogren's syndrome with inflammatory myositis  -Follows up with rheumatology outpatient  -Continue Imuran 50mg BID and prednisone 15mg QD      Essential hypertension  -Blood pressure low, hold home meds, continue to monitor        Congestive heart failure with preserved EF  -Hold Coreg, Lasix, Aldactone secondary to hypotension  -Appears compensated      Diabetic peripheral neuropathy  -Continue Lyrica      Mixed hyperlipidemia  -Continue statin      Obesity  Mitral valve insufficiency  Aortic dilatation      Activities: Up with assist  Prophylaxis: Heparin  Code status: Full    ==========================================================          Thank you Beryl Paredes MD for the opportunity to be involved in this patient's care.  If you have any questions or concerns please feel free to contact me at [phone].  -----------------------------  Lilia Rich MD  [facility] Center[facility]

## 2020-01-20 NOTE — PROGRESS NOTES
Clinical Pharmacy Note  Heparin Dosing       Lab Results   Component Value Date    APTT 46.2 01/20/2020     Lab Results   Component Value Date    HGB 13.2 01/20/2020    HCT 40.2 01/20/2020     01/20/2020    INR 1.04 10/10/2019       Current Infusion Rate: 10 mL/hr    Plan:  Bolus: 2000 units  Rate: increase to 12.4 mL/hr  Next aPTT: 2000  1-20-20    Pharmacy will continue to monitor and adjust based on aPTT results.     [address] [facility], [facility], PRS 1/20/2020  1:41 PM

## 2020-01-20 NOTE — PROGRESS NOTES
effective compensatory strategies, and safe eating environment. Impression  Dysphagia Diagnosis: Suspected needs further assessment;Mild oral stage dysphagia;Mild to moderate pharyngeal stage dysphagia  · Accepted and tolerated limited evaluation at bedside. Patient awakened for assessment and only agreeable to thin trials. · Mild oral stage dysphagia characterized by concern for reduced bolus control and increased risk for premature bolus loss to the pharynx. · Mild to moderate pharyngeal stage dysphagia characterized by delayed swallow and decreased laryngeal elevation without overt signs/symptoms of penetration when using established compensatory swallow strategies as recommended in the past: small sips, no straws, etc.  Dysphagia Outcome Severity Scale: Level 5: Mild dysphagia- Distant supervision. May need one diet consistency restricted     Treatment Plan  Requires SLP Intervention: Yes  Duration/Frequency of Treatment: ST to tx 3-5 times per week for dysphagia during acute admission  D/C Recommendations: To be determined    Recommended Diet and Intervention  Diet Solids Recommendation: Regular  Liquid Consistency Recommendation: Thin  Recommended Form of Meds: Meds in puree  Compensatory Swallowing Strategies: Small bites/sips;Upright as possible for all oral intake;Remain upright for 30-45 minutes after meals;Eat/Feed slowly; No straws; Alternate solids and liquids;Effortful swallow;Swallow 2 times per bite/sip    Therapeutic Interventions: Diet tolerance monitoring; Therapeutic PO trials with SLP;Patient/Family education    Treatment/Goals  Dysphagia Goals: The patient will tolerate repeat BSE when able. ;The patient/caregiver will demonstrate understanding of compensatory strategies for improved swallowing safety. ;The patient will tolerate recommended diet without observed clinical signs of aspiration    General  Chart Reviewed: Yes  Subjective: Accepted and tolerate limited evaluation at

## 2020-01-20 NOTE — ED NOTES
MD Edith Boyle at bedside for [address] placed IV.       Snow Salas RN  01/20/20 0600

## 2020-01-20 NOTE — ED NOTES
Bed: B-09  Expected date:   Expected time:   Means of arrival:   Comments:  marlena Davidson RN  01/20/20 9384

## 2020-01-21 LAB
GLUCOSE BLD-MCNC: 247 MG/DL (ref 70–99)
GLUCOSE BLD-MCNC: 247 MG/DL (ref 70–99)
GLUCOSE BLD-MCNC: 318 MG/DL (ref 70–99)
GLUCOSE BLD-MCNC: 416 MG/DL (ref 70–99)
PERFORMED ON: ABNORMAL
RAPID INFLUENZA  B AGN: NEGATIVE
RAPID INFLUENZA A AGN: NEGATIVE
URINE CULTURE, ROUTINE: NORMAL

## 2020-01-21 PROCEDURE — 99233 SBSQ HOSP IP/OBS HIGH 50: CPT | Performed by: INTERNAL MEDICINE

## 2020-01-21 PROCEDURE — 87804 INFLUENZA ASSAY W/OPTIC: CPT

## 2020-01-21 PROCEDURE — 92526 ORAL FUNCTION THERAPY: CPT

## 2020-01-21 PROCEDURE — 2060000000 HC ICU INTERMEDIATE R&B

## 2020-01-21 PROCEDURE — 97129 THER IVNTJ 1ST 15 MIN: CPT

## 2020-01-21 PROCEDURE — 6370000000 HC RX 637 (ALT 250 FOR IP): Performed by: INTERNAL MEDICINE

## 2020-01-21 PROCEDURE — 6360000002 HC RX W HCPCS: Performed by: FAMILY MEDICINE

## 2020-01-21 PROCEDURE — 6370000000 HC RX 637 (ALT 250 FOR IP): Performed by: FAMILY MEDICINE

## 2020-01-21 PROCEDURE — 94760 N-INVAS EAR/PLS OXIMETRY 1: CPT

## 2020-01-21 RX ORDER — CLOPIDOGREL BISULFATE 75 MG/1
75 TABLET ORAL DAILY
Status: DISCONTINUED | OUTPATIENT
Start: 2020-01-21 | End: 2020-01-24 | Stop reason: HOSPADM

## 2020-01-21 RX ADMIN — INSULIN LISPRO 22 UNITS: 100 INJECTION, SOLUTION INTRAVENOUS; SUBCUTANEOUS at 12:40

## 2020-01-21 RX ADMIN — OXYCODONE HYDROCHLORIDE 10 MG: 10 TABLET ORAL at 18:14

## 2020-01-21 RX ADMIN — OXYCODONE HYDROCHLORIDE 10 MG: 10 TABLET ORAL at 09:13

## 2020-01-21 RX ADMIN — NYSTATIN 500000 UNITS: 100000 SUSPENSION ORAL at 12:40

## 2020-01-21 RX ADMIN — NYSTATIN 500000 UNITS: 100000 SUSPENSION ORAL at 22:34

## 2020-01-21 RX ADMIN — ATORVASTATIN CALCIUM 40 MG: 40 TABLET, FILM COATED ORAL at 23:37

## 2020-01-21 RX ADMIN — INSULIN LISPRO 12 UNITS: 100 INJECTION, SOLUTION INTRAVENOUS; SUBCUTANEOUS at 18:05

## 2020-01-21 RX ADMIN — INSULIN LISPRO 3 UNITS: 100 INJECTION, SOLUTION INTRAVENOUS; SUBCUTANEOUS at 22:34

## 2020-01-21 RX ADMIN — ASPIRIN 81 MG 81 MG: 81 TABLET ORAL at 08:47

## 2020-01-21 RX ADMIN — PANTOPRAZOLE SODIUM 40 MG: 40 TABLET, DELAYED RELEASE ORAL at 08:47

## 2020-01-21 RX ADMIN — INSULIN LISPRO 6 UNITS: 100 INJECTION, SOLUTION INTRAVENOUS; SUBCUTANEOUS at 08:48

## 2020-01-21 RX ADMIN — NYSTATIN 500000 UNITS: 100000 SUSPENSION ORAL at 08:47

## 2020-01-21 RX ADMIN — PREGABALIN 50 MG: 50 CAPSULE ORAL at 08:47

## 2020-01-21 RX ADMIN — AZATHIOPRINE 50 MG: 50 TABLET ORAL at 23:47

## 2020-01-21 RX ADMIN — CLOPIDOGREL BISULFATE 75 MG: 75 TABLET ORAL at 10:43

## 2020-01-21 RX ADMIN — NYSTATIN 500000 UNITS: 100000 SUSPENSION ORAL at 18:14

## 2020-01-21 RX ADMIN — PREGABALIN 50 MG: 50 CAPSULE ORAL at 22:33

## 2020-01-21 RX ADMIN — INSULIN GLARGINE 30 UNITS: 100 INJECTION, SOLUTION SUBCUTANEOUS at 08:47

## 2020-01-21 RX ADMIN — OXYCODONE HYDROCHLORIDE 10 MG: 10 TABLET ORAL at 22:41

## 2020-01-21 RX ADMIN — AZATHIOPRINE 50 MG: 50 TABLET ORAL at 08:47

## 2020-01-21 RX ADMIN — PREDNISONE 15 MG: 10 TABLET ORAL at 08:46

## 2020-01-21 ASSESSMENT — PAIN DESCRIPTION - ONSET
ONSET: ON-GOING
ONSET: ON-GOING
ONSET: SUDDEN
ONSET: ON-GOING
ONSET: PROGRESSIVE
ONSET: SUDDEN

## 2020-01-21 ASSESSMENT — PAIN SCALES - GENERAL
PAINLEVEL_OUTOF10: 6
PAINLEVEL_OUTOF10: 8
PAINLEVEL_OUTOF10: 7
PAINLEVEL_OUTOF10: 6
PAINLEVEL_OUTOF10: 8
PAINLEVEL_OUTOF10: 7
PAINLEVEL_OUTOF10: 10
PAINLEVEL_OUTOF10: 0
PAINLEVEL_OUTOF10: 8

## 2020-01-21 ASSESSMENT — PAIN DESCRIPTION - LOCATION
LOCATION: KNEE;SHOULDER;BACK
LOCATION: KNEE;BACK;SHOULDER
LOCATION: KNEE;SHOULDER;BACK
LOCATION: GENERALIZED
LOCATION: GENERALIZED

## 2020-01-21 ASSESSMENT — PAIN DESCRIPTION - PAIN TYPE
TYPE: CHRONIC PAIN

## 2020-01-21 ASSESSMENT — PAIN DESCRIPTION - FREQUENCY
FREQUENCY: CONTINUOUS
FREQUENCY: INTERMITTENT
FREQUENCY: CONTINUOUS

## 2020-01-21 ASSESSMENT — PAIN - FUNCTIONAL ASSESSMENT
PAIN_FUNCTIONAL_ASSESSMENT: ACTIVITIES ARE NOT PREVENTED

## 2020-01-21 ASSESSMENT — PAIN DESCRIPTION - DESCRIPTORS
DESCRIPTORS: ACHING
DESCRIPTORS: ACHING
DESCRIPTORS: SHARP
DESCRIPTORS: RADIATING;SHARP
DESCRIPTORS: SHARP
DESCRIPTORS: ACHING

## 2020-01-21 ASSESSMENT — PAIN DESCRIPTION - ORIENTATION
ORIENTATION: LOWER
ORIENTATION: LOWER;MID
ORIENTATION: OTHER (COMMENT)

## 2020-01-21 ASSESSMENT — PAIN DESCRIPTION - PROGRESSION
CLINICAL_PROGRESSION: NOT CHANGED
CLINICAL_PROGRESSION: GRADUALLY IMPROVING
CLINICAL_PROGRESSION: GRADUALLY IMPROVING
CLINICAL_PROGRESSION: NOT CHANGED
CLINICAL_PROGRESSION: GRADUALLY IMPROVING

## 2020-01-21 NOTE — CONSULTS
[address] Deepak Marques 16                                  CONSULTATION    PATIENT NAME: Marques Cleveland                      :        1960  MED REC NO:   [id number]                          ROOM:       5117  ACCOUNT NO:   [de-identified]                           ADMIT DATE: 2020  PROVIDER:     Shanda Otoole MD    CARDIOLOGY CONSULTATION    CONSULT DATE:  2020    HISTORY OF PRESENT ILLNESS:  This is a pleasant 68-year-old obese  -American male with a history of diabetes, hypertension,  hyperlipidemia, presented to the hospital with generalized weakness and  pain across his chest.  He said his sugar was running very high and in  fact, his sugar was in 600 range when he presented to the hospital.  He  denies any diaphoresis. He had no significant shortness of breath. According to the patient, he was not getting his medicine that is  prescribed to him. He had no fever, chills, or rigors. In the emergency room, his troponin was drawn which was elevated leading  to this cardiac consultation. REVIEW OF SYSTEMS:  Please see HPI. All other systems are reviewed and  they are negative. PAST MEDICAL HISTORY:  1. History of diabetes mellitus, uncontrolled at the present time. 2.  Previous history of troponin elevation and has admitted he had  troponin elevated all the way up to 0.81 in the past.  3.  Hyperlipidemia. 4.  Hypertension. 5.  Depression. SOCIAL HISTORY:  He denies any smoking or alcohol abuse. FAMILY HISTORY:  Strongly positive for coronary artery disease in the  family. SURGICAL HISTORY:  He had a left breast mass excision, hernia repair,  muscle biopsy. MEDICINES AND ALLERGIES:  Have been reviewed. PHYSICAL EXAMINATION:  VITAL SIGNS:  Pulse is 77 and regular, blood pressure 96/64,  respirations are 18, temperature is 98. CONSTITUTIONAL:  He is alert and oriented.   HEENT

## 2020-01-21 NOTE — PROGRESS NOTES
Glucose 416, notified Dr. Loyda Woodard, given order to give 22u humalog. This amount administered by Jackelin Alfredo RN and student nurse.

## 2020-01-21 NOTE — PROGRESS NOTES
bowl*    Positioning   Fully upright on side of bed    PO Trials:  · Thin Liquids: reduced bolus control; increased risk for premature bolus loss to the pharynx; delayed swallow and decreased laryngeal elevation without overt signs/symptoms of penetration when using established compensatory swallow strategies   · Regular food: reduced bolus control; increased risk for premature bolus loss to the pharynx; delayed swallow and decreased laryngeal elevation without overt signs/symptoms of penetration when using established compensatory swallow strategies     Dysphagia Tx:   PO trials: NO overt signs/symptoms of penetration/aspiration through meal  Comp strats: independent carryover even when distracted during meal    Goals: continue all  Dysphagia Goals: The patient will tolerate repeat BSE when able. , The patient/caregiver will demonstrate understanding of compensatory strategies for improved swallowing safety. , The patient will tolerate recommended diet without observed clinical signs of aspiration    Assessment:   Impressions:   Dysphagia Diagnosis: Mild oral stage dysphagia, Mild to moderate pharyngeal stage dysphagia   Safely tolerating current diet. NO overt signs/symptoms of penetration over 45 minute meal observation. Independent carryover of comp strats even when distracted.     Diet Recommendations:  continue regular  Continue thin  Recommended Form of Meds: Meds in puree    Strategies:   Compensatory Swallowing Strategies: Small bites/sips, Upright as possible for all oral intake, Remain upright for 30-45 minutes after meals, Eat/Feed slowly, No straws, Alternate solids and liquids, Effortful swallow, Swallow 2 times per bite/sip    Education:  Consulted and agree with results and recommendations: Patient, RN  Patient Education: completed on results/recs/plan  Patient Education Response: Needs reinforcement    Prognosis:   Good for dysphagia    Plan:     Continue Dysphagia Therapy: YES    Interventions:

## 2020-01-21 NOTE — PROGRESS NOTES
[facility]   Daily Progress Note      Admit Date:  1/20/2020    CC: \" I have high blood sugar    HISTORY OF PRESENT ILLNESS:  This is a pleasant 61-year-old obese  -American male with a history of diabetes, hypertension,  hyperlipidemia, presented to the hospital with generalized weakness and  pain across his chest.  He said his sugar was running very high and in  fact, his sugar was in 600 range when he presented to the hospital.  He  denies any diaphoresis. He had no significant shortness of breath. According to the patient, he was not getting his medicine that is  prescribed to him. He had no fever, chills, or rigors.     Subjective:  Pt with no acute overnight events. Denies chest pain, palpitations, and dyspnea  Blood sugars are slightly better controlled. Objective:   /68   Pulse 74   Temp 98.3 °F (36.8 °C) (Oral)   Resp 19   Ht 5' 11\" (1.803 m)   Wt 270 lb 15.1 oz (122.9 kg)   SpO2 96%   BMI 37.79 kg/m²       Intake/Output Summary (Last 24 hours) at 1/21/2020 0945  Last data filed at 1/21/2020 0935  Gross per 24 hour   Intake 2609 ml   Output 677 ml   Net 1932 ml     Wt Readings from Last 3 Encounters:   01/21/20 270 lb 15.1 oz (122.9 kg)   10/17/19 291 lb 9.6 oz (132.3 kg)   10/11/19 285 lb 4.4 oz (129.4 kg)     Telemetry:NSR    Physical Exam:  General:  NAD, Awake, alert and oriented X4  Skin:  Warm and dry  Neck:  Supple, no JVP appreciated, no bruit  Chest:  Clear to auscultation, no wheezes/rhonchi/rales  Cardiovascular:  Regular rate.  S1S2  Abdomen:  Soft, nontender, +bowel sounds  Extremities:  No LE edema    Cardiac Diagnosis:  diabetes, hypertension, hyperlipidemia and coronary artery disease    Medications:    clopidogrel  75 mg Oral Daily    aspirin  81 mg Oral Daily    sodium chloride flush  10 mL Intravenous 2 times per day    atorvastatin  40 mg Oral Nightly    insulin lispro  0-18 Units Subcutaneous TID     insulin lispro  0-9 Units is very likely due to diffuse CAD  -We will continue with medical therapy and work on aggressive risk factor modification  -We will add Plavix to his regimen  -We will like to add small dose of beta-blocker if his blood pressure stays above 003 systolic    2) HTN  -Stable    DM  -Improving  :  Okay to send patient home from cardiac standpoint.   I strongly encouraged him to have a sleep study patient outpatient    Electronically signed by Emelia Severance, MD on 1/21/2020 at 9:45 AM

## 2020-01-21 NOTE — PLAN OF CARE
Problem: Pain:  Goal: Pain level will decrease  Description  Pain level will decrease  1/21/2020 0137 by Nitin Pablo RN  Outcome: Ongoing  1/20/2020 1724 by Beny Smith RN  Outcome: Ongoing  Goal: Control of acute pain  Description  Control of acute pain  1/21/2020 0137 by Nitin Pablo RN  Outcome: Ongoing  1/20/2020 1724 by Beny Smith RN  Outcome: Ongoing  Goal: Control of chronic pain  Description  Control of chronic pain  1/21/2020 0137 by Nitin Pablo RN  Outcome: Ongoing  1/20/2020 1724 by Beny Smith RN  Outcome: Ongoing     Problem: Falls - Risk of:  Goal: Will remain free from falls  Description  Will remain free from falls  1/21/2020 0137 by Nitin Pablo RN  Outcome: Ongoing  1/20/2020 1724 by Beny Smith RN  Outcome: Ongoing  Goal: Absence of physical injury  Description  Absence of physical injury  1/21/2020 0137 by Nitin Pablo RN  Outcome: Ongoing  1/20/2020 1724 by Beny Smith RN  Outcome: Ongoing     Problem: Serum Glucose Level - Abnormal:  Goal: Ability to maintain appropriate glucose levels will improve  Description  Ability to maintain appropriate glucose levels will improve  1/21/2020 0137 by Nitin Pablo RN  Outcome: Ongoing  1/20/2020 1724 by Beny Smith RN  Outcome: Ongoing  Goal: Ability to maintain appropriate glucose levels has stabilized  Description  Ability to maintain appropriate glucose levels has stabilized  1/21/2020 0137 by Nitin Pablo RN  Outcome: Ongoing  1/20/2020 1724 by Beny Smith RN  Outcome: Ongoing

## 2020-01-21 NOTE — PROGRESS NOTES
Progress Note  Admit Date: 2020      PCP: Lila Woodard MD     CC: F/U for hyperglycemia, elevated troponins    SUBJECTIVE / Interval History:   No acute events overnight. Patient denies any chest pain, shortness of breath. Blood sugars have still been uncontrolled. Allergies  Lisinopril and Bee venom    Medications    Scheduled Meds:   clopidogrel  75 mg Oral Daily    aspirin  81 mg Oral Daily    sodium chloride flush  10 mL Intravenous 2 times per day    atorvastatin  40 mg Oral Nightly    insulin lispro  0-18 Units Subcutaneous TID WC    insulin lispro  0-9 Units Subcutaneous Nightly    azaTHIOprine  50 mg Oral BID    predniSONE  15 mg Oral Daily    pantoprazole  40 mg Oral Daily    pregabalin  50 mg Oral BID    nystatin  5 mL Oral 4x Daily    insulin glargine  30 Units Subcutaneous Daily     Continuous Infusions:   dextrose         PRN Meds:  sodium chloride flush, magnesium hydroxide, ondansetron, potassium chloride **OR** potassium alternative oral replacement **OR** potassium chloride, magnesium sulfate, nitroGLYCERIN, oxyCODONE **OR** oxyCODONE, glucose, dextrose, glucagon (rDNA), dextrose    Vitals    TEMPERATURE:  Current - Temp: 98.3 °F (36.8 °C); Max - Temp  Av °F (36.7 °C)  Min: 97.5 °F (36.4 °C)  Max: 98.3 °F (36.8 °C)  RESPIRATIONS RANGE: Resp  Av.8  Min: 18  Max: 20  PULSE RANGE: Pulse  Av.3  Min: 73  Max: 86  BLOOD PRESSURE RANGE:  Systolic (63JRK), QLX:34 , Min:88 , MOR:969   ; Diastolic (68FRL), WOL:80, Min:57, Max:71    PULSE OXIMETRY RANGE: SpO2  Av.8 %  Min: 94 %  Max: 96 %  24HR INTAKE/OUTPUT:      Intake/Output Summary (Last 24 hours) at 2020 1507  Last data filed at 2020 1210  Gross per 24 hour   Intake 2549 ml   Output 327 ml   Net 2222 ml       Exam:    Gen: No distress. Eyes: PERRL. No sclera icterus. No conjunctival injection. ENT: No discharge. Pharynx clear.  External appearance of ears and nose normal.  Neck: Trachea midline. No obvious mass. Resp: No accessory muscle use. No crackles. No wheezes. No rhonchi. No dullness on percussion. CV: Regular rate. Regular rhythm. No murmur or rub. No edema. GI: Non-tender. Non-distended. No hernia. Skin: Warm, dry, normal texture and turgor. No nodule on exposed extremities. Lymph: No cervical LAD. No supraclavicular LAD. M/S: No cyanosis. No clubbing. No joint deformity. Neuro: Moves all four extremities. CN 2-12 tested, no defect noted. Psych: Oriented x 3. No anxiety. Awake. Alert. Intact judgement and insight. Data    LABS  CBC:   Recent Labs     01/20/20  0815   WBC 8.0   HGB 13.2*   HCT 40.2*   MCV 85.7        BMP:   Recent Labs     01/20/20  0240 01/20/20  1312   * 131*   K 4.2 4.3   CL 89* 93*   CO2 23 24   PHOS  --  2.9   BUN 11 8   CREATININE 0.9 0.7*   GLUCOSE 630* 467*     POC GLUCOSE:    Recent Labs     01/20/20  1238 01/20/20  1718 01/20/20  2127 01/21/20  0821 01/21/20  1202   POCGLU 486* 252* 294* 247* 416*     LIVER PROFILE:   Recent Labs     01/20/20  0240   AST 11*   ALT 12   LIPASE 39.0   LABALBU 3.3*   BILITOT <0.2   ALKPHOS 141*     PT/INR: No results for input(s): PROTIME, INR in the last 72 hours. APTT:   Recent Labs     01/20/20  1312   APTT 46.2*     UA:  Recent Labs     01/20/20  0236   COLORU YELLOW   PHUR 5.5   WBCUA 6-10*   RBCUA 0-2   CLARITYU Clear   SPECGRAV >1.030   LEUKOCYTESUR Negative   UROBILINOGEN 0.2   BILIRUBINUR Negative   BLOODU TRACE*   GLUCOSEU >=1000*   KETUA Negative     Microbiology:  Wound Culture: No results for input(s): WNDABS, ORG in the last 72 hours. Invalid input(s):  LABGRAM  Nasal Culture: No results for input(s): ORG, MRSAPCR in the last 72 hours. Blood Culture: No results for input(s): BC, BLOODCULT2 in the last 72 hours. Fungal Culture:   No results for input(s): FUNGSM in the last 72 hours. No results for input(s): FUNCXBLD in the last 72 hours.   CSF Culture:  No results for input(s): COLORCSF, APPEARCSF, CFTUBE, CLOTCSF, WBCCSF, RBCCSF, NEUTCSF, NUMCELLSCSF, LYMPHSCSF, MONOCSF, GLUCCSF, VOLCSF in the last 72 hours. Respiratory Culture:  No results for input(s): Riaz Teri in the last 72 hours. AFB:No results for input(s): AFBSMEAR in the last 72 hours. Urine Culture  Recent Labs     01/20/20  0322   LABURIN <50,000 CFU/ml mixed skin/urogenital bobby. No further workup       RADIOLOGY:    XR CHEST STANDARD (2 VW)   Final Result   No acute cardiopulmonary findings on this exam limited by shallow inspiratory   effort. CONSULTS:    IP CONSULT TO CARDIOLOGY    ASSESSMENT AND PLAN:      Active Problems:    NSTEMI (non-ST elevated myocardial infarction) ([facility] [address]    Diabetes mellitus ([facility] [address]    Hypertension    Peripheral neuropathy    Obesity due to excess calories    Mixed hyperlipidemia    Hyperglycemia    Sjogren's syndrome (HCC)    Aortic dilatation (HCC)  Resolved Problems:    * No resolved hospital problems. *    NSTEMI  -Had normal troponins back in December 2019  -off heparin drip  -Cardiology following, state patient may well have microvascular disease: A troponin leak.   Await final recommendation regarding any ischemic work-up  -Continue aspirin, statin      Uncontrolled diabetes mellitus with hyperglycemia  -A1c of 15.1,   -Diabetes education provided today  -Continue Lantus 30 units daily, insulin sliding scale     Sjogren's syndrome with inflammatory myositis  -Follows up with rheumatology outpatient  -Continue Imuran 50mg BID and prednisone 15mg QD      Essential hypertension  -Blood pressure low, hold home meds, continue to monitor        Congestive heart failure with preserved EF  -Hold Coreg, Lasix, Aldactone secondary to hypotension  -Appears compensated      Diabetic peripheral neuropathy  -Continue Lyrica      Mixed hyperlipidemia  -Continue statin        Obesity  Mitral valve insufficiency  Aortic dilatation         DVT Prophylaxis: *Patient ambulatory  Diet: DIET

## 2020-01-22 LAB
GLUCOSE BLD-MCNC: 199 MG/DL (ref 70–99)
GLUCOSE BLD-MCNC: 248 MG/DL (ref 70–99)
GLUCOSE BLD-MCNC: 287 MG/DL (ref 70–99)
GLUCOSE BLD-MCNC: 288 MG/DL (ref 70–99)
PERFORMED ON: ABNORMAL

## 2020-01-22 PROCEDURE — 2060000000 HC ICU INTERMEDIATE R&B

## 2020-01-22 PROCEDURE — 6370000000 HC RX 637 (ALT 250 FOR IP): Performed by: INTERNAL MEDICINE

## 2020-01-22 PROCEDURE — 93308 TTE F-UP OR LMTD: CPT

## 2020-01-22 PROCEDURE — 6360000002 HC RX W HCPCS: Performed by: FAMILY MEDICINE

## 2020-01-22 PROCEDURE — 2580000003 HC RX 258: Performed by: INTERNAL MEDICINE

## 2020-01-22 PROCEDURE — 6370000000 HC RX 637 (ALT 250 FOR IP): Performed by: FAMILY MEDICINE

## 2020-01-22 PROCEDURE — 94760 N-INVAS EAR/PLS OXIMETRY 1: CPT

## 2020-01-22 RX ADMIN — PANTOPRAZOLE SODIUM 40 MG: 40 TABLET, DELAYED RELEASE ORAL at 08:54

## 2020-01-22 RX ADMIN — SODIUM CHLORIDE, PRESERVATIVE FREE 10 ML: 5 INJECTION INTRAVENOUS at 20:58

## 2020-01-22 RX ADMIN — PREGABALIN 50 MG: 50 CAPSULE ORAL at 20:58

## 2020-01-22 RX ADMIN — ATORVASTATIN CALCIUM 40 MG: 40 TABLET, FILM COATED ORAL at 20:58

## 2020-01-22 RX ADMIN — PREDNISONE 15 MG: 10 TABLET ORAL at 08:54

## 2020-01-22 RX ADMIN — MAGNESIUM HYDROXIDE 30 ML: 400 SUSPENSION ORAL at 05:06

## 2020-01-22 RX ADMIN — SODIUM CHLORIDE, PRESERVATIVE FREE 10 ML: 5 INJECTION INTRAVENOUS at 08:55

## 2020-01-22 RX ADMIN — OXYCODONE HYDROCHLORIDE 10 MG: 10 TABLET ORAL at 04:54

## 2020-01-22 RX ADMIN — NYSTATIN 500000 UNITS: 100000 SUSPENSION ORAL at 13:03

## 2020-01-22 RX ADMIN — NYSTATIN 500000 UNITS: 100000 SUSPENSION ORAL at 20:58

## 2020-01-22 RX ADMIN — INSULIN LISPRO 9 UNITS: 100 INJECTION, SOLUTION INTRAVENOUS; SUBCUTANEOUS at 18:38

## 2020-01-22 RX ADMIN — AZATHIOPRINE 50 MG: 50 TABLET ORAL at 21:19

## 2020-01-22 RX ADMIN — AZATHIOPRINE 50 MG: 50 TABLET ORAL at 08:54

## 2020-01-22 RX ADMIN — INSULIN GLARGINE 30 UNITS: 100 INJECTION, SOLUTION SUBCUTANEOUS at 08:55

## 2020-01-22 RX ADMIN — INSULIN LISPRO 9 UNITS: 100 INJECTION, SOLUTION INTRAVENOUS; SUBCUTANEOUS at 13:03

## 2020-01-22 RX ADMIN — OXYCODONE HYDROCHLORIDE 10 MG: 10 TABLET ORAL at 19:36

## 2020-01-22 RX ADMIN — NYSTATIN 500000 UNITS: 100000 SUSPENSION ORAL at 08:54

## 2020-01-22 RX ADMIN — INSULIN LISPRO 3 UNITS: 100 INJECTION, SOLUTION INTRAVENOUS; SUBCUTANEOUS at 08:55

## 2020-01-22 RX ADMIN — PREGABALIN 50 MG: 50 CAPSULE ORAL at 08:54

## 2020-01-22 RX ADMIN — CLOPIDOGREL BISULFATE 75 MG: 75 TABLET ORAL at 08:54

## 2020-01-22 RX ADMIN — ASPIRIN 81 MG 81 MG: 81 TABLET ORAL at 08:54

## 2020-01-22 RX ADMIN — INSULIN LISPRO 3 UNITS: 100 INJECTION, SOLUTION INTRAVENOUS; SUBCUTANEOUS at 20:58

## 2020-01-22 ASSESSMENT — PAIN DESCRIPTION - ONSET
ONSET: ON-GOING
ONSET: ON-GOING

## 2020-01-22 ASSESSMENT — PAIN - FUNCTIONAL ASSESSMENT
PAIN_FUNCTIONAL_ASSESSMENT: ACTIVITIES ARE NOT PREVENTED
PAIN_FUNCTIONAL_ASSESSMENT: ACTIVITIES ARE NOT PREVENTED

## 2020-01-22 ASSESSMENT — PAIN DESCRIPTION - PROGRESSION
CLINICAL_PROGRESSION: NOT CHANGED
CLINICAL_PROGRESSION: NOT CHANGED

## 2020-01-22 ASSESSMENT — PAIN DESCRIPTION - ORIENTATION
ORIENTATION: OTHER (COMMENT)
ORIENTATION: LEFT

## 2020-01-22 ASSESSMENT — PAIN SCALES - GENERAL
PAINLEVEL_OUTOF10: 6
PAINLEVEL_OUTOF10: 0
PAINLEVEL_OUTOF10: 7
PAINLEVEL_OUTOF10: 2
PAINLEVEL_OUTOF10: 9

## 2020-01-22 ASSESSMENT — PAIN DESCRIPTION - PAIN TYPE
TYPE: CHRONIC PAIN
TYPE: ACUTE PAIN;CHRONIC PAIN

## 2020-01-22 ASSESSMENT — PAIN DESCRIPTION - FREQUENCY
FREQUENCY: CONTINUOUS
FREQUENCY: CONTINUOUS

## 2020-01-22 ASSESSMENT — PAIN DESCRIPTION - DIRECTION: RADIATING_TOWARDS: HIP

## 2020-01-22 ASSESSMENT — PAIN DESCRIPTION - DESCRIPTORS
DESCRIPTORS: ACHING
DESCRIPTORS: ACHING

## 2020-01-22 ASSESSMENT — PAIN DESCRIPTION - LOCATION
LOCATION: GENERALIZED
LOCATION: KNEE

## 2020-01-22 NOTE — PLAN OF CARE
Problem: Pain:  Goal: Pain level will decrease  Description  Pain level will decrease  1/22/2020 0012 by Torres aGrg RN  Outcome: Ongoing  1/21/2020 1111 by Mely Richards RN  Outcome: Ongoing  Goal: Control of acute pain  Description  Control of acute pain  Outcome: Ongoing  Goal: Control of chronic pain  Description  Control of chronic pain  Outcome: Ongoing     Problem: Falls - Risk of:  Goal: Will remain free from falls  Description  Will remain free from falls  1/22/2020 0012 by Torres Grag RN  Outcome: Ongoing  1/21/2020 1111 by Mely Richards RN  Outcome: Ongoing  Goal: Absence of physical injury  Description  Absence of physical injury  Outcome: Ongoing     Problem: Serum Glucose Level - Abnormal:  Goal: Ability to maintain appropriate glucose levels will improve  Description  Ability to maintain appropriate glucose levels will improve  1/22/2020 0012 by Torres Garg RN  Outcome: Ongoing  1/21/2020 1111 by Mely Richards RN  Outcome: Ongoing  Goal: Ability to maintain appropriate glucose levels has stabilized  Description  Ability to maintain appropriate glucose levels has stabilized  Outcome: Ongoing

## 2020-01-22 NOTE — PROGRESS NOTES
Speech Language Pathology      Follow up for dysphagia tx attempted. Pt out of room at [facility]. Speech will re-attempt this date as schedule permits or will follow up with pt 1/23/2020.     Loyda Becker 87 CCC/SLP [id number]  Speech Language Pathologist  01/22/20  2:32 PM

## 2020-01-22 NOTE — PROGRESS NOTES
accessory muscle use. No crackles. No wheezes. No rhonchi. No dullness on percussion. CV: Regular rate. Regular rhythm. No murmur or rub. No edema. GI: Non-tender. Non-distended. No hernia. Skin: Warm, dry, normal texture and turgor. No nodule on exposed extremities. Lymph: No cervical LAD. No supraclavicular LAD. M/S: No cyanosis. No clubbing. No joint deformity. Neuro: Moves all four extremities. CN 2-12 tested, no defect noted. Psych: Oriented x 3. No anxiety. Awake. Alert. Intact judgement and insight. Data    LABS  CBC:   Recent Labs     01/20/20  0815   WBC 8.0   HGB 13.2*   HCT 40.2*   MCV 85.7        BMP:   Recent Labs     01/20/20  0240 01/20/20  1312   * 131*   K 4.2 4.3   CL 89* 93*   CO2 23 24   PHOS  --  2.9   BUN 11 8   CREATININE 0.9 0.7*   GLUCOSE 630* 467*     POC GLUCOSE:    Recent Labs     01/21/20  1643 01/21/20  2137 01/22/20  0826 01/22/20  1146 01/22/20  1645   POCGLU 318* 247* 199* 288* 287*     LIVER PROFILE:   Recent Labs     01/20/20  0240   AST 11*   ALT 12   LIPASE 39.0   LABALBU 3.3*   BILITOT <0.2   ALKPHOS 141*     PT/INR: No results for input(s): PROTIME, INR in the last 72 hours. APTT:   Recent Labs     01/20/20  1312   APTT 46.2*     UA:  Recent Labs     01/20/20  0236   COLORU YELLOW   PHUR 5.5   WBCUA 6-10*   RBCUA 0-2   CLARITYU Clear   SPECGRAV >1.030   LEUKOCYTESUR Negative   UROBILINOGEN 0.2   BILIRUBINUR Negative   BLOODU TRACE*   GLUCOSEU >=1000*   KETUA Negative     Microbiology:  Wound Culture: No results for input(s): WNDABS, ORG in the last 72 hours. Invalid input(s):  LABGRAM  Nasal Culture: No results for input(s): ORG, MRSAPCR in the last 72 hours. Blood Culture:   Recent Labs     01/20/20  1939   BC No Growth to date. Any change in status will be called. Fungal Culture:   No results for input(s): FUNGSM in the last 72 hours. No results for input(s): FUNCXBLD in the last 72 hours.   CSF Culture:  No results for input(s): COLORCSF, APPEARCSF, CFTUBE, CLOTCSF, WBCCSF, RBCCSF, NEUTCSF, NUMCELLSCSF, LYMPHSCSF, MONOCSF, GLUCCSF, VOLCSF in the last 72 hours. Respiratory Culture:  No results for input(s): John Justice in the last 72 hours. AFB:No results for input(s): AFBSMEAR in the last 72 hours. Urine Culture  Recent Labs     01/20/20  0322   LABURIN <50,000 CFU/ml mixed skin/urogenital bobby. No further workup       RADIOLOGY:    XR CHEST STANDARD (2 VW)   Final Result   No acute cardiopulmonary findings on this exam limited by shallow inspiratory   effort. CONSULTS:    IP CONSULT TO CARDIOLOGY    ASSESSMENT AND PLAN:      Active Problems:    NSTEMI (non-ST elevated myocardial infarction) ([facility] [address]    Diabetes mellitus ([facility] [address]    Hypertension    Peripheral neuropathy    Obesity due to excess calories    Mixed hyperlipidemia    Hyperglycemia    Sjogren's syndrome (HCC)    Aortic dilatation (HCC)  Resolved Problems:    * No resolved hospital problems. *    Elevated tropinin secondary to type II MI  -Cardiology following, state patient may well have microvascular disease: A troponin leak.   Await final recommendation regarding any ischemic work-up  -Continue aspirin, statin  - no intervention at this time per cardiology  - Stable for DC, awaiting precert for placement      Uncontrolled diabetes mellitus with hyperglycemia  -A1c of 15.1  - Discharge patient on metformin, Long acting insulin, DM diet, counselled patient on controlling DM  -Diabetes education provided today  -Continue Lantus 30 units daily, insulin sliding scale     Sjogren's syndrome with inflammatory myositis  -Follows up with rheumatology outpatient  -Continue Imuran 50mg BID and prednisone 15mg QD      Essential hypertension  -Blood pressure low, hold home meds, continue to monitor        Congestive heart failure with preserved EF  -Hold Coreg, Lasix, Aldactone secondary to hypotension  -Appears compensated      Diabetic peripheral neuropathy  -Continue

## 2020-01-23 LAB
GLUCOSE BLD-MCNC: 146 MG/DL (ref 70–99)
GLUCOSE BLD-MCNC: 214 MG/DL (ref 70–99)
GLUCOSE BLD-MCNC: 270 MG/DL (ref 70–99)
GLUCOSE BLD-MCNC: 330 MG/DL (ref 70–99)
PERFORMED ON: ABNORMAL

## 2020-01-23 PROCEDURE — 2580000003 HC RX 258: Performed by: INTERNAL MEDICINE

## 2020-01-23 PROCEDURE — 92526 ORAL FUNCTION THERAPY: CPT

## 2020-01-23 PROCEDURE — 6370000000 HC RX 637 (ALT 250 FOR IP): Performed by: HOSPITALIST

## 2020-01-23 PROCEDURE — 6370000000 HC RX 637 (ALT 250 FOR IP): Performed by: FAMILY MEDICINE

## 2020-01-23 PROCEDURE — 6360000002 HC RX W HCPCS: Performed by: FAMILY MEDICINE

## 2020-01-23 PROCEDURE — 6370000000 HC RX 637 (ALT 250 FOR IP): Performed by: INTERNAL MEDICINE

## 2020-01-23 PROCEDURE — 2060000000 HC ICU INTERMEDIATE R&B

## 2020-01-23 RX ORDER — FUROSEMIDE 20 MG/1
20 TABLET ORAL DAILY
Status: DISCONTINUED | OUTPATIENT
Start: 2020-01-23 | End: 2020-01-24 | Stop reason: HOSPADM

## 2020-01-23 RX ORDER — LANOLIN ALCOHOL/MO/W.PET/CERES
1000 CREAM (GRAM) TOPICAL DAILY
Status: DISCONTINUED | OUTPATIENT
Start: 2020-01-23 | End: 2020-01-24 | Stop reason: HOSPADM

## 2020-01-23 RX ORDER — SPIRONOLACTONE 25 MG/1
25 TABLET ORAL DAILY
Status: DISCONTINUED | OUTPATIENT
Start: 2020-01-23 | End: 2020-01-24

## 2020-01-23 RX ORDER — INSULIN GLARGINE 100 [IU]/ML
40 INJECTION, SOLUTION SUBCUTANEOUS DAILY
Status: DISCONTINUED | OUTPATIENT
Start: 2020-01-24 | End: 2020-01-24 | Stop reason: HOSPADM

## 2020-01-23 RX ADMIN — ASPIRIN 81 MG 81 MG: 81 TABLET ORAL at 08:34

## 2020-01-23 RX ADMIN — PREGABALIN 50 MG: 50 CAPSULE ORAL at 22:36

## 2020-01-23 RX ADMIN — INSULIN LISPRO 9 UNITS: 100 INJECTION, SOLUTION INTRAVENOUS; SUBCUTANEOUS at 13:51

## 2020-01-23 RX ADMIN — AZATHIOPRINE 50 MG: 50 TABLET ORAL at 08:40

## 2020-01-23 RX ADMIN — OXYCODONE HYDROCHLORIDE 10 MG: 10 TABLET ORAL at 17:11

## 2020-01-23 RX ADMIN — OXYCODONE HYDROCHLORIDE 10 MG: 10 TABLET ORAL at 11:30

## 2020-01-23 RX ADMIN — OXYCODONE HYDROCHLORIDE 10 MG: 10 TABLET ORAL at 04:34

## 2020-01-23 RX ADMIN — SPIRONOLACTONE 25 MG: 25 TABLET ORAL at 22:36

## 2020-01-23 RX ADMIN — FUROSEMIDE 20 MG: 20 TABLET ORAL at 22:36

## 2020-01-23 RX ADMIN — CLOPIDOGREL BISULFATE 75 MG: 75 TABLET ORAL at 08:33

## 2020-01-23 RX ADMIN — SODIUM CHLORIDE, PRESERVATIVE FREE 10 ML: 5 INJECTION INTRAVENOUS at 22:37

## 2020-01-23 RX ADMIN — INSULIN GLARGINE 30 UNITS: 100 INJECTION, SOLUTION SUBCUTANEOUS at 08:33

## 2020-01-23 RX ADMIN — NYSTATIN 500000 UNITS: 100000 SUSPENSION ORAL at 22:36

## 2020-01-23 RX ADMIN — NYSTATIN 500000 UNITS: 100000 SUSPENSION ORAL at 13:51

## 2020-01-23 RX ADMIN — SODIUM CHLORIDE, PRESERVATIVE FREE 10 ML: 5 INJECTION INTRAVENOUS at 08:34

## 2020-01-23 RX ADMIN — INSULIN LISPRO 12 UNITS: 100 INJECTION, SOLUTION INTRAVENOUS; SUBCUTANEOUS at 17:13

## 2020-01-23 RX ADMIN — PANTOPRAZOLE SODIUM 40 MG: 40 TABLET, DELAYED RELEASE ORAL at 08:34

## 2020-01-23 RX ADMIN — AZATHIOPRINE 50 MG: 50 TABLET ORAL at 22:41

## 2020-01-23 RX ADMIN — PREGABALIN 50 MG: 50 CAPSULE ORAL at 08:33

## 2020-01-23 RX ADMIN — PREDNISONE 15 MG: 10 TABLET ORAL at 08:33

## 2020-01-23 RX ADMIN — INSULIN LISPRO 3 UNITS: 100 INJECTION, SOLUTION INTRAVENOUS; SUBCUTANEOUS at 08:33

## 2020-01-23 RX ADMIN — NYSTATIN 500000 UNITS: 100000 SUSPENSION ORAL at 17:13

## 2020-01-23 RX ADMIN — CYANOCOBALAMIN TAB 1000 MCG 1000 MCG: 1000 TAB at 22:36

## 2020-01-23 RX ADMIN — ATORVASTATIN CALCIUM 40 MG: 40 TABLET, FILM COATED ORAL at 22:36

## 2020-01-23 RX ADMIN — NYSTATIN 500000 UNITS: 100000 SUSPENSION ORAL at 08:32

## 2020-01-23 RX ADMIN — INSULIN LISPRO 3 UNITS: 100 INJECTION, SOLUTION INTRAVENOUS; SUBCUTANEOUS at 22:37

## 2020-01-23 ASSESSMENT — PAIN DESCRIPTION - ONSET
ONSET: ON-GOING

## 2020-01-23 ASSESSMENT — PAIN DESCRIPTION - DESCRIPTORS
DESCRIPTORS: ACHING;CONSTANT

## 2020-01-23 ASSESSMENT — PAIN DESCRIPTION - DIRECTION
RADIATING_TOWARDS: KNEES

## 2020-01-23 ASSESSMENT — PAIN DESCRIPTION - LOCATION
LOCATION: FOOT
LOCATION: FOOT;ANKLE
LOCATION: FOOT;ANKLE

## 2020-01-23 ASSESSMENT — PAIN DESCRIPTION - PROGRESSION
CLINICAL_PROGRESSION: GRADUALLY WORSENING
CLINICAL_PROGRESSION: GRADUALLY WORSENING
CLINICAL_PROGRESSION: NOT CHANGED

## 2020-01-23 ASSESSMENT — PAIN DESCRIPTION - FREQUENCY
FREQUENCY: CONTINUOUS

## 2020-01-23 ASSESSMENT — PAIN SCALES - GENERAL
PAINLEVEL_OUTOF10: 7
PAINLEVEL_OUTOF10: 8
PAINLEVEL_OUTOF10: 8
PAINLEVEL_OUTOF10: 6
PAINLEVEL_OUTOF10: 7
PAINLEVEL_OUTOF10: 7

## 2020-01-23 ASSESSMENT — PAIN SCALES - WONG BAKER
WONGBAKER_NUMERICALRESPONSE: 0
WONGBAKER_NUMERICALRESPONSE: 0

## 2020-01-23 ASSESSMENT — PAIN DESCRIPTION - ORIENTATION
ORIENTATION: RIGHT;LEFT

## 2020-01-23 ASSESSMENT — ENCOUNTER SYMPTOMS
ABDOMINAL DISTENTION: 0
BACK PAIN: 1
COUGH: 0
NAUSEA: 0
SHORTNESS OF BREATH: 0
CONSTIPATION: 0

## 2020-01-23 ASSESSMENT — PAIN DESCRIPTION - PAIN TYPE
TYPE: ACUTE PAIN
TYPE: CHRONIC PAIN
TYPE: CHRONIC PAIN

## 2020-01-23 ASSESSMENT — PAIN - FUNCTIONAL ASSESSMENT
PAIN_FUNCTIONAL_ASSESSMENT: PREVENTS OR INTERFERES SOME ACTIVE ACTIVITIES AND ADLS

## 2020-01-23 NOTE — PROGRESS NOTES
Progress Note    Date:1/23/2020       Room:E4M-4198/5117-01  Patient Name:Richard Beltre Post     YOB: 1960     Age:59 y.o. Subjective   Interval History Status: improved. Pt with leg swelling , h and p and notes reviewed  Sugars high   No shortness of breath no palpitations      Review of Systems   Review of Systems   Constitutional: Positive for fatigue. Negative for chills and fever. HENT: Negative for congestion. Respiratory: Negative for cough and shortness of breath. Cardiovascular: Negative for chest pain and palpitations. Gastrointestinal: Negative for abdominal distention, constipation and nausea. Genitourinary: Negative for dysuria. Musculoskeletal: Positive for back pain (old), joint swelling (rt knee worst) and neck pain. Skin: Negative for rash. Neurological: Negative for dizziness and headaches.        Medications   Scheduled Meds:    furosemide  20 mg Oral Daily    spironolactone  25 mg Oral Daily    clopidogrel  75 mg Oral Daily    aspirin  81 mg Oral Daily    sodium chloride flush  10 mL Intravenous 2 times per day    atorvastatin  40 mg Oral Nightly    insulin lispro  0-18 Units Subcutaneous TID WC    insulin lispro  0-9 Units Subcutaneous Nightly    azaTHIOprine  50 mg Oral BID    predniSONE  15 mg Oral Daily    pantoprazole  40 mg Oral Daily    pregabalin  50 mg Oral BID    nystatin  5 mL Oral 4x Daily    insulin glargine  30 Units Subcutaneous Daily     Continuous Infusions:    dextrose       PRN Meds: sodium chloride flush, magnesium hydroxide, ondansetron, potassium chloride **OR** potassium alternative oral replacement **OR** potassium chloride, magnesium sulfate, nitroGLYCERIN, oxyCODONE **OR** oxyCODONE, glucose, dextrose, glucagon (rDNA), dextrose    Past History    Past Medical History:   has a past medical history of Arthritis, Depression, Diabetes mellitus ([facility] [address] Difficult intubation, Dyslipidemia, Herniated disc, cervical, Hypertension, Osteoporosis, and Peripheral neuropathy. Social History:   reports that he has never smoked. He has never used smokeless tobacco. He reports current alcohol use. He reports that he does not use drugs. Family History:   Family History   Problem Relation Age of Onset    Heart Disease Mother     High Blood Pressure Mother     High Blood Pressure Father     High Blood Pressure Sister     Breast Cancer Brother     Heart Disease Brother     High Blood Pressure Brother        Physical Examination      Vitals:  BP 91/60   Pulse 66   Temp 98.5 °F (36.9 °C) (Oral)   Resp 18   Ht 5' 11\" (1.803 m)   Wt 272 lb 4.3 oz (123.5 kg)   SpO2 95%   BMI 37.97 kg/m²   Temp (24hrs), Av.1 °F (36.7 °C), Min:97.3 °F (36.3 °C), Max:98.6 °F (37 °C)      I/O (24Hr): Intake/Output Summary (Last 24 hours) at 2020 1852  Last data filed at 2020 1355  Gross per 24 hour   Intake 1210 ml   Output 1425 ml   Net -215 ml       Physical Exam  Constitutional:       General: He is not in acute distress. Appearance: Normal appearance. He is obese. He is not ill-appearing, toxic-appearing or diaphoretic. HENT:      Head: Normocephalic and atraumatic. Nose: Nose normal.   Eyes:      Pupils: Pupils are equal, round, and reactive to light. Cardiovascular:      Rate and Rhythm: Normal rate and regular rhythm. Pulses: Normal pulses. Heart sounds: Normal heart sounds. No murmur. Pulmonary:      Effort: Pulmonary effort is normal. No respiratory distress. Breath sounds: Normal breath sounds. No wheezing or rhonchi. Abdominal:      General: There is no distension. Tenderness: There is no tenderness. There is no guarding or rebound. Musculoskeletal:         General: Swelling (2 plus feet and ankles) present. Skin:     Capillary Refill: Capillary refill takes 2 to 3 seconds. Neurological:      Mental Status: He is alert.       Comments: Neuropathy hands bilat     Psychiatric:         Mood and Affect: Mood normal.         Behavior: Behavior normal.         Thought Content: Thought content normal.         Judgment: Judgment normal.         Labs/Imaging/Diagnostics   Labs:  CBC:No results for input(s): WBC, RBC, HGB, HCT, MCV, RDW, PLT in the last 72 hours. CHEMISTRIES:No results for input(s): NA, K, CL, CO2, BUN, CREATININE, GLUCOSE, PHOS, MG in the last 72 hours. Invalid input(s): CA  PT/INR:No results for input(s): PROTIME, INR in the last 72 hours. APTT:No results for input(s): APTT in the last 72 hours. LIVER PROFILE:No results for input(s): AST, ALT, BILIDIR, BILITOT, ALKPHOS in the last 72 hours. Imaging Last 24 Hours:  No results found. Assessment        Hospital Problems           Last Modified POA    NSTEMI (non-ST elevated myocardial infarction) ([address] 1/20/2020 Yes    Diabetes mellitus ([address] (Chronic) 1/20/2020 Yes    Hypertension (Chronic) 1/20/2020 Yes    Peripheral neuropathy (Chronic) 1/20/2020 Yes    Obesity due to excess calories 1/20/2020 Yes    Heart failure ([address] 1/23/2020 Yes    Leg swelling 1/23/2020 Yes    Mixed hyperlipidemia 1/20/2020 Yes    Hyperglycemia 1/20/2020 Yes    Sjogren's syndrome ([address] 1/20/2020 Yes    Aortic dilatation ([address] 1/20/2020 Yes          Plan:           Elevated tropinin secondary to type II MI  -Cardiology signed off 2 days ago.   Sees cards at [facility]  -Continue aspirin, statin  - recheck trop in am  - Stable for DC, awaiting precert for placement     Edema?lymphedema or diastolic dysfunction had been on aldactone and lasix  Will restart both     Uncontrolled diabetes mellitus with hyperglycemia  -A1c of 15.1--- increase insulin    - Discharge patient on metformin, Long acting insulin, DM diet, counselled patient on controlling DM    -Diabetes education provided today       Sjogren's syndrome with inflammatory myositis  -Follows up with rheumatology outpatient  -Continue Imuran 50mg BID and prednisone 15mg QD      Essential hypertension  -Blood pressure low,  continue to monitor        Congestive heart failure with preserved EF    -Hold Coreg, Lasix, Aldactone secondary to hypotension        Diabetic peripheral neuropathy  -Continue Lyrica      Mixed hyperlipidemia  -Continue statin        Obesity  Mitral valve insufficiency  Aortic dilatation      pt planning rt knee surgery in 2-3 weeks I advised pt to cancel this secondary to elevated hba1c     DVT Prophylaxis: *Patient ambulatory  Diet: DIET CARB CONTROL; No Caffeine, Cardiac  Code Status: Full Code        Discharge plan: Patient stable for Discharge, awaiting precert for placement       The note was completed using EMR.  Every effort was made to ensure accuracy; however, inadvertent computerized transcription errors may be present.           Electronically signed by Lesli Fan MD on 1/23/20 at 6:52 PM

## 2020-01-23 NOTE — PROGRESS NOTES
Trials:  · Thin Liquids--despite the rec not to use straws, the pt used one throughout the meal.  For this session, he did not have overt s/s of aspiration. · Nectar thick liquids--n/a  · Honey Thick liquids--n/a  · Puree--n/a   · Soft food--n/a  · Regular food--pt tolerated chicken pot pit without choking. He did have an occasional wet voice throughout the meal.    Dysphagia Tx: The focus of this session was to ensure diet toleration. Goals:   Dysphagia Goals:   1. The patient will tolerate repeat BSE when able. --goal met  2. The patient/caregiver will demonstrate understanding of compensatory strategies for improved swallowing safety. --ongoing. Pt has a good awareness of his swallowing difficulties. 3. The patient will tolerate recommended diet without observed clinical signs of aspiration--ongoing. The pt had no coughing/choking during the meal, but he did have an occasional wet voice.     Assessment:   Impressions:   Dysphagia Diagnosis: Suspected needs further assessment, Mild oral stage dysphagia, Mild to moderate pharyngeal stage dysphagia     Diet Recommendations:  Con't with current diet              Recommended Form of Meds: Meds in puree    Strategies:   Compensatory Swallowing Strategies: Small bites/sips, Upright as possible for all oral intake, Remain upright for 30-45 minutes after meals, Eat/Feed slowly, No straws, Alternate solids and liquids, Effortful swallow, Swallow 2 times per bite/sip    Education:  Consulted and agree with results and recommendations: Patient, RN  Patient Education: completed on results/recs/plan  Patient Education Response: Needs reinforcement    Prognosis for Dysphagia:   good    Plan:     Continue Dysphagia Therapy: Yes  Interventions: Therapeutic Interventions: Diet tolerance monitoring, Therapeutic PO trials with SLP, Patient/Family education  Duration/Frequency of therapy while on unit: Duration/Frequency of Treatment  Duration/Frequency of Treatment: ST to tx 3-5 tiems per week for dysphagia durign acute amdission  Discharge Instructions:   Anticipate Yes__x__No__ for further skilled Speech Therapy for Dysphagia at discharge    This note serves as a D/C Summary in the event that this patient is discharged prior to the next therapy session.     Coded treatment time:  Total treatment time: 15 minutes    Electronically signed by Conrado Batista M.A./CCC-SLP #[id number] on 1/23/2020 at 12:53 PM

## 2020-01-23 NOTE — PLAN OF CARE
Problem: HEMODYNAMIC STATUS  Goal: Patient has stable vital signs and fluid balance  Outcome: Ongoing     Problem: FLUID AND ELECTROLYTE IMBALANCE  Goal: Fluid and electrolyte balance are achieved/maintained  Outcome: Ongoing

## 2020-01-23 NOTE — PLAN OF CARE
Problem: Pain:  Description  Pain management should include both nonpharmacologic and pharmacologic interventions. Goal: Pain level will decrease  Description  Pain level will decrease  Outcome: Ongoing  Note:   Pain/discomfort being managed with PRN analgesics per MD orders. Pt able to express presence and absence of pain and rate pain appropriately using numerical scale. With prn oxicodone for pain. Pain is in knees and thighs with some stiffness, according to patient he had multiple surgeries in the past.  Goal: Control of acute pain  Description  Control of acute pain  Outcome: Ongoing  Goal: Control of chronic pain  Description  Control of chronic pain  Outcome: Ongoing     Problem: Falls - Risk of:  Goal: Will remain free from falls  Description  Will remain free from falls  Outcome: Ongoing  Note:   Fall risk assessment completed every shift. All precautions in place. Pt has call light within reach at all times. Room clear of clutter. Pt aware to call for assistance when getting up. Bed wheels locked. Side rails raised. Nonskid socks on. Bed at lowest setting. Bed alarm off but aware to call for help if needed.   Goal: Absence of physical injury  Description  Absence of physical injury  Outcome: Ongoing     Problem: Serum Glucose Level - Abnormal:  Goal: Ability to maintain appropriate glucose levels will improve  Description  Ability to maintain appropriate glucose levels will improve  Outcome: Ongoing  Goal: Ability to maintain appropriate glucose levels has stabilized  Description  Ability to maintain appropriate glucose levels has stabilized  Outcome: Ongoing

## 2020-01-24 VITALS
DIASTOLIC BLOOD PRESSURE: 73 MMHG | HEIGHT: 71 IN | SYSTOLIC BLOOD PRESSURE: 135 MMHG | HEART RATE: 82 BPM | BODY MASS INDEX: 38.12 KG/M2 | TEMPERATURE: 98.4 F | RESPIRATION RATE: 16 BRPM | OXYGEN SATURATION: 97 % | WEIGHT: 272.27 LBS

## 2020-01-24 LAB
A/G RATIO: 1 (ref 1.1–2.2)
ALBUMIN SERPL-MCNC: 2.9 G/DL (ref 3.4–5)
ALP BLD-CCNC: 58 U/L (ref 40–129)
ALT SERPL-CCNC: 10 U/L (ref 10–40)
ANION GAP SERPL CALCULATED.3IONS-SCNC: 11 MMOL/L (ref 3–16)
AST SERPL-CCNC: 12 U/L (ref 15–37)
BILIRUB SERPL-MCNC: <0.2 MG/DL (ref 0–1)
BLOOD CULTURE, ROUTINE: NORMAL
BUN BLDV-MCNC: 9 MG/DL (ref 7–20)
CALCIUM SERPL-MCNC: 8.3 MG/DL (ref 8.3–10.6)
CHLORIDE BLD-SCNC: 95 MMOL/L (ref 99–110)
CO2: 25 MMOL/L (ref 21–32)
CREAT SERPL-MCNC: 0.7 MG/DL (ref 0.8–1.3)
GFR AFRICAN AMERICAN: >60
GFR NON-AFRICAN AMERICAN: >60
GLOBULIN: 2.9 G/DL
GLUCOSE BLD-MCNC: 203 MG/DL (ref 70–99)
GLUCOSE BLD-MCNC: 211 MG/DL (ref 70–99)
GLUCOSE BLD-MCNC: 321 MG/DL (ref 70–99)
GLUCOSE BLD-MCNC: 323 MG/DL (ref 70–99)
HCT VFR BLD CALC: 36.3 % (ref 40.5–52.5)
HEMOGLOBIN: 12 G/DL (ref 13.5–17.5)
MCH RBC QN AUTO: 28.2 PG (ref 26–34)
MCHC RBC AUTO-ENTMCNC: 33 G/DL (ref 31–36)
MCV RBC AUTO: 85.4 FL (ref 80–100)
PDW BLD-RTO: 16.8 % (ref 12.4–15.4)
PERFORMED ON: ABNORMAL
PLATELET # BLD: 266 K/UL (ref 135–450)
PMV BLD AUTO: 6.8 FL (ref 5–10.5)
POTASSIUM SERPL-SCNC: 3.7 MMOL/L (ref 3.5–5.1)
RBC # BLD: 4.25 M/UL (ref 4.2–5.9)
SODIUM BLD-SCNC: 131 MMOL/L (ref 136–145)
TOTAL PROTEIN: 5.8 G/DL (ref 6.4–8.2)
TROPONIN: 0.16 NG/ML
WBC # BLD: 8.9 K/UL (ref 4–11)

## 2020-01-24 PROCEDURE — 6370000000 HC RX 637 (ALT 250 FOR IP): Performed by: FAMILY MEDICINE

## 2020-01-24 PROCEDURE — 80053 COMPREHEN METABOLIC PANEL: CPT

## 2020-01-24 PROCEDURE — 36415 COLL VENOUS BLD VENIPUNCTURE: CPT

## 2020-01-24 PROCEDURE — 2580000003 HC RX 258: Performed by: INTERNAL MEDICINE

## 2020-01-24 PROCEDURE — 6360000002 HC RX W HCPCS: Performed by: FAMILY MEDICINE

## 2020-01-24 PROCEDURE — 85027 COMPLETE CBC AUTOMATED: CPT

## 2020-01-24 PROCEDURE — 97129 THER IVNTJ 1ST 15 MIN: CPT

## 2020-01-24 PROCEDURE — 6370000000 HC RX 637 (ALT 250 FOR IP): Performed by: INTERNAL MEDICINE

## 2020-01-24 PROCEDURE — 94760 N-INVAS EAR/PLS OXIMETRY 1: CPT

## 2020-01-24 PROCEDURE — 6370000000 HC RX 637 (ALT 250 FOR IP): Performed by: HOSPITALIST

## 2020-01-24 PROCEDURE — 84484 ASSAY OF TROPONIN QUANT: CPT

## 2020-01-24 RX ORDER — CLOPIDOGREL BISULFATE 75 MG/1
75 TABLET ORAL DAILY
Qty: 30 TABLET | Refills: 3 | Status: ON HOLD
Start: 2020-01-24 | End: 2022-03-20 | Stop reason: ALTCHOICE

## 2020-01-24 RX ORDER — FUROSEMIDE 20 MG/1
20 TABLET ORAL DAILY
Qty: 60 TABLET | Refills: 3 | Status: ON HOLD
Start: 2020-01-24 | End: 2022-03-20 | Stop reason: CLARIF

## 2020-01-24 RX ORDER — ATORVASTATIN CALCIUM 40 MG/1
40 TABLET, FILM COATED ORAL NIGHTLY
Qty: 30 TABLET | Refills: 3
Start: 2020-01-24

## 2020-01-24 RX ORDER — OXYCODONE HYDROCHLORIDE 5 MG/1
5-10 TABLET ORAL EVERY 4 HOURS PRN
Qty: 20 TABLET | Refills: 0 | Status: SHIPPED | OUTPATIENT
Start: 2020-01-24 | End: 2020-01-27

## 2020-01-24 RX ORDER — INSULIN GLARGINE 100 [IU]/ML
40 INJECTION, SOLUTION SUBCUTANEOUS DAILY
Qty: 1 VIAL | Refills: 3 | Status: ON HOLD
Start: 2020-01-24 | End: 2022-03-20 | Stop reason: CLARIF

## 2020-01-24 RX ADMIN — NYSTATIN 500000 UNITS: 100000 SUSPENSION ORAL at 17:23

## 2020-01-24 RX ADMIN — OXYCODONE HYDROCHLORIDE 10 MG: 10 TABLET ORAL at 15:30

## 2020-01-24 RX ADMIN — INSULIN GLARGINE 40 UNITS: 100 INJECTION, SOLUTION SUBCUTANEOUS at 09:50

## 2020-01-24 RX ADMIN — ASPIRIN 81 MG 81 MG: 81 TABLET ORAL at 09:19

## 2020-01-24 RX ADMIN — PREDNISONE 15 MG: 10 TABLET ORAL at 09:19

## 2020-01-24 RX ADMIN — INSULIN LISPRO 6 UNITS: 100 INJECTION, SOLUTION INTRAVENOUS; SUBCUTANEOUS at 09:51

## 2020-01-24 RX ADMIN — FUROSEMIDE 20 MG: 20 TABLET ORAL at 09:00

## 2020-01-24 RX ADMIN — PREGABALIN 50 MG: 50 CAPSULE ORAL at 09:19

## 2020-01-24 RX ADMIN — INSULIN LISPRO 12 UNITS: 100 INJECTION, SOLUTION INTRAVENOUS; SUBCUTANEOUS at 13:14

## 2020-01-24 RX ADMIN — PANTOPRAZOLE SODIUM 40 MG: 40 TABLET, DELAYED RELEASE ORAL at 09:19

## 2020-01-24 RX ADMIN — CYANOCOBALAMIN TAB 1000 MCG 1000 MCG: 1000 TAB at 09:19

## 2020-01-24 RX ADMIN — NYSTATIN 500000 UNITS: 100000 SUSPENSION ORAL at 13:15

## 2020-01-24 RX ADMIN — OXYCODONE HYDROCHLORIDE 10 MG: 10 TABLET ORAL at 10:06

## 2020-01-24 RX ADMIN — NYSTATIN 500000 UNITS: 100000 SUSPENSION ORAL at 09:20

## 2020-01-24 RX ADMIN — AZATHIOPRINE 50 MG: 50 TABLET ORAL at 09:19

## 2020-01-24 RX ADMIN — INSULIN LISPRO 8 UNITS: 100 INJECTION, SOLUTION INTRAVENOUS; SUBCUTANEOUS at 17:30

## 2020-01-24 RX ADMIN — INSULIN LISPRO 12 UNITS: 100 INJECTION, SOLUTION INTRAVENOUS; SUBCUTANEOUS at 17:30

## 2020-01-24 RX ADMIN — SODIUM CHLORIDE, PRESERVATIVE FREE 10 ML: 5 INJECTION INTRAVENOUS at 09:20

## 2020-01-24 RX ADMIN — CLOPIDOGREL BISULFATE 75 MG: 75 TABLET ORAL at 09:19

## 2020-01-24 RX ADMIN — OXYCODONE HYDROCHLORIDE 10 MG: 10 TABLET ORAL at 05:34

## 2020-01-24 RX ADMIN — FUROSEMIDE 20 MG: 20 TABLET ORAL at 09:58

## 2020-01-24 ASSESSMENT — PAIN SCALES - WONG BAKER
WONGBAKER_NUMERICALRESPONSE: 0

## 2020-01-24 ASSESSMENT — PAIN DESCRIPTION - PAIN TYPE
TYPE: CHRONIC PAIN

## 2020-01-24 ASSESSMENT — PAIN DESCRIPTION - ONSET
ONSET: ON-GOING

## 2020-01-24 ASSESSMENT — PAIN - FUNCTIONAL ASSESSMENT
PAIN_FUNCTIONAL_ASSESSMENT: PREVENTS OR INTERFERES SOME ACTIVE ACTIVITIES AND ADLS

## 2020-01-24 ASSESSMENT — PAIN DESCRIPTION - DESCRIPTORS
DESCRIPTORS: ACHING
DESCRIPTORS: ACHING;PENETRATING
DESCRIPTORS: ACHING

## 2020-01-24 ASSESSMENT — PAIN DESCRIPTION - PROGRESSION
CLINICAL_PROGRESSION: NOT CHANGED
CLINICAL_PROGRESSION: NOT CHANGED

## 2020-01-24 ASSESSMENT — PAIN SCALES - GENERAL
PAINLEVEL_OUTOF10: 8
PAINLEVEL_OUTOF10: 8
PAINLEVEL_OUTOF10: 7
PAINLEVEL_OUTOF10: 7
PAINLEVEL_OUTOF10: 8
PAINLEVEL_OUTOF10: 8

## 2020-01-24 ASSESSMENT — PAIN DESCRIPTION - ORIENTATION
ORIENTATION: RIGHT;LEFT
ORIENTATION: RIGHT;LEFT

## 2020-01-24 ASSESSMENT — PAIN DESCRIPTION - FREQUENCY
FREQUENCY: CONTINUOUS

## 2020-01-24 ASSESSMENT — PAIN DESCRIPTION - LOCATION
LOCATION: KNEE
LOCATION: KNEE

## 2020-01-24 NOTE — DISCHARGE SUMMARY
Hospitalist Discharge Summary    Patient ID:  Dre Anthony  [id number]  63 y.o.  1960    Admit date: 1/20/2020    Discharge date: 1/24/2020    Disposition: SNF    Admission Diagnoses:   Patient Active Problem List   Diagnosis    NSTEMI (non-ST elevated myocardial infarction) ([address]    Diabetes mellitus ([address]    Hypertension    Peripheral neuropathy    Generalized weakness    Obesity due to excess calories    Heart failure (HCC)    Leg swelling    Mixed hyperlipidemia    Hyperglycemia    Sjogren's syndrome ([address]    Aortic dilatation ([address]       Discharge Diagnoses: Active Problems:    NSTEMI (non-ST elevated myocardial infarction) ([address]    Diabetes mellitus ([address]    Hypertension    Peripheral neuropathy    Obesity due to excess calories    Heart failure (HCC)    Leg swelling    Mixed hyperlipidemia    Hyperglycemia    Sjogren's syndrome (HCC)    Aortic dilatation (HCC)  Resolved Problems:    * No resolved hospital problems. *      Code Status:  Full Code    Condition:  Stable    Discharge Diet: Diet:  DIET CARB CONTROL; No Caffeine, Cardiac    PCP to do list:        Hospital Course:     Patient presented to the hospital with elevated blood sugar, he was seen by our diabetes educator. He was found to have an A1c of 15.1. He was started on insulin. His blood sugar improved. He is being discharged to follow-up with his primary care as an outpatient.     Elevated troponin  Seen by cardiology  Most likely demand mediated  Continue with home meds    Bilateral lower extremity edema  Continue with home Lasix    Type 2 diabetes mellitus  Sliding scale insulin, pre-meal insulin, Lantus  Seen by our diabetes educator    Sjogren syndrome  Follow-up with dermatology as an outpatient  Continue with home meds      Diastolic CHF:   Hold Aldactone, Coreg secondary to hypotension  Daily weight and low-salt diet  Fluid restriction          Discharge Medications:   Current Discharge Medication List      START taking these medications    Details   insulin glargine (LANTUS) 100 UNIT/ML injection vial Inject 40 Units into the skin daily  Qty: 1 vial, Refills: 3      insulin lispro (HUMALOG) 100 UNIT/ML injection vial Inject 0-18 Units into the skin 3 times daily (with meals) **High Dose Corrective Algorithm** Glucose: Dose:              No Insulin 140-199           3 Units 200-249 6 Units 250-299 9 Units 300-349 12 Units 350-400 15 Units Over 400 18 Units  Qty: 1 vial, Refills: 3      atorvastatin (LIPITOR) 40 MG tablet Take 1 tablet by mouth nightly  Qty: 30 tablet, Refills: 3      clopidogrel (PLAVIX) 75 MG tablet Take 1 tablet by mouth daily  Qty: 30 tablet, Refills: 3           Current Discharge Medication List      CONTINUE these medications which have CHANGED    Details   oxyCODONE (ROXICODONE) 5 MG immediate release tablet Take 1-2 tablets by mouth every 4 hours as needed for Pain for up to 3 days. Qty: 20 tablet, Refills: 0    Comments: Reduce doses taken as pain becomes manageable  Associated Diagnoses: Sjogren's syndrome, with unspecified organ involvement ([facility])      furosemide (LASIX) 20 MG tablet Take 1 tablet by mouth daily  Qty: 60 tablet, Refills: 3           Current Discharge Medication List      CONTINUE these medications which have NOT CHANGED    Details   azaTHIOprine (IMURAN) 50 MG tablet Take 50 mg by mouth 2 times daily      nystatin (MYCOSTATIN) 749474 UNIT/ML suspension Take 500,000 Units by mouth 4 times daily      pantoprazole (PROTONIX) 40 MG tablet Take 40 mg by mouth daily      predniSONE (DELTASONE) 10 MG tablet Take 15 mg by mouth daily      pregabalin (LYRICA) 50 MG capsule Take 1 capsule by mouth 2 times daily for 30 days.   Qty: 60 capsule, Refills: 0    Associated Diagnoses: Peripheral polyneuropathy      vitamin B-12 (CYANOCOBALAMIN) 1000 MCG tablet Take 1 tablet by mouth daily  Qty: 30 tablet, Refills: 0      aspirin 81 MG tablet Take 81 mg by mouth daily

## 2020-01-24 NOTE — PROGRESS NOTES
Patient discharged via wheelchair to [facility] with belongings at 7123. Friend is transporting pt to [facility]. Attempted to discuss discharge instructions. Pt refused the need to review. Called report to Herber at [facility] at [phone].   Electronically signed by Soledad Willis RN on 1/24/2020 at 5:51 PM

## 2020-01-24 NOTE — CARE COORDINATION
Discharge Plan:  Patient to return to [facility] [place]. Alana Barger once precert is obtained. Patient requested to transport to SNF by KRISTI.

## 2020-01-24 NOTE — PLAN OF CARE
Problem: Pain:  Goal: Pain level will decrease  Description  Pain level will decrease  Outcome: Ongoing   Pain/discomfort being managed with PRN analgesics per MD orders. Pt able to express presence and absence of pain and rate pain appropriately using numerical scale. Problem: Falls - Risk of:  Goal: Will remain free from falls  Description  Will remain free from falls  Outcome: Ongoing    Problem: HEMODYNAMIC STATUS  Goal: Patient has stable vital signs and fluid balance  Outcome: Ongoing     Problem: FLUID AND ELECTROLYTE IMBALANCE  Goal: Fluid and electrolyte balance are achieved/maintained  Outcome: Ongoing   Call light in reach. Side rails up x2. Pt reminded to use call light for assistance getting out of bed. Hourly rounding done to anticipate pt needs.    Electronically signed by Leora Brunner RN on 1/24/2020 at 2:31 PM

## 2020-01-27 NOTE — ADT AUTH CERT
Utilization Reviews         Myocardial Infarction - Care Day 4 (2020) by Marlene Montano, RN         Review Status Review Entered   Completed 2020 10:08       Criteria Review      Care Day: 4 Care Date: 2020 Level of Care:    Guideline Day 3    Level Of Care    (X) Intermediate care or telemetry to discharge    (X) Complete discharge planning    Clinical Status    (X) * Hemodynamic stability    (X) * Chest pain, dyspnea, or anginal equivalent absent    (X) * No evidence of bleeding or recurrent myocardial ischemia    (X) * Dangerous arrhythmia absent    (X) * Vascular access site without evidence of infection, aneurysm, or growing hematoma    (X) * Renal function at baseline or acceptable for next level of care    (X) * Discharge plans and education understood    Activity    (X) * Ambulatory    Routes    (X) * Oral hydration, medications, and diet    Interventions    (X) Dietitian consultation for instruction in heart-healthy diet    Medications    (X) * Anticoagulation absent    (X) Antiplatelet agents (eg, aspirin, clopidogrel, ticagrelor)    (X) Beta-blocker    (X) Statin medication    * Milestone   Additional Notes   2020   DAY 4   BP 91/60   Pulse 66   Temp 98.5 °F (36.9 °C) (Oral)   Resp 18   Ht 5' 11\" (1.803 m)   Wt 272 lb 4.3 oz (123.5 kg)   SpO2 95%   BMI 37.97 kg/m²    Temp (24hrs), Av.1 °F (36.7 °C), Min:97.3 °F (36.3 °C), Max:98.6 °F (37 °C)       Scheduled Medications   · furosemide 20 mg Oral Daily   · spironolactone 25 mg Oral Daily   · clopidogrel 75 mg Oral Daily   · aspirin 81 mg Oral Daily   · sodium chloride flush 10 mL Intravenous 2 times per day   · atorvastatin 40 mg Oral Nightly   · insulin lispro 0-18 Units Subcutaneous TID WC   · insulin lispro 0-9 Units Subcutaneous Nightly   · azaTHIOprine 50 mg Oral BID   · predniSONE 15 mg Oral Daily   · pantoprazole 40 mg Oral Daily   · pregabalin 50 mg Oral BID   · nystatin 5 mL Oral 4x Daily   · insulin glargine 30         Obesity   Mitral valve insufficiency   Aortic dilatation        pt planning rt knee surgery in 2-3 weeks I advised pt to cancel this secondary to elevated hba1c       DVT Prophylaxis: *Patient ambulatory   Diet: DIET CARB CONTROL; No Caffeine, Cardiac   Code Status: Full Code           Discharge plan: Patient stable for Discharge, awaiting precert for placement          -hj       Myocardial Infarction - Care Day 3 (2020) by Toro Blackman RN         Review Status Review Entered   Completed 2020 09:57       Criteria Review      Care Day: 3 Care Date: 2020 Level of Care:    Guideline Day 2    Level Of Care    (X) Intermediate care or telemetry    Clinical Status    (X) * Hemodynamic stability    (X) * Chest pain, dyspnea, or anginal equivalent absent    Activity    (X) Activity as tolerated    (X) Possibly begin ambulation    Routes    (X) * Oral hydration, medications    Interventions    (X) * Oxygen absent    Medications    (X) * IV nitroglycerin absent    (X) Antiplatelet agents (eg, aspirin, clopidogrel, ticagrelor)    (X) Statin medication    * Milestone   Additional Notes   2020   Day 3   TEMPERATURE:  Current - Temp: 98.3 °F (36.8 °C);  Max - Temp  Av.8 °F (36.6 °C)  Min: 97.4 °F (36.3 °C)  Max: 98.3 °F (36.8 °C)   RESPIRATIONS RANGE: Resp  Av.7  Min: 16  Max: 20   PULSE RANGE: Pulse  Av.7  Min: 72  Max: 105   BLOOD PRESSURE RANGE:  Systolic (74EQD), GKK:034 , Min:93 , RSU:249    ; Diastolic (01YZM), BBC:67, Min:58, Max:84       PULSE OXIMETRY RANGE: SpO2  Av.2 %  Min: 96 %  Max: 99 %   24HR INTAKE/OUTPUT:        Scheduled Medications   · clopidogrel 75 mg Oral Daily   · aspirin 81 mg Oral Daily   · sodium chloride flush 10 mL Intravenous 2 times per day   · atorvastatin 40 mg Oral Nightly   · insulin lispro 0-18 Units Subcutaneous TID WC   · insulin lispro 0-9 Units Subcutaneous Nightly   · azaTHIOprine 50 mg Oral BID   · predniSONE 15 mg Oral Daily   · pantoprazole 40 mg Oral Daily   · pregabalin 50 mg Oral BID   · nystatin 5 mL Oral 4x Daily   · insulin glargine 30 Units Subcutaneous Daily      Continuous Infusions:   Infusions Meds   · dextrose       PRN Medications   sodium chloride flush, magnesium hydroxide, ondansetron, potassium chloride **OR** potassium alternative oral replacement **OR** potassium chloride, magnesium sulfate, nitroGLYCERIN, oxyCODONE **OR** oxyCODONE, glucose, dextrose, glucagon (rDNA), dextrose   CBC:    Recent Labs     01/20/20   0815   WBC 8.0   HGB 13.2*   HCT 40.2*   MCV 85.7          BMP:    Recent Labs     01/20/20   0240 01/20/20   1312   * 131*   K 4.2 4.3   CL 89* 93*   CO2 23 24   PHOS -- 2.9   BUN 11 8   CREATININE 0.9 0.7*   GLUCOSE 630* 467*       POC GLUCOSE:     Recent Labs     01/21/20   1643 01/21/20   2137 01/22/20   0826 01/22/20   1146 01/22/20   1645   POCGLU 318* 247* 199* 288* 287*       LIVER PROFILE:    Recent Labs     01/20/20   0240   AST 11*   ALT 12   LIPASE 39.0   LABALBU 3.3*   BILITOT <0.2   ALKPHOS 141*       PT/INR: No results for input(s): PROTIME, INR in the last 72 hours. APTT:    Recent Labs     01/20/20   1312   APTT 46.2*       UA:   Recent Labs     01/20/20   0236   COLORU YELLOW   PHUR 5.5   WBCUA 6-10*   RBCUA 0-2   CLARITYU Clear   SPECGRAV >1.030   LEUKOCYTESUR Negative   UROBILINOGEN 0.2   BILIRUBINUR Negative   BLOODU TRACE*   GLUCOSEU >=1000*   KETUA Negative       Microbiology:   Wound Culture: No results for input(s): WNDABS, ORG in the last 72 hours.       INTERNAL MED NOTE:   SUBJECTIVE / Interval History:    No acute events overnight.  Patient denies any chest pain, shortness of breath.           ASSESSMENT AND PLAN:         Active Problems:     NSTEMI (non-ST elevated myocardial infarction) (HCC)     Diabetes mellitus (HCC)     Hypertension     Peripheral neuropathy     Obesity due to excess calories     Mixed hyperlipidemia     Hyperglycemia     Sjogren's syndrome ([facility] [address]

## 2020-03-12 ENCOUNTER — TELEPHONE (OUTPATIENT)
Dept: PAIN MANAGEMENT | Age: 60
End: 2020-03-12

## 2020-04-08 NOTE — TELEPHONE ENCOUNTER
Due to Double booking, pt appointment was cancelled and tried to reach appt two times but no answer.  LVM for patient to call back and to get rescheduled

## 2022-03-19 ENCOUNTER — APPOINTMENT (OUTPATIENT)
Dept: GENERAL RADIOLOGY | Age: 62
DRG: 194 | End: 2022-03-19
Payer: COMMERCIAL

## 2022-03-19 ENCOUNTER — HOSPITAL ENCOUNTER (INPATIENT)
Age: 62
LOS: 3 days | Discharge: NURSING, REHAB OR LONG-TERM CARE | DRG: 194 | End: 2022-03-22
Attending: EMERGENCY MEDICINE | Admitting: INTERNAL MEDICINE
Payer: COMMERCIAL

## 2022-03-19 ENCOUNTER — APPOINTMENT (OUTPATIENT)
Dept: CT IMAGING | Age: 62
DRG: 194 | End: 2022-03-19
Payer: COMMERCIAL

## 2022-03-19 DIAGNOSIS — R06.02 SHORTNESS OF BREATH: Primary | ICD-10-CM

## 2022-03-19 DIAGNOSIS — I50.33 ACUTE ON CHRONIC DIASTOLIC HEART FAILURE (HCC): ICD-10-CM

## 2022-03-19 LAB
ABO/RH: NORMAL
ANION GAP SERPL CALCULATED.3IONS-SCNC: 11 MMOL/L (ref 3–16)
ANTI-XA UNFRAC HEPARIN: >1.8 IU/ML (ref 0.3–0.7)
ANTIBODY SCREEN: NORMAL
BASE EXCESS VENOUS: 5.6 MMOL/L (ref -2–3)
BASOPHILS ABSOLUTE: 0.1 K/UL (ref 0–0.2)
BASOPHILS RELATIVE PERCENT: 1 %
BUN BLDV-MCNC: 10 MG/DL (ref 7–20)
CALCIUM SERPL-MCNC: 9.4 MG/DL (ref 8.3–10.6)
CARBOXYHEMOGLOBIN: 1.3 % (ref 0–1.5)
CHLORIDE BLD-SCNC: 98 MMOL/L (ref 99–110)
CO2: 27 MMOL/L (ref 21–32)
CREAT SERPL-MCNC: 0.8 MG/DL (ref 0.8–1.3)
EOSINOPHILS ABSOLUTE: 0.1 K/UL (ref 0–0.6)
EOSINOPHILS RELATIVE PERCENT: 1.3 %
GFR AFRICAN AMERICAN: >60
GFR NON-AFRICAN AMERICAN: >60
GLUCOSE BLD-MCNC: 103 MG/DL (ref 70–99)
GLUCOSE BLD-MCNC: 146 MG/DL (ref 70–99)
HCO3 VENOUS: 32.6 MMOL/L (ref 24–28)
HCT VFR BLD CALC: 33.4 % (ref 40.5–52.5)
HEMOGLOBIN, VEN, REDUCED: 82.6 %
HEMOGLOBIN: 10.6 G/DL (ref 13.5–17.5)
INR BLD: 2.09 (ref 0.88–1.12)
LYMPHOCYTES ABSOLUTE: 1 K/UL (ref 1–5.1)
LYMPHOCYTES RELATIVE PERCENT: 9.8 %
MCH RBC QN AUTO: 28.4 PG (ref 26–34)
MCHC RBC AUTO-ENTMCNC: 31.8 G/DL (ref 31–36)
MCV RBC AUTO: 89.5 FL (ref 80–100)
METHEMOGLOBIN VENOUS: 0.8 % (ref 0–1.5)
MONOCYTES ABSOLUTE: 0.6 K/UL (ref 0–1.3)
MONOCYTES RELATIVE PERCENT: 5.8 %
NEUTROPHILS ABSOLUTE: 8.4 K/UL (ref 1.7–7.7)
NEUTROPHILS RELATIVE PERCENT: 82.1 %
O2 SAT, VEN: 16 %
PCO2, VEN: 60.1 MMHG (ref 41–51)
PDW BLD-RTO: 17.1 % (ref 12.4–15.4)
PERFORMED ON: ABNORMAL
PH VENOUS: 7.34 (ref 7.35–7.45)
PLATELET # BLD: 303 K/UL (ref 135–450)
PMV BLD AUTO: 7.1 FL (ref 5–10.5)
PO2, VEN: <30 MMHG (ref 25–40)
POTASSIUM REFLEX MAGNESIUM: 4.2 MMOL/L (ref 3.5–5.1)
PRO-BNP: 1200 PG/ML (ref 0–124)
PROTHROMBIN TIME: 24.3 SEC (ref 9.9–12.7)
RBC # BLD: 3.73 M/UL (ref 4.2–5.9)
SODIUM BLD-SCNC: 136 MMOL/L (ref 136–145)
TCO2 CALC VENOUS: 35 MMOL/L
TROPONIN: 0.07 NG/ML
TROPONIN: 0.09 NG/ML
TROPONIN: 0.11 NG/ML
WBC # BLD: 10.2 K/UL (ref 4–11)

## 2022-03-19 PROCEDURE — 99284 EMERGENCY DEPT VISIT MOD MDM: CPT

## 2022-03-19 PROCEDURE — 80048 BASIC METABOLIC PNL TOTAL CA: CPT

## 2022-03-19 PROCEDURE — U0005 INFEC AGEN DETEC AMPLI PROBE: HCPCS

## 2022-03-19 PROCEDURE — 2060000000 HC ICU INTERMEDIATE R&B

## 2022-03-19 PROCEDURE — 2580000003 HC RX 258: Performed by: STUDENT IN AN ORGANIZED HEALTH CARE EDUCATION/TRAINING PROGRAM

## 2022-03-19 PROCEDURE — 36415 COLL VENOUS BLD VENIPUNCTURE: CPT

## 2022-03-19 PROCEDURE — 86901 BLOOD TYPING SEROLOGIC RH(D): CPT

## 2022-03-19 PROCEDURE — 85520 HEPARIN ASSAY: CPT

## 2022-03-19 PROCEDURE — U0003 INFECTIOUS AGENT DETECTION BY NUCLEIC ACID (DNA OR RNA); SEVERE ACUTE RESPIRATORY SYNDROME CORONAVIRUS 2 (SARS-COV-2) (CORONAVIRUS DISEASE [COVID-19]), AMPLIFIED PROBE TECHNIQUE, MAKING USE OF HIGH THROUGHPUT TECHNOLOGIES AS DESCRIBED BY CMS-2020-01-R: HCPCS

## 2022-03-19 PROCEDURE — 96374 THER/PROPH/DIAG INJ IV PUSH: CPT

## 2022-03-19 PROCEDURE — 85025 COMPLETE CBC W/AUTO DIFF WBC: CPT

## 2022-03-19 PROCEDURE — 6370000000 HC RX 637 (ALT 250 FOR IP): Performed by: STUDENT IN AN ORGANIZED HEALTH CARE EDUCATION/TRAINING PROGRAM

## 2022-03-19 PROCEDURE — 6360000002 HC RX W HCPCS: Performed by: STUDENT IN AN ORGANIZED HEALTH CARE EDUCATION/TRAINING PROGRAM

## 2022-03-19 PROCEDURE — 82803 BLOOD GASES ANY COMBINATION: CPT

## 2022-03-19 PROCEDURE — 85610 PROTHROMBIN TIME: CPT

## 2022-03-19 PROCEDURE — 71260 CT THORAX DX C+: CPT

## 2022-03-19 PROCEDURE — 93005 ELECTROCARDIOGRAM TRACING: CPT | Performed by: STUDENT IN AN ORGANIZED HEALTH CARE EDUCATION/TRAINING PROGRAM

## 2022-03-19 PROCEDURE — 6360000004 HC RX CONTRAST MEDICATION: Performed by: INTERNAL MEDICINE

## 2022-03-19 PROCEDURE — 84484 ASSAY OF TROPONIN QUANT: CPT

## 2022-03-19 PROCEDURE — 86850 RBC ANTIBODY SCREEN: CPT

## 2022-03-19 PROCEDURE — 83880 ASSAY OF NATRIURETIC PEPTIDE: CPT

## 2022-03-19 PROCEDURE — 86900 BLOOD TYPING SEROLOGIC ABO: CPT

## 2022-03-19 PROCEDURE — 71046 X-RAY EXAM CHEST 2 VIEWS: CPT

## 2022-03-19 RX ORDER — FUROSEMIDE 10 MG/ML
20 INJECTION INTRAMUSCULAR; INTRAVENOUS DAILY
Status: DISCONTINUED | OUTPATIENT
Start: 2022-03-19 | End: 2022-03-22

## 2022-03-19 RX ORDER — ATORVASTATIN CALCIUM 40 MG/1
40 TABLET, FILM COATED ORAL NIGHTLY
Status: DISCONTINUED | OUTPATIENT
Start: 2022-03-19 | End: 2022-03-22 | Stop reason: HOSPADM

## 2022-03-19 RX ORDER — ONDANSETRON 2 MG/ML
4 INJECTION INTRAMUSCULAR; INTRAVENOUS EVERY 6 HOURS PRN
Status: DISCONTINUED | OUTPATIENT
Start: 2022-03-19 | End: 2022-03-22 | Stop reason: HOSPADM

## 2022-03-19 RX ORDER — ACETAMINOPHEN 325 MG/1
650 TABLET ORAL EVERY 6 HOURS PRN
Status: DISCONTINUED | OUTPATIENT
Start: 2022-03-19 | End: 2022-03-22 | Stop reason: HOSPADM

## 2022-03-19 RX ORDER — SODIUM CHLORIDE 0.9 % (FLUSH) 0.9 %
5-40 SYRINGE (ML) INJECTION PRN
Status: DISCONTINUED | OUTPATIENT
Start: 2022-03-19 | End: 2022-03-22 | Stop reason: HOSPADM

## 2022-03-19 RX ORDER — POLYETHYLENE GLYCOL 3350 17 G/17G
17 POWDER, FOR SOLUTION ORAL DAILY PRN
Status: DISCONTINUED | OUTPATIENT
Start: 2022-03-19 | End: 2022-03-22 | Stop reason: HOSPADM

## 2022-03-19 RX ORDER — FAMOTIDINE 20 MG/1
20 TABLET, FILM COATED ORAL 2 TIMES DAILY
Status: DISCONTINUED | OUTPATIENT
Start: 2022-03-19 | End: 2022-03-22 | Stop reason: HOSPADM

## 2022-03-19 RX ORDER — OXYCODONE AND ACETAMINOPHEN 10; 325 MG/1; MG/1
1 TABLET ORAL EVERY 6 HOURS PRN
Status: DISCONTINUED | OUTPATIENT
Start: 2022-03-19 | End: 2022-03-21

## 2022-03-19 RX ORDER — FUROSEMIDE 40 MG/1
20 TABLET ORAL DAILY
Status: CANCELLED | OUTPATIENT
Start: 2022-03-20

## 2022-03-19 RX ORDER — NICOTINE POLACRILEX 4 MG
15 LOZENGE BUCCAL PRN
Status: DISCONTINUED | OUTPATIENT
Start: 2022-03-19 | End: 2022-03-19

## 2022-03-19 RX ORDER — OXYCODONE AND ACETAMINOPHEN 10; 325 MG/1; MG/1
1 TABLET ORAL EVERY 4 HOURS PRN
COMMUNITY

## 2022-03-19 RX ORDER — DEXTROSE MONOHYDRATE 50 MG/ML
100 INJECTION, SOLUTION INTRAVENOUS PRN
Status: DISCONTINUED | OUTPATIENT
Start: 2022-03-19 | End: 2022-03-22 | Stop reason: HOSPADM

## 2022-03-19 RX ORDER — ONDANSETRON 4 MG/1
4 TABLET, ORALLY DISINTEGRATING ORAL EVERY 8 HOURS PRN
Status: DISCONTINUED | OUTPATIENT
Start: 2022-03-19 | End: 2022-03-22 | Stop reason: HOSPADM

## 2022-03-19 RX ORDER — INSULIN LISPRO 100 [IU]/ML
0-3 INJECTION, SOLUTION INTRAVENOUS; SUBCUTANEOUS NIGHTLY
Status: DISCONTINUED | OUTPATIENT
Start: 2022-03-19 | End: 2022-03-22 | Stop reason: HOSPADM

## 2022-03-19 RX ORDER — SODIUM CHLORIDE 9 MG/ML
25 INJECTION, SOLUTION INTRAVENOUS PRN
Status: DISCONTINUED | OUTPATIENT
Start: 2022-03-19 | End: 2022-03-22 | Stop reason: HOSPADM

## 2022-03-19 RX ORDER — OXYCODONE HYDROCHLORIDE 5 MG/1
10 TABLET ORAL ONCE
Status: COMPLETED | OUTPATIENT
Start: 2022-03-19 | End: 2022-03-19

## 2022-03-19 RX ORDER — FUROSEMIDE 10 MG/ML
20 INJECTION INTRAMUSCULAR; INTRAVENOUS ONCE
Status: COMPLETED | OUTPATIENT
Start: 2022-03-19 | End: 2022-03-19

## 2022-03-19 RX ORDER — DEXTROSE MONOHYDRATE 25 G/50ML
12.5 INJECTION, SOLUTION INTRAVENOUS PRN
Status: DISCONTINUED | OUTPATIENT
Start: 2022-03-19 | End: 2022-03-19

## 2022-03-19 RX ORDER — ACETAMINOPHEN 650 MG/1
650 SUPPOSITORY RECTAL EVERY 6 HOURS PRN
Status: DISCONTINUED | OUTPATIENT
Start: 2022-03-19 | End: 2022-03-22 | Stop reason: HOSPADM

## 2022-03-19 RX ORDER — SODIUM CHLORIDE 0.9 % (FLUSH) 0.9 %
5-40 SYRINGE (ML) INJECTION EVERY 12 HOURS SCHEDULED
Status: DISCONTINUED | OUTPATIENT
Start: 2022-03-19 | End: 2022-03-22 | Stop reason: HOSPADM

## 2022-03-19 RX ORDER — ASPIRIN 81 MG/1
81 TABLET ORAL DAILY
Status: DISCONTINUED | OUTPATIENT
Start: 2022-03-20 | End: 2022-03-22 | Stop reason: HOSPADM

## 2022-03-19 RX ORDER — INSULIN LISPRO 100 [IU]/ML
0-6 INJECTION, SOLUTION INTRAVENOUS; SUBCUTANEOUS
Status: DISCONTINUED | OUTPATIENT
Start: 2022-03-20 | End: 2022-03-22 | Stop reason: HOSPADM

## 2022-03-19 RX ADMIN — FUROSEMIDE 20 MG: 10 INJECTION, SOLUTION INTRAMUSCULAR; INTRAVENOUS at 21:45

## 2022-03-19 RX ADMIN — SODIUM CHLORIDE, PRESERVATIVE FREE 10 ML: 5 INJECTION INTRAVENOUS at 21:45

## 2022-03-19 RX ADMIN — ATORVASTATIN CALCIUM 40 MG: 40 TABLET, FILM COATED ORAL at 21:45

## 2022-03-19 RX ADMIN — OXYCODONE AND ACETAMINOPHEN 1 TABLET: 10; 325 TABLET ORAL at 21:45

## 2022-03-19 RX ADMIN — FUROSEMIDE 20 MG: 10 INJECTION, SOLUTION INTRAMUSCULAR; INTRAVENOUS at 17:11

## 2022-03-19 RX ADMIN — IOPAMIDOL 80 ML: 755 INJECTION, SOLUTION INTRAVENOUS at 19:48

## 2022-03-19 RX ADMIN — OXYCODONE 10 MG: 5 TABLET ORAL at 17:11

## 2022-03-19 RX ADMIN — FAMOTIDINE 20 MG: 20 TABLET, FILM COATED ORAL at 21:45

## 2022-03-19 ASSESSMENT — PAIN SCALES - GENERAL
PAINLEVEL_OUTOF10: 7
PAINLEVEL_OUTOF10: 0
PAINLEVEL_OUTOF10: 0
PAINLEVEL_OUTOF10: 8
PAINLEVEL_OUTOF10: 7

## 2022-03-19 ASSESSMENT — ENCOUNTER SYMPTOMS
SHORTNESS OF BREATH: 1
BACK PAIN: 0
NAUSEA: 1
COUGH: 1
CHEST TIGHTNESS: 1

## 2022-03-19 ASSESSMENT — PAIN DESCRIPTION - ORIENTATION: ORIENTATION: RIGHT

## 2022-03-19 ASSESSMENT — PAIN DESCRIPTION - LOCATION: LOCATION: KNEE

## 2022-03-19 ASSESSMENT — PAIN DESCRIPTION - DESCRIPTORS: DESCRIPTORS: ACHING;THROBBING

## 2022-03-19 ASSESSMENT — PAIN DESCRIPTION - FREQUENCY: FREQUENCY: CONTINUOUS

## 2022-03-19 ASSESSMENT — PAIN DESCRIPTION - PAIN TYPE: TYPE: ACUTE PAIN;SURGICAL PAIN

## 2022-03-19 NOTE — ED PROVIDER NOTES
[id number] RESIDENT NOTE       Date of evaluation: 3/19/2022    Chief Complaint     Shortness of Breath (intermittent for a couple days while laying down, hx CHF, +anxiety, +weight gain)      of Present Illness     Anda Collet is a 59 y.o. male with h/o atrial fibrillation and DVT on xarelto, HFpEF, type II diabetes, GERD, hypertension, coronary artery disease with MI, anemia, obesity, polymyositis, and sjogren's syndrome on mycophenolate and prednisone who presents to the emergency department via EMS from home for shortness of breath. Patient had a right total knee replacement approximately 1 month ago and was discharged to an inpatient rehab facility. There, he was noted to have a DVT in the right lower extremity and was transitioned from heparin back onto his Xarelto. He was dissatisfied with the care that he was receiving at this facility and was discharged home yesterday with the plan for home health care. He feels that he is having difficulty caring for himself at home alone. He also notes shortness of breath when lying on his right side. He states that this was occurring while he was in the inpatient facility but has not been worked up. He denies any associated chest pain and has not had any fever, chills, cough, hemoptysis, lightheadedness, or syncope. He has no recent sick contacts. He is vaccinated for Covid and flu. He denies any other associated symptoms and has not identified any other exacerbating or alleviating factors. He believes he may have missed a few doses of his medications while he was in the rehab facility. Review of Systems     Positive for shortness of breath. Negative for fever, chills, chest pain, abdominal pain, nausea, vomiting, diarrhea, pedal edema. All other symptoms as mentioned previously in the HPI.     Past Medical, Surgical, Family, and Social History     He has a past medical history of Arthritis, Depression, Diabetes mellitus ([facility] [address] Difficult intubation, Dyslipidemia, Herniated disc, cervical, Hypertension, Osteoporosis, and Peripheral neuropathy. He has a past surgical history that includes hernia repair; Breast surgery (Left, 8/7/2019); and Muscle biopsy (Left, 10/10/2019). His family history includes Breast Cancer in his brother; Heart Disease in his brother and mother; High Blood Pressure in his brother, father, mother, and sister. He reports that he has never smoked. He has never used smokeless tobacco. He reports current alcohol use. He reports that he does not use drugs. Medications     Previous Medications    ASPIRIN 81 MG TABLET    Take 81 mg by mouth daily    ATORVASTATIN (LIPITOR) 40 MG TABLET    Take 1 tablet by mouth nightly    AZATHIOPRINE (IMURAN) 50 MG TABLET    Take 50 mg by mouth 2 times daily    CLOPIDOGREL (PLAVIX) 75 MG TABLET    Take 1 tablet by mouth daily    FUROSEMIDE (LASIX) 20 MG TABLET    Take 1 tablet by mouth daily    INSULIN GLARGINE (LANTUS) 100 UNIT/ML INJECTION VIAL    Inject 40 Units into the skin daily    INSULIN LISPRO (HUMALOG) 100 UNIT/ML INJECTION VIAL    Inject 0-18 Units into the skin 3 times daily (with meals) **High Dose Corrective Algorithm** Glucose: Dose:              No Insulin 140-199           3 Units 200-249 6 Units 250-299 9 Units 300-349 12 Units 350-400 15 Units Over 400 18 Units    INSULIN LISPRO (HUMALOG) 100 UNIT/ML INJECTION VIAL    Inject 8 Units into the skin 3 times daily (with meals)    PANTOPRAZOLE (PROTONIX) 40 MG TABLET    Take 40 mg by mouth daily    POTASSIUM CHLORIDE (KLOR-CON M) 20 MEQ EXTENDED RELEASE TABLET    Take 1 tablet by mouth daily    PREGABALIN (LYRICA) 50 MG CAPSULE    Take 1 capsule by mouth 2 times daily for 30 days. VITAMIN B-12 (CYANOCOBALAMIN) 1000 MCG TABLET    Take 1 tablet by mouth daily       Allergies     He is allergic to lisinopril, bee venom, and other.     Physical Exam     INITIAL VITALS: BP: (!) 154/73, Temp: 98.4 °F (36.9 °C), Pulse: 60, Resp: 22, SpO2: 100 %     Physical exam  General: well-appearing, no acute distress  HEENT: no discharge from the eyes or nose. OP clear. MMM. Cardiovascular: regular rate and rhythm, no murmurs. Strong pulses in all 4 extremities. Pulmonary: non-labored breathing, normal lung sounds, speaks in full sentences  Abdominal: soft, non-tender, non-distended  Musculoskeletal: no long bone deformity. There is no obvious effusion to the right knee. Surgical incision c/d/i/   Extremities: 1+ pitting edema BL  Skin: dry, no rashes or lesions. Surgical incision to the right knee c/d/i  Neuro: alert and oriented, speech and mentation normal, no focal deficits    DiagnosticResults     EKG   Interpreted in conjunction with emergencydepartment physician Francis Beach MD    Atrial fibrillation a rate of 65 bpm.  Left axis deviation. Left bundle branch block. No STEMI. When compared with previous EKG in our system from 2020, left bundle branch block is new, however it is documented on multiple outside hospital EKGs. RADIOLOGY:  No orders to display       LABS:   No results found for this visit on 03/19/22. ED BEDSIDE ULTRASOUND:    Bedside TTE is limited by body habitus but shows grossly normal LVEF with no obvious pericardial effusion and minimally collapsible IVC. RECENT VITALS:  BP: (!) 154/73, Temp: 98.4 °F (36.9 °C), Pulse: 60,Resp: 22, SpO2: 100 %     Procedures       ED Course     Nursing Notes, Past Medical Hx, Past Surgical Hx, Social Hx, Allergies, and Family Hx were reviewed. The patient was given the followingmedications:  No orders of the defined types were placed in this encounter. CONSULTS:  None    MEDICAL DECISION MAKING / ASSESSMENT / Shade Sender is a 59 y.o. male who presents to the emergency department for shortness of breath. Vital signs are stable. SPO2 is 100% on room air and the patient is in no respiratory distress.   On examination, the lungs are clear bilaterally and he speaks in full sentences. EKG shows no evidence of acute ischemia. Chest x-ray is performed and is unremarkable. Hemoglobin is stable at his baseline and he has no active bleeding. BNP is elevated to 1200, clinically the patient appears volume overloaded with bilateral pedal edema. Troponin is mildly elevated 0.09 with repeat downtrending to 0.07. Low suspicion for ACS. Antiten a level is therapeutic therefore PE is thought to be less likely. Bedside echo shows no evidence of pericardial effusion and grossly normal LVEF, although it is significantly limited by body habitus. At this time, I suspect that the patient has mild volume overload secondary to known history of HFpEF, which may be related to missed doses of his home diuretics. He was also tested for COVID-19 which is pending at this time. He will be admitted for optimization of his volume status. The patient will also require evaluation by PT/OT given his recent surgery and reports that he is having difficulty caring for himself at home. All questions were answered appropriately. Patient verbalized understanding and agreement with the above treatment plan. This patient was also evaluated by the attending physician. All care plans were discussed and agreed upon. Clinical Impression     1. Shortness of breath        Disposition     PATIENT REFERRED TO:  No follow-up provider specified.     DISCHARGE MEDICATIONS:  New Prescriptions    No medications on file       Maria Fernanda Garza MD  Resident  03/19/22 2025

## 2022-03-19 NOTE — ED PROVIDER NOTES
ED Attending Attestation Note     Date of evaluation: 3/19/2022    This patient was seen by the resident. I have seen and examined the patient, agree with the workup, evaluation, management and diagnosis. The care plan has been discussed. I have reviewed the ECG and concur with the resident's interpretation. My assessment reveals a 78-year-old male patient who presents with lower extremity swelling and shortness of breath. Patient had extensive recent hospitalization following his right total knee arthroplasty earlier this month. He reports that he was discharged to a SNF but recently was discharged home. He states that at home he has had increasing shortness of breath and has had a significant difficulty getting around his home. His right leg is swollen compared to his left however he states the swelling seems to have slightly gone down. He is on diuretics and anticoagulation.      Adriano Deras MD  03/19/22 0712

## 2022-03-19 NOTE — H&P
Internal Medicine  PGY 1  History & Physical      CC SOB    History Obtained From:  patient, electronic medical record    HISTORY OF PRESENT ILLNESS:  Pt is a 58 y/o male with a PMHx of CHF, DMT2, Atrial fibrillation, aortic aneurysm, HTN, HLD, CAD, poliomyelitis, Sjogren's and recent R knee replacement (2/24/2022) with subsequent DVT of of the R soleal vein who presented to the hospital for SOB. Pt had R knee replaced at an OSH on 2/24/22, following his surgery he was sent to a SNF, where he developed a DVT on 3/08/22 and was sent back to the OSH for with heparin and transitioned to [place]. Pt states that his SOB is intermittent as it comes and goes, and is worse when he lies on his left side. Pt also endorses chest tenderness in one spot near his left breast, he describes it as a dull ache, and that pushing on it makes it worse. He also endorses the symptoms of cough, nausea and calf pain. The pt also endorses inability to walk, he states that he did not receive very much physical therapy when he was at the SNF. In addition to this, the pt stated that he was suppose to have home health upon discharge from his most recent hospital stay, but no one showed up to his place the following day. In the ED, the pt was afebrile, hemodynamically stable, and sating well on room air. His labs displayed a pro-BNP 1,200, troponin were 0.09, 0.07, INR 2.09, AntiWBC was 10.2, and his CXR was unrevealing. An EKG displayed a LBBB, but it ws reported to be observed on a EKG from [address] [place] [address] on [id number] as well. Pt to undergo a CTAP to r/o PE and will be admitted to the hospital for his SOB.     Past Medical History:        Diagnosis Date    Arthritis     Depression     Diabetes mellitus ([address]     Difficult intubation     throat swelled    Dyslipidemia     Herniated disc, cervical     Hypertension     Osteoporosis     Peripheral neuropathy        Past Surgical History:        Procedure Laterality Date    BREAST SURGERY Left 8/7/2019    LEFT BREAST MASS EXCISION; LEFT AXILLIARY LYMPH NODE EXCISION performed by Van Martinez MD at [address] Left 10/10/2019    MUSCLE BIOPSY LOWER EXTREMITY LEFT performed by Van Martinez MD at [facility] Admission:    Not in a hospital admission. Allergies:  Lisinopril, Bee venom, and Other    Social History:   · TOBACCO:   reports that he has never smoked. He has never used smokeless tobacco.  · ETOH:   reports current alcohol use. · DRUGS : No  · Patient currently [facility] by himself at [facility]. ·   Family History:       Problem Relation Age of Onset    Heart Disease Mother     High Blood Pressure Mother     High Blood Pressure Father     High Blood Pressure Sister     Breast Cancer Brother     Heart Disease Brother     High Blood Pressure Brother        Review of Systems   Constitutional: Negative for chills and fever. Eyes: Negative for visual disturbance. Respiratory: Positive for cough, chest tightness and shortness of breath. Cardiovascular: Positive for leg swelling. Negative for chest pain and palpitations. Gastrointestinal: Positive for nausea. Musculoskeletal: Positive for myalgias. Negative for arthralgias and back pain. Neurological: Negative for headaches. ROS: A 10 point review of systems was conducted, significant findings as noted in HPI. Physical Exam  Constitutional:       General: He is not in acute distress. Appearance: Normal appearance. He is obese. He is not ill-appearing, toxic-appearing or diaphoretic. HENT:      Head: Normocephalic and atraumatic. Eyes:      Extraocular Movements: Extraocular movements intact. Cardiovascular:      Rate and Rhythm: Normal rate and regular rhythm. Heart sounds: No murmur heard. No friction rub. No gallop. Pulmonary:      Breath sounds: No wheezing, rhonchi or rales.    Abdominal:      General: [facility] (3/15/22) and was recommended OP ischemic workup. DMT2: LDSSI  CAD: Cont ASA 81 mg daily  HTN: No home medication listed  Aortic Aneurysm: Pt states it is 3.0 CM  Sjogrens: On cellcept and Mycophenolate at home. Please verify medrec and restart medications. Polymyositis: Chronic, pt has a hx of. Will discuss with attending physician Dr. Tyrel Pope. Code Status:Full code  FEN: ADULT DIET; Regular; 3 carb choices (45 gm/meal);  Low Fat/Low Chol/High Fiber/USAMA; Low Sodium (2 gm)  PPX: Nayana Smith  DISPO: Bentley Piedra DO, PGY-1  3/19/2022,  8:20 PM

## 2022-03-19 NOTE — ED NOTES
Food tray provided after patient request to eat and order okayed and verified by physician. Patient in high-fowlers with bedside table in place, assisted with food arrangement and opened all containers.       Anca Ryan RN  03/19/22 1935

## 2022-03-20 LAB
ANION GAP SERPL CALCULATED.3IONS-SCNC: 17 MMOL/L (ref 3–16)
BASOPHILS ABSOLUTE: 0.1 K/UL (ref 0–0.2)
BASOPHILS RELATIVE PERCENT: 0.8 %
BUN BLDV-MCNC: 14 MG/DL (ref 7–20)
CALCIUM SERPL-MCNC: 9.3 MG/DL (ref 8.3–10.6)
CHLORIDE BLD-SCNC: 95 MMOL/L (ref 99–110)
CO2: 23 MMOL/L (ref 21–32)
CREAT SERPL-MCNC: 0.9 MG/DL (ref 0.8–1.3)
EKG ATRIAL RATE: 250 BPM
EKG DIAGNOSIS: NORMAL
EKG Q-T INTERVAL: 404 MS
EKG QRS DURATION: 146 MS
EKG QTC CALCULATION (BAZETT): 420 MS
EKG R AXIS: -46 DEGREES
EKG T AXIS: 107 DEGREES
EKG VENTRICULAR RATE: 65 BPM
EOSINOPHILS ABSOLUTE: 0.1 K/UL (ref 0–0.6)
EOSINOPHILS RELATIVE PERCENT: 1.3 %
GFR AFRICAN AMERICAN: >60
GFR NON-AFRICAN AMERICAN: >60
GLUCOSE BLD-MCNC: 100 MG/DL (ref 70–99)
GLUCOSE BLD-MCNC: 113 MG/DL (ref 70–99)
GLUCOSE BLD-MCNC: 131 MG/DL (ref 70–99)
GLUCOSE BLD-MCNC: 136 MG/DL (ref 70–99)
GLUCOSE BLD-MCNC: 151 MG/DL (ref 70–99)
HCT VFR BLD CALC: 33.2 % (ref 40.5–52.5)
HEMOGLOBIN: 10.5 G/DL (ref 13.5–17.5)
LYMPHOCYTES ABSOLUTE: 1.3 K/UL (ref 1–5.1)
LYMPHOCYTES RELATIVE PERCENT: 10.9 %
MAGNESIUM: 1.7 MG/DL (ref 1.8–2.4)
MCH RBC QN AUTO: 28 PG (ref 26–34)
MCHC RBC AUTO-ENTMCNC: 31.5 G/DL (ref 31–36)
MCV RBC AUTO: 88.7 FL (ref 80–100)
MONOCYTES ABSOLUTE: 0.9 K/UL (ref 0–1.3)
MONOCYTES RELATIVE PERCENT: 8.2 %
NEUTROPHILS ABSOLUTE: 9.1 K/UL (ref 1.7–7.7)
NEUTROPHILS RELATIVE PERCENT: 78.8 %
PDW BLD-RTO: 17 % (ref 12.4–15.4)
PERFORMED ON: ABNORMAL
PLATELET # BLD: 319 K/UL (ref 135–450)
PMV BLD AUTO: 7.2 FL (ref 5–10.5)
POTASSIUM SERPL-SCNC: 4.1 MMOL/L (ref 3.5–5.1)
RBC # BLD: 3.75 M/UL (ref 4.2–5.9)
REASON FOR REJECTION: NORMAL
REJECTED TEST: NORMAL
SARS-COV-2: NOT DETECTED
SODIUM BLD-SCNC: 135 MMOL/L (ref 136–145)
TROPONIN: 0.07 NG/ML
TROPONIN: 0.08 NG/ML
WBC # BLD: 11.6 K/UL (ref 4–11)

## 2022-03-20 PROCEDURE — 6360000002 HC RX W HCPCS: Performed by: STUDENT IN AN ORGANIZED HEALTH CARE EDUCATION/TRAINING PROGRAM

## 2022-03-20 PROCEDURE — 83735 ASSAY OF MAGNESIUM: CPT

## 2022-03-20 PROCEDURE — 84484 ASSAY OF TROPONIN QUANT: CPT

## 2022-03-20 PROCEDURE — 80048 BASIC METABOLIC PNL TOTAL CA: CPT

## 2022-03-20 PROCEDURE — 6370000000 HC RX 637 (ALT 250 FOR IP): Performed by: STUDENT IN AN ORGANIZED HEALTH CARE EDUCATION/TRAINING PROGRAM

## 2022-03-20 PROCEDURE — 99223 1ST HOSP IP/OBS HIGH 75: CPT | Performed by: INTERNAL MEDICINE

## 2022-03-20 PROCEDURE — 2580000003 HC RX 258: Performed by: STUDENT IN AN ORGANIZED HEALTH CARE EDUCATION/TRAINING PROGRAM

## 2022-03-20 PROCEDURE — 85025 COMPLETE CBC W/AUTO DIFF WBC: CPT

## 2022-03-20 PROCEDURE — 36415 COLL VENOUS BLD VENIPUNCTURE: CPT

## 2022-03-20 PROCEDURE — 2060000000 HC ICU INTERMEDIATE R&B

## 2022-03-20 RX ORDER — FUROSEMIDE 20 MG/1
20 TABLET ORAL DAILY
Status: ON HOLD | COMMUNITY
End: 2022-03-22 | Stop reason: HOSPADM

## 2022-03-20 RX ORDER — ACETAMINOPHEN 325 MG/1
650 TABLET ORAL EVERY 6 HOURS PRN
COMMUNITY

## 2022-03-20 RX ORDER — TAMSULOSIN HYDROCHLORIDE 0.4 MG/1
0.4 CAPSULE ORAL DAILY
COMMUNITY

## 2022-03-20 RX ORDER — METHOCARBAMOL 500 MG/1
500 TABLET, FILM COATED ORAL EVERY 4 HOURS PRN
Status: DISCONTINUED | OUTPATIENT
Start: 2022-03-20 | End: 2022-03-22 | Stop reason: HOSPADM

## 2022-03-20 RX ORDER — M-VIT,TX,IRON,MINS/CALC/FOLIC 27MG-0.4MG
1 TABLET ORAL DAILY
COMMUNITY

## 2022-03-20 RX ORDER — PREDNISONE 1 MG/1
5 TABLET ORAL DAILY
Status: ON HOLD | COMMUNITY
End: 2022-03-22 | Stop reason: SDUPTHER

## 2022-03-20 RX ORDER — INSULIN GLARGINE 100 [IU]/ML
10 INJECTION, SOLUTION SUBCUTANEOUS DAILY PRN
COMMUNITY

## 2022-03-20 RX ORDER — SPIRONOLACTONE 25 MG/1
25 TABLET ORAL DAILY
COMMUNITY

## 2022-03-20 RX ORDER — PREGABALIN 75 MG/1
75 CAPSULE ORAL 2 TIMES DAILY
COMMUNITY

## 2022-03-20 RX ORDER — METHOCARBAMOL 500 MG/1
500 TABLET, FILM COATED ORAL EVERY 6 HOURS PRN
COMMUNITY

## 2022-03-20 RX ORDER — FERROUS SULFATE 325(65) MG
325 TABLET ORAL
COMMUNITY

## 2022-03-20 RX ORDER — MYCOPHENOLATE MOFETIL 500 MG/1
TABLET ORAL 3 TIMES DAILY
COMMUNITY

## 2022-03-20 RX ORDER — FOLIC ACID 1 MG/1
1 TABLET ORAL DAILY
COMMUNITY

## 2022-03-20 RX ADMIN — OXYCODONE AND ACETAMINOPHEN 1 TABLET: 10; 325 TABLET ORAL at 19:57

## 2022-03-20 RX ADMIN — OXYCODONE AND ACETAMINOPHEN 1 TABLET: 10; 325 TABLET ORAL at 01:54

## 2022-03-20 RX ADMIN — ATORVASTATIN CALCIUM 40 MG: 40 TABLET, FILM COATED ORAL at 19:59

## 2022-03-20 RX ADMIN — RIVAROXABAN 15 MG: 15 TABLET, FILM COATED ORAL at 17:39

## 2022-03-20 RX ADMIN — OXYCODONE AND ACETAMINOPHEN 1 TABLET: 10; 325 TABLET ORAL at 12:35

## 2022-03-20 RX ADMIN — SODIUM CHLORIDE, PRESERVATIVE FREE 10 ML: 5 INJECTION INTRAVENOUS at 20:17

## 2022-03-20 RX ADMIN — SODIUM CHLORIDE, PRESERVATIVE FREE 10 ML: 5 INJECTION INTRAVENOUS at 09:47

## 2022-03-20 RX ADMIN — ASPIRIN 81 MG: 81 TABLET, COATED ORAL at 09:44

## 2022-03-20 RX ADMIN — RIVAROXABAN 15 MG: 15 TABLET, FILM COATED ORAL at 09:44

## 2022-03-20 RX ADMIN — OXYCODONE AND ACETAMINOPHEN 1 TABLET: 10; 325 TABLET ORAL at 06:16

## 2022-03-20 RX ADMIN — FUROSEMIDE 20 MG: 10 INJECTION, SOLUTION INTRAMUSCULAR; INTRAVENOUS at 09:44

## 2022-03-20 ASSESSMENT — PAIN SCALES - GENERAL
PAINLEVEL_OUTOF10: 5
PAINLEVEL_OUTOF10: 10
PAINLEVEL_OUTOF10: 0
PAINLEVEL_OUTOF10: 5
PAINLEVEL_OUTOF10: 5
PAINLEVEL_OUTOF10: 7
PAINLEVEL_OUTOF10: 7
PAINLEVEL_OUTOF10: 8
PAINLEVEL_OUTOF10: 5
PAINLEVEL_OUTOF10: 4
PAINLEVEL_OUTOF10: 0
PAINLEVEL_OUTOF10: 4
PAINLEVEL_OUTOF10: 0

## 2022-03-20 ASSESSMENT — PAIN DESCRIPTION - ORIENTATION
ORIENTATION: RIGHT

## 2022-03-20 ASSESSMENT — PAIN DESCRIPTION - PAIN TYPE
TYPE: ACUTE PAIN;SURGICAL PAIN
TYPE: ACUTE PAIN;SURGICAL PAIN
TYPE: ACUTE PAIN
TYPE: ACUTE PAIN;SURGICAL PAIN
TYPE: ACUTE PAIN;SURGICAL PAIN

## 2022-03-20 ASSESSMENT — PAIN DESCRIPTION - FREQUENCY
FREQUENCY: CONTINUOUS

## 2022-03-20 ASSESSMENT — PAIN DESCRIPTION - DESCRIPTORS
DESCRIPTORS: ACHING;THROBBING
DESCRIPTORS: ACHING;THROBBING
DESCRIPTORS: ACHING
DESCRIPTORS: ACHING;THROBBING
DESCRIPTORS: ACHING;THROBBING

## 2022-03-20 ASSESSMENT — PAIN DESCRIPTION - LOCATION
LOCATION: KNEE

## 2022-03-20 ASSESSMENT — PAIN DESCRIPTION - ONSET
ONSET: ON-GOING

## 2022-03-20 ASSESSMENT — PAIN DESCRIPTION - PROGRESSION
CLINICAL_PROGRESSION: GRADUALLY WORSENING
CLINICAL_PROGRESSION: NOT CHANGED
CLINICAL_PROGRESSION: NOT CHANGED
CLINICAL_PROGRESSION: GRADUALLY IMPROVING

## 2022-03-20 ASSESSMENT — PAIN - FUNCTIONAL ASSESSMENT
PAIN_FUNCTIONAL_ASSESSMENT: PREVENTS OR INTERFERES SOME ACTIVE ACTIVITIES AND ADLS
PAIN_FUNCTIONAL_ASSESSMENT: PREVENTS OR INTERFERES SOME ACTIVE ACTIVITIES AND ADLS
PAIN_FUNCTIONAL_ASSESSMENT: PREVENTS OR INTERFERES WITH MANY ACTIVE NOT PASSIVE ACTIVITIES
PAIN_FUNCTIONAL_ASSESSMENT: PREVENTS OR INTERFERES SOME ACTIVE ACTIVITIES AND ADLS

## 2022-03-20 NOTE — CONSULTS
[facility]   Cardiology Consultation   Date: 3/20/2022  Admit Date:  3/19/2022  Reason for Consultation: Congestive heart failure  Consult Requesting Physician: No att. providers found     Chief Complaint   Patient presents with    Shortness of Breath     intermittent for a couple days while laying down, hx CHF, +anxiety, +weight gain     HPI: Luisito Casillas is a 59 y.o. gentleman with a past medical history significant for heart failure with preserved LV ejection fraction, paroxysmal atrial fibrillation, atrial fibrillation, hypertension, hyperlipidemia, myositis, Sjogren's syndrome was admitted to the hospital with a history of shortness of breath on lying down position. Of note, he recently underwent right knee replacement and went to SNF where he developed a DVT and sent back to the hospital for anticoagulation. Patient continues to have intermittent shortness of breath, with no clear precipitation. He feels that his shortness of breath is more worse whenever he is lying down on the right side and little bit better when he is lying on the left side and no shortness of breath whenever he is in the sitting upright position. He denies any history of chest pain. He denies any history of pedal edema. He had CardioMEMS implanted in 2021, that needs to be interrogated. He had a mild troponin leak of 0.09. His EKG showed atypical atrial flutter with 4-1 block, left bundle branch block. His EKG from [facility] March 8, 2022 was reported to be in atrial flutter with 4-1 AV block, left bundle branch block with a QRS duration of 146 ms. His EKG from the system in 2020 showed normal sinus rhythm with no left bundle branch block. Echo from March 2021 from [facility]   Left ventricle concentric hypertrophy diastolic   function unable to be assessed. Ejection fraction 53%.    Right ventricle dilated with normal systolic function   right atrium enlarged   left atrial enlargement   mitral valve mild to moderate mitral regurgitation   aortic valve trileaflet. Focal thickening of the lcc. No   stenosis/regurg   tricuspid valve trace to mild tricuspid regurgitation   pulmonary valve normal   inferior vena cava normal in size with poor insp change. No effuison   dilated ascending aorta. Past Medical History:   Diagnosis Date    Arthritis     Depression     Diabetes mellitus ([address]     Difficult intubation     throat swelled    Dyslipidemia     Herniated disc, cervical     Hypertension     Osteoporosis     Peripheral neuropathy         Past Surgical History:   Procedure Laterality Date    BREAST SURGERY Left 8/7/2019    LEFT BREAST MASS EXCISION; LEFT AXILLIARY LYMPH NODE EXCISION performed by Geovanna Burgos MD at [address]  Left 10/10/2019    MUSCLE BIOPSY LOWER EXTREMITY LEFT performed by Geovanna Burgos MD at [address]   Allergen Reactions    Lisinopril Swelling    Bee Venom Swelling    Other Itching     Itch like crazy        Social History:  Reviewed. reports that he has never smoked. He has never used smokeless tobacco. He reports current alcohol use. He reports that he does not use drugs. Family History:  Reviewed. family history includes Breast Cancer in his brother; Heart Disease in his brother and mother; High Blood Pressure in his brother, father, mother, and sister. No premature CAD. Review of System:  All other systems reviewed except for that noted above.  Pertinent negatives and positives are:     · General: negative for fever, chills   · Ophthalmic ROS: negative for - eye pain or loss of vision  · ENT ROS: negative for - headaches, sore throat   · Respiratory: negative for - cough, sputum  · Cardiovascular: Reviewed in HPI  · Gastrointestinal: negative for - abdominal pain, diarrhea, N/V  · Hematology: negative for - bleeding, blood clots, bruising or jaundice  · Genito-Urinary: negative for - Dysuria or incontinence  · Musculoskeletal: negative for - Joint swelling, muscle pain  · Neurological: negative for - confusion, dizziness, headaches   · Psychiatric: No anxiety, no depression. · Dermatological: negative for - rash    Physical Examination:  Vitals:    22 1235   BP: 109/63   Pulse: 68   Resp: 18   Temp: 98.6 °F (37 °C)   SpO2:         Intake/Output Summary (Last 24 hours) at 3/20/2022 1239  Last data filed at 3/20/2022 1149  Gross per 24 hour   Intake 850 ml   Output 3750 ml   Net -2900 ml     In: 850 [P.O.:840; I.V.:10]  Out: 3750    Wt Readings from Last 3 Encounters:   22 (!) 312 lb 6.3 oz (141.7 kg)   20 272 lb 4.3 oz (123.5 kg)   10/17/19 291 lb 9.6 oz (132.3 kg)     Temp  Av.1 °F (36.7 °C)  Min: 97.7 °F (36.5 °C)  Max: 98.6 °F (37 °C)  Pulse  Av.1  Min: 53  Max: 75  BP  Min: 98/74  Max: 149/74  SpO2  Av.9 %  Min: 78 %  Max: 96 %    · Telemetry: Atypical atrial flutter with controlled ventricular rate  · Constitutional: Alert. Oriented to person, place, and time. No distress. · Head: Normocephalic and atraumatic. · Mouth/Throat: Lips appear moist. Oropharynx is clear and moist.  · Eyes: Conjunctivae normal. EOM are normal.   · Neck: Neck supple. No lymphadenopathy. No rigidity. No JVD present. · Cardiovascular: Normal rate, regular rhythm. Normal S1&S2. Carotid pulse 2+ bilaterally. · Pulmonary/Chest: Bilateral respiratory sounds present. No respiratory accessory muscle use. No wheezes, No rhonchi. · Abdominal: Soft. Normal bowel sounds present. No distension, No tenderness. No splenomegaly. No hernia. · Musculoskeletal: No tenderness. No edema    · Lymphadenopathy: Has no cervical adenopathy. · Neurological: Alert and oriented. Cranial nerve II-XII grossly intact, No gross deficit to touch. · Skin: Skin is warm and dry. No rash, lesions, ulcerations noted. · Psychiatric: No anxiety nor agitation. Labs:  Reviewed.    Recent Labs     03/19/22  1407      K 4.2   CL 98*   CO2 27   BUN 10   CREATININE 0.8     Recent Labs     03/19/22  1407   WBC 10.2   HGB 10.6*   HCT 33.4*   MCV 89.5        Lab Results   Component Value Date    CKTOTAL 730 10/07/2019    TROPONINI 0.11 03/19/2022     No results found for: BNP  Lab Results   Component Value Date    PROTIME 24.3 03/19/2022    PROTIME 11.8 10/10/2019    PROTIME 11.7 10/03/2019    INR 2.09 03/19/2022    INR 1.04 10/10/2019    INR 1.03 10/03/2019     Lab Results   Component Value Date    CHOL 171 10/07/2019    HDL 39 10/07/2019    TRIG 296 10/07/2019       Diagnostic and imaging results reviewed. ECG: Atypical atrial flutter, controlled ventricular rate, left bundle branch block      I independently reviewed the ECG and telemetry.     Scheduled Meds:   aspirin  81 mg Oral Daily    atorvastatin  40 mg Oral Nightly    sodium chloride flush  5-40 mL IntraVENous 2 times per day    famotidine  20 mg Oral BID    insulin lispro  0-6 Units SubCUTAneous TID WC    insulin lispro  0-3 Units SubCUTAneous Nightly    furosemide  20 mg IntraVENous Daily    rivaroxaban  15 mg Oral BID WC    Followed by   Berenice Burgess ON 3/30/2022] rivaroxaban  20 mg Oral Daily     Continuous Infusions:   sodium chloride      dextrose       PRN Meds:.albuterol, sodium chloride flush, sodium chloride, ondansetron **OR** ondansetron, polyethylene glycol, acetaminophen **OR** acetaminophen, glucagon (rDNA), dextrose, dextrose bolus (hypoglycemia) **OR** dextrose bolus (hypoglycemia), glucose, oxyCODONE-acetaminophen     Assessment:   Patient Active Problem List    Diagnosis Date Noted    Acute on chronic congestive heart failure with left ventricular diastolic dysfunction ([facility] 03/19/2022    Shortness of breath 03/19/2022    Hyperglycemia 01/20/2020    Sjogren's syndrome ([facility] 01/20/2020    Aortic dilatation ([facility] 01/20/2020    Mixed hyperlipidemia     Leg swelling     Heart failure ([facility] 10/03/2019  Obesity due to excess calories     NSTEMI (non-ST elevated myocardial infarction) ([facility] 06/04/2019    Diabetes mellitus ([facility] 06/04/2019    Hypertension 06/04/2019    Peripheral neuropathy 06/04/2019    Generalized weakness 06/04/2019      Active Hospital Problems    Diagnosis Date Noted    Acute on chronic congestive heart failure with left ventricular diastolic dysfunction (HCC) [I50.33] 03/19/2022    Shortness of breath [R06.02] 03/19/2022     Recommendation(s):  1. Acute on chronic heart failure with preserved LV ejection fraction  2. Atypical atrial flutter  3. Left bundle branch block  4. Sjogren's syndrome  5. Recent DVT, on anticoagulation    Clinically, concerning for acute exacerbation of chronic congestive heart failure. Lets continue IV Lasix. Let us also get his CardioMEMS device interrogated. I will request Dr. Newman to see from tomorrow. With his background history of jugular syndrome, I am more concerned about his pulmonary hypertension. Schedule repeat transthoracic echocardiogram to evaluate his right ventricle. He is noted to be in atypical atrial flutter with controlled ventricular rate. Continue oral anticoagulation. Once he is on optimal dose of Xarelto, for at least 3 weeks, then we could consider cardioversion. Thank you for allowing me to participate in the care of Karla Hogan . If you have any questions/comments, please do not hesitate to contact us. Yani Richards MD   Cardiac Electrophysiology  [facility]  Office [phone]    For any EP related issues after 5 PM, contact Mamta Menezes on call cardiology through .

## 2022-03-20 NOTE — PLAN OF CARE
Problem: Pain:  Goal: Pain level will decrease  Description: Pain level will decrease  3/20/2022 1711 by Vern Herman RN  Outcome: Ongoing  Note: Pt remained in an upright sitting position during shift which patient stated helped ease right knee pain. Patient was offered heat and ice, but refused both. Patient obtained medication Q6 for pain relief and stated satisfaction with pain relief. Will continue to monitor. Problem: Cardiac:  Goal: Cardiovascular alteration will improve  Description: Cardiovascular alteration will improve  Outcome: Ongoing  Note: Pt was on continuous telemetry and was rate controlled afib during assessments. Patients vitals were assessed Q4 and were WNL. Patient on anticoagulants, reported no SOB or chest pain during shift. Peripheral pulse palpated +2 during each assessment. Bilateral lower edema present. Will continue to monitor for symptoms of heart failure. Problem: Falls - Risk of:  Goal: Will remain free from falls  Description: Will remain free from falls  3/20/2022 1711 by Vern Herman RN  Outcome: Ongoing  Note: Pt was instructed on appropriate call light use. Call light and possessions were placed within reach of the pt. Bed was kept locked and in its lowest position. Bed alarm was on. Pt was assisted to staff during standing. Will continue to monitor pt.

## 2022-03-20 NOTE — PLAN OF CARE
No falls noted thus far this shift, bed in lowest position, alarm on, non-skid socks on, call light within reach, hourly checks, safety maintained. Call light within reach. Telemetry showing Afib. R knee pain controlled with prn percocet. WCTM.     Problem: Falls - Risk of:  Goal: Will remain free from falls  Description: Will remain free from falls  Outcome: Ongoing     Problem: Pain:  Goal: Pain level will decrease  Description: Pain level will decrease  Outcome: Ongoing

## 2022-03-20 NOTE — PROGRESS NOTES
4 Eyes Admission Assessment     I agree as the admission nurse that 2 RN's have performed a thorough Head to Toe Skin Assessment on the patient. ALL assessment sites listed below have been assessed on admission. Areas assessed by both nurses:   [x]   Head, Face, and Ears   [x]   Shoulders, Back, and Chest  [x]   Arms, Elbows, and Hands   [x]   Coccyx, Sacrum, and Ischium  [x]   Legs, Feet, and Heels        Does the Patient have Skin Breakdown?   No         Titus Prevention initiated:  No   Wound Care Orders initiated:  No      [facility] nurse consulted for Pressure Injury (Stage 3,4, Unstageable, DTI, NWPT, and Complex wounds) or Titus score 18 or lower:  No      Nurse 1 eSignature: Electronically signed by Jose Chacon RN on 3/19/22 at 9:15 PM EDT    **SHARE this note so that the co-signing nurse is able to place an eSignature**    Nurse 2 eSignature: Electronically signed by Tammie Meyers RN on 3/20/22 at 6:14 AM EDT

## 2022-03-20 NOTE — DISCHARGE INSTR - COC
Continuity of Care Form    Patient Name: Yaniv Driver   :  1960  MRN:  [id number]    Admit date:  3/19/2022  Discharge date:  2022    Code Status Order: Full Code   Advance Directives:      Admitting Physician:  Antonio Urbano MD  PCP: Omid Pickens MD    Discharging Nurse: [facility] Unit/Room#: 1466/2296-00  Discharging Unit Phone Number: [phone]    Emergency Contact:   Extended Emergency Contact Information  Primary Emergency Contact: [address] Phone: [phone]  Mobile Phone: [phone]  Relation: Niece/Nephew   needed? No  Secondary Emergency Contact: [address] Phone: [phone]  Mobile Phone: [phone]  Relation: Other   needed?  No    Past Surgical History:  Past Surgical History:   Procedure Laterality Date    BREAST SURGERY Left 2019    LEFT BREAST MASS EXCISION; LEFT AXILLIARY LYMPH NODE EXCISION performed by Leela Mayfield MD at [place] [address] Left 10/10/2019    MUSCLE BIOPSY LOWER EXTREMITY LEFT performed by Leela Mayfield MD at [facility]       Immunization History:   Immunization History   Administered Date(s) Administered    COVID-19, Pfizer Purple top, DILUTE for use, 12+ yrs, 30mcg/0.3mL dose 2021, 2021, 2021    Tdap (Boostrix, Adacel) 2019       Active Problems:  Patient Active Problem List   Diagnosis Code    NSTEMI (non-ST elevated myocardial infarction) ([facility] I21.4    Diabetes mellitus ([facility] E11.9    Hypertension I10    Peripheral neuropathy G62.9    Generalized weakness R53.1    Obesity due to excess calories E66.09    Heart failure (HCC) I50.9    Leg swelling M79.89    Mixed hyperlipidemia E78.2    Hyperglycemia R73.9    Sjogren's syndrome (HCC) M35.00    Aortic dilatation (HCC) I77.819    Acute on chronic congestive heart failure with left ventricular diastolic dysfunction (HCC) I50.33    Shortness of breath R06.02 Isolation/Infection:   Isolation            No Isolation          Patient Infection Status       Infection Onset Added Last Indicated Last Indicated By Review Planned Expiration Resolved Resolved By    None active    Resolved    COVID-19 (Rule Out) 03/19/22 03/19/22 03/19/22 COVID-19 (Ordered)   03/20/22 Rule-Out Test Resulted            Nurse Assessment:  Last Vital Signs: /77   Pulse 64   Temp 97.8 °F (36.6 °C) (Oral)   Resp 18   Ht 5' 11\" (1.803 m)   Wt (!) 312 lb 6.3 oz (141.7 kg)   SpO2 98%   BMI 43.57 kg/m²     Last documented pain score (0-10 scale): Pain Level: 5  Last Weight:   Wt Readings from Last 1 Encounters:   03/20/22 (!) 312 lb 6.3 oz (141.7 kg)     Mental Status:  oriented and alert    IV Access:  - None    Nursing Mobility/ADLs:  Walking   Independent  Transfer  Assisted  Bathing  Independent  Dressing  Independent  Toileting  Independent  Feeding  [address]  Assisted  Med Delivery   whole    Wound Care Documentation and Therapy:        Elimination:  Continence: Bowel: Yes  Bladder: Yes  Urinary Catheter: None   Colostomy/Ileostomy/Ileal Conduit: No       Date of Last BM: 3/20      Intake/Output Summary (Last 24 hours) at 3/20/2022 1912  Last data filed at 3/20/2022 1400  Gross per 24 hour   Intake 1450 ml   Output 3925 ml   Net -2475 ml     I/O last 3 completed shifts: In: 8837 [P.O.:1440; I.V.:10]  Out: 3388 [Urine:3925]    Safety Concerns:     None    Impairments/Disabilities:      None    Nutrition Therapy:  Current Nutrition Therapy:   - Oral Diet:  Carb Control 3 carbs/meal (1500kcals/day), Cardiac, and Low Sodium (2gm)    Routes of Feeding: Oral  Liquids: No Restrictions  Daily Fluid Restriction: no  Last Modified Barium Swallow with Video (Video Swallowing Test): not done    Treatments at the Time of Hospital Discharge:   Respiratory Treatments:   Oxygen Therapy:  is not on home oxygen therapy.   Ventilator:    - No ventilator support    Heart Failure Instructions for Daily Management  Patient was treated for acute on chronic diastolic heart failure. he  will require the following:    Please weigh daily on the same scale and approximately the same time of day. Report weight gain of 3 pounds/day or 5 pounds/week to : [facility] [phone], jannet GAO, and Ronny Bhatia MD None. Please use hospital discharge weight as baseline reference. Please monitor for signs and symptoms of and report to MD:  Worsening Heart Failure: sudden weight gain, shortness of breath, lower extremity or general edema/swelling, abdominal bloating/swelling, inability to lie flat, intolerance to usual activity, or cough (especially at night). Report these finding even if no increase in weight. Dehydration:  having difficulty or a decrease in urination, dizziness, worsening fatigue, or new onset/worsening of generalized weakness. Please continue a LOW SODIUM diet and LIMIT fluid intake to 48 - 64 ounces ( 1.5 - 2 liters) per day. Call [facility] [phone], Ronny Bhatia MD None, and facility MD with any questions or concerns. Please continue heart failure education to patient and family/support system. See After Visit Summary for hospital follow up appointment details. Consider spiritual care referral for support and/or completion of advance directives . Consider: having the facility MD complete required 7 day follow up, [place] [id number] telehealth program if patient agreeable and able to participate, and palliative care consult for ongoing goals of care, end of life, and/or chronic disease management discussions. Patient's primary cardiologist is Dr Eloisa Pal, [facility]. Please draw BMP on 3/29 and fax results to [facility] so results can be reviewed at follow up.        Rehab Therapies: Physical Therapy and Occupational Therapy  Weight Bearing Status/Restrictions: No weight bearing restrictions  Other Medical Equipment (for information only, NOT a DME order): walker  Other Treatments:     Patient's personal belongings (please select all that are sent with patient):  Glasses    RN SIGNATURE:  Electronically signed by Kelly Escalera RN on 3/22/22 at 4:46 PM EDT    CASE MANAGEMENT/SOCIAL WORK SECTION    Inpatient Status Date: ***    Readmission Risk Assessment Score:  Readmission Risk              Risk of Unplanned Readmission:  16           Discharging to Facility/ Agency   Name: [facility]  Address: [address] [place] [address]   [facility]:[phone]  [facility]:[phone]      / signature: Electronically signed by Ana Hernandez RN on 3/22/22 at 2:08 PM EDT    PHYSICIAN SECTION    Prognosis: Good    Condition at Discharge: Stable    Rehab Potential (if transferring to Rehab): Good    Recommended Labs or Other Treatments After Discharge:   START taking these medications    START taking these medications   famotidine 20 MG tablet  Commonly known as: PEPCID  Take 1 tablet by mouth 2 times daily     CHANGE how you take these medications    CHANGE how you take these medications   furosemide 40 MG tablet  Commonly known as: LASIX  Take 1 tablet by mouth daily  Start taking on: March 23, 2022  What changed:   medication strength  how much to take  additional instructions   * predniSONE 20 MG tablet  Commonly known as: DELTASONE  Take 2 tablets by mouth daily for 3 days, THEN 1.5 tablets daily for 3 days, THEN 1 tablet daily for 3 days, THEN 0.5 tablets daily for 3 days. Start taking on: March 22, 2022  What changed: You were already taking a medication with the same name, and this prescription was added. Make sure you understand how and when to take each. * predniSONE 5 MG tablet  Commonly known as: DELTASONE  Take 1 tablet by mouth daily  Start taking on: April 3, 2022  What changed: These instructions start on April 3, 2022. If you are unsure what to do until then, ask your doctor or other care provider.    rivaroxaban 20 MG Tabs tablet  Commonly known as: XARELTO  Take 1 tablet by mouth daily  Start taking on: March 30, 2022  What changed:   medication strength  how much to take  when to take this  These instructions start on March 30, 2022. If you are unsure what to do until then, ask your doctor or other care provider. Another medication with the same name was removed. Continue taking this medication, and follow the directions you see here. (very important)  * This list has 2 medication(s) that are the same as other medications prescribed for you. Read the directions carefully, and ask your doctor or other care provider to review them with you. CONTINUE taking these medications    CONTINUE taking these medications   acetaminophen 325 MG tablet  Commonly known as: TYLENOL  Take 650 mg by mouth every 6 hours as needed for Pain   aspirin 81 MG tablet  Take 81 mg by mouth daily   atorvastatin 40 MG tablet  Commonly known as: LIPITOR  Take 1 tablet by mouth nightly   cyanocobalamin 1000 MCG tablet  Take 1,000 mcg by mouth daily   ferrous sulfate 325 (65 Fe) MG tablet  Commonly known as: IRON 325  Take 325 mg by mouth daily (with breakfast)   folic acid 1 MG tablet  Commonly known as: FOLVITE  Take 1 mg by mouth daily   insulin glargine 100 UNIT/ML injection vial  Commonly known as: LANTUS  Inject 10 Units into the skin daily as needed Patient takes this medication only if BG has been >120 x2-3 days. He uses 10 units nightly until readings are <120. methocarbamol 500 MG tablet  Commonly known as: ROBAXIN  Take 500 mg by mouth every 6 hours as needed   mycophenolate 500 MG tablet  Commonly known as: CELLCEPT  Take by mouth in the morning, at noon, and at bedtime   oxyCODONE-acetaminophen  MG per tablet  Commonly known as: PERCOCET  Take 1 tablet by mouth every 4 hours as needed for Pain. pregabalin 75 MG capsule  Commonly known as: LYRICA  Take 75 mg by mouth 2 times daily.    spironolactone 25 MG tablet  Commonly known as: ALDACTONE  Take 25 mg by mouth daily   tamsulosin 0.4 MG capsule  Commonly known as: FLOMAX  Take 0.4 mg by mouth daily   therapeutic multivitamin-minerals tablet  Take 1 tablet by mouth daily         Physician Certification: I certify the above information and transfer of Aleksander Caputo  is necessary for the continuing treatment of the diagnosis listed and that he requires [facility] for greater 30 days.      Update Admission H&P: No change in H&P    PHYSICIAN SIGNATURE:  Electronically signed by Noah Hawthorne MD on 3/22/22 at 1:38 PM EDT

## 2022-03-20 NOTE — PROGRESS NOTES
Progress Note    Admit Date: 3/19/2022  Day: 2  Diet: ADULT DIET; Regular; 3 carb choices (45 gm/meal); Low Fat/Low Chol/High Fiber/USAMA; Low Sodium (2 gm)    CC: SOB    Interval history: Pt reports that his SOB has slightly improved. He does not notice he legs have become more swollen. Denies any chest pain, fevers , chills, n/v, abdominal pain, changes is bowel or bladder habits. HPI:Pt is a 60 y/o male with a PMHx of CHF, DMT2, Atrial fibrillation, aortic aneurysm, HTN, HLD, CAD, poliomyelitis, Sjogren's and recent R knee replacement (2/24/2022) with subsequent DVT of of the R soleal vein who presented to the hospital for SOB.      Pt had R knee replaced at an OSH on 2/24/22, following his surgery he was sent to a SNF, where he developed a DVT on 3/08/22 and was sent back to the OSH for with heparin and transitioned to [place]. Pt states that his SOB is intermittent as it comes and goes, and is worse when he lies on his left side. Pt also endorses chest tenderness in one spot near his left breast, he describes it as a dull ache, and that pushing on it makes it worse. He also endorses the symptoms of cough, nausea and calf pain. The pt also endorses inability to walk, he states that he did not receive very much physical therapy when he was at the SNF. In addition to this, the pt stated that he was suppose to have home health upon discharge from his most recent hospital stay, but no one showed up to his place the following day.      In the ED, the pt was afebrile, hemodynamically stable, and sating well on room air. His labs displayed a pro-BNP 1,200, troponin were 0.09, 0.07, INR 2.09, AntiWBC was 10.2, and his CXR was unrevealing. An EKG displayed a LBBB, but it ws reported to be observed on a EKG from [facility] on [id number] as well. Pt to undergo a CTAP to r/o PE and will be admitted to the hospital for his SOB.     Medications:     Scheduled Meds:   aspirin  81 mg Oral Daily    atorvastatin  40 mg Oral Nightly    sodium chloride flush  5-40 mL IntraVENous 2 times per day    famotidine  20 mg Oral BID    insulin lispro  0-6 Units SubCUTAneous TID     insulin lispro  0-3 Units SubCUTAneous Nightly    furosemide  20 mg IntraVENous Daily    rivaroxaban  15 mg Oral BID WC    Followed by   Allan Rincon ON 3/30/2022] rivaroxaban  20 mg Oral Daily     Continuous Infusions:   sodium chloride      dextrose       PRN Meds:sodium chloride flush, sodium chloride, ondansetron **OR** ondansetron, polyethylene glycol, acetaminophen **OR** acetaminophen, glucagon (rDNA), dextrose, dextrose bolus (hypoglycemia) **OR** dextrose bolus (hypoglycemia), glucose, oxyCODONE-acetaminophen    Objective:   Vitals:   T-max:  Patient Vitals for the past 8 hrs:   BP Temp Temp src Pulse Resp SpO2 Weight   03/20/22 0600 -- -- -- 60 -- -- --   03/20/22 0321 120/73 97.7 °F (36.5 °C) Oral 71 20 94 % (!) 312 lb 6.3 oz (141.7 kg)   03/20/22 0200 -- -- -- 71 -- -- --   03/20/22 0016 (!) 142/78 98.3 °F (36.8 °C) Oral 62 20 94 % --       Intake/Output Summary (Last 24 hours) at 3/20/2022 0734  Last data filed at 3/20/2022 0600  Gross per 24 hour   Intake 840 ml   Output 2950 ml   Net -2110 ml       Review of Systems    Physical Exam  Constitutional:       Appearance: He is obese. HENT:      Head: Normocephalic and atraumatic. Eyes:      Extraocular Movements: Extraocular movements intact. Conjunctiva/sclera: Conjunctivae normal.   Cardiovascular:      Rate and Rhythm: Normal rate and regular rhythm. Pulmonary:      Effort: Pulmonary effort is normal.      Breath sounds: Normal breath sounds. No wheezing, rhonchi or rales. Abdominal:      Palpations: Abdomen is soft. Musculoskeletal:         General: Normal range of motion. Comments: Swelling R>L, no pitting edema   Skin:     General: Skin is warm and dry. Neurological:      General: No focal deficit present. Mental Status: He is alert.    Psychiatric:         Mood and Affect: Mood normal.         LABS:    CBC:   Recent Labs     03/19/22  1407   WBC 10.2   HGB 10.6*   HCT 33.4*      MCV 89.5     Renal:    Recent Labs     03/19/22  1407      K 4.2   CL 98*   CO2 27   BUN 10   CREATININE 0.8   GLUCOSE 103*   CALCIUM 9.4   ANIONGAP 11     Hepatic: No results for input(s): AST, ALT, BILITOT, BILIDIR, PROT, LABALBU, ALKPHOS in the last 72 hours. Troponin:   Recent Labs     03/19/22  1407 03/19/22  1526 03/19/22 2010   TROPONINI 0.09* 0.07* 0.11*     BNP: No results for input(s): BNP in the last 72 hours. Lipids: No results for input(s): CHOL, HDL in the last 72 hours. Invalid input(s): LDLCALCU, TRIGLYCERIDE  ABGs:  No results for input(s): PHART, HHC6PRK, PO2ART, WSQ8RAZ, BEART, THGBART, H1IPDIBY, HTW3ETM in the last 72 hours. INR:   Recent Labs     03/19/22  1407   INR 2.09*     Lactate: No results for input(s): LACTATE in the last 72 hours. Cultures:  -----------------------------------------------------------------  RAD:   CT CHEST PULMONARY EMBOLISM W CONTRAST   Final Result      1. No evidence of pulmonary embolism. 2.  Incidental CardioMems device in left lower lobe segmental pulmonary artery. 3.  Mild right lower lobe atelectasis. XR CHEST (2 VW)   Final Result      1. No findings for acute cardiopulmonary disease in this underinflated chest.          Assessment/Plan:     SOB, 2/2 Possible HF exacerbation vs deconditioning/anxiety  Pt has hx of heart failure is on Lasix at home, his Pro-BNP 1200. Last Echo (1/22/2020) EF 60-65%  Pt had recent R knee replacement (2/24) at an OSH, developed DVT (3/08) at SNF, sent back to OSH for tx and tx with and D/C on Xeralto. Pt then presented to ED because had intermittent SOB and anxiety about being able to take care of himself of home. BNP 1200, elevated from previous.  EKG LBBB present on 3/15  - CTPA negative for PE  - continue xarelto  - PT/OT  - Cardiology consult  - cont lasix 20mg IV    Chronic Problems  LBBB: present on EKG, per present on EKG at [facility] (3/15/22) and was recommended OP ischemic workup. DMT2: LDSSI  CAD: Cont ASA 81 mg daily  HTN: No home medication listed  Aortic Aneurysm: Pt states it is 3.0 CM  Sjogrens: On cellcept and Mycophenolate at home. Please verify medrec and restart medications. Polymyositis: Chronic, pt has a hx of. Code Status: FULL  FEN: ADULT DIET; Regular; 3 carb choices (45 gm/meal);  Low Fat/Low Chol/High Fiber/USAMA; Low Sodium (2 gm)  PPX: xarelto  DISPO: Berny Pham MD, PGY-1  03/20/22  7:34 AM    This patient has been staffed and discussed with Chino Oshea MD.

## 2022-03-20 NOTE — RT PROTOCOL NOTE
RT Inhaler-Nebulizer Bronchodilator Protocol Note    There is a bronchodilator order in the chart from a provider indicating to follow the RT Bronchodilator Protocol and there is an Initiate RT Inhaler-Nebulizer Bronchodilator Protocol order as well (see protocol at bottom of note). CXR Findings:  No results found. The findings from the last RT Protocol Assessment were as follows:   History Pulmonary Disease: None or smoker <15 pack years  Respiratory Pattern: Regular pattern and RR 12-20 bpm  Breath Sounds: Slightly diminished and/or crackles  Cough: Strong, spontaneous, non-productive  Indication for Bronchodilator Therapy:    Bronchodilator Assessment Score: 2    Aerosolized bronchodilator medication orders have been revised according to the RT Inhaler-Nebulizer Bronchodilator Protocol below. Respiratory Therapist to perform RT Therapy Protocol Assessment initially then follow the protocol. Repeat RT Therapy Protocol Assessment PRN for score 0-3 or on second treatment, BID, and PRN for scores above 3. No Indications - adjust the frequency to every 6 hours PRN wheezing or bronchospasm, if no treatments needed after 48 hours then discontinue using Per Protocol order mode. If indication present, adjust the RT bronchodilator orders based on the Bronchodilator Assessment Score as indicated below. Use Inhaler orders unless patient has one or more of the following: on home nebulizer, not able to hold breath for 10 seconds, is not alert and oriented, cannot activate and use MDI correctly, or respiratory rate 25 breaths per minute or more, then use the equivalent nebulizer order(s) with same Frequency and PRN reasons based on the score. If a patient is on this medication at home then do not decrease Frequency below that used at home.     0-3 - enter or revise RT bronchodilator order(s) to equivalent RT Bronchodilator order with Frequency of every 4 hours PRN for wheezing or increased work of breathing using Per Protocol order mode. 4-6 - enter or revise RT Bronchodilator order(s) to two equivalent RT bronchodilator orders with one order with BID Frequency and one order with Frequency of every 4 hours PRN wheezing or increased work of breathing using Per Protocol order mode. 7-10 - enter or revise RT Bronchodilator order(s) to two equivalent RT bronchodilator orders with one order with TID Frequency and one order with Frequency of every 4 hours PRN wheezing or increased work of breathing using Per Protocol order mode. 11-13 - enter or revise RT Bronchodilator order(s) to one equivalent RT bronchodilator order with QID Frequency and an Albuterol order with Frequency of every 4 hours PRN wheezing or increased work of breathing using Per Protocol order mode. Greater than 13 - enter or revise RT Bronchodilator order(s) to one equivalent RT bronchodilator order with every 4 hours Frequency and an Albuterol order with Frequency of every 2 hours PRN wheezing or increased work of breathing using Per Protocol order mode. RT to enter RT Home Evaluation for COPD & MDI Assessment order using Per Protocol order mode.     Electronically signed by Mayra Anthony RCP on 3/20/2022 at 12:14 PM

## 2022-03-21 LAB
ANION GAP SERPL CALCULATED.3IONS-SCNC: 10 MMOL/L (ref 3–16)
BASOPHILS ABSOLUTE: 0.1 K/UL (ref 0–0.2)
BASOPHILS RELATIVE PERCENT: 1 %
BUN BLDV-MCNC: 16 MG/DL (ref 7–20)
C-REACTIVE PROTEIN: 38.6 MG/L (ref 0–5.1)
CALCIUM SERPL-MCNC: 9.3 MG/DL (ref 8.3–10.6)
CHLORIDE BLD-SCNC: 94 MMOL/L (ref 99–110)
CO2: 28 MMOL/L (ref 21–32)
CREAT SERPL-MCNC: 0.9 MG/DL (ref 0.8–1.3)
EOSINOPHILS ABSOLUTE: 0.1 K/UL (ref 0–0.6)
EOSINOPHILS RELATIVE PERCENT: 1.2 %
GFR AFRICAN AMERICAN: >60
GFR NON-AFRICAN AMERICAN: >60
GLUCOSE BLD-MCNC: 104 MG/DL (ref 70–99)
GLUCOSE BLD-MCNC: 127 MG/DL (ref 70–99)
GLUCOSE BLD-MCNC: 130 MG/DL (ref 70–99)
GLUCOSE BLD-MCNC: 216 MG/DL (ref 70–99)
GLUCOSE BLD-MCNC: 234 MG/DL (ref 70–99)
HCT VFR BLD CALC: 31.4 % (ref 40.5–52.5)
HEMOGLOBIN: 10 G/DL (ref 13.5–17.5)
LV EF: 58 %
LVEF MODALITY: NORMAL
LYMPHOCYTES ABSOLUTE: 1.3 K/UL (ref 1–5.1)
LYMPHOCYTES RELATIVE PERCENT: 11.6 %
MAGNESIUM: 1.7 MG/DL (ref 1.8–2.4)
MCH RBC QN AUTO: 27.9 PG (ref 26–34)
MCHC RBC AUTO-ENTMCNC: 31.8 G/DL (ref 31–36)
MCV RBC AUTO: 87.8 FL (ref 80–100)
MONOCYTES ABSOLUTE: 0.8 K/UL (ref 0–1.3)
MONOCYTES RELATIVE PERCENT: 7.4 %
NEUTROPHILS ABSOLUTE: 8.8 K/UL (ref 1.7–7.7)
NEUTROPHILS RELATIVE PERCENT: 78.8 %
PDW BLD-RTO: 16.5 % (ref 12.4–15.4)
PERFORMED ON: ABNORMAL
PLATELET # BLD: 295 K/UL (ref 135–450)
PMV BLD AUTO: 7.4 FL (ref 5–10.5)
POTASSIUM SERPL-SCNC: 4.7 MMOL/L (ref 3.5–5.1)
RBC # BLD: 3.58 M/UL (ref 4.2–5.9)
SEDIMENTATION RATE, ERYTHROCYTE: 73 MM/HR (ref 0–20)
SODIUM BLD-SCNC: 132 MMOL/L (ref 136–145)
TOTAL CK: 172 U/L (ref 39–308)
TROPONIN: 0.06 NG/ML
TROPONIN: 0.09 NG/ML
TROPONIN: 0.1 NG/ML
TROPONIN: 0.11 NG/ML
TROPONIN: 0.11 NG/ML
TROPONIN: 0.12 NG/ML
WBC # BLD: 11.1 K/UL (ref 4–11)

## 2022-03-21 PROCEDURE — 97110 THERAPEUTIC EXERCISES: CPT

## 2022-03-21 PROCEDURE — 99255 IP/OBS CONSLTJ NEW/EST HI 80: CPT | Performed by: INTERNAL MEDICINE

## 2022-03-21 PROCEDURE — 6370000000 HC RX 637 (ALT 250 FOR IP): Performed by: STUDENT IN AN ORGANIZED HEALTH CARE EDUCATION/TRAINING PROGRAM

## 2022-03-21 PROCEDURE — 6360000002 HC RX W HCPCS: Performed by: STUDENT IN AN ORGANIZED HEALTH CARE EDUCATION/TRAINING PROGRAM

## 2022-03-21 PROCEDURE — C8929 TTE W OR WO FOL WCON,DOPPLER: HCPCS

## 2022-03-21 PROCEDURE — 36415 COLL VENOUS BLD VENIPUNCTURE: CPT

## 2022-03-21 PROCEDURE — 97161 PT EVAL LOW COMPLEX 20 MIN: CPT

## 2022-03-21 PROCEDURE — 99233 SBSQ HOSP IP/OBS HIGH 50: CPT | Performed by: NURSE PRACTITIONER

## 2022-03-21 PROCEDURE — 80048 BASIC METABOLIC PNL TOTAL CA: CPT

## 2022-03-21 PROCEDURE — 86140 C-REACTIVE PROTEIN: CPT

## 2022-03-21 PROCEDURE — 97116 GAIT TRAINING THERAPY: CPT

## 2022-03-21 PROCEDURE — 2580000003 HC RX 258: Performed by: STUDENT IN AN ORGANIZED HEALTH CARE EDUCATION/TRAINING PROGRAM

## 2022-03-21 PROCEDURE — 97166 OT EVAL MOD COMPLEX 45 MIN: CPT

## 2022-03-21 PROCEDURE — 82550 ASSAY OF CK (CPK): CPT

## 2022-03-21 PROCEDURE — 97530 THERAPEUTIC ACTIVITIES: CPT

## 2022-03-21 PROCEDURE — 6360000004 HC RX CONTRAST MEDICATION: Performed by: INTERNAL MEDICINE

## 2022-03-21 PROCEDURE — 83735 ASSAY OF MAGNESIUM: CPT

## 2022-03-21 PROCEDURE — 97535 SELF CARE MNGMENT TRAINING: CPT

## 2022-03-21 PROCEDURE — 2060000000 HC ICU INTERMEDIATE R&B

## 2022-03-21 PROCEDURE — 6370000000 HC RX 637 (ALT 250 FOR IP)

## 2022-03-21 PROCEDURE — 84484 ASSAY OF TROPONIN QUANT: CPT

## 2022-03-21 PROCEDURE — 85652 RBC SED RATE AUTOMATED: CPT

## 2022-03-21 PROCEDURE — 85025 COMPLETE CBC W/AUTO DIFF WBC: CPT

## 2022-03-21 RX ORDER — TAMSULOSIN HYDROCHLORIDE 0.4 MG/1
0.4 CAPSULE ORAL DAILY
Status: DISCONTINUED | OUTPATIENT
Start: 2022-03-21 | End: 2022-03-22 | Stop reason: HOSPADM

## 2022-03-21 RX ORDER — PREGABALIN 75 MG/1
75 CAPSULE ORAL 2 TIMES DAILY
Status: CANCELLED | OUTPATIENT
Start: 2022-03-21

## 2022-03-21 RX ORDER — PREDNISONE 1 MG/1
5 TABLET ORAL DAILY
Status: DISCONTINUED | OUTPATIENT
Start: 2022-03-21 | End: 2022-03-22 | Stop reason: HOSPADM

## 2022-03-21 RX ORDER — PREDNISONE 1 MG/1
5 TABLET ORAL DAILY
Status: CANCELLED | OUTPATIENT
Start: 2022-03-21

## 2022-03-21 RX ORDER — OXYCODONE AND ACETAMINOPHEN 10; 325 MG/1; MG/1
1 TABLET ORAL EVERY 4 HOURS PRN
Status: DISCONTINUED | OUTPATIENT
Start: 2022-03-21 | End: 2022-03-22 | Stop reason: HOSPADM

## 2022-03-21 RX ORDER — TAMSULOSIN HYDROCHLORIDE 0.4 MG/1
0.4 CAPSULE ORAL DAILY
Status: CANCELLED | OUTPATIENT
Start: 2022-03-21

## 2022-03-21 RX ORDER — MYCOPHENOLATE MOFETIL 500 MG/1
500 TABLET ORAL 3 TIMES DAILY
Status: DISCONTINUED | OUTPATIENT
Start: 2022-03-21 | End: 2022-03-22 | Stop reason: HOSPADM

## 2022-03-21 RX ORDER — PREGABALIN 75 MG/1
75 CAPSULE ORAL 2 TIMES DAILY
Status: DISCONTINUED | OUTPATIENT
Start: 2022-03-21 | End: 2022-03-22 | Stop reason: HOSPADM

## 2022-03-21 RX ORDER — METHYLPREDNISOLONE SODIUM SUCCINATE 40 MG/ML
40 INJECTION, POWDER, LYOPHILIZED, FOR SOLUTION INTRAMUSCULAR; INTRAVENOUS ONCE
Status: COMPLETED | OUTPATIENT
Start: 2022-03-21 | End: 2022-03-21

## 2022-03-21 RX ORDER — MAGNESIUM SULFATE IN WATER 40 MG/ML
2000 INJECTION, SOLUTION INTRAVENOUS ONCE
Status: COMPLETED | OUTPATIENT
Start: 2022-03-21 | End: 2022-03-21

## 2022-03-21 RX ADMIN — FAMOTIDINE 20 MG: 20 TABLET, FILM COATED ORAL at 08:12

## 2022-03-21 RX ADMIN — OXYCODONE AND ACETAMINOPHEN 1 TABLET: 10; 325 TABLET ORAL at 01:54

## 2022-03-21 RX ADMIN — SODIUM CHLORIDE, PRESERVATIVE FREE 10 ML: 5 INJECTION INTRAVENOUS at 19:59

## 2022-03-21 RX ADMIN — INSULIN LISPRO 1 UNITS: 100 INJECTION, SOLUTION INTRAVENOUS; SUBCUTANEOUS at 21:53

## 2022-03-21 RX ADMIN — METHOCARBAMOL 500 MG: 500 TABLET ORAL at 23:54

## 2022-03-21 RX ADMIN — RIVAROXABAN 15 MG: 15 TABLET, FILM COATED ORAL at 15:53

## 2022-03-21 RX ADMIN — ATORVASTATIN CALCIUM 40 MG: 40 TABLET, FILM COATED ORAL at 19:57

## 2022-03-21 RX ADMIN — ASPIRIN 81 MG: 81 TABLET, COATED ORAL at 08:11

## 2022-03-21 RX ADMIN — METHOCARBAMOL 500 MG: 500 TABLET ORAL at 15:54

## 2022-03-21 RX ADMIN — SODIUM CHLORIDE, PRESERVATIVE FREE 10 ML: 5 INJECTION INTRAVENOUS at 08:13

## 2022-03-21 RX ADMIN — OXYCODONE AND ACETAMINOPHEN 1 TABLET: 10; 325 TABLET ORAL at 05:55

## 2022-03-21 RX ADMIN — PERFLUTREN 1.65 MG: 6.52 INJECTION, SUSPENSION INTRAVENOUS at 15:00

## 2022-03-21 RX ADMIN — MYCOPHENOLATE MOFETIL 500 MG: 500 TABLET, FILM COATED ORAL at 19:58

## 2022-03-21 RX ADMIN — METHYLPREDNISOLONE SODIUM SUCCINATE 40 MG: 40 INJECTION, POWDER, FOR SOLUTION INTRAMUSCULAR; INTRAVENOUS at 15:54

## 2022-03-21 RX ADMIN — OXYCODONE AND ACETAMINOPHEN 1 TABLET: 10; 325 TABLET ORAL at 15:54

## 2022-03-21 RX ADMIN — TAMSULOSIN HYDROCHLORIDE 0.4 MG: 0.4 CAPSULE ORAL at 12:18

## 2022-03-21 RX ADMIN — METHOCARBAMOL 500 MG: 500 TABLET ORAL at 08:15

## 2022-03-21 RX ADMIN — MAGNESIUM SULFATE HEPTAHYDRATE 2000 MG: 2 INJECTION, SOLUTION INTRAVENOUS at 12:19

## 2022-03-21 RX ADMIN — METHOCARBAMOL 500 MG: 500 TABLET ORAL at 01:54

## 2022-03-21 RX ADMIN — PREGABALIN 75 MG: 75 CAPSULE ORAL at 12:17

## 2022-03-21 RX ADMIN — FUROSEMIDE 20 MG: 10 INJECTION, SOLUTION INTRAMUSCULAR; INTRAVENOUS at 08:11

## 2022-03-21 RX ADMIN — PREGABALIN 75 MG: 75 CAPSULE ORAL at 19:57

## 2022-03-21 RX ADMIN — MYCOPHENOLATE MOFETIL 500 MG: 500 TABLET, FILM COATED ORAL at 15:55

## 2022-03-21 RX ADMIN — OXYCODONE AND ACETAMINOPHEN 1 TABLET: 10; 325 TABLET ORAL at 23:54

## 2022-03-21 RX ADMIN — OXYCODONE AND ACETAMINOPHEN 1 TABLET: 10; 325 TABLET ORAL at 19:57

## 2022-03-21 RX ADMIN — INSULIN LISPRO 2 UNITS: 100 INJECTION, SOLUTION INTRAVENOUS; SUBCUTANEOUS at 19:02

## 2022-03-21 RX ADMIN — PREDNISONE 5 MG: 5 TABLET ORAL at 12:16

## 2022-03-21 RX ADMIN — OXYCODONE AND ACETAMINOPHEN 1 TABLET: 10; 325 TABLET ORAL at 09:47

## 2022-03-21 RX ADMIN — RIVAROXABAN 15 MG: 15 TABLET, FILM COATED ORAL at 08:12

## 2022-03-21 ASSESSMENT — PAIN DESCRIPTION - ORIENTATION
ORIENTATION: RIGHT

## 2022-03-21 ASSESSMENT — PAIN DESCRIPTION - PAIN TYPE
TYPE: ACUTE PAIN

## 2022-03-21 ASSESSMENT — PAIN DESCRIPTION - PROGRESSION

## 2022-03-21 ASSESSMENT — PAIN DESCRIPTION - FREQUENCY
FREQUENCY: CONTINUOUS

## 2022-03-21 ASSESSMENT — PAIN DESCRIPTION - LOCATION
LOCATION: KNEE

## 2022-03-21 ASSESSMENT — PAIN DESCRIPTION - DESCRIPTORS
DESCRIPTORS: ACHING

## 2022-03-21 ASSESSMENT — PAIN SCALES - GENERAL
PAINLEVEL_OUTOF10: 4
PAINLEVEL_OUTOF10: 8
PAINLEVEL_OUTOF10: 6
PAINLEVEL_OUTOF10: 7
PAINLEVEL_OUTOF10: 8
PAINLEVEL_OUTOF10: 8
PAINLEVEL_OUTOF10: 2
PAINLEVEL_OUTOF10: 4
PAINLEVEL_OUTOF10: 7
PAINLEVEL_OUTOF10: 7
PAINLEVEL_OUTOF10: 8
PAINLEVEL_OUTOF10: 4

## 2022-03-21 ASSESSMENT — PAIN DESCRIPTION - ONSET
ONSET: ON-GOING

## 2022-03-21 NOTE — PROGRESS NOTES
Rep came to scan pt implant device. Pt was upset because they were interrupting him eating. Pt then refused the scan.

## 2022-03-21 NOTE — PROGRESS NOTES
Electrophysiology - PROGRESS NOTE    Admit Date: 3/19/2022     Chief Complaint: AFL, acute on chronic dCHF     Interval History:   Patient seen and examined and notes reviewed. Patient is being followed for AF/AFL, acute on chronic diastolic CHF. Patient had undergone a total right knee replacement in February 2022. He was sent to a SNF for rehab where he developed a DVT in his right lower extremity and was sent back to the hospital for anticoagulation. He states that he was anemic and was in [facility] where he received packed red blood cells. He did have an EGD with Botox. He was then placed on Xarelto 15 mg twice a day x21 days for his DVT. He was sent home and within 1 day of being home he started feeling increased shortness of breath and came back to the emergency room. His BNP was 172 and his troponin max was 0.12. He was placed on Lasix 20 mg twice a day and is -2.9 L. In: 56 [P.O.:600;  I.V.:10]  Out: 1425    Wt Readings from Last 2 Encounters:   03/21/22 (!) 310 lb 6.5 oz (140.8 kg)   01/24/20 272 lb 4.3 oz (123.5 kg)       Data:   Scheduled Meds:   Scheduled Meds:   aspirin  81 mg Oral Daily    atorvastatin  40 mg Oral Nightly    sodium chloride flush  5-40 mL IntraVENous 2 times per day    famotidine  20 mg Oral BID    insulin lispro  0-6 Units SubCUTAneous TID WC    insulin lispro  0-3 Units SubCUTAneous Nightly    furosemide  20 mg IntraVENous Daily    rivaroxaban  15 mg Oral BID WC    Followed by   Federico Estrada ON 3/30/2022] rivaroxaban  20 mg Oral Daily     Continuous Infusions:   sodium chloride      dextrose       PRN Meds:.oxyCODONE-acetaminophen, albuterol, methocarbamol, sodium chloride flush, sodium chloride, ondansetron **OR** ondansetron, polyethylene glycol, acetaminophen **OR** acetaminophen, glucagon (rDNA), dextrose, dextrose bolus (hypoglycemia) **OR** dextrose bolus (hypoglycemia), glucose  Continuous Infusions:   sodium chloride      dextrose         Intake/Output Summary (Last 24 hours) at 3/21/2022 1001  Last data filed at 3/21/2022 0154  Gross per 24 hour   Intake 360 ml   Output 1425 ml   Net -1065 ml       CBC:   Lab Results   Component Value Date    WBC 11.1 03/21/2022    HGB 10.0 03/21/2022     03/21/2022     BMP:  Lab Results   Component Value Date     03/21/2022    K 4.7 03/21/2022    K 4.2 03/19/2022    CL 94 03/21/2022    CO2 28 03/21/2022    BUN 16 03/21/2022    CREATININE 0.9 03/21/2022    GLUCOSE 127 03/21/2022     INR:   Lab Results   Component Value Date    INR 2.09 03/19/2022    INR 1.04 10/10/2019    INR 1.03 10/03/2019        CARDIAC LABS  ENZYMES:  Recent Labs     03/21/22  0026 03/21/22  0458 03/21/22  0833   TROPONINI 0.10* 0.11* 0.12*     FASTING LIPID PANEL:  Lab Results   Component Value Date    HDL 39 10/07/2019    LDLCALC 73 10/07/2019    TRIG 296 10/07/2019    TSH 1.31 06/04/2019     LIVER PROFILE:  Lab Results   Component Value Date    AST 12 01/24/2020    AST 11 01/20/2020    ALT 10 01/24/2020    ALT 12 01/20/2020       -----------------------------------------------------------------  Telemetry: Personally reviewed  AFL - HR controlled    Objective:   Vitals: BP (!) 151/93   Pulse 61   Temp 97.6 °F (36.4 °C) (Oral)   Resp 18   Ht 5' 11\" (1.803 m)   Wt (!) 310 lb 6.5 oz (140.8 kg)   SpO2 98%   BMI 43.29 kg/m²   General appearance: alert, appears stated age and cooperative, No acute distress   Eyes: Conjunctiva and pupils normal and reactive  Skin: Skin color, texture, turgor normal. No rashes or ecchymosis.   Neck: no JVD, supple, symmetrical, trachea midline   Lungs: , no accessory muscle use, no respiratory distress  Heart: AFL, HR controlled  Abdomen: soft, non-tender; bowel sounds normal  Extremities: No edema, DP +  Psychiatric: normal insight and affect    Patient Active Problem List:     NSTEMI (non-ST elevated myocardial infarction) ([facility] [address]     Diabetes mellitus ([facility] [address]     Hypertension     Peripheral neuropathy     Generalized weakness     Obesity due to excess calories     Heart failure (HCC)     Leg swelling     Mixed hyperlipidemia     Hyperglycemia     Sjogren's syndrome (HCC)     Aortic dilatation (HCC)     Acute on chronic congestive heart failure with left ventricular diastolic dysfunction (HCC)     Shortness of breath        Assessment & Plan:      1. SOB  2. Acute dCHF  3. pAF    58 y/o man with a h/o HTN, HLD, DM, chronic dCHF, s/p CardioMems, DVT after TRKR (2/2022), CAD, s/p MI, Sjogrens disease, polymyositis, pAF on Xarelto who p/w SOB, acute on chronic dCHF. [id number] 4. TSH 0.45 (1/20/2020). Acute dCHF  - EF 60-65% (2020)  - On lasix 20 mg BID IV  - Keep K+ > 4.0 and Mg > 2.0 - replacement ordered  - Reviewed labs  - Will have CardioMems interrogated  - Echo - pending  - Dr. Maria Teresa Merrill to see    pAF/AFL  - In AFL w/ controlled rate - asymptomatic  - On Xarelto 20 mg QD - no s/s bleeding - continue  - Consider DCCV to NSR after 3-4 weeks of uninterrupted [address]  - Reviewed risk factors, pathophysiology, treatment options and lifestyle modification for atrial fibrillation: Blood pressure control, blood sugar control, healthy diet, minimal alcohol intake, no smoking, activity and exercise, manage stress sleep apnea evaluation and symptoms of a stroke. DVT  - Recent after TRKR  - Had been on loading dose for DVT at 15 mg BID x 21 days however he only took 1 Xarelto 15 mg the day he was d/c'd from [facility] then changed to 20 mg QD      [facility]      I spent a total of 35 minutes in care of the patient and greater than 50% of the time was spent counseling with Amy Del Rio and coordinating care regarding their diagnosis, treatments and plan of care.

## 2022-03-21 NOTE — CONSULTS
See note below for med rec completed this weekend. Thanks for consulting pharmacy! Cristofer Mckay PharmD  Pharmacy Resident   Please call with questions [phone]  3/21/2022 11:05 AM  ___________________________________________________________________________________  Clinical Pharmacy Progress Note  Medication History     Admit Date: 3/19/2022    Pharmacy has been consulted to review this patient's home medication list by Dr. Chilango Graves. List of current medications the patient is taking is complete, and home medication list in Epic has been updated to reflect the changes noted below. Source(s) of information: Patient     Changes made to medication list:    Medications removed (no longer taking):  · Azathioprine  · Clopidogrel   · Humalog   · Pantoprazole   · Potassium chloride     Medications added:  · Ferrous sulfate  · Folic acid   · Methocarbamol   · Multivitamin  · Mycophenolate   · Prednisone   · Spironolactone   · Tamsulosin  · Tylenol     Medication doses / instructions adjusted:  · Lasix: patient states he takes 20mg daily and if pressure on cardiomems is >28 for 2-3 days, double dose to 40mg daily x2-3 day until pressure comes down  · Insulin glargine: Patient takes this medication only if BG has been >120 x2-3 days. He uses 10 units nightly until readings are <120. Other notes:   · Patient on Xarelto 15mg BID until 3/30/22 then is to start talking xarelto 20mg daily     Thanks for consulting pharmacy!   Cristofer Mckay PharmD  Pharmacy Resident   Please call with questions [phone]  3/21/2022 11:05 AM

## 2022-03-21 NOTE — PROGRESS NOTES
Occupational Therapy   Occupational Therapy Initial Assessment/Treatment  Date: 3/21/2022   Patient Name: Leticia Quintanilla  MRN: [id number]     : 1960    Date of Service: 3/21/2022    Discharge Recommendations:    Leticia Quintanilla scored a 18/24 on the AM-PAC ADL Inpatient form. Current research shows that an AM-PAC score of 17 or less is typically not associated with a discharge to the patient's home setting. Based on the patient's AM-PAC score and their current ADL deficits, it is recommended that the patient have 5-7 sessions per week of Occupational Therapy at d/c to increase the patient's independence. At this time, this patient demonstrates the endurance, and/or tolerance for 3 hours of therapy each day, with a treatment frequency of 5-7x/wk. Please see assessment section for further patient specific details. If patient discharges prior to next session this note will serve as a discharge summary. Please see below for the latest assessment towards goals. Assessment   Performance deficits / Impairments: Decreased functional mobility ; Decreased ADL status; Decreased endurance;Decreased balance  Assessment: pt is a 59 y.o. male presenting below baseline following R TKR in . Pt DC'd to SNF, then home then returned to ED with SOB. Pt now requires min A for bed mobility, Phillip-CGA for functional mobility and CGA-SBA for ADLs. Pt reports difficulty walking 2/2 pain and weakness for R TKR. At this time pt would benefit from intensive inpatient skilled OT services for increased participation functional mobility/ADLs and return to baseline performance.   Treatment Diagnosis: functional/mobility ADL deficit  Prognosis: Fair  Decision Making: Medium Complexity  OT Education: OT Role;Plan of Care;Transfer Training;ADL Adaptive Strategies  Patient Education: pt verb. understanding  Barriers to Learning: none  REQUIRES OT FOLLOW UP: Yes  Activity Tolerance  Activity Tolerance: Patient Tolerated treatment well  Safety Devices  Safety Devices in place: Yes  Type of devices: Call light within reach; Left in chair;Chair alarm in place;Nurse notified           Patient Diagnosis(es): The encounter diagnosis was Shortness of breath. has a past medical history of Arthritis, Depression, Diabetes mellitus ([facility] [address] Diastolic CHF ([facility] [address] Difficult intubation, Dyslipidemia, Herniated disc, cervical, Hypertension, Osteoporosis, and Peripheral neuropathy. has a past surgical history that includes hernia repair; Breast surgery (Left, 8/7/2019); and Muscle biopsy (Left, 10/10/2019). Treatment Diagnosis: functional/mobility ADL deficit      Restrictions  Position Activity Restriction  Other position/activity restrictions: up with assist    Subjective   General  Chart Reviewed: Yes  Additional Pertinent Hx: pt is a 59 y.o. with PMH: Arthritis, Depressio, Diabetes mellitus, Dyslipidemia, Herniated disc, cervical, Hypertension, Osteoporosis, Peripheral neuropathy and recent R TKR on 2/24  Family / Caregiver Present: No  Referring Practitioner: Matthew  Diagnosis: SOB  Subjective  Subjective: pt in bed upon arrival, agreeable to OT/PT. Pt stated frustration and anxieties over recent SNF stays. Consolation provided. Patient Currently in Pain: Yes    Social/Functional History  Social/Functional History  Lives With: Alone  Type of Home: Apartment (on third floor, 8 steps to floor.  Has elevators however require walking longer distance)  Home Layout: Performs ADL's on one level  Bathroom Shower/Tub: Walk-in shower  Bathroom Toilet: Standard (comfort height)  Bathroom Equipment: Grab bars around toilet,Grab bars in shower  Bathroom Accessibility: Accessible  Home Equipment: Rolling walker (owns wide rolling walker but has lost it recently)  ADL Assistance: Independent (pt typically independent prior to TKR in 2/22)  Homemaking Assistance: Needs assistance (since shoulder surgery in 5/21, home care comes 14 hours/week to assist with laundry, cleaning. Meals on wheels.)  Ambulation Assistance: Independent (Independent at baseline prior to TKR and consecutive admissions. Currently requires assist.)  Transfer Assistance: Independent  Active : No  Patient's  Info: transport services       Objective   Vision: Impaired  Vision Exceptions: Wears glasses for reading  Hearing: Within functional limits    Orientation  Overall Orientation Status: Within Normal Limits  Observation/Palpation  Scar: R TKR scar healing well - pt was instructed in gentle scar tissue mobility to facilitate improved knee ROM  Balance  Sitting Balance: Supervision (~EOB about 15 min with SPV)  Standing Balance: Contact guard assistance  Standing Balance  Time: ~5 min total  Activity: functional mobility, ADLs  Comment: with RW, CGA for balance  Functional Mobility  Functional - Mobility Device: Rolling Walker  Activity: To/from bathroom; Other  Assist Level: Minimal assistance (progressing to CGA)  Functional Mobility Comments: pt ambulated with RW min assist progressing to CGA  Toilet Transfers  Toilet - Technique: Ambulating  Equipment Used: Extra wide bedside commode (over toilet)  Toilet Transfer: Contact guard assistance  Toilet Transfers Comments: with RW  ADL  Grooming: Stand by assistance (pt washed face sitting EOB with SBA)  UE Dressing: Stand by assistance (pt donned hospital gown with SBA sitting on BSC over toilet.)  Toileting: Contact guard assistance (CGA with RW, raised BSC over toilet.)  Tone RUE  RUE Tone: Normotonic  Tone LUE  LUE Tone: Normotonic  Coordination  Movements Are Fluid And Coordinated: Yes     Bed mobility  Supine to Sit: Minimal assistance  Scooting: Contact guard assistance  Transfers  Sit to stand: Contact guard assistance  Stand to sit: Contact guard assistance  Transfer Comments: from bed/recliner>recliner with RW     Cognition  Overall Cognitive Status: WFL                 LUE PROM (degrees)  LUE PROM: WFL  RUE AROM (degrees)  RUE AROM : Exceptions  R Shoulder Flexion 0-180: ~150  LUE Strength  Gross LUE Strength: WFL  RUE Strength  Gross RUE Strength: WFL  RUE Strength Comment: mild R shoulder weakness 2/2 shoulder surgery in 5/22 however functional for ADLs.                    Plan   Plan  Times per week: 5-7  Times per day: Daily  Current Treatment Recommendations: Reese Parrish Mobility Training,Endurance Training,Patient/Caregiver Education & Training,Self-Care / ADL,Safety Education & Training                                               AM-PAC Score        AM-[facility] Inpatient Daily Activity Raw Score: 18 (03/21/22 1331)  AM-PAC Inpatient ADL T-Scale Score : 38.66 (03/21/22 1331)  ADL Inpatient CMS 0-100% Score: 46.65 (03/21/22 1331)  ADL Inpatient CMS G-Code Modifier : CK (03/21/22 1331)    Goals  Short term goals  Time Frame for Short term goals: DC  Short term goal 1: pt will complete LB dressing with CGA-not met  Short term goal 2: pt will increase standing tolerance to 5 min to complete ADLs with CGA-not met  Short term goal 3: pt will complete bed mobility with SBA-not met  Patient Goals   Patient goals : Get better       Therapy Time   Individual Concurrent Group Co-treatment   Time In 1010         Time Out 1125         Minutes 75              Timed code tx minutes: 60  Total tx minutes:75      Lito Matt OT

## 2022-03-21 NOTE — PLAN OF CARE
Problem: Falls - Risk of:  Goal: Will remain free from falls  Description: Will remain free from falls  Outcome: Met This Shift     Problem: Pain:  Goal: Pain level will decrease  Description: Pain level will decrease  Outcome: Ongoing     Problem: Cardiac:  Goal: Cardiovascular alteration will improve  Description: Cardiovascular alteration will improve  Outcome: Ongoing     Problem: HEMODYNAMIC STATUS  Goal: Patient has stable vital signs and fluid balance  Outcome: Met This Shift     Problem: FLUID AND ELECTROLYTE IMBALANCE  Goal: Fluid and electrolyte balance are achieved/maintained  Outcome: Ongoing     Problem: ACTIVITY INTOLERANCE/IMPAIRED MOBILITY  Goal: Mobility/activity is maintained at optimum level for patient  Outcome: Ongoing

## 2022-03-21 NOTE — PROGRESS NOTES
Physical Therapy    Facility/Department: [place] [id number] [facility]  Initial Assessment / treatment    NAME: Santi Latif  : 1960  MRN: [id number]    Date of Service: 3/21/2022    Discharge Recommendations: Santi Latif scored a 16/24 on the AM-PAC short mobility form. Current research shows that an AM-PAC score of 17 or less is typically not associated with a discharge to the patient's home setting. Based on the patient's AM-PAC score and their current functional mobility deficits, it is recommended that the patient have 5-7 sessions per week of Physical Therapy at d/c to increase the patient's independence. At this time, this patient demonstrates the endurance, and/or tolerance for 3 hours of therapy each day, with a treatment frequency of 5-7x/wk. Please see assessment section for further patient specific details. If patient discharges prior to next session this note will serve as a discharge summary. Please see below for the latest assessment towards goals. PT Equipment Recommendations  Other: rec wide rolling walker if pt d/cs home from hospital    Assessment   Body structures, Functions, Activity limitations: Decreased functional mobility   Assessment: Pt is 59 y.o. male admit with acute on chronic HF. Pt had recent R TKR (22) and h/o R TSR (2021). Mobility is limited due to pain and weakness. Multiple gait impairments which place him at risk for falls. Prior to R TKR, pt lived alone and ambulated around apt independently. Pt is below his functional baseline. Pt was not successful at SNF as he developed DVT and not successful at home as pt was home <24hrs before readmission to hospital.  Pt has 8 steps to enter home and has not been able to negotiate steps since TKR. Pt demos good activity tolerance and is motivated to improve. Will benefit from aggressive IP PT to maximize safety and independence prior to eventual return home.   Treatment Diagnosis: impaired gait and transfers, decreased R knee ROM and LE strength  Prognosis: Good  Decision Making: Low Complexity  PT Education: PT Role;Functional Mobility Training  Patient Education: pt demos understanding  REQUIRES PT FOLLOW UP: Yes       Patient Diagnosis(es): The encounter diagnosis was Shortness of breath. has a past medical history of Arthritis, Depression, Diabetes mellitus ([facility] [address] Diastolic CHF ([address] Difficult intubation, Dyslipidemia, Herniated disc, cervical, Hypertension, Osteoporosis, and Peripheral neuropathy. has a past surgical history that includes hernia repair; Breast surgery (Left, 8/7/2019); and Muscle biopsy (Left, 10/10/2019). Restrictions  Position Activity Restriction  Other position/activity restrictions: up with assist  Vision/Hearing  Vision: Impaired  Hearing: Within functional limits     Subjective  General  Chart Reviewed: Yes  Additional Pertinent Hx: Admit 3/19 with SOB, LE swelling; (+) acute on chronic HF; chest CT: (-) PE; PMHx: R TKR 2/24/22, R LE DVT, arthritis, DM, HTN, CHF, peripheral neuropathy, sjogren's, paroxysmal atrial fibrillation, R Total shoulder replacement May 2021  Referring Practitioner: Robert Deal DO  Diagnosis: heart failure  Subjective  Subjective: Pt found supine in bed. Agrees to PT. Reports R knee knee pain, also has chronic pain in R shoulder and L knee. RN aware. Ice applied to R knee end of session. Pain Screening  Patient Currently in Pain: Yes (8/10 pain in R knee and calf/shin)          Orientation  Orientation  Overall Orientation Status: Within Normal Limits  Social/Functional History  Social/Functional History  Lives With: Alone  Type of Home: Apartment (on third floor, 8 steps to floor.  Has elevators however require walking longer distance)  Home Layout: Performs ADL's on one level  Bathroom Shower/Tub: Walk-in shower  Bathroom Toilet: Standard (comfort height)  Bathroom Equipment: Grab bars around toilet,Grab bars in shower  Bathroom Accessibility: Accessible  Home Equipment: Rolling walker (owns wide rolling walker but has lost it recently)  ADL Assistance: Independent (pt typically independent prior to TKR in 2/22)  Homemaking Assistance: Needs assistance (since shoulder surgery in 5/21, home care comes 14 hours/week to assist with laundry, cleaning. Meals on wheels.)  Ambulation Assistance: Independent (Independent at baseline prior to TKR and consecutive admissions.  Currently requires assist.)  Transfer Assistance: Independent  Active : No  Patient's  Info: transport services  Cognition        Objective     Observation/Palpation  Scar: R TKR scar healing well - pt was instructed in gentle scar tissue mobility to facilitate improved knee ROM    AROM RLE (degrees)  RLE General AROM: knee AROM ~10-90 deg; hip/ankle WFL  AROM LLE (degrees)  LLE AROM : WFL  Strength RLE  Comment: grossly weaker than L but at least 3+/5 throughout  Strength LLE  Strength LLE: WFL        Bed mobility  Supine to Sit: Minimal assistance (slow and effortful, HOB flat, use of rail)  Scooting: Contact guard assistance  Transfers  Sit to Stand: Contact guard assistance (from raised EOB and chair)  Stand to sit: Contact guard assistance  Comment: increased effort needed with transfers  Ambulation  Ambulation?: Yes  Ambulation 1  Device: Rolling Walker  Assistance: Minimal assistance (to CGA)  Quality of Gait: very slow and effortful, flexed trunk, leans heavily on walker, decreased step length and height, unsteady although no overt LOB, R knee flex in all phases of gait, difficulty placing L heel on floor  Distance: 15 ft x 2; 20 ft     Balance  Sitting - Static: Good  Sitting - Dynamic: Good  Standing - Static: Fair (stood at sink with CGA for balance while washing his hands)  Standing - Dynamic: Fair  Exercises  Comments: x 5 reps R LAQ with isometric hold; 5 x 10 sec R knee ext stretch; 2 x 30 sec R knee flex stretch; educated pt on SLR - pt demonstrated good understanding   Treatment included LE ex, gait and transfer training, pt education. Plan   Plan  Times per week: 2-5  Current Treatment Recommendations: Strengthening,ROM,Balance Training,Functional Mobility Training,Transfer Training,Gait Training,Stair training,Patient/Caregiver Education & Training,Home Exercise Program,Modalities  Safety Devices  Type of devices: Call light within reach,Chair alarm in place,Nurse notified,Gait belt,Left in chair (ice to R knee)    G-Code       [facility] Score                                                  AM-PAC Score  AM-PAC Inpatient Mobility Raw Score : 16 (03/21/22 1203)  AM-PAC Inpatient T-Scale Score : 40.78 (03/21/22 1203)  Mobility Inpatient CMS 0-100% Score: 54.16 (03/21/22 1203)  Mobility Inpatient CMS G-Code Modifier : CK (03/21/22 1203)          Goals  Short term goals  Time Frame for Short term goals: discharge  Short term goal 1: Pt will transfer supine <--> sit with supervision  Short term goal 2: Pt will transfer sit <--> stand with supervision  Short term goal 3: Pt will amb 150 ft with RW and SBA  Short term goal 4: Pt will negotiate at least 4 steps with rail and CGA (if pt goes home)  Short term goal 5: Pt will demo indep with LE strengthening and ROM HEP  Patient Goals   Patient goals : d/c to ARU before eventual return home       Therapy Time   Individual Concurrent Group Co-treatment   Time In 1010         Time Out 1125         Minutes 75                 Timed Code Treatment Minutes:  60    Total Treatment Minutes:  75    If patient is discharged prior to next treatment, this note will serve as the discharge summary.   Lashae Shane PT, DPT  [id number]

## 2022-03-21 NOTE — CONSULTS
Cardiology Consultation                                                                    Pt Name: Emir Weeks  Age: 59 y.o. Sex: male  : 1960  Location: [address] [place], [place] [address]    Referring Physician: Cristiane Sanders MD  Primary cardiologist: Dr Donavon Mills at [facility]      Reason for Consult:     Reason for Consultation/Chief Complaint: AHF, elevated troponin    HPI:      Emir Weeks is a 59 y.o. male with a past medical history of morbid obesity, HTN, HLP, DM, HFpEF with CardioMEMS implant in , CAF, myositis, Sjogren's disease, acute DVT right soleal vein after TKA 2022, new LBBB (as of 2022). Echo 2021: LVH, LVEF 60%, indeterminate diastolic function, mild to moderate MR (similar to echo 2020 and echo 10/2019)    [facility] 2019: Mild CAD (LAD 20%), normal LVEF. Patient presented to the emergency room on 3/19 with progressively worse shortness of breath, orthopnea, lower extremity edema over the last 1 week. He denies any chest pains or productive cough, denies any fevers. He was afebrile, normotensive, with normal oxygen saturation on room air. Labs show normal creatinine 0.8, proBNP 1200, troponin 0.09-0. 11. CTA chest negative for PE, no pulmonary edema. ECG consistent with coarse A. fib, ventricular rate 65 bpm, LBBB ( ms). Review of previous records reveals atrial fibrillation and LBBB since 2021. Cardiology was consulted during the weekend Dr. Gilda Hart assessed the patient. Patient was started on Lasix 20 mg IV daily, I's and O's -2.9 L and weight down 7 pounds since admission. CardioMEMS has not been interrogated yet. I was consulted today for further evaluation of his heart failure. Patient reports improvement with his symptoms. Home diuretics: Spironolactone 25 mg daily and Lasix 20 mg daily; take Lasix 40 mg daily x2 to 3 days if CardioMEMS PA pressure greater than 28 mmHg.       Histories     Past Medical History:   has a past medical history of Arthritis, Depression, Diabetes mellitus ([facility] [address] Diastolic CHF ([facility] [address] Difficult intubation, Dyslipidemia, Herniated disc, cervical, Hypertension, Osteoporosis, and Peripheral neuropathy. Surgical History:   has a past surgical history that includes hernia repair; Breast surgery (Left, 8/7/2019); and Muscle biopsy (Left, 10/10/2019). Social History:   reports that he has never smoked. He has never used smokeless tobacco. He reports current alcohol use. He reports that he does not use drugs. Family History:  No evidence for sudden cardiac death or premature CAD      Medications:       Home Medications  Were reviewed and are listed in nursing record. and/or listed below  Prior to Admission medications    Medication Sig Start Date End Date Taking? Authorizing Provider   furosemide (LASIX) 20 MG tablet Take 20 mg by mouth daily If pressure on cardiomems is >28 for 2-3 days, double dose to 40mg daily x2-3 day until pressure comes down   Yes Historical Provider, MD   insulin glargine (LANTUS) 100 UNIT/ML injection vial Inject 10 Units into the skin daily as needed Patient takes this medication only if BG has been >120 x2-3 days. He uses 10 units nightly until readings are <120. Yes Historical Provider, MD   pregabalin (LYRICA) 75 MG capsule Take 75 mg by mouth 2 times daily.    Yes Historical Provider, MD   cyanocobalamin 1000 MCG tablet Take 1,000 mcg by mouth daily   Yes Historical Provider, MD   ferrous sulfate (IRON 325) 325 (65 Fe) MG tablet Take 325 mg by mouth daily (with breakfast)   Yes Historical Provider, MD   folic acid (FOLVITE) 1 MG tablet Take 1 mg by mouth daily   Yes Historical Provider, MD   methocarbamol (ROBAXIN) 500 MG tablet Take 500 mg by mouth every 6 hours as needed   Yes Historical Provider, MD   Multiple Vitamins-Minerals (THERAPEUTIC MULTIVITAMIN-MINERALS) tablet Take 1 tablet by mouth daily   Yes Historical Provider, MD   mycophenolate (CELLCEPT) 500 MG tablet Take by mouth in the morning, at noon, and at bedtime   Yes Historical Provider, MD   predniSONE (DELTASONE) 5 MG tablet Take 5 mg by mouth daily   Yes Historical Provider, MD   spironolactone (ALDACTONE) 25 MG tablet Take 25 mg by mouth daily   Yes Historical Provider, MD   rivaroxaban (XARELTO) 20 MG TABS tablet Take 20 mg by mouth daily (with breakfast) 3/31/22  Yes Historical Provider, MD   acetaminophen (TYLENOL) 325 MG tablet Take 650 mg by mouth every 6 hours as needed for Pain   Yes Historical Provider, MD   tamsulosin (FLOMAX) 0.4 MG capsule Take 0.4 mg by mouth daily   Yes Historical Provider, MD   oxyCODONE-acetaminophen (PERCOCET)  MG per tablet Take 1 tablet by mouth every 4 hours as needed for Pain.    Yes Historical Provider, MD   rivaroxaban (XARELTO) 15 MG TABS tablet Take 15 mg by mouth 2 times daily (with meals)  3/18/22 3/30/22 Yes Historical Provider, MD   atorvastatin (LIPITOR) 40 MG tablet Take 1 tablet by mouth nightly 1/24/20   [facility], MD   aspirin 81 MG tablet Take 81 mg by mouth daily    Historical Provider, MD          Inpatient Medications:   mycophenolate  500 mg Oral TID    predniSONE  5 mg Oral Daily    pregabalin  75 mg Oral BID    tamsulosin  0.4 mg Oral Daily    methylPREDNISolone  40 mg IntraVENous Once    aspirin  81 mg Oral Daily    atorvastatin  40 mg Oral Nightly    sodium chloride flush  5-40 mL IntraVENous 2 times per day    famotidine  20 mg Oral BID    insulin lispro  0-6 Units SubCUTAneous TID     insulin lispro  0-3 Units SubCUTAneous Nightly    furosemide  20 mg IntraVENous Daily    rivaroxaban  15 mg Oral BID     Followed by   Mateo Appiah ON 3/30/2022] rivaroxaban  20 mg Oral Daily       IV drips:   sodium chloride      dextrose         PRN:  oxyCODONE-acetaminophen, albuterol, methocarbamol, sodium chloride flush, sodium chloride, ondansetron **OR** ondansetron, polyethylene glycol, acetaminophen **OR** acetaminophen, glucagon (rDNA), dextrose, dextrose bolus (hypoglycemia) **OR** dextrose bolus (hypoglycemia), glucose    Allergy:     Lisinopril, Bee venom, and Other       Review of Systems:     All 12 point review of symptoms completed. Pertinent positives identified in the HPI, all other review of symptoms negative as below. CONSTITUTIONAL: No fatigue  SKIN: No rash or pruritis. EYES: No visual changes or diplopia. No scleral icterus. ENT: No Headaches, hearing loss or vertigo. No mouth sores or sore throat. CARDIOVASCULAR: No chest pain/chest pressure/chest discomfort. No palpitations. + edema. RESPIRATORY: + dyspnea. No cough or wheezing, no sputum production. GASTROINTESTINAL: No N/V/D. No abdominal pain, appetite loss, blood in stools. GENITOURINARY: No dysuria, trouble voiding, or hematuria. MUSCULOSKELETAL:  No gait disturbance, weakness or joint complaints. NEUROLOGICAL: No headache, diplopia, change in muscle strength, numbness or tingling. No change in gait, balance, coordination, mood, affect, memory, mentation, behavior. ENDOCRINE: No excessive thirst, fluid intake, or urination. No tremor. HEMATOLOGIC: No abnormal bruising or bleeding. ALLERGY: No nasal congestion or hives.       Physical Examination:     Vitals:    03/21/22 0503 03/21/22 0547 03/21/22 0809 03/21/22 1208   BP: (!) 151/93  124/89 (!) 131/90   Pulse: 63 62 61 65   Resp: 18  18 18   Temp: 97.4 °F (36.3 °C)  97.6 °F (36.4 °C) 97.6 °F (36.4 °C)   TempSrc: Oral  Oral Oral   SpO2: 98%  98% 99%   Weight: (!) 310 lb 6.5 oz (140.8 kg)      Height:           Wt Readings from Last 3 Encounters:   03/21/22 (!) 310 lb 6.5 oz (140.8 kg)   01/24/20 272 lb 4.3 oz (123.5 kg)   10/17/19 291 lb 9.6 oz (132.3 kg)         General Appearance:  Alert, cooperative, no distress, appears stated age Appropriate weight   Head:  Normocephalic, without obvious abnormality, atraumatic   Eyes:  PERRL, conjunctiva/corneas clear EOM intact  Ears normal   Throat no lesions       Nose: Nares normal, no drainage or sinus tenderness   Throat: Lips, mucosa, and tongue normal   Neck: Supple, symmetrical, trachea midline, no adenopathy, thyroid: not enlarged, symmetric, no tenderness/mass/nodules, no carotid bruit. Lungs:   Respirations unlabored, clear to auscultation bilaterally, without any wheezes, rubs or ronchi. Chest Wall:  No tenderness or deformity   Heart:  Regular rhythm, rate is controlled, S1, S2 normal, there is no murmur, there is no rub or gallop, cannot assess jvd, 1+ bilateral lower extremity edema   Abdomen:   Soft, non-tender, bowel sounds active all four quadrants,  no masses, no organomegaly       Extremities: Extremities normal, atraumatic, no cyanosis. Pulses: 2+ and symmetric   Skin: Skin color, texture, turgor normal, no rashes or lesions   Pysch: Normal mood and affect   Neurologic: Normal gross motor and sensory exam.  Cranial nerves intact        Labs:     Recent Labs     03/19/22  1407 03/20/22  1545 03/21/22  0458    135* 132*   K 4.2 4.1 4.7   BUN 10 14 16   CREATININE 0.8 0.9 0.9   CL 98* 95* 94*   CO2 27 23 28   GLUCOSE 103* 131* 127*   CALCIUM 9.4 9.3 9.3   MG  --  1.70* 1.70*     Recent Labs     03/19/22  1407 03/19/22 1407 03/20/22 2037 03/20/22 2037 03/21/22  0458   WBC 10.2  --  11.6*  --  11.1*   HGB 10.6*  --  10.5*  --  10.0*   HCT 33.4*  --  33.2*  --  31.4*     --  319  --  295   MCV 89.5   < > 88.7   < > 87.8    < > = values in this interval not displayed. No results for input(s): CHOLTOT, TRIG, HDL, LDL in the last 72 hours. Invalid input(s): LIPIDCOMM, CHOLHDL, VLDCHOL  Recent Labs     03/19/22  1407   INR 2.09*     Recent Labs     03/20/22 2037 03/21/22  0026 03/21/22  0458 03/21/22  0833 03/21/22  1232   CKTOTAL  --   --  172  --   --    TROPONINI 0.07* 0.10* 0.11* 0.12* 0.11*     No results for input(s): BNP in the last 72 hours. No results for input(s): TSH in the last 72 hours.   No results for input(s): CHOL, HDL, LDLCALC, TRIG in the last 72 hours.]    Lab Results   Component Value Date    CKTOTAL 172 03/21/2022    TROPONINI 0.11 (H) 03/21/2022         Imaging:     Telemetry personally reviewed:  NSR      Assessment / Plan:     1. Acute on chronic HFpEF. Patient clinically is approaching euvolemia. CardioMEMS device has not been interrogated yet. 2.  CAF. On Xarelto 20 mg daily, no need for rate control. 3.  CAD. It is mild per recent LHC. 4.  Myositis. Patient is supposed to be on mycophenolate 500 mg p.o. 3 times daily. 5.  Elevated troponin. It is most likely due to myositis. I doubt ACS in view of recent unremarkable LHC. LBBB is chronic (at least since 2021). Patient does not have any concerning symptoms.      -Continue with baby aspirin, Xarelto  -Continue with Lasix 20 mg IV  - Strict I's and O's every shift and standing weights if possible, low-salt diet and daily BMP with reflex to Mg, wean supplemental oxygen to off for sats greater than 94%. -Will repeat echo  -Once euvolemic, will resume home dose of Lasix prior history. I have personally reviewed the reports and images of labs, radiological studies, cardiac studies including ECG's and telemetry, current and old medical records. The note was completed using EMR and Dragon dictation system. Every effort was made to ensure accuracy; however, inadvertent computerized transcription errors may be present. All questions and concerns were addressed to the patient/family. Alternatives to my treatment were discussed. I would like to thank you for providing me the opportunity to participate in the care of your patient. If you have any questions, please do not hesitate to contact me.      Aga Ortiz MD, [facility], [address]  The [address]  [address] [address]  Ph: [phone]  Fax: [phone]

## 2022-03-21 NOTE — CARE COORDINATION
Case Management Assessment           Initial Evaluation                Date / Time of Evaluation: 3/21/2022 4:23 PM                 Assessment Completed by: Sona Messer RN    Patient Name: Peri Saleem     YOB: 1960  Diagnosis: Shortness of breath [R06.02]  Acute on chronic congestive heart failure with left ventricular diastolic dysfunction ([facility] [address] [I50.33]     Date / Time: 3/19/2022 11:50 AM    Patient Admission Status: Inpatient    If patient is discharged prior to next notation, then this note serves as note for discharge by case management. Current PCP: Hemal Hernandez MD  Clinic Patient: No    Chart Reviewed: Yes  Patient/ Family Interviewed: Yes    Initial assessment completed at bedside with: patient     Hospitalization in the last 30 days: No    Emergency Contacts:  Extended Emergency Contact Information  Primary Emergency Contact: [address] Phone: [phone]  Mobile Phone: [phone]  Relation: Niece/Nephew   needed? No  Secondary Emergency Contact: [address] Phone: [phone]  Mobile Phone: [phone]  Relation: Other   needed? No    Advance Directives:   Code Status: Full Code        Financial  Payor: [facility]  [id number] / Plan: [facility]  [phone] / Product Type: *No Product type* /     Pre-cert required for SNF: Yes    [address] [facility],[address] [phone]  [address] [place] [address]  Phone: [phone] Fax: [phone]      Potential assistance Purchasing Medications: Potential Assistance Purchasing Medications: No  Does Patient want to participate in local refill/ meds to beds program?:      Meds To Beds General Rules:  1. Can ONLY be done Monday- Friday between 8:30am-5pm  2. Prescription(s) must be in pharmacy by 3pm to be filled same day  3. Copy of patient's insurance/ prescription drug card and patient face sheet must be sent along with the prescription(s)  4. Cost of Rx cannot be added to hospital bill. If financial assistance is needed, please contact unit  or ;  or  CANNOT provide pharmacy voucher for patients co-pays  5. Patients can then  the prescription on their way out of the hospital at discharge, or pharmacy can deliver to the bedside if staff is available. (payment due at time of pick-up or delivery - cash, check, or card accepted)     Able to afford home medications/ co-pay costs: Yes    ADLS  Support Systems: Children    PT AM-PAC: 16 /24  OT AM-PAC: 18 /24    [place]: apartment  Steps: 8      Plans to RETURN to current housing: No  Barriers to RETURNING to current housing: needs rehab          [facility] Provider: n/a  Equipment: wide rolling walker (is missing since ER)    Home Oxygen and [address] [place] prior to admission: No  [address]: Not Applicable        DISCHARGE PLAN:  Disposition: Inpatient Rehab: [facility] Phone: [phone] Fax: [phone]    Transportation PLAN for discharge: family     Factors facilitating achievement of predicted outcomes: Cooperative and Pleasant    Barriers to discharge: Medical complications    Additional Case Management Notes:   Patient is from home alone, is agreeable to inpatient rehab. CM faxed referral to [facility], spoke with Marnie Lee.   They will have clinical team review, but think he is a good candidate, pre-cert waived at this time, likely [facility] would have a bed first.      The Plan for Transition of Care is related to the following treatment goals of Shortness of breath [R06.02]  Acute on chronic congestive heart failure with left ventricular diastolic dysfunction ([address] [I50.33]    The Patient and/or patient representative Raheem Randle and his family were provided with a choice of provider and agrees with the discharge plan Yes    Freedom of choice list was provided with basic dialogue that supports the patient's individualized plan of care/goals and shares the quality data associated with the providers.  Yes    Care Transition patient: No    Favian Mars RN  [facility]  Case Management Department  Ph: [phone]   Fax: [phone]

## 2022-03-21 NOTE — PLAN OF CARE
Problem: Falls - Risk of:  Goal: Will remain free from falls  Description: Will remain free from falls  7/28/6754 [id number] by Hitesh Greene RN  Outcome: Ongoing  Note: Pt is a Fall Risk. See Akira Inches Fall Risk Score. Pt bed in low position and side rails up. Call light and belongings in reach. Pt encouraged to call for assistance. Will continue with hourly rounds for PO intake, pain needs, toileting, and repositioning as needed. Problem: Pain:  Goal: Pain level will decrease  Description: Pain level will decrease  4/84/5962 [id number] by Hitesh Greene RN  Outcome: Ongoing  Note: Pt continues to report pain in R knee, relieved with prn medication. Rest encouraged. Pain assessed q4h and as needed. Problem: Cardiac:  Goal: Cardiovascular alteration will improve  Description: Cardiovascular alteration will improve  0/99/6956 [id number] by Hitesh Greene RN  Outcome: Ongoing  Note: A fib on tele, rate controlled. Pt denies any associated symptoms. Pt is encouraged to notify RN if experiencing.

## 2022-03-21 NOTE — PROGRESS NOTES
Progress Note    Admit Date: 3/19/2022  Day: 3  Diet: ADULT DIET; Regular; 3 carb choices (45 gm/meal); Low Fat/Low Chol/High Fiber/2 gm Na; Low Sodium (2 gm)    CC: SOB    Interval history: Pt reports that his SOB has improved. Legs less swollen. Has point tenderness. Denies any chest pain, fevers , chills, n/v, abdominal pain, changes is bowel or bladder habits. HPI:Pt is a 58 y/o male with a PMHx of CHF, DMT2, Atrial fibrillation, aortic aneurysm, HTN, HLD, CAD, poliomyelitis, Sjogren's and recent R knee replacement (2/24/2022) with subsequent DVT of of the R soleal vein who presented to the hospital for SOB.      Pt had R knee replaced at an OSH on 2/24/22, following his surgery he was sent to a SNF, where he developed a DVT on 3/08/22 and was sent back to the OSH for with heparin and transitioned to [place]. Pt states that his SOB is intermittent as it comes and goes, and is worse when he lies on his left side. Pt also endorses chest tenderness in one spot near his left breast, he describes it as a dull ache, and that pushing on it makes it worse. He also endorses the symptoms of cough, nausea and calf pain. The pt also endorses inability to walk, he states that he did not receive very much physical therapy when he was at the SNF. In addition to this, the pt stated that he was suppose to have home health upon discharge from his most recent hospital stay, but no one showed up to his place the following day.      In the ED, the pt was afebrile, hemodynamically stable, and sating well on room air. His labs displayed a pro-BNP 1,200, troponin were 0.09, 0.07, INR 2.09, AntiWBC was 10.2, and his CXR was unrevealing. An EKG displayed a LBBB, but it ws reported to be observed on a EKG from [facility] on 3/907141 as well. Pt to undergo a CTAP to r/o PE and will be admitted to the hospital for his SOB.     Medications:     Scheduled Meds:   mycophenolate  500 mg Oral TID    predniSONE  5 mg Oral Daily    pregabalin  75 mg Oral BID    tamsulosin  0.4 mg Oral Daily    methylPREDNISolone  40 mg IntraVENous Once    aspirin  81 mg Oral Daily    atorvastatin  40 mg Oral Nightly    sodium chloride flush  5-40 mL IntraVENous 2 times per day    famotidine  20 mg Oral BID    insulin lispro  0-6 Units SubCUTAneous TID     insulin lispro  0-3 Units SubCUTAneous Nightly    furosemide  20 mg IntraVENous Daily    rivaroxaban  15 mg Oral BID WC    Followed by   aMteo Appiah ON 3/30/2022] rivaroxaban  20 mg Oral Daily     Continuous Infusions:   sodium chloride      dextrose       PRN Meds:oxyCODONE-acetaminophen, albuterol, methocarbamol, sodium chloride flush, sodium chloride, ondansetron **OR** ondansetron, polyethylene glycol, acetaminophen **OR** acetaminophen, glucagon (rDNA), dextrose, dextrose bolus (hypoglycemia) **OR** dextrose bolus (hypoglycemia), glucose    Objective:   Vitals:   T-max:  Patient Vitals for the past 8 hrs:   BP Temp Temp src Pulse Resp SpO2   03/21/22 1208 (!) 131/90 97.6 °F (36.4 °C) Oral 65 18 99 %   03/21/22 0809 124/89 97.6 °F (36.4 °C) Oral 61 18 98 %       Intake/Output Summary (Last 24 hours) at 3/21/2022 1404  Last data filed at 3/21/2022 0154  Gross per 24 hour   Intake 0 ml   Output 450 ml   Net -450 ml       Review of Systems    Physical Exam  Constitutional:       Appearance: He is obese. HENT:      Head: Normocephalic and atraumatic. Eyes:      Extraocular Movements: Extraocular movements intact. Conjunctiva/sclera: Conjunctivae normal.   Cardiovascular:      Rate and Rhythm: Normal rate and regular rhythm. Pulmonary:      Effort: Pulmonary effort is normal.      Breath sounds: Normal breath sounds. No wheezing, rhonchi or rales. Abdominal:      Palpations: Abdomen is soft. Musculoskeletal:         General: Normal range of motion. Comments: Swelling R>L, non pitting edema, improved   Skin:     General: Skin is warm and dry.    Neurological:      General: No focal deficit present. Mental Status: He is alert. Psychiatric:         Mood and Affect: Mood normal.         LABS:    CBC:   Recent Labs     03/19/22  1407 03/20/22  2037 03/21/22  0458   WBC 10.2 11.6* 11.1*   HGB 10.6* 10.5* 10.0*   HCT 33.4* 33.2* 31.4*    319 295   MCV 89.5 88.7 87.8     Renal:    Recent Labs     03/19/22  1407 03/20/22  1545 03/21/22  0458    135* 132*   K 4.2 4.1 4.7   CL 98* 95* 94*   CO2 27 23 28   BUN 10 14 16   CREATININE 0.8 0.9 0.9   GLUCOSE 103* 131* 127*   CALCIUM 9.4 9.3 9.3   MG  --  1.70* 1.70*   ANIONGAP 11 17* 10     Hepatic: No results for input(s): AST, ALT, BILITOT, BILIDIR, PROT, LABALBU, ALKPHOS in the last 72 hours. Troponin:   Recent Labs     03/21/22  0458 03/21/22  0833 03/21/22  1232   TROPONINI 0.11* 0.12* 0.11*     BNP: No results for input(s): BNP in the last 72 hours. Lipids: No results for input(s): CHOL, HDL in the last 72 hours. Invalid input(s): LDLCALCU, TRIGLYCERIDE  ABGs:  No results for input(s): PHART, OZT2IIT, PO2ART, QUM1PIG, BEART, THGBART, A7ZKLIGG, HLW5KWQ in the last 72 hours. INR:   Recent Labs     03/19/22  1407   INR 2.09*     Lactate: No results for input(s): LACTATE in the last 72 hours. Cultures:  -----------------------------------------------------------------  RAD:   CT CHEST PULMONARY EMBOLISM W CONTRAST   Final Result      1. No evidence of pulmonary embolism. 2.  Incidental CardioMems device in left lower lobe segmental pulmonary artery. 3.  Mild right lower lobe atelectasis. XR CHEST (2 VW)   Final Result      1. No findings for acute cardiopulmonary disease in this underinflated chest.          Assessment/Plan:     SOB, 2/2 Acute on Chronic HF exacerbation vs Acute polymyositis flair  Pt has hx of heart failure is on Lasix at home, his Pro-BNP 1200.  Last Echo (1/22/2020) EF 60-65%  Pt had recent R knee replacement (2/24) at an OSH, developed DVT (3/08) at SNF, sent back to OSH for tx and tx with and D/C on Xeralto. Pt then presented to ED because had intermittent SOB and anxiety about being able to take care of himself of home. BNP 1200, elevated from previous. EKG LBBB present on 3/15  Has a history of polymyositis  - CTPA negative for PE  - continue xarelto  - PT/OT  - Cardiology consult, ECHO to see RH function  - cont lasix 20mg IV    Chronic Problems  LBBB: present on EKG, per present on EKG at [facility] (3/15/22) and was recommended OP ischemic workup. DMT2: LDSSI  CAD: Cont ASA 81 mg daily  HTN: No home medication listed  Aortic Aneurysm: Pt states it is 3.0 CM  Sjogrens: On cellcept and Mycophenolate, continue  Polymyositis: Chronic, pt has a hx of, will order inflammatory markers, ESR elevated, normal CK    Code Status: FULL  FEN: ADULT DIET; Regular; 3 carb choices (45 gm/meal); Low Fat/Low Chol/High Fiber/2 gm Na; Low Sodium (2 gm)  PPX: xarelto  DISPO: Sharonda Greenwood MD, PGY-1  03/21/22  2:04 PM    This patient has been staffed and discussed with Tatyana Vasquez MD.       Patient seen and examined, labs and imaging studies reviewed, agree with assessment and plan as outlined above. Continue with current care and plan. Discussed case with patients nurse, discussed case with care team, discussed plan. My primary concern here is patient may have acute inflammatory myositis.   Will monitor closely give steroids await cardiac recs    MD Lorraine Dailey

## 2022-03-21 NOTE — PROGRESS NOTES
Pt declines family updates at this time and will do so independently.     Electronically signed by Nilton Gooden RN on 0/94/8437 at 6:14 AM

## 2022-03-22 VITALS
TEMPERATURE: 98.2 F | RESPIRATION RATE: 16 BRPM | SYSTOLIC BLOOD PRESSURE: 138 MMHG | OXYGEN SATURATION: 93 % | BODY MASS INDEX: 43.36 KG/M2 | DIASTOLIC BLOOD PRESSURE: 79 MMHG | HEIGHT: 71 IN | HEART RATE: 69 BPM | WEIGHT: 309.75 LBS

## 2022-03-22 LAB
ANION GAP SERPL CALCULATED.3IONS-SCNC: 13 MMOL/L (ref 3–16)
BASOPHILS ABSOLUTE: 0 K/UL (ref 0–0.2)
BASOPHILS RELATIVE PERCENT: 0.4 %
BUN BLDV-MCNC: 27 MG/DL (ref 7–20)
CALCIUM SERPL-MCNC: 9.1 MG/DL (ref 8.3–10.6)
CHLORIDE BLD-SCNC: 96 MMOL/L (ref 99–110)
CO2: 26 MMOL/L (ref 21–32)
CREAT SERPL-MCNC: 1 MG/DL (ref 0.8–1.3)
EOSINOPHILS ABSOLUTE: 0 K/UL (ref 0–0.6)
EOSINOPHILS RELATIVE PERCENT: 0 %
GFR AFRICAN AMERICAN: >60
GFR NON-AFRICAN AMERICAN: >60
GLUCOSE BLD-MCNC: 114 MG/DL (ref 70–99)
GLUCOSE BLD-MCNC: 138 MG/DL (ref 70–99)
GLUCOSE BLD-MCNC: 158 MG/DL (ref 70–99)
HCT VFR BLD CALC: 34 % (ref 40.5–52.5)
HEMOGLOBIN: 10.8 G/DL (ref 13.5–17.5)
LYMPHOCYTES ABSOLUTE: 0.6 K/UL (ref 1–5.1)
LYMPHOCYTES RELATIVE PERCENT: 4.8 %
MAGNESIUM: 2.2 MG/DL (ref 1.8–2.4)
MCH RBC QN AUTO: 27.7 PG (ref 26–34)
MCHC RBC AUTO-ENTMCNC: 31.8 G/DL (ref 31–36)
MCV RBC AUTO: 87.1 FL (ref 80–100)
MONOCYTES ABSOLUTE: 0.4 K/UL (ref 0–1.3)
MONOCYTES RELATIVE PERCENT: 3.5 %
NEUTROPHILS ABSOLUTE: 10.7 K/UL (ref 1.7–7.7)
NEUTROPHILS RELATIVE PERCENT: 91.3 %
PDW BLD-RTO: 16.1 % (ref 12.4–15.4)
PERFORMED ON: ABNORMAL
PERFORMED ON: ABNORMAL
PLATELET # BLD: 362 K/UL (ref 135–450)
PMV BLD AUTO: 7.4 FL (ref 5–10.5)
POTASSIUM SERPL-SCNC: 4.8 MMOL/L (ref 3.5–5.1)
RBC # BLD: 3.9 M/UL (ref 4.2–5.9)
SODIUM BLD-SCNC: 135 MMOL/L (ref 136–145)
WBC # BLD: 11.7 K/UL (ref 4–11)

## 2022-03-22 PROCEDURE — 99233 SBSQ HOSP IP/OBS HIGH 50: CPT | Performed by: INTERNAL MEDICINE

## 2022-03-22 PROCEDURE — 97530 THERAPEUTIC ACTIVITIES: CPT

## 2022-03-22 PROCEDURE — 6370000000 HC RX 637 (ALT 250 FOR IP): Performed by: STUDENT IN AN ORGANIZED HEALTH CARE EDUCATION/TRAINING PROGRAM

## 2022-03-22 PROCEDURE — 99233 SBSQ HOSP IP/OBS HIGH 50: CPT | Performed by: NURSE PRACTITIONER

## 2022-03-22 PROCEDURE — 6360000002 HC RX W HCPCS: Performed by: STUDENT IN AN ORGANIZED HEALTH CARE EDUCATION/TRAINING PROGRAM

## 2022-03-22 PROCEDURE — 36415 COLL VENOUS BLD VENIPUNCTURE: CPT

## 2022-03-22 PROCEDURE — 6370000000 HC RX 637 (ALT 250 FOR IP)

## 2022-03-22 PROCEDURE — 83735 ASSAY OF MAGNESIUM: CPT

## 2022-03-22 PROCEDURE — 2580000003 HC RX 258: Performed by: STUDENT IN AN ORGANIZED HEALTH CARE EDUCATION/TRAINING PROGRAM

## 2022-03-22 PROCEDURE — 85025 COMPLETE CBC W/AUTO DIFF WBC: CPT

## 2022-03-22 PROCEDURE — 80048 BASIC METABOLIC PNL TOTAL CA: CPT

## 2022-03-22 RX ORDER — PREDNISONE 20 MG/1
TABLET ORAL
Qty: 15 TABLET | Refills: 0 | Status: SHIPPED | OUTPATIENT
Start: 2022-03-22 | End: 2022-03-22 | Stop reason: SDUPTHER

## 2022-03-22 RX ORDER — FUROSEMIDE 40 MG/1
40 TABLET ORAL DAILY
Status: DISCONTINUED | OUTPATIENT
Start: 2022-03-23 | End: 2022-03-22 | Stop reason: HOSPADM

## 2022-03-22 RX ORDER — FAMOTIDINE 20 MG/1
20 TABLET, FILM COATED ORAL 2 TIMES DAILY
Qty: 60 TABLET | Refills: 3 | Status: SHIPPED | OUTPATIENT
Start: 2022-03-22

## 2022-03-22 RX ORDER — FUROSEMIDE 40 MG/1
40 TABLET ORAL DAILY
Qty: 60 TABLET | Refills: 3 | OUTPATIENT
Start: 2022-03-23

## 2022-03-22 RX ORDER — PREDNISONE 1 MG/1
5 TABLET ORAL DAILY
Qty: 30 TABLET | Refills: 1 | Status: SHIPPED | OUTPATIENT
Start: 2022-04-03 | End: 2022-03-22 | Stop reason: SDUPTHER

## 2022-03-22 RX ORDER — FUROSEMIDE 40 MG/1
40 TABLET ORAL DAILY
Qty: 60 TABLET | Refills: 3 | Status: SHIPPED | OUTPATIENT
Start: 2022-03-23

## 2022-03-22 RX ORDER — FAMOTIDINE 20 MG/1
20 TABLET, FILM COATED ORAL 2 TIMES DAILY
Qty: 60 TABLET | Refills: 3 | Status: SHIPPED | OUTPATIENT
Start: 2022-03-22 | End: 2022-03-22

## 2022-03-22 RX ORDER — PREDNISONE 20 MG/1
TABLET ORAL
Qty: 15 TABLET | Refills: 0 | Status: SHIPPED | OUTPATIENT
Start: 2022-03-22 | End: 2022-04-03

## 2022-03-22 RX ORDER — PREDNISONE 1 MG/1
5 TABLET ORAL DAILY
Qty: 30 TABLET | Refills: 1 | Status: SHIPPED | OUTPATIENT
Start: 2022-04-03

## 2022-03-22 RX ORDER — METHYLPREDNISOLONE SODIUM SUCCINATE 40 MG/ML
40 INJECTION, POWDER, LYOPHILIZED, FOR SOLUTION INTRAMUSCULAR; INTRAVENOUS ONCE
Status: COMPLETED | OUTPATIENT
Start: 2022-03-22 | End: 2022-03-22

## 2022-03-22 RX ADMIN — PREDNISONE 5 MG: 5 TABLET ORAL at 08:05

## 2022-03-22 RX ADMIN — MYCOPHENOLATE MOFETIL 500 MG: 500 TABLET, FILM COATED ORAL at 08:06

## 2022-03-22 RX ADMIN — TAMSULOSIN HYDROCHLORIDE 0.4 MG: 0.4 CAPSULE ORAL at 08:05

## 2022-03-22 RX ADMIN — METHYLPREDNISOLONE SODIUM SUCCINATE 40 MG: 40 INJECTION, POWDER, FOR SOLUTION INTRAMUSCULAR; INTRAVENOUS at 12:00

## 2022-03-22 RX ADMIN — METHOCARBAMOL 500 MG: 500 TABLET ORAL at 15:23

## 2022-03-22 RX ADMIN — PREGABALIN 75 MG: 75 CAPSULE ORAL at 08:05

## 2022-03-22 RX ADMIN — RIVAROXABAN 15 MG: 15 TABLET, FILM COATED ORAL at 08:05

## 2022-03-22 RX ADMIN — SODIUM CHLORIDE, PRESERVATIVE FREE 10 ML: 5 INJECTION INTRAVENOUS at 08:06

## 2022-03-22 RX ADMIN — OXYCODONE AND ACETAMINOPHEN 1 TABLET: 10; 325 TABLET ORAL at 08:05

## 2022-03-22 RX ADMIN — OXYCODONE AND ACETAMINOPHEN 1 TABLET: 10; 325 TABLET ORAL at 12:00

## 2022-03-22 RX ADMIN — FAMOTIDINE 20 MG: 20 TABLET, FILM COATED ORAL at 08:05

## 2022-03-22 RX ADMIN — ONDANSETRON 4 MG: 2 INJECTION INTRAMUSCULAR; INTRAVENOUS at 02:57

## 2022-03-22 RX ADMIN — OXYCODONE AND ACETAMINOPHEN 1 TABLET: 10; 325 TABLET ORAL at 04:01

## 2022-03-22 RX ADMIN — MYCOPHENOLATE MOFETIL 500 MG: 500 TABLET, FILM COATED ORAL at 15:29

## 2022-03-22 RX ADMIN — ASPIRIN 81 MG: 81 TABLET, COATED ORAL at 08:05

## 2022-03-22 RX ADMIN — FUROSEMIDE 20 MG: 10 INJECTION, SOLUTION INTRAMUSCULAR; INTRAVENOUS at 08:05

## 2022-03-22 ASSESSMENT — PAIN DESCRIPTION - ONSET
ONSET: ON-GOING

## 2022-03-22 ASSESSMENT — PAIN DESCRIPTION - LOCATION
LOCATION: KNEE

## 2022-03-22 ASSESSMENT — PAIN DESCRIPTION - FREQUENCY
FREQUENCY: CONTINUOUS

## 2022-03-22 ASSESSMENT — PAIN DESCRIPTION - DESCRIPTORS
DESCRIPTORS: ACHING

## 2022-03-22 ASSESSMENT — PAIN SCALES - GENERAL
PAINLEVEL_OUTOF10: 6
PAINLEVEL_OUTOF10: 0
PAINLEVEL_OUTOF10: 7
PAINLEVEL_OUTOF10: 8
PAINLEVEL_OUTOF10: 7
PAINLEVEL_OUTOF10: 0

## 2022-03-22 ASSESSMENT — PAIN DESCRIPTION - ORIENTATION
ORIENTATION: RIGHT

## 2022-03-22 ASSESSMENT — PAIN DESCRIPTION - PROGRESSION
CLINICAL_PROGRESSION: NOT CHANGED

## 2022-03-22 ASSESSMENT — PAIN DESCRIPTION - PAIN TYPE
TYPE: ACUTE PAIN

## 2022-03-22 ASSESSMENT — PAIN - FUNCTIONAL ASSESSMENT
PAIN_FUNCTIONAL_ASSESSMENT: PREVENTS OR INTERFERES SOME ACTIVE ACTIVITIES AND ADLS
PAIN_FUNCTIONAL_ASSESSMENT: ACTIVITIES ARE NOT PREVENTED

## 2022-03-22 NOTE — PROGRESS NOTES
Progress Note    Admit Date: 3/19/2022  Day: 3  Diet: ADULT DIET; Regular; 3 carb choices (45 gm/meal); Low Fat/Low Chol/High Fiber/2 gm Na; Low Sodium (2 gm)    CC: SOB    Interval history: Pt reports that his SOB has improved. Legs less swollen. Has point tenderness. Denies any chest pain, fevers , chills, n/v, abdominal pain, changes is bowel or bladder habits. HPI:Pt is a 60 y/o male with a PMHx of CHF, DMT2, Atrial fibrillation, aortic aneurysm, HTN, HLD, CAD, poliomyelitis, Sjogren's and recent R knee replacement (2/24/2022) with subsequent DVT of of the R soleal vein who presented to the hospital for SOB.      Pt had R knee replaced at an OSH on 2/24/22, following his surgery he was sent to a SNF, where he developed a DVT on 3/08/22 and was sent back to the OSH for with heparin and transitioned to [place]. Pt states that his SOB is intermittent as it comes and goes, and is worse when he lies on his left side. Pt also endorses chest tenderness in one spot near his left breast, he describes it as a dull ache, and that pushing on it makes it worse. He also endorses the symptoms of cough, nausea and calf pain. The pt also endorses inability to walk, he states that he did not receive very much physical therapy when he was at the SNF. In addition to this, the pt stated that he was suppose to have home health upon discharge from his most recent hospital stay, but no one showed up to his place the following day.      In the ED, the pt was afebrile, hemodynamically stable, and sating well on room air. His labs displayed a pro-BNP 1,200, troponin were 0.09, 0.07, INR 2.09, AntiWBC was 10.2, and his CXR was unrevealing. An EKG displayed a LBBB, but it ws reported to be observed on a EKG from [facility] on 3/285994 as well. Pt to undergo a CTAP to r/o PE and will be admitted to the hospital for his SOB.     Medications:     Scheduled Meds:   mycophenolate  500 mg Oral TID    predniSONE  5 mg Oral Daily    pregabalin  75 mg Oral BID    tamsulosin  0.4 mg Oral Daily    aspirin  81 mg Oral Daily    atorvastatin  40 mg Oral Nightly    sodium chloride flush  5-40 mL IntraVENous 2 times per day    famotidine  20 mg Oral BID    insulin lispro  0-6 Units SubCUTAneous TID     insulin lispro  0-3 Units SubCUTAneous Nightly    furosemide  20 mg IntraVENous Daily    rivaroxaban  15 mg Oral BID WC    Followed by   Estrella Dominguez ON 3/30/2022] rivaroxaban  20 mg Oral Daily     Continuous Infusions:   sodium chloride      dextrose       PRN Meds:oxyCODONE-acetaminophen, albuterol, methocarbamol, sodium chloride flush, sodium chloride, ondansetron **OR** ondansetron, polyethylene glycol, acetaminophen **OR** acetaminophen, glucagon (rDNA), dextrose, dextrose bolus (hypoglycemia) **OR** dextrose bolus (hypoglycemia), glucose    Objective:   Vitals:   T-max:  Patient Vitals for the past 8 hrs:   BP Temp Temp src Pulse Resp SpO2 Weight   03/22/22 0748 113/63 97.4 °F (36.3 °C) Oral 59 16 93 %    03/22/22 0359       (!) 309 lb 11.9 oz (140.5 kg)   03/22/22 0358 103/68 97.4 °F (36.3 °C) Oral 57 15 90 %        Intake/Output Summary (Last 24 hours) at 3/22/2022 0827  Last data filed at 3/22/2022 5712  Gross per 24 hour   Intake 680 ml   Output 1450 ml   Net -770 ml       Review of Systems    Physical Exam  Constitutional:       Appearance: He is obese. HENT:      Head: Normocephalic and atraumatic. Eyes:      Extraocular Movements: Extraocular movements intact. Conjunctiva/sclera: Conjunctivae normal.   Cardiovascular:      Rate and Rhythm: Normal rate and regular rhythm. Pulmonary:      Effort: Pulmonary effort is normal.      Breath sounds: Normal breath sounds. No wheezing, rhonchi or rales. Abdominal:      Palpations: Abdomen is soft. Musculoskeletal:         General: Normal range of motion. Comments: Swelling R>L, non pitting edema, improved   Skin:     General: Skin is warm and dry.    Neurological: General: No focal deficit present. Mental Status: He is alert. Psychiatric:         Mood and Affect: Mood normal.         LABS:    CBC:   Recent Labs     03/20/22  2037 03/21/22  0458 03/22/22  0533   WBC 11.6* 11.1* 11.7*   HGB 10.5* 10.0* 10.8*   HCT 33.2* 31.4* 34.0*    295 362   MCV 88.7 87.8 87.1     Renal:    Recent Labs     03/20/22  1545 03/21/22  0458 03/22/22  0533   * 132* 135*   K 4.1 4.7 4.8   CL 95* 94* 96*   CO2 23 28 26   BUN 14 16 27*   CREATININE 0.9 0.9 1.0   GLUCOSE 131* 127* 158*   CALCIUM 9.3 9.3 9.1   MG 1.70* 1.70* 2.20   ANIONGAP 17* 10 13     Hepatic: No results for input(s): AST, ALT, BILITOT, BILIDIR, PROT, LABALBU, ALKPHOS in the last 72 hours. Troponin:   Recent Labs     03/21/22  1232 03/21/22  1714 03/21/22  2132   TROPONINI 0.11* 0.09* 0.06*     BNP: No results for input(s): BNP in the last 72 hours. Lipids: No results for input(s): CHOL, HDL in the last 72 hours. Invalid input(s): LDLCALCU, TRIGLYCERIDE  ABGs:  No results for input(s): PHART, HTA4QBZ, PO2ART, NIW1RLQ, BEART, THGBART, A1UKVYSG, JYX1FVO in the last 72 hours. INR:   Recent Labs     03/19/22  1407   INR 2.09*     Lactate: No results for input(s): LACTATE in the last 72 hours. Cultures:  -----------------------------------------------------------------  RAD:   CT CHEST PULMONARY EMBOLISM W CONTRAST   Final Result      1. No evidence of pulmonary embolism. 2.  Incidental CardioMems device in left lower lobe segmental pulmonary artery. 3.  Mild right lower lobe atelectasis. XR CHEST (2 VW)   Final Result      1. No findings for acute cardiopulmonary disease in this underinflated chest.          Assessment/Plan:     SOB, 2/2 Acute on Chronic HF exacerbation vs Acute polymyositis flair  Pt has hx of heart failure is on Lasix at home, his Pro-BNP 1200.  Last Echo (1/22/2020) EF 60-65%  Pt had recent R knee replacement (2/24) at an OSH, developed DVT (3/08) at SNF, sent back to OSH for tx and tx with and D/C on Xeralto. Pt then presented to ED because had intermittent SOB and anxiety about being able to take care of himself of home. BNP 1200, elevated from previous. EKG LBBB present on 3/15  Has a history of polymyositis  - CTPA negative for PE  - continue xarelto  - PT/OT  - Cardiology consult, ECHO to see RH function  - cont lasix 20mg IV    Chronic Problems  LBBB: present on EKG, per present on EKG at [facility] (3/15/22) and was recommended OP ischemic workup. DMT2: LDSSI  CAD: Cont ASA 81 mg daily  HTN: No home medication listed  Aortic Aneurysm: Pt states it is 3.0 CM  Sjogrens: On cellcept and Mycophenolate, continue  Polymyositis: Chronic, pt has a hx of, will order inflammatory markers, ESR elevated, normal CK    Code Status: FULL  FEN: ADULT DIET; Regular; 3 carb choices (45 gm/meal); Low Fat/Low Chol/High Fiber/2 gm Na; Low Sodium (2 gm)  PPX: xarelto  DISPO: Alok Olmedo MD, PGY-1  03/22/22  8:27 AM    This patient has been staffed and discussed with Humberto Hernandez MD.       Patient seen and examined, labs and imaging studies reviewed, agree with assessment and plan as outlined above. Continue with current care and plan. Discussed case with patients nurse, discussed case with care team, discussed plan. My primary concern here is patient may have acute inflammatory myositis.   Will monitor closely give steroids await cardiac recs    Humberto Hernandez MD [address]

## 2022-03-22 NOTE — PROGRESS NOTES
Cardiology Consult Service  Daily Progress Note        Admit Date:  3/19/2022  Primary cardiologist: Dr Chencho Elkins at [facility]    Reason for Consultation/Chief Complaint: AHF, elevated troponin    Subjective:     Uriah El is a 59 y.o. male with a past medical history of morbid obesity, HTN, HLP, DM, HFpEF with CardioMEMS implant in 2021, CAF, myositis, Sjogren's disease, acute DVT right soleal vein after TKA 03/2022, new LBBB (as of 03/2022).    Echo 03/2021: LVH, LVEF 60%, indeterminate diastolic function, mild to moderate MR (similar to echo 01/2020 and echo 10/2019)     [facility] 06/2019: Mild CAD (LAD 20%), normal LVEF.     Patient presented to the emergency room on 3/19 with progressively worse shortness of breath, orthopnea, lower extremity edema over the last 1 week. He denies any chest pains or productive cough, denies any fevers. He was afebrile, normotensive, with normal oxygen saturation on room air. Labs show normal creatinine 0.8, proBNP 1200, troponin 0.09-0. 11. CTA chest negative for PE, no pulmonary edema.     ECG consistent with coarse A. fib, ventricular rate 65 bpm, LBBB ( ms). Review of previous records reveals atrial fibrillation and LBBB since 03/14/2021.     Cardiology was consulted during the weekend Dr. Lawyer Cranker assessed the patient. Patient was started on Lasix 20 mg IV daily, I's and O's -2.9 L and weight down 7 pounds since admission. CardioMEMS has not been interrogated yet. I was consulted today for further evaluation of his heart failure. Patient reports improvement with his symptoms.     Home diuretics: Spironolactone 25 mg daily and Lasix 20 mg daily; take Lasix 40 mg daily x2 to 3 days if CardioMEMS PA pressure greater than 28 mmHg. Echo 3/21/22: normal LV, EF 60%, mild LVH, indeterminate diastolic, normal RV and valves. CardioMEMS 3/22/22: mean PA 28-29    Interval history:  Patient reports no complaints. There were no events.      Objective:     Medications:   [START ON 3/23/2022] furosemide  40 mg Oral Daily    mycophenolate  500 mg Oral TID    predniSONE  5 mg Oral Daily    pregabalin  75 mg Oral BID    tamsulosin  0.4 mg Oral Daily    aspirin  81 mg Oral Daily    atorvastatin  40 mg Oral Nightly    sodium chloride flush  5-40 mL IntraVENous 2 times per day    famotidine  20 mg Oral BID    insulin lispro  0-6 Units SubCUTAneous TID WC    insulin lispro  0-3 Units SubCUTAneous Nightly    rivaroxaban  15 mg Oral BID WC    Followed by   Vera Francis ON 3/30/2022] rivaroxaban  20 mg Oral Daily       IV drips:   sodium chloride      dextrose         PRN:  oxyCODONE-acetaminophen, albuterol, methocarbamol, sodium chloride flush, sodium chloride, ondansetron **OR** ondansetron, polyethylene glycol, acetaminophen **OR** acetaminophen, glucagon (rDNA), dextrose, dextrose bolus (hypoglycemia) **OR** dextrose bolus (hypoglycemia), glucose    Vitals:    03/22/22 0359 03/22/22 0748 03/22/22 1136 03/22/22 1204   BP:  113/63  123/69   Pulse:  59 62 63   Resp:  16 16    Temp:  97.4 °F (36.3 °C) 98.2 °F (36.8 °C)    TempSrc:  Oral Oral    SpO2:  93% 93%    Weight: (!) 309 lb 11.9 oz (140.5 kg)      Height:           Intake/Output Summary (Last 24 hours) at 3/22/2022 1351  Last data filed at 3/22/2022 2567  Gross per 24 hour   Intake 460 ml   Output 950 ml   Net -490 ml     I/O last 3 completed shifts:   In: 65 [P.O.:680]  Out: 1800 [Urine:1800]  Wt Readings from Last 3 Encounters:   03/22/22 (!) 309 lb 11.9 oz (140.5 kg)   01/24/20 272 lb 4.3 oz (123.5 kg)   10/17/19 291 lb 9.6 oz (132.3 kg)       Admit Wt: Weight: (!) 317 lb (143.8 kg)   Todays Wt: Weight: (!) 309 lb 11.9 oz (140.5 kg)    TELEMETRY personally reviewed: Atrial fibrillation with CVR    Physical Exam:         General Appearance:  Alert, cooperative, no distress, appears stated age Appropriate weight   Head:  Normocephalic, without obvious abnormality, atraumatic   Eyes:  PERRL, conjunctiva/corneas clear EOM intact  Ears normal   Throat no lesions       Nose: Nares normal, no drainage or sinus tenderness   Throat: Lips, mucosa, and tongue normal   Neck: Supple, symmetrical, trachea midline, no adenopathy, thyroid: not enlarged, symmetric, no tenderness/mass/nodules, no carotid bruit. Lungs:   Normal respiratory rate, lungs clear to auscultation without any wheezes, rubs or ronchi bilaterally. Chest Wall:  No tenderness or deformity   Heart:  Regular rhythm, rate is controlled, S1, S2 normal, there is no murmur, there is no rub or gallop, cannot assess jvd, no bilateral lower extremity edema   Abdomen:   Soft, non-tender, bowel sounds active all four quadrants,  no masses, no organomegaly       Extremities: Extremities normal, atraumatic, no cyanosis. Pulses: 2+ and symmetric   Skin: Skin color, texture, turgor normal, no rashes or lesions   Pysch: Normal mood and affect   Neurologic: Normal gross motor and sensory exam.  Cranial nerves intact       Labs:   Recent Labs     03/20/22  1545 03/21/22  0458 03/22/22  0533   * 132* 135*   K 4.1 4.7 4.8   BUN 14 16 27*   CREATININE 0.9 0.9 1.0   CL 95* 94* 96*   CO2 23 28 26   GLUCOSE 131* 127* 158*   CALCIUM 9.3 9.3 9.1   MG 1.70* 1.70* 2.20     Recent Labs     03/20/22 2037 03/20/22 2037 03/21/22 0458 03/21/22  0458 03/22/22  0533   WBC 11.6*  --  11.1*  --  11.7*   HGB 10.5*  --  10.0*  --  10.8*   HCT 33.2*  --  31.4*  --  34.0*     --  295  --  362   MCV 88.7   < > 87.8   < > 87.1    < > = values in this interval not displayed. No results for input(s): CHOLTOT, TRIG, HDL, LDL in the last 72 hours. Invalid input(s): LIPIDCOMM, CHOLHDL, VLDCHOL  Recent Labs     03/19/22  1407   INR 2.09*     Recent Labs     03/21/22  0458 03/21/22  0833 03/21/22  1232 03/21/22  1714 03/21/22  2132   CKTOTAL 172  --   --   --   --    TROPONINI 0.11* 0.12* 0.11* 0.09* 0.06*     No results for input(s): BNP in the last 72 hours.   No results for input(s): NTPROBNP in the last 72 hours. No results for input(s): TSH in the last 72 hours. Imaging:       Assessment & Plan:     1. Acute on chronic HFpEF. Patient clinically is approaching euvolemia. CardioMEMS device has not been interrogated yet. 2.  CAF. On Xarelto 20 mg daily, no need for rate control. 3.  CAD. It is mild per recent LHC. 4.  Myositis. Patient is supposed to be on mycophenolate 500 mg p.o. 3 times daily. 5.  Elevated troponin. It is most likely due to myositis. I doubt ACS in view of recent unremarkable LHC. LBBB is chronic (at least since 2021). Patient does not have any concerning symptoms.        -Continue with baby aspirin, Xarelto  -Will change Lasix 20 mg IV to lasix 40 po daily (was on 20 mg daily at home). - Strict I's and O's every shift and standing weights if possible, low-salt diet and daily BMP with reflex to Mg, wean supplemental oxygen to off for sats greater than 94%. Patient may be discharged home today on the above meds and follow up with me in 1 week with a BMP prior to visit. Please call me with any questions. I have spent 35 minutes of face to face time with the patient with more than 50% spent counseling and coordinating care. I have personally reviewed the reports and images of labs, radiological studies, cardiac studies including ECG's and telemetry, current and old medical records. The note was completed using EMR and Dragon dictation system. Every effort was made to ensure accuracy; however, inadvertent computerized transcription errors may be present. All questions and concerns were addressed to the patient/family. Alternatives to my treatment were discussed. Thank you for allowing to us to participate in the care or Jeb Abdallalincoln. Please call our service with questions.     Lisa Mullins MD, [facility], [address]  The [address]  [address] [address]  Ph: [phone]  Fax: [phone]

## 2022-03-22 NOTE — PROGRESS NOTES
Pt providing shift updates independently.     Electronically signed by Eli Zaragoza RN on 0/52/8612 at 6:07 AM

## 2022-03-22 NOTE — PLAN OF CARE
Problem: Falls - Risk of:  Goal: Will remain free from falls  Description: Will remain free from falls  8/57/8639 [id number] by Day Ramírez RN  Outcome: Ongoing  Note: Pt is a Fall Risk. See [place] Fall Risk Score. Pt bed in low position and side rails up. Call light and belongings in reach. Pt encouraged to call for assistance. Will continue with hourly rounds for PO intake, pain needs, toileting, and repositioning as needed. 3/21/2022 1858 by Carlita Do RN  Outcome: Met This Shift     Problem: Cardiac:  Goal: Cardiovascular alteration will improve  Description: Cardiovascular alteration will improve  1/07/8119 4813 by Day Ramírez RN  Outcome: Ongoing  Note: Vitals remain stable tonight. Strict I&Os in place.

## 2022-03-22 NOTE — PROGRESS NOTES
Progress Note    Admit Date: 3/19/2022  Day: 4  Diet: ADULT DIET; Regular; 3 carb choices (45 gm/meal); Low Fat/Low Chol/High Fiber/2 gm Na; Low Sodium (2 gm)    CC: SOB    Interval history: Pt reports improved leg swelling and denies SOB except present when he lays down. He has point tenderness over the left breast that has also improved. He reports coughing up frothy white sputum during the night. Px denies fever, chills, chest pain, nausea, vomiting, abdominal pain and changes in bowel/bladder habits. HPI: Pt is a 60 y/o male with a PMHx of CHF, DMT2, Atrial fibrillation, aortic aneurysm, HTN, HLD, CAD, poliomyelitis, Sjogren's and recent R knee replacement (2/24/2022) with subsequent DVT of of the R soleal vein who presented to the hospital for SOB.      Pt had R knee replaced at an OSH on 2/24/22, following his surgery he was sent to a SNF, where he developed a DVT on 3/08/22 and was sent back to the OSH for with heparin and transitioned to Xeralto. Pt states that his SOB is intermittent as it comes and goes, and is worse when he lies on his left side. Pt also endorses chest tenderness in one spot near his left breast, he describes it as a dull ache, and that pushing on it makes it worse. He also endorses the symptoms of cough, nausea and calf pain. The pt also endorses inability to walk, he states that he did not receive very much physical therapy when he was at the SNF. In addition to this, the pt stated that he was suppose to have home health upon discharge from his most recent hospital stay, but no one showed up to his place the following day.      In the ED, the pt was afebrile, hemodynamically stable, and sating well on room air. His labs displayed a pro-BNP 1,200, troponin were 0.09, 0.07, INR 2.09, AntiWBC was 10.2, and his CXR was unrevealing. An EKG displayed a LBBB, but it ws reported to be observed on a EKG from [facility] on 3/[id number] as well.  Pt to undergo a CTAP to r/o PE and will be admitted to the hospital for his SOB. Medications:     Scheduled Meds:   mycophenolate  500 mg Oral TID    predniSONE  5 mg Oral Daily    pregabalin  75 mg Oral BID    tamsulosin  0.4 mg Oral Daily    aspirin  81 mg Oral Daily    atorvastatin  40 mg Oral Nightly    sodium chloride flush  5-40 mL IntraVENous 2 times per day    famotidine  20 mg Oral BID    insulin lispro  0-6 Units SubCUTAneous TID     insulin lispro  0-3 Units SubCUTAneous Nightly    furosemide  20 mg IntraVENous Daily    rivaroxaban  15 mg Oral BID WC    Followed by   Brendan Johnson ON 3/30/2022] rivaroxaban  20 mg Oral Daily     Continuous Infusions:   sodium chloride      dextrose       PRN Meds:oxyCODONE-acetaminophen, albuterol, methocarbamol, sodium chloride flush, sodium chloride, ondansetron **OR** ondansetron, polyethylene glycol, acetaminophen **OR** acetaminophen, glucagon (rDNA), dextrose, dextrose bolus (hypoglycemia) **OR** dextrose bolus (hypoglycemia), glucose    Objective:   Vitals:   T-max:  Patient Vitals for the past 8 hrs:   BP Temp Temp src Pulse Resp SpO2 Weight   03/22/22 0748 113/63 97.4 °F (36.3 °C) Oral 59 16 93 % --   03/22/22 0359 -- -- -- -- -- -- (!) 309 lb 11.9 oz (140.5 kg)   03/22/22 0358 103/68 97.4 °F (36.3 °C) Oral 57 15 90 % --       Intake/Output Summary (Last 24 hours) at 3/22/2022 1111  Last data filed at 3/22/2022 6433  Gross per 24 hour   Intake 460 ml   Output 950 ml   Net -490 ml       Review of Systems    Physical Exam  Constitutional:       Appearance: He is obese. HENT:      Head: Normocephalic and atraumatic. Eyes:      Extraocular Movements: Extraocular movements intact. Conjunctiva/sclera: Conjunctivae normal.   Cardiovascular:      Rate and Rhythm: Normal rate and regular rhythm. Pulmonary:      Effort: Pulmonary effort is normal.      Breath sounds: Normal breath sounds. No wheezing, rhonchi or rales. Abdominal:      Palpations: Abdomen is soft.    Musculoskeletal: General: Normal range of motion. Comments: Swelling R>L, non pitting edema, improved  Skin:     General: Skin is warm and dry. Neurological:      General: No focal deficit present. Mental Status: He is alert. Psychiatric:         Mood and Affect: Mood normal    LABS:    CBC:   Recent Labs     03/20/22  2037 03/21/22  0458 03/22/22  0533   WBC 11.6* 11.1* 11.7*   HGB 10.5* 10.0* 10.8*   HCT 33.2* 31.4* 34.0*    295 362   MCV 88.7 87.8 87.1     Renal:    Recent Labs     03/20/22  1545 03/21/22  0458 03/22/22  0533   * 132* 135*   K 4.1 4.7 4.8   CL 95* 94* 96*   CO2 23 28 26   BUN 14 16 27*   CREATININE 0.9 0.9 1.0   GLUCOSE 131* 127* 158*   CALCIUM 9.3 9.3 9.1   MG 1.70* 1.70* 2.20   ANIONGAP 17* 10 13     Hepatic: No results for input(s): AST, ALT, BILITOT, BILIDIR, PROT, LABALBU, ALKPHOS in the last 72 hours. Troponin:   Recent Labs     03/21/22  1232 03/21/22  1714 03/21/22  2132   TROPONINI 0.11* 0.09* 0.06*     BNP: No results for input(s): BNP in the last 72 hours. Lipids: No results for input(s): CHOL, HDL in the last 72 hours. Invalid input(s): LDLCALCU, TRIGLYCERIDE  ABGs:  No results for input(s): PHART, AFL2EQE, PO2ART, YJF6VZC, BEART, THGBART, B8UNXXCS, VRH7JIG in the last 72 hours. INR:   Recent Labs     03/19/22  1407   INR 2.09*     Lactate: No results for input(s): LACTATE in the last 72 hours. Cultures:  -----------------------------------------------------------------  RAD:   CT CHEST PULMONARY EMBOLISM W CONTRAST   Final Result      1. No evidence of pulmonary embolism. 2.  Incidental CardioMems device in left lower lobe segmental pulmonary artery. 3.  Mild right lower lobe atelectasis. XR CHEST (2 VW)   Final Result      1.   No findings for acute cardiopulmonary disease in this underinflated chest.          Assessment/Plan:   SOB, 2/2 Acute on Chronic HF exacerbation with Acute polymyositis flair  Pt has hx of heart failure is on Lasix at home, his Pro-BNP 1200. Last Echo (3/21/2020) showed mild concentric LV hypertrophy with EF 55-60%  Pt had recent R knee replacement (2/24) at an OSH, developed DVT (3/08) at SNF, sent back to OSH for tx and tx with and D/C on Xeralto. Pt then presented to ED because had intermittent SOB and anxiety about being able to take care of himself of home. BNP 1200, elevated from previous. EKG LBBB present on 3/15  Has a history of polymyositis  - CTPA negative for PE  - continue xarelto  - PT/OT  - cont lasix 20mg IV  - Solumedrol 40mg IV x2  - will see how pt feels today with another soludmedrol dose  - pt wants to go back to rehab at d/c     Chronic Problems  LBBB: present on EKG, per present on EKG at [address] (3/15/22) and was recommended OP ischemic workup. 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 patients condition continues to improve doing well, asked patient to monitor side effects of medications which i've discussed at length, recurrence of symptoms or new symptoms including but not limited to chest pain, shortness of breath, nausea, vomiting, fevers or chills and seek immediate medical attention or call 911. Greater than 30 minutes spent on case on day of discharge.    Full dc note to follow   Davina Hernandez MD FACP

## 2022-03-22 NOTE — PROGRESS NOTES
Alerted to patient with CardioMems PA sensor needing readings today. Patient agreeable and shares his last reading at home on his pillow was 28 on 2/23. Pt has RTKR on 2/24/22. Pt follows with [facility] and Dr Yakov Graves is his primary cardiologist.  Pt info (sensor, calibration code, baseline code) received from A Menker, [employer] rep, and added to CardioMems unit. Readings x 3 were done without incident. Pt was lying flat in bed (his usual position at home). All readings had good waveform with \"green\" side bar noted. PA 43/21 (28) HR 63  PA 46/22 (29) HR 59  PA 46/21 (28) HR 61    Findings were forwarded to Dr Mateo Jarrell and Martine Bronson NP. Pt tolerated well -- pleasant and cooperative with care.

## 2022-03-22 NOTE — PROGRESS NOTES
Electrophysiology - PROGRESS NOTE    Admit Date: 3/19/2022     Chief Complaint: AFL, acute on chronic dCHF     Interval History:   Patient seen and examined and notes reviewed. Patient is being followed for AF/AFL, acute on chronic diastolic CHF. Patient had undergone a total right knee replacement in February 2022. He was sent to a SNF for rehab where he developed a DVT in his right lower extremity and was sent back to the hospital for anticoagulation. He states that he was anemic and was in [facility] where he received packed red blood cells. He did have an EGD with Botox. He was then placed on Xarelto 15 mg twice a day x21 days for his DVT. He states at home that he had only taken the Xaelto 15 mg x 1 and not BID. He was sent home and within 1 day of being home he started feeling increased shortness of breath and came back to the emergency room. His BNP was 172 and his troponin max was 0.12. He was placed on Lasix 20 mg twice a day and is -3.5 L. He states his breathing is better today. Right lower extremity is less edematous and erythematous than what it was yesterday. Remains with 1-2+ pitting edema. He denies any chest pain, shortness of breath, PND, orthopnea. He remains in AF L with heart rate controlled. He states normally he goes in and out of rhythm and that this is the longest time that he has been in atrial flutter.     In: 680 [P.O.:680]  Out: 1650    Wt Readings from Last 2 Encounters:   03/22/22 (!) 309 lb 11.9 oz (140.5 kg)   01/24/20 272 lb 4.3 oz (123.5 kg)       Data:   Scheduled Meds:   Scheduled Meds:   mycophenolate  500 mg Oral TID    predniSONE  5 mg Oral Daily    pregabalin  75 mg Oral BID    tamsulosin  0.4 mg Oral Daily    aspirin  81 mg Oral Daily    atorvastatin  40 mg Oral Nightly    sodium chloride flush  5-40 mL IntraVENous 2 times per day    famotidine  20 mg Oral BID    insulin lispro  0-6 Units SubCUTAneous TID     insulin lispro  0-3 Units SubCUTAneous Nightly    furosemide  20 mg IntraVENous Daily    rivaroxaban  15 mg Oral BID WC    Followed by   Earnstine Laser ON 3/30/2022] rivaroxaban  20 mg Oral Daily     Continuous Infusions:   sodium chloride      dextrose       PRN Meds:.oxyCODONE-acetaminophen, albuterol, methocarbamol, sodium chloride flush, sodium chloride, ondansetron **OR** ondansetron, polyethylene glycol, acetaminophen **OR** acetaminophen, glucagon (rDNA), dextrose, dextrose bolus (hypoglycemia) **OR** dextrose bolus (hypoglycemia), glucose  Continuous Infusions:   sodium chloride      dextrose         Intake/Output Summary (Last 24 hours) at 3/22/2022 0819  Last data filed at 3/22/2022 5708  Gross per 24 hour   Intake 680 ml   Output 1450 ml   Net -770 ml       CBC:   Lab Results   Component Value Date    WBC 11.7 03/22/2022    HGB 10.8 03/22/2022     03/22/2022     BMP:  Lab Results   Component Value Date     03/22/2022    K 4.8 03/22/2022    K 4.2 03/19/2022    CL 96 03/22/2022    CO2 26 03/22/2022    BUN 27 03/22/2022    CREATININE 1.0 03/22/2022    GLUCOSE 158 03/22/2022     INR:   Lab Results   Component Value Date    INR 2.09 03/19/2022    INR 1.04 10/10/2019    INR 1.03 10/03/2019        CARDIAC LABS  ENZYMES:  Recent Labs     03/21/22  1232 03/21/22  1714 03/21/22  2132   TROPONINI 0.11* 0.09* 0.06*     FASTING LIPID PANEL:  Lab Results   Component Value Date    HDL 39 10/07/2019    LDLCALC 73 10/07/2019    TRIG 296 10/07/2019    TSH 1.31 06/04/2019     LIVER PROFILE:  Lab Results   Component Value Date    AST 12 01/24/2020    AST 11 01/20/2020    ALT 10 01/24/2020    ALT 12 01/20/2020       -----------------------------------------------------------------  Telemetry: Personally reviewed  AFL - HR controlled    Objective:   Vitals: /63   Pulse 59   Temp 97.4 °F (36.3 °C) (Oral)   Resp 16   Ht 5' 11\" (1.803 m)   Wt (!) 309 lb 11.9 oz (140.5 kg)   SpO2 93%   BMI 43.20 kg/m²   General appearance: alert, appears stated age and cooperative, No acute distress   Eyes: Conjunctiva and pupils normal and reactive  Skin: Skin color, texture, turgor normal. No rashes or ecchymosis. Neck: no JVD, supple, symmetrical, trachea midline   Lungs: , no accessory muscle use, no respiratory distress  Heart: AFL, HR controlled  Abdomen: soft, non-tender; bowel sounds normal  Extremities: No edema, DP +  Psychiatric: normal insight and affect    Patient Active Problem List:     NSTEMI (non-ST elevated myocardial infarction) ([address]     Diabetes mellitus ([address]     Hypertension     Peripheral neuropathy     Generalized weakness     Obesity due to excess calories     Heart failure (HCC)     Leg swelling     Mixed hyperlipidemia     Hyperglycemia     Sjogren's syndrome (HCC)     Aortic dilatation (HCC)     Acute on chronic congestive heart failure with left ventricular diastolic dysfunction (HCC)     Shortness of breath        Assessment & Plan:      1. SOB  2. Acute dCHF  3. pAF    58 y/o man with a h/o HTN, HLD, DM, chronic dCHF, s/p CardioMems, DVT after TRKR (2/2022), CAD, s/p MI, Sjogrens disease, polymyositis, pAF on Xarelto who p/w SOB, acute on chronic dCHF. [id number] 4. TSH 0.45 (1/20/2020). Acute dCHF  - EF 60-65% (2020)  - On lasix 20 mg BID IV  - Keep K+ > 4.0 and Mg > 2.0 - replacement ordered  - Reviewed labs  - Will have CardioMems interrogated  - Echo -EF 55 to 60%, LA 4.6/131  - Dr. Maria Teresa Merrill following    pAF/AFL  - In AFL w/ controlled rate - asymptomatic  - On Xarelto 20 mg QD - no s/s bleeding - continue  - Consider DCCV to NSR after 3-4 weeks of uninterrupted [address]  - Reviewed risk factors, pathophysiology, treatment options and lifestyle modification for atrial fibrillation: Blood pressure control, blood sugar control, healthy diet, minimal alcohol intake, no smoking, activity and exercise, manage stress sleep apnea evaluation and symptoms of a stroke.     DVT  - Recent after TRKR  - Had been on loading dose for DVT at 15 mg BID x 21 days however he only took 1 Xarelto 15 mg the day he was d/c'd from [facility] then changed to 20 mg QD      [address]      I spent a total of 35 minutes in care of the patient and greater than 50% of the time was spent counseling with Cristhian Vogel and coordinating care regarding their diagnosis, treatments and plan of care.

## 2022-03-22 NOTE — PROGRESS NOTES
Report called to [facility] at this time.  All questions answered    Electronically signed by Joellen Mcnair RN on 3/22/2022 at 4:40 PM

## 2022-03-22 NOTE — PROGRESS NOTES
Physician Progress Note      Camron Cervantes  [facility] #:                  [id number]  :                       1960  ADMIT DATE:       3/19/2022 11:50 AM  DISCH DATE:  RESPONDING  PROVIDER #:        Lakisha Moore          QUERY TEXT:    Pt admitted with SOB. Noted documentation of  Acute on chronic heart failure   with preserved LV ejection fraction on 3/20 by ordered cardiology consultant. If possible, please document in progress notes and discharge summary:    The medical record reflects the following:  Risk Factors: 60 yo w/ hx of diastolic CHF, possible noncompliance with meds. Clinical Indicators: Per EP cards 3/20: Acute on chronic heart failure with   preserved LV ejection fraction. Per IM 3/20: SOB, 2/2 Possible HF exacerbation   vs deconditioning/anxiety. Pro-BNP on admit 1200. Treatment: IV Lasix 20 mg daily, ECHO, Cardiology consult. Options provided:  -- Acute on chronic diastolic CHF confirmed present on admission  -- Other - I will add my own diagnosis  -- Disagree - Not applicable / Not valid  -- Disagree - Clinically unable to determine / Unknown  -- Refer to Clinical Documentation Reviewer    PROVIDER RESPONSE TEXT:    The diagnosis of acute on chronic diastolic CHF was confirmed as present on   admission.     Query created by: Colby Marks on 3/21/2022 9:12 AM      Electronically signed by:  Lakisha Moore 3/22/2022 1:57 PM

## 2022-03-22 NOTE — DISCHARGE SUMMARY
INTERNAL MEDICINE DEPARTMENT AT [address] [place] [address]  DISCHARGE SUMMARY    Patient ID: Ricardo Lira                                             Discharge Date: 3/22/2022   Patient's PCP: Leila Carreno MD                                          Discharge Physician: Emily Nettles MD MD  Admit Date: 3/19/2022   Admitting Physician: No admitting provider for patient encounter. PROBLEMS DURING HOSPITALIZATION:  Present on Admission:   Acute on chronic congestive heart failure with left ventricular diastolic dysfunction (HCC)   Shortness of breath   Paroxysmal atrial fibrillation (HCC)   Atypical atrial flutter (HCC)      DISCHARGE DIAGNOSES:    HPI: \"Pt is a 58 y/o male with a PMHx of CHF, DMT2, Atrial fibrillation, aortic aneurysm, HTN, HLD, CAD, poliomyelitis, Sjogren's and recent R knee replacement (2/24/2022) with subsequent DVT of of the R soleal vein who presented to the hospital for SOB.      Pt had R knee replaced at an OSH on 2/24/22, following his surgery he was sent to a SNF, where he developed a DVT on 3/08/22 and was sent back to the OSH for with heparin and transitioned to Xeralto. Pt states that his SOB is intermittent as it comes and goes, and is worse when he lies on his left side. Pt also endorses chest tenderness in one spot near his left breast, he describes it as a dull ache, and that pushing on it makes it worse. He also endorses the symptoms of cough, nausea and calf pain. The pt also endorses inability to walk, he states that he did not receive very much physical therapy when he was at the SNF. In addition to this, the pt stated that he was suppose to have home health upon discharge from his most recent hospital stay, but no one showed up to his place the following day.      In the ED, the pt was afebrile, hemodynamically stable, and sating well on room air. His labs displayed a pro-BNP 1,200, troponin were 0.09, 0.07, INR 2.09, AntiWBC was 10.2, and his CXR was unrevealing. An EKG displayed a LBBB, but it ws reported to be observed on a EKG from [facility] on 3/000585 as well. Pt to undergo a CTAP to r/o PE and will be admitted to the hospital for his SOB. \"    The following issues were addressed during hospitalization:  SOB, 2/2 Acute on Chronic HF exacerbation with Acute polymyositis flare  CTPA negative for PE. Pt was started on lasix 20mg IV for possible. Cards was consulted for possible worsening of HF. ECHO done showed normal EF and indeterminate diastolic function. Pt had interval improvement of SOB. Diuresed at net -3.5L. Pt also worked with PT/OT. Inflammatory markers were also elevated and pt had some pain in legs and difficulty walking. He was given 40 mg IV solumedrol for possible flare and continued on home steroids and mycophenalate. He will be discharged to SNF with prednisone taper and 40mg po lasix. He should get BMP in 1 week and follow-up with Dr. Mateo Jarrell and PCP in 1 week     Physical Exam   Constitutional:       Appearance: He is obese. HENT:      Head: Normocephalic and atraumatic. Eyes:      Extraocular Movements: Extraocular movements intact.      Conjunctiva/sclera: Conjunctivae normal.   Cardiovascular:      Rate and Rhythm: Normal rate and regular rhythm. Pulmonary:      Effort: Pulmonary effort is normal.      Breath sounds: Normal breath sounds. No wheezing, rhonchi or rales. Abdominal:      Palpations: Abdomen is soft. Musculoskeletal:         General: Normal range of motion.      Comments: Swelling R>L, non pitting edema, improved  Skin:     General: Skin is warm and dry. Neurological:      General: No focal deficit present.      Mental Status: He is alert.    Psychiatric: Vla Aristides and Affect: Mood normal  Consults: cardiology  Significant Diagnostic Studies:  chest x-ray, ECHO, CTPA  Treatments: diuresis  Disposition: SNF  Discharged Condition: Stable  Follow Up: Primary Care Physician in one week    DISCHARGE MEDICATION: Medication List      START taking these medications    famotidine 20 MG tablet  Commonly known as: PEPCID  Take 1 tablet by mouth 2 times daily        CHANGE how you take these medications    * predniSONE 20 MG tablet  Commonly known as: DELTASONE  Take 2 tablets by mouth daily for 3 days, THEN 1.5 tablets daily for 3 days, THEN 1 tablet daily for 3 days, THEN 0.5 tablets daily for 3 days. Start taking on: March 22, 2022  What changed: You were already taking a medication with the same name, and this prescription was added. Make sure you understand how and when to take each. * predniSONE 5 MG tablet  Commonly known as: DELTASONE  Take 1 tablet by mouth daily  Start taking on: April 3, 2022  What changed: These instructions start on April 3, 2022. If you are unsure what to do until then, ask your doctor or other care provider. rivaroxaban 20 MG Tabs tablet  Commonly known as: XARELTO  Take 1 tablet by mouth daily  Start taking on: March 30, 2022  What changed:   · medication strength  · how much to take  · when to take this  · These instructions start on March 30, 2022. If you are unsure what to do until then, ask your doctor or other care provider. · Another medication with the same name was removed. Continue taking this medication, and follow the directions you see here. * This list has 2 medication(s) that are the same as other medications prescribed for you. Read the directions carefully, and ask your doctor or other care provider to review them with you.             CONTINUE taking these medications    acetaminophen 325 MG tablet  Commonly known as: TYLENOL     aspirin 81 MG tablet     atorvastatin 40 MG tablet  Commonly known as: LIPITOR  Take 1 tablet by mouth nightly     cyanocobalamin 1000 MCG tablet     ferrous sulfate 325 (65 Fe) MG tablet  Commonly known as: IRON 555     folic acid 1 MG tablet  Commonly known as: FOLVITE     insulin glargine 100 UNIT/ML injection vial  Commonly known as: LANTUS     Lasix 20 MG tablet  Generic drug: furosemide     methocarbamol 500 MG tablet  Commonly known as: ROBAXIN     mycophenolate 500 MG tablet  Commonly known as: CELLCEPT     oxyCODONE-acetaminophen  MG per tablet  Commonly known as: PERCOCET     pregabalin 75 MG capsule  Commonly known as: LYRICA     spironolactone 25 MG tablet  Commonly known as: ALDACTONE     tamsulosin 0.4 MG capsule  Commonly known as: FLOMAX     therapeutic multivitamin-minerals tablet        STOP taking these medications    azaTHIOprine 50 MG tablet  Commonly known as: IMURAN     clopidogrel 75 MG tablet  Commonly known as: PLAVIX     insulin lispro 100 UNIT/ML injection vial  Commonly known as: HUMALOG     pantoprazole 40 MG tablet  Commonly known as: PROTONIX     potassium chloride 20 MEQ extended release tablet  Commonly known as: KLOR-CON M           Where to Get Your Medications      You can get these medications from any pharmacy    Bring a paper prescription for each of these medications  · famotidine 20 MG tablet  · predniSONE 20 MG tablet  · predniSONE 5 MG tablet  · rivaroxaban 20 MG Tabs tablet          Activity: activity as tolerated  Diet: regular diet  Wound Care: none needed    Time Spent on discharge is more than 20 minutes    Signed: Jessica Shook MD,  MD, PGY-  3/22/2022   Patient seen and examined, labs and imaging reviewed, agree with assessment and plan as outlined above. patients condition continues to improve doing well, asked patient to monitor side effects of medications which i've discussed at length, recurrence of symptoms or new symptoms including but not limited to chest pain, shortness of breath, nausea, vomiting, fevers or chills and seek immediate medical attention or call 911. Greater than 30 minutes spent on case on day of discharge.      Gena Powell MD FACP

## 2022-03-22 NOTE — PROGRESS NOTES
assist     Subjective   General  Chart Reviewed: Yes  Additional Pertinent Hx: pt is a 59 y.o. presented with SOB. Admitted for Acute on Chronic HF exacerbation with Acute polymyositis flair. CTPA (-) for PE.   PMH: Arthritis, Depressio, Diabetes mellitus, Dyslipidemia, Herniated disc, cervical, Hypertension, Osteoporosis, Peripheral neuropathy and recent R TKR on 2/24  Family / Caregiver Present: No  Referring Practitioner: Matthew  Diagnosis: SOB  Subjective  Subjective: Pt seated eob upon OT entry. Initially declined therapy 2/2 R knee pain, then requested to walk hallway. Pt planning d/c to [facility] today   Pain: 6/10 R knee, RN made aware      Orientation  Orientation  Overall Orientation Status: Within Normal Limits       Objective    ADL  LE Dressing: Dependent/Total (socks; reach to R LE limited by ROM restriction)  Additional Comments: pt declined ADLs despite encouragement           Balance  Sitting Balance: Independent  Standing Balance: Contact guard assistance    Standing Balance  Time: 8 minutes  Activity: ambulation      Functional Mobility  Functional - Mobility Device: Rolling Walker  Activity: Other (150')  Assist Level: Contact guard assistance  Functional Mobility Comments: R knee unable to fully extend, relies heavily on UEs at walker, forward flexed posture.  Limited by fatigue and R knee pain    Bed mobility  Scooting: Independent         Transfers  Sit to stand: Contact guard assistance (effortful from bed)  Stand to sit: Stand by assistance (to bed)        Cognition  Overall Cognitive Status: [facility]              Plan   Plan  Times per week: 2-5  Times per day: Daily  Current Treatment Recommendations: Strengthening,Balance Training,Functional Mobility Training,Endurance Training,Patient/Caregiver Education & Training,Self-Care / ADL,Safety Education & Training    AM-PAC Score        AM-[facility] Inpatient Daily Activity Raw Score: 19 (03/22/22 1512)  AM-PAC Inpatient ADL T-Scale Score : 40.22 (03/22/22 1512)  ADL Inpatient CMS 0-100% Score: 42.8 (03/22/22 1512)  ADL Inpatient CMS G-Code Modifier : CK (03/22/22 1512)    Goals  Short term goals  Time Frame for Short term goals: DC  Short term goal 1: pt will complete LB dressing with CGA-not met  Short term goal 2: pt will increase standing tolerance to 5 min to complete ADLs with CGA-not met  Short term goal 3: pt will complete bed mobility with SBA-not met  Patient Goals   Patient goals : Get better       Therapy Time   Individual Concurrent Group Co-treatment   Time In 2687         Time Out 1420         Minutes 25         Timed Code Treatment Minutes: [facility], OT

## 2022-03-22 NOTE — CARE COORDINATION
Case Management Assessment            Discharge Note                    Date / Time of Note: 3/22/2022 1:28 PM                  Discharge Note Completed by: Abelardo Edgar RN    Patient Name: Santi Latif   YOB: 1960  Diagnosis: Shortness of breath [R06.02]  Acute on chronic congestive heart failure with left ventricular diastolic dysfunction ([facility] [address] [I50.33]   Date / Time: 3/19/2022 11:50 AM    Current PCP: Naima Fox MD  Clinic patient: No    Hospitalization in the last 30 days: No    Advance Directives:  Code Status: Full Code  [place] DNR form completed and on chart: Not Indicated    Financial:  Payor: [facility] [employer] [place]  [address] / Plan: [facility] [address] / Product Type: *No Product type* /      Pharmacy:    [address] [place] [address] 68, [address] [phone]  [facility]  [facility]  Phone: [phone] Fax: [phone]      Assistance purchasing medications?: Potential Assistance Purchasing Medications: No  Assistance provided by Case Management: None at this time    Does patient want to participate in local refill/ meds to beds program?:      Meds To Beds General Rules:  1. Can ONLY be done Monday- Friday between 8:30am-5pm  2. Prescription(s) must be in pharmacy by 3pm to be filled same day  3. Copy of patient's insurance/ prescription drug card and patient face sheet must be sent along with the prescription(s)  4. Cost of Rx cannot be added to hospital bill. If financial assistance is needed, please contact unit  or ;  or  CANNOT provide pharmacy voucher for patients co-pays  5.  Patients can then  the prescription on their way out of the hospital at discharge, or pharmacy can deliver to the bedside if staff is available. (payment due at time of pick-up or delivery - cash, check, or card accepted)     Able to afford home medications/ co-pay costs: Yes    ADLS:  Current PT AM-PAC Score: 16 /24  Current OT AM-PAC Score: 18 /24      DISCHARGE Disposition: Inpatient Rehab: [facility] Phone: [phone] Fax: [phone]    LOC at discharge: Not Applicable  JACK Completed: Yes    Notification completed in HENS/PAS?:  Not Applicable    IMM Completed:   Not Indicated    Transportation:  Transportation PLAN for discharge: EMS transportation   Mode of Transport: Ambulance stretcher - BLS  Reason for medical transport: Severe muscular weakness and de-conditioned state due to knee replacement and requires ambulance transport due to  CHF  Name of [address] [place] [address], [address]: [facility]  Phone: [phone]  Time of Transport: [facility]    Transport form completed: Yes    Home Care:  Home Care ordered at discharge: Not [address]: Not Applicable  Orders faxed: No    Durable Medical Equipment:  DME Provider: n/a  Equipment obtained during hospitalization: walker    Home Oxygen and Respiratory Equipment:  Oxygen needed at discharge?: Not [address]: Not Applicable  Portable tank available for discharge?: Not Indicated    Dialysis:  Dialysis patient: No    Dialysis Center:  Not Applicable    Hospice Services:  Location: Not Applicable  Agency: Not Applicable    Consents signed: Not Indicated    Referrals made at [facility] for outpatient continued care:  Not Applicable    Additional CM Notes: Patient discharge to [facility]. Orders faxed to [phone], nurse to call report to [phone]. Patient scheduled for transport at 1700 via [address] Patient aware and agreeable to plan.     The Plan for Transition of Care is related to the following treatment goals of Shortness of breath [R06.02]  Acute on chronic congestive heart failure with left ventricular diastolic dysfunction ([facility] [address] [I50.33]    The Patient and/or patient representative Booker Yuen and his family were provided with a choice of provider and agrees with the discharge plan Yes    Freedom of choice list was provided with basic dialogue that supports the patient's individualized plan of care/goals and shares the quality data associated with the providers.  Yes    Care Transitions patient: No    Charis White RN  [facility]  Case Management Department  Ph: [phone]  Fax: [phone]

## 2022-03-23 ENCOUNTER — TELEPHONE (OUTPATIENT)
Dept: CARDIOLOGY CLINIC | Age: 62
End: 2022-03-23

## 2022-03-23 NOTE — TELEPHONE ENCOUNTER
Called and spoke with pt. States he has a lot on his plate right now and couldn't give a straight answer as to if he's following up with [facility] or [facility]. He did state that he is very comfortable with his doctors at [facility] but is not pleased with the care that was given while at the hospital, and will most likely go to [facility] of he had to go to ED. If  Anything changes he states he will give us a call.

## 2022-10-19 ENCOUNTER — HOSPITAL ENCOUNTER (OUTPATIENT)
Dept: CARDIAC REHAB | Age: 62
Setting detail: THERAPIES SERIES
Discharge: HOME | End: 2022-10-19
Payer: COMMERCIAL

## 2022-10-19 LAB
GLUCOSE BLD-MCNC: 123 MG/DL (ref 70–99)
GLUCOSE BLD-MCNC: 155 MG/DL (ref 70–99)
PERFORMED ON: ABNORMAL
PERFORMED ON: ABNORMAL

## 2022-10-19 PROCEDURE — 93798 PHYS/QHP OP CAR RHAB W/ECG: CPT

## 2022-10-21 ENCOUNTER — HOSPITAL ENCOUNTER (OUTPATIENT)
Dept: CARDIAC REHAB | Age: 62
Setting detail: THERAPIES SERIES
Discharge: HOME | End: 2022-10-21
Payer: COMMERCIAL

## 2022-10-21 LAB
GLUCOSE BLD-MCNC: 108 MG/DL (ref 70–99)
GLUCOSE BLD-MCNC: 152 MG/DL (ref 70–99)
PERFORMED ON: ABNORMAL
PERFORMED ON: ABNORMAL

## 2022-10-21 PROCEDURE — 93798 PHYS/QHP OP CAR RHAB W/ECG: CPT

## 2022-10-24 ENCOUNTER — HOSPITAL ENCOUNTER (OUTPATIENT)
Dept: CARDIAC REHAB | Age: 62
Setting detail: THERAPIES SERIES
Discharge: HOME | End: 2022-10-24
Payer: COMMERCIAL

## 2022-10-24 LAB
GLUCOSE BLD-MCNC: 106 MG/DL (ref 70–99)
GLUCOSE BLD-MCNC: 146 MG/DL (ref 70–99)
PERFORMED ON: ABNORMAL
PERFORMED ON: ABNORMAL

## 2022-10-24 PROCEDURE — 93798 PHYS/QHP OP CAR RHAB W/ECG: CPT

## 2022-10-26 ENCOUNTER — HOSPITAL ENCOUNTER (OUTPATIENT)
Dept: CARDIAC REHAB | Age: 62
Setting detail: THERAPIES SERIES
Discharge: HOME | End: 2022-10-26
Payer: COMMERCIAL

## 2022-10-26 LAB
GLUCOSE BLD-MCNC: 113 MG/DL (ref 70–99)
GLUCOSE BLD-MCNC: 141 MG/DL (ref 70–99)
PERFORMED ON: ABNORMAL
PERFORMED ON: ABNORMAL

## 2022-10-26 PROCEDURE — 93798 PHYS/QHP OP CAR RHAB W/ECG: CPT

## 2022-10-27 LAB
CATARACTS: POSITIVE
DIABETIC RETINOPATHY: NEGATIVE
GLAUCOMA: NEGATIVE
INTRAOCULAR PRESSURE EYE: NORMAL
VISUAL ACUITY DISTANCE LEFT EYE: NORMAL
VISUAL ACUITY DISTANCE RIGHT EYE: NORMAL

## 2022-10-28 ENCOUNTER — HOSPITAL ENCOUNTER (OUTPATIENT)
Dept: CARDIAC REHAB | Age: 62
Setting detail: THERAPIES SERIES
Discharge: HOME | End: 2022-10-28
Payer: COMMERCIAL

## 2022-10-28 LAB
GLUCOSE BLD-MCNC: 130 MG/DL (ref 70–99)
GLUCOSE BLD-MCNC: 162 MG/DL (ref 70–99)
PERFORMED ON: ABNORMAL
PERFORMED ON: ABNORMAL

## 2022-10-28 PROCEDURE — 93798 PHYS/QHP OP CAR RHAB W/ECG: CPT

## 2022-10-31 ENCOUNTER — HOSPITAL ENCOUNTER (OUTPATIENT)
Dept: CARDIAC REHAB | Age: 62
Setting detail: THERAPIES SERIES
Discharge: HOME | End: 2022-10-31
Payer: COMMERCIAL

## 2022-10-31 LAB
GLUCOSE BLD-MCNC: 110 MG/DL (ref 70–99)
GLUCOSE BLD-MCNC: 124 MG/DL (ref 70–99)
PERFORMED ON: ABNORMAL
PERFORMED ON: ABNORMAL

## 2022-10-31 PROCEDURE — 93798 PHYS/QHP OP CAR RHAB W/ECG: CPT

## 2022-11-04 ENCOUNTER — HOSPITAL ENCOUNTER (OUTPATIENT)
Dept: CARDIAC REHAB | Age: 62
Setting detail: THERAPIES SERIES
Discharge: HOME | End: 2022-11-04
Payer: COMMERCIAL

## 2022-11-04 PROCEDURE — 93798 PHYS/QHP OP CAR RHAB W/ECG: CPT

## 2022-11-07 ENCOUNTER — HOSPITAL ENCOUNTER (OUTPATIENT)
Dept: CARDIAC REHAB | Age: 62
Setting detail: THERAPIES SERIES
Discharge: HOME | End: 2022-11-07
Payer: COMMERCIAL

## 2022-11-07 PROCEDURE — 93798 PHYS/QHP OP CAR RHAB W/ECG: CPT

## 2022-11-08 ENCOUNTER — OFFICE VISIT (OUTPATIENT)
Dept: INTERNAL MEDICINE CLINIC | Age: 62
End: 2022-11-08
Payer: COMMERCIAL

## 2022-11-08 VITALS
DIASTOLIC BLOOD PRESSURE: 62 MMHG | HEART RATE: 71 BPM | SYSTOLIC BLOOD PRESSURE: 90 MMHG | BODY MASS INDEX: 44.1 KG/M2 | WEIGHT: 315 LBS | OXYGEN SATURATION: 96 % | HEIGHT: 71 IN | TEMPERATURE: 98.4 F

## 2022-11-08 DIAGNOSIS — E11.9 TYPE 2 DIABETES MELLITUS WITHOUT COMPLICATION, WITH LONG-TERM CURRENT USE OF INSULIN (HCC): ICD-10-CM

## 2022-11-08 DIAGNOSIS — Z23 NEED FOR INFLUENZA VACCINATION: ICD-10-CM

## 2022-11-08 DIAGNOSIS — M25.511 ACUTE PAIN OF RIGHT SHOULDER: ICD-10-CM

## 2022-11-08 DIAGNOSIS — K21.9 GASTROESOPHAGEAL REFLUX DISEASE WITHOUT ESOPHAGITIS: ICD-10-CM

## 2022-11-08 DIAGNOSIS — Z79.4 TYPE 2 DIABETES MELLITUS WITHOUT COMPLICATION, WITH LONG-TERM CURRENT USE OF INSULIN (HCC): ICD-10-CM

## 2022-11-08 DIAGNOSIS — G89.29 CHRONIC PAIN OF RIGHT KNEE: ICD-10-CM

## 2022-11-08 DIAGNOSIS — I48.0 PAROXYSMAL ATRIAL FIBRILLATION (HCC): ICD-10-CM

## 2022-11-08 DIAGNOSIS — I42.8 NONISCHEMIC CARDIOMYOPATHY (HCC): Primary | ICD-10-CM

## 2022-11-08 DIAGNOSIS — M25.561 CHRONIC PAIN OF RIGHT KNEE: ICD-10-CM

## 2022-11-08 DIAGNOSIS — I10 PRIMARY HYPERTENSION: Chronic | ICD-10-CM

## 2022-11-08 LAB
A/G RATIO: 1.4 (ref 1.1–2.2)
ALBUMIN SERPL-MCNC: 4.3 G/DL (ref 3.4–5)
ALP BLD-CCNC: 98 U/L (ref 40–129)
ALT SERPL-CCNC: 12 U/L (ref 10–40)
ANION GAP SERPL CALCULATED.3IONS-SCNC: 16 MMOL/L (ref 3–16)
AST SERPL-CCNC: 20 U/L (ref 15–37)
BILIRUB SERPL-MCNC: <0.2 MG/DL (ref 0–1)
BUN BLDV-MCNC: 16 MG/DL (ref 7–20)
CALCIUM SERPL-MCNC: 9.3 MG/DL (ref 8.3–10.6)
CHLORIDE BLD-SCNC: 101 MMOL/L (ref 99–110)
CO2: 24 MMOL/L (ref 21–32)
CREAT SERPL-MCNC: 0.9 MG/DL (ref 0.8–1.3)
GFR SERPL CREATININE-BSD FRML MDRD: >60 ML/MIN/{1.73_M2}
GLUCOSE BLD-MCNC: 138 MG/DL (ref 70–99)
HCT VFR BLD CALC: 37.8 % (ref 40.5–52.5)
HEMOGLOBIN: 12.3 G/DL (ref 13.5–17.5)
MCH RBC QN AUTO: 27.9 PG (ref 26–34)
MCHC RBC AUTO-ENTMCNC: 32.6 G/DL (ref 31–36)
MCV RBC AUTO: 85.5 FL (ref 80–100)
PDW BLD-RTO: 15.5 % (ref 12.4–15.4)
PLATELET # BLD: 230 K/UL (ref 135–450)
PMV BLD AUTO: 8.1 FL (ref 5–10.5)
POTASSIUM SERPL-SCNC: 4.3 MMOL/L (ref 3.5–5.1)
RBC # BLD: 4.42 M/UL (ref 4.2–5.9)
SODIUM BLD-SCNC: 141 MMOL/L (ref 136–145)
TOTAL PROTEIN: 7.4 G/DL (ref 6.4–8.2)
WBC # BLD: 8.6 K/UL (ref 4–11)

## 2022-11-08 PROCEDURE — 3017F COLORECTAL CA SCREEN DOC REV: CPT | Performed by: HOSPITALIST

## 2022-11-08 PROCEDURE — 3078F DIAST BP <80 MM HG: CPT | Performed by: HOSPITALIST

## 2022-11-08 PROCEDURE — 2022F DILAT RTA XM EVC RTNOPTHY: CPT | Performed by: HOSPITALIST

## 2022-11-08 PROCEDURE — G8427 DOCREV CUR MEDS BY ELIG CLIN: HCPCS | Performed by: HOSPITALIST

## 2022-11-08 PROCEDURE — 1036F TOBACCO NON-USER: CPT | Performed by: HOSPITALIST

## 2022-11-08 PROCEDURE — 3074F SYST BP LT 130 MM HG: CPT | Performed by: HOSPITALIST

## 2022-11-08 PROCEDURE — G8417 CALC BMI ABV UP PARAM F/U: HCPCS | Performed by: HOSPITALIST

## 2022-11-08 PROCEDURE — G8482 FLU IMMUNIZE ORDER/ADMIN: HCPCS | Performed by: HOSPITALIST

## 2022-11-08 PROCEDURE — 90674 CCIIV4 VAC NO PRSV 0.5 ML IM: CPT | Performed by: HOSPITALIST

## 2022-11-08 PROCEDURE — 3046F HEMOGLOBIN A1C LEVEL >9.0%: CPT | Performed by: HOSPITALIST

## 2022-11-08 PROCEDURE — 99205 OFFICE O/P NEW HI 60 MIN: CPT | Performed by: HOSPITALIST

## 2022-11-08 RX ORDER — TIZANIDINE 4 MG/1
TABLET ORAL
COMMUNITY
Start: 2022-10-17

## 2022-11-08 RX ORDER — DAPAGLIFLOZIN 10 MG/1
TABLET, FILM COATED ORAL
COMMUNITY
Start: 2022-09-27 | End: 2022-11-30 | Stop reason: SDUPTHER

## 2022-11-08 RX ORDER — METOPROLOL SUCCINATE 25 MG/1
TABLET, EXTENDED RELEASE ORAL
COMMUNITY
Start: 2022-09-27 | End: 2022-11-30 | Stop reason: SDUPTHER

## 2022-11-08 RX ORDER — IBUPROFEN 800 MG/1
TABLET ORAL
COMMUNITY
Start: 2022-10-17

## 2022-11-08 RX ORDER — METOLAZONE 5 MG/1
5 TABLET ORAL DAILY PRN
COMMUNITY
Start: 2022-05-06 | End: 2022-11-30 | Stop reason: SDUPTHER

## 2022-11-08 RX ORDER — POTASSIUM CHLORIDE 20 MEQ/1
20 TABLET, EXTENDED RELEASE ORAL DAILY
COMMUNITY
Start: 2022-09-13

## 2022-11-08 RX ORDER — IRON POLYSACCHARIDE COMPLEX 180 MG
CAPSULE ORAL
COMMUNITY
Start: 2022-10-10

## 2022-11-08 RX ORDER — TADALAFIL 5 MG/1
TABLET ORAL
COMMUNITY
Start: 2022-10-25

## 2022-11-08 ASSESSMENT — ENCOUNTER SYMPTOMS
ABDOMINAL PAIN: 0
ABDOMINAL DISTENTION: 0
NAUSEA: 0
SORE THROAT: 0
SINUS PAIN: 0
WHEEZING: 0
BLOOD IN STOOL: 0
TROUBLE SWALLOWING: 0
SHORTNESS OF BREATH: 1
CONSTIPATION: 0
CHEST TIGHTNESS: 0
BACK PAIN: 0
DIARRHEA: 0
VOICE CHANGE: 0
VOMITING: 0
SINUS PRESSURE: 0
COUGH: 1

## 2022-11-08 NOTE — PROGRESS NOTES
[facility]  Follow-up visit   2022    Emeka Bland (:  1960) is a 57 y.o. male, here for follow-up:    Chief Complaint   Patient presents with    Diabetes     Wants a diabetic physical        HPI  59 gentleman with with a PMHx of CHF, DMT2, Atrial fibrillation, aortic aneurysm, HTN, HLD, CAD, poliomyelitis, Sjogren's and recent R knee replacement (2022) with subsequent DVT of of the R soleal vein.    -Complains of dry eyes. He does see Dr. Ivon Thomas and is to address with her.    -He has chronic cough, this is worse at night with sputum production. Patient states that if he drinks cold water at night, this refluxes back up and causes cough. He was tried on a proton pump inhibitor before but this gave him a bad taste in his mouth. He is currently on Pepcid 20 mg to take twice daily.  -Congestive heart failure: The patient has nonischemic cardiomyopathy. Nonobstructive coronary artery disease by coronary angiography in 2022. He has combined systolic and diastolic CHF. His ejection fraction was approximately 35% at that time. Per review of cardiology note from Taylor Lujan NP (with [facility]) unable to take an ACE inhibitor, ARB, or Arrnii secondary to anaphylaxis. Has associated left bundle branch block and is status post AV AICD placement at [facility] by Dr. Talbot Head 2022. Slow atrial fibrillation and Xarelto anticoagulated. He has an upcoming appointment with Dr. Taylor Vines Thursday.     -Type 2 diabetes: She is on Farxiga 10 mg daily, this is partially for heart failure management. He uses Lantus units nightly only when his a.m. glucose levels are 20 or above. -Right shoulder pain/right knee pain/left knee pain: Patient was previously seeing Dr. Eleonora Henry at the [facility].  He had a right reverse shoulder arthroplasty a year ago. Had a right knee replacement last February.   Both the surgeries were [facility]  He would like to see a different orthopedic surgeon. I have referred him to Dr. Corinne Lopez.        ROS  Review of Systems   Constitutional:  Negative for activity change, appetite change, chills, diaphoresis, fatigue, fever and unexpected weight change. HENT:  Negative for sinus pressure, sinus pain, sore throat, trouble swallowing and voice change. Eyes:  Negative for visual disturbance. Respiratory:  Positive for cough and shortness of breath. Negative for chest tightness and wheezing. Cardiovascular:  Positive for leg swelling. Negative for chest pain and palpitations. Gastrointestinal:  Negative for abdominal distention, abdominal pain, blood in stool, constipation, diarrhea, nausea and vomiting. Endocrine: Negative for polydipsia and polyphagia. Genitourinary:  Negative for decreased urine volume, difficulty urinating, dysuria and urgency. Musculoskeletal:  Negative for back pain, gait problem, joint swelling and myalgias. Neurological:  Negative for dizziness, seizures, syncope, light-headedness and headaches. Psychiatric/Behavioral:  Negative for agitation, behavioral problems, confusion and suicidal ideas.       HISTORIES  Current Outpatient Medications on File Prior to Visit   Medication Sig Dispense Refill    metOLazone (ZAROXOLYN) 5 MG tablet Take 5 mg by mouth daily as needed      tadalafil (CIALIS) 5 MG tablet TAKE 1 TABLET BY MOUTH ONCE DAILY      tiZANidine (ZANAFLEX) 4 MG tablet TAKE 1 TABLET BY MOUTH THREE TIMES DAILY AS NEEDED FOR PAIN      FARXIGA 10 MG tablet TAKE 1 TABLET BY MOUTH ONCE DAILY IN THE MORNING      metoprolol succinate (TOPROL XL) 25 MG extended release tablet TAKE 1 TABLET BY MOUTH ONCE DAILY      potassium chloride (KLOR-CON M) 20 MEQ extended release tablet Take 20 mEq by mouth daily      PROFE 391.3 (180 Fe) MG CAPS TAKE 1 CAPSULE BY MOUTH EVERY OTHER DAY      rivaroxaban (XARELTO) 20 MG TABS tablet Take 1 tablet by mouth daily 90 tablet 2    furosemide (LASIX) 40 MG tablet Take 1 tablet by mouth daily 60 tablet 3    insulin glargine (LANTUS) 100 UNIT/ML injection vial Inject 10 Units into the skin daily as needed Patient takes this medication only if BG has been >120 x2-3 days. He uses 10 units nightly until readings are <120. pregabalin (LYRICA) 75 MG capsule Take 75 mg by mouth 2 times daily. cyanocobalamin 1000 MCG tablet Take 1,000 mcg by mouth daily      ferrous sulfate (IRON 325) 325 (65 Fe) MG tablet Take 325 mg by mouth daily (with breakfast)      folic acid (FOLVITE) 1 MG tablet Take 1 mg by mouth daily      methocarbamol (ROBAXIN) 500 MG tablet Take 500 mg by mouth every 6 hours as needed      mycophenolate (CELLCEPT) 500 MG tablet Take by mouth in the morning, at noon, and at bedtime      spironolactone (ALDACTONE) 25 MG tablet Take 25 mg by mouth daily      tamsulosin (FLOMAX) 0.4 MG capsule Take 0.4 mg by mouth daily      atorvastatin (LIPITOR) 40 MG tablet Take 1 tablet by mouth nightly 30 tablet 3    aspirin 81 MG tablet Take 81 mg by mouth daily      ibuprofen (ADVIL;MOTRIN) 800 MG tablet TAKE 1 TABLET BY MOUTH THREE TIMES DAILY AS NEEDED FOR PAIN      famotidine (PEPCID) 20 MG tablet Take 1 tablet by mouth 2 times daily 60 tablet 3    predniSONE (DELTASONE) 5 MG tablet Take 1 tablet by mouth daily 30 tablet 1    Multiple Vitamins-Minerals (THERAPEUTIC MULTIVITAMIN-MINERALS) tablet Take 1 tablet by mouth daily      acetaminophen (TYLENOL) 325 MG tablet Take 650 mg by mouth every 6 hours as needed for Pain      oxyCODONE-acetaminophen (PERCOCET)  MG per tablet Take 1 tablet by mouth every 4 hours as needed for Pain. No current facility-administered medications on file prior to visit.       Allergies   Allergen Reactions    Lisinopril Swelling    Bee Venom Swelling    Other Itching     Itch like crazy EKG TAPE     Past Medical History:   Diagnosis Date    Arthritis     Depression     Diabetes mellitus (HCC)     Diastolic CHF ([address] Difficult intubation     throat swelled    Dyslipidemia     Herniated disc, cervical     Hypertension     Osteoporosis     Peripheral neuropathy      Patient Active Problem List   Diagnosis    NSTEMI (non-ST elevated myocardial infarction) ([facility] [address]    Diabetes mellitus ([facility] [address]    Hypertension    Peripheral neuropathy    Generalized weakness    Obesity due to excess calories    Heart failure (HCC)    Leg swelling    Mixed hyperlipidemia    Hyperglycemia    Sjogren's syndrome (HCC)    Aortic dilatation (HCC)    Acute on chronic congestive heart failure with left ventricular diastolic dysfunction (HCC)    Shortness of breath    Paroxysmal atrial fibrillation (HCC)    Atypical atrial flutter (HCC)    Nonischemic cardiomyopathy (HCC)     Past Surgical History:   Procedure Laterality Date    BREAST SURGERY Left 8/7/2019    LEFT BREAST MASS EXCISION; LEFT AXILLIARY LYMPH NODE EXCISION performed by Curtis Cabral MD at [place] [address] Left 10/10/2019    MUSCLE BIOPSY LOWER EXTREMITY LEFT performed by Curtis Cabral MD at [address] [place] [address]     Socioeconomic History    Marital status: Single     Spouse name: Not on file    Number of children: Not on file    Years of education: Not on file    Highest education level: Not on file   Occupational History    Not on file   Tobacco Use    Smoking status: Never    Smokeless tobacco: Never   Vaping Use    Vaping Use: Never used   Substance and Sexual Activity    Alcohol use: Yes     Comment: occ    Drug use: No     Types: Cocaine     Comment: 50 years ago- no longer    Sexual activity: Yes     Partners: Female     Comment:    Other Topics Concern    Not on file   Social History Narrative    Not on file     Social Determinants of Health     Financial Resource Strain: Not on file   Food Insecurity: Not on file   Transportation Needs: Not on file   Physical Activity: Not on file   Stress: Not on file   Social Connections: Not on file   Intimate Partner Violence: Not on file   Housing Stability: Not on file      Family History   Problem Relation Age of Onset    Heart Disease Mother     High Blood Pressure Mother     High Blood Pressure Father     High Blood Pressure Sister     Breast Cancer Brother     Heart Disease Brother     High Blood Pressure Brother        PE  Vitals:    11/08/22 1128   BP: 90/62   Site: Left Upper Arm   Position: Sitting   Pulse: 71   Temp: 98.4 °F (36.9 °C)   SpO2: 96%   Weight: (!) 316 lb (143.3 kg)   Height: 5' 11\" (1.803 m)     Estimated body mass index is 44.07 kg/m² as calculated from the following:    Height as of this encounter: 5' 11\" (1.803 m). Weight as of this encounter: 316 lb (143.3 kg). Physical Exam  Vitals reviewed. Constitutional:       General: He is not in acute distress. Appearance: Normal appearance. He is obese. HENT:      Head: Normocephalic and atraumatic. Mouth/Throat:      Pharynx: Oropharynx is clear. Eyes:      Conjunctiva/sclera: Conjunctivae normal.      Pupils: Pupils are equal, round, and reactive to light. Cardiovascular:      Rate and Rhythm: Normal rate and regular rhythm. Pulses: Normal pulses. Heart sounds: Normal heart sounds. Pulmonary:      Effort: Pulmonary effort is normal. No respiratory distress. Breath sounds: Normal breath sounds. No wheezing or rales. Abdominal:      Palpations: Abdomen is soft. Tenderness: There is no abdominal tenderness. There is no rebound. Musculoskeletal:         General: No signs of injury. Normal range of motion. Cervical back: Normal range of motion and neck supple. Skin:     General: Skin is warm and dry. Coloration: Skin is not jaundiced. Neurological:      General: No focal deficit present. Mental Status: He is alert and oriented to person, place, and time. Psychiatric:         Behavior: Behavior normal.         Thought Content:  Thought content normal. Judgment: Judgment normal.       ASSESSMENT/ PLAN:  1. Acute pain of right shoulder/ Chronic pain of right knee/ Nonischemic cardiomyopathy ([facility] [address]  -To Dr. Herman De La Cruz    2. Primary hypertension  -Pressure runs low ordinarily. Continue current cardiac regimen    3. Paroxysmal atrial fibrillation (HCC)  -Xarelto anticoagulation. Usually has slow A. fib. BiV pacer in place    4. Lymphadenopathy of the chest left axilla. This is to be biopsied next week. 5.  GERD: Earlier this year at [facility], the patient had Botox injections to the lower esophageal sphincter. Was on proton pump inhibitor therapy, however this caused a bad taste in his mouth and he is unable to tolerate it. Referral to GI. 6. Need for influenza vaccination  -The patient influenza vaccine today     Approximately 43 minutes on this visit    Orders Placed This Encounter   Procedures    Influenza, FLUCELVAX, (age 10 mo+), IM, Preservative Free, 0.5 mL      No orders of the defined types were placed in this encounter. There are no discontinued medications. No follow-ups on file. Fermin Mathew MD    This dictation was generated by voice recognition computer software. Although all attempts are made to edit the dictation for accuracy, there may be errors in the transcription that are not intended.

## 2022-11-09 ENCOUNTER — HOSPITAL ENCOUNTER (OUTPATIENT)
Dept: CARDIAC REHAB | Age: 62
Setting detail: THERAPIES SERIES
Discharge: HOME | End: 2022-11-09
Payer: COMMERCIAL

## 2022-11-09 LAB
ESTIMATED AVERAGE GLUCOSE: 137 MG/DL
HBA1C MFR BLD: 6.4 %

## 2022-11-09 PROCEDURE — 93798 PHYS/QHP OP CAR RHAB W/ECG: CPT

## 2022-11-10 ENCOUNTER — HOSPITAL ENCOUNTER (INPATIENT)
Age: 62
LOS: 6 days | Discharge: HOME HEALTH | DRG: 194 | End: 2022-11-16
Attending: EMERGENCY MEDICINE | Admitting: HOSPITALIST
Payer: COMMERCIAL

## 2022-11-10 ENCOUNTER — APPOINTMENT (OUTPATIENT)
Dept: GENERAL RADIOLOGY | Age: 62
DRG: 194 | End: 2022-11-10
Payer: COMMERCIAL

## 2022-11-10 DIAGNOSIS — I50.9 ACUTE ON CHRONIC CONGESTIVE HEART FAILURE, UNSPECIFIED HEART FAILURE TYPE (HCC): Primary | ICD-10-CM

## 2022-11-10 DIAGNOSIS — I50.23 ACUTE ON CHRONIC SYSTOLIC CONGESTIVE HEART FAILURE (HCC): ICD-10-CM

## 2022-11-10 LAB
ANION GAP SERPL CALCULATED.3IONS-SCNC: 10 MMOL/L (ref 3–16)
BASOPHILS ABSOLUTE: 0.1 K/UL (ref 0–0.2)
BASOPHILS RELATIVE PERCENT: 0.9 %
BUN BLDV-MCNC: 18 MG/DL (ref 7–20)
CALCIUM SERPL-MCNC: 8.9 MG/DL (ref 8.3–10.6)
CHLORIDE BLD-SCNC: 98 MMOL/L (ref 99–110)
CO2: 28 MMOL/L (ref 21–32)
CREAT SERPL-MCNC: 0.9 MG/DL (ref 0.8–1.3)
EKG ATRIAL RATE: 234 BPM
EKG DIAGNOSIS: NORMAL
EKG Q-T INTERVAL: 438 MS
EKG QRS DURATION: 178 MS
EKG QTC CALCULATION (BAZETT): 482 MS
EKG R AXIS: 255 DEGREES
EKG T AXIS: 68 DEGREES
EKG VENTRICULAR RATE: 73 BPM
EOSINOPHILS ABSOLUTE: 0.2 K/UL (ref 0–0.6)
EOSINOPHILS RELATIVE PERCENT: 2.5 %
GFR SERPL CREATININE-BSD FRML MDRD: >60 ML/MIN/{1.73_M2}
GLUCOSE BLD-MCNC: 103 MG/DL (ref 70–99)
GLUCOSE BLD-MCNC: 128 MG/DL (ref 70–99)
HCT VFR BLD CALC: 37.1 % (ref 40.5–52.5)
HEMOGLOBIN: 12 G/DL (ref 13.5–17.5)
INFLUENZA A: NOT DETECTED
INFLUENZA B: NOT DETECTED
LYMPHOCYTES ABSOLUTE: 1.5 K/UL (ref 1–5.1)
LYMPHOCYTES RELATIVE PERCENT: 16.6 %
MCH RBC QN AUTO: 27.6 PG (ref 26–34)
MCHC RBC AUTO-ENTMCNC: 32.5 G/DL (ref 31–36)
MCV RBC AUTO: 84.8 FL (ref 80–100)
MONOCYTES ABSOLUTE: 0.9 K/UL (ref 0–1.3)
MONOCYTES RELATIVE PERCENT: 9.7 %
NEUTROPHILS ABSOLUTE: 6.4 K/UL (ref 1.7–7.7)
NEUTROPHILS RELATIVE PERCENT: 70.3 %
PDW BLD-RTO: 15.6 % (ref 12.4–15.4)
PERFORMED ON: ABNORMAL
PLATELET # BLD: 237 K/UL (ref 135–450)
PMV BLD AUTO: 7.3 FL (ref 5–10.5)
POTASSIUM REFLEX MAGNESIUM: 3.7 MMOL/L (ref 3.5–5.1)
PRO-BNP: 2012 PG/ML (ref 0–124)
RBC # BLD: 4.37 M/UL (ref 4.2–5.9)
SARS-COV-2 RNA, RT PCR: NOT DETECTED
SODIUM BLD-SCNC: 136 MMOL/L (ref 136–145)
TROPONIN: 0.21 NG/ML
TROPONIN: 0.25 NG/ML
WBC # BLD: 9.1 K/UL (ref 4–11)

## 2022-11-10 PROCEDURE — 84484 ASSAY OF TROPONIN QUANT: CPT

## 2022-11-10 PROCEDURE — 99223 1ST HOSP IP/OBS HIGH 75: CPT | Performed by: HOSPITALIST

## 2022-11-10 PROCEDURE — 83880 ASSAY OF NATRIURETIC PEPTIDE: CPT

## 2022-11-10 PROCEDURE — 87636 SARSCOV2 & INF A&B AMP PRB: CPT

## 2022-11-10 PROCEDURE — 36415 COLL VENOUS BLD VENIPUNCTURE: CPT

## 2022-11-10 PROCEDURE — 99285 EMERGENCY DEPT VISIT HI MDM: CPT

## 2022-11-10 PROCEDURE — 93005 ELECTROCARDIOGRAM TRACING: CPT | Performed by: EMERGENCY MEDICINE

## 2022-11-10 PROCEDURE — 71046 X-RAY EXAM CHEST 2 VIEWS: CPT

## 2022-11-10 PROCEDURE — 80048 BASIC METABOLIC PNL TOTAL CA: CPT

## 2022-11-10 PROCEDURE — 1200000000 HC SEMI PRIVATE

## 2022-11-10 PROCEDURE — 85025 COMPLETE CBC W/AUTO DIFF WBC: CPT

## 2022-11-10 RX ORDER — INSULIN LISPRO 100 [IU]/ML
0-8 INJECTION, SOLUTION INTRAVENOUS; SUBCUTANEOUS
Status: DISCONTINUED | OUTPATIENT
Start: 2022-11-11 | End: 2022-11-16 | Stop reason: HOSPADM

## 2022-11-10 RX ORDER — ASPIRIN 81 MG/1
81 TABLET ORAL DAILY
Status: DISCONTINUED | OUTPATIENT
Start: 2022-11-11 | End: 2022-11-16 | Stop reason: HOSPADM

## 2022-11-10 RX ORDER — ACETAMINOPHEN 650 MG/1
650 SUPPOSITORY RECTAL EVERY 6 HOURS PRN
Status: DISCONTINUED | OUTPATIENT
Start: 2022-11-10 | End: 2022-11-16 | Stop reason: HOSPADM

## 2022-11-10 RX ORDER — TAMSULOSIN HYDROCHLORIDE 0.4 MG/1
0.4 CAPSULE ORAL DAILY
Status: DISCONTINUED | OUTPATIENT
Start: 2022-11-11 | End: 2022-11-16 | Stop reason: HOSPADM

## 2022-11-10 RX ORDER — INSULIN LISPRO 100 [IU]/ML
0-4 INJECTION, SOLUTION INTRAVENOUS; SUBCUTANEOUS NIGHTLY
Status: DISCONTINUED | OUTPATIENT
Start: 2022-11-10 | End: 2022-11-16 | Stop reason: HOSPADM

## 2022-11-10 RX ORDER — OXYCODONE HYDROCHLORIDE AND ACETAMINOPHEN 5; 325 MG/1; MG/1
1 TABLET ORAL EVERY 4 HOURS PRN
Status: DISCONTINUED | OUTPATIENT
Start: 2022-11-10 | End: 2022-11-16 | Stop reason: HOSPADM

## 2022-11-10 RX ORDER — M-VIT,TX,IRON,MINS/CALC/FOLIC 27MG-0.4MG
1 TABLET ORAL DAILY
Status: DISCONTINUED | OUTPATIENT
Start: 2022-11-11 | End: 2022-11-16 | Stop reason: HOSPADM

## 2022-11-10 RX ORDER — ATORVASTATIN CALCIUM 40 MG/1
40 TABLET, FILM COATED ORAL NIGHTLY
Status: DISCONTINUED | OUTPATIENT
Start: 2022-11-10 | End: 2022-11-16 | Stop reason: HOSPADM

## 2022-11-10 RX ORDER — ONDANSETRON 2 MG/ML
4 INJECTION INTRAMUSCULAR; INTRAVENOUS EVERY 6 HOURS PRN
Status: DISCONTINUED | OUTPATIENT
Start: 2022-11-10 | End: 2022-11-16 | Stop reason: HOSPADM

## 2022-11-10 RX ORDER — ONDANSETRON 4 MG/1
4 TABLET, ORALLY DISINTEGRATING ORAL EVERY 8 HOURS PRN
Status: DISCONTINUED | OUTPATIENT
Start: 2022-11-10 | End: 2022-11-16 | Stop reason: HOSPADM

## 2022-11-10 RX ORDER — SODIUM CHLORIDE 0.9 % (FLUSH) 0.9 %
5-40 SYRINGE (ML) INJECTION EVERY 12 HOURS SCHEDULED
Status: DISCONTINUED | OUTPATIENT
Start: 2022-11-10 | End: 2022-11-16 | Stop reason: HOSPADM

## 2022-11-10 RX ORDER — MYCOPHENOLATE MOFETIL 500 MG/1
500 TABLET ORAL 3 TIMES DAILY
Status: DISCONTINUED | OUTPATIENT
Start: 2022-11-10 | End: 2022-11-16 | Stop reason: HOSPADM

## 2022-11-10 RX ORDER — FAMOTIDINE 20 MG/1
20 TABLET, FILM COATED ORAL 2 TIMES DAILY
Status: DISCONTINUED | OUTPATIENT
Start: 2022-11-10 | End: 2022-11-14

## 2022-11-10 RX ORDER — POLYETHYLENE GLYCOL 3350 17 G/17G
17 POWDER, FOR SOLUTION ORAL DAILY PRN
Status: DISCONTINUED | OUTPATIENT
Start: 2022-11-10 | End: 2022-11-16 | Stop reason: HOSPADM

## 2022-11-10 RX ORDER — DEXTROSE MONOHYDRATE 100 MG/ML
INJECTION, SOLUTION INTRAVENOUS CONTINUOUS PRN
Status: DISCONTINUED | OUTPATIENT
Start: 2022-11-10 | End: 2022-11-16 | Stop reason: HOSPADM

## 2022-11-10 RX ORDER — FOLIC ACID 1 MG/1
1 TABLET ORAL DAILY
Status: DISCONTINUED | OUTPATIENT
Start: 2022-11-11 | End: 2022-11-16 | Stop reason: HOSPADM

## 2022-11-10 RX ORDER — PREGABALIN 75 MG/1
75 CAPSULE ORAL 2 TIMES DAILY
Status: DISCONTINUED | OUTPATIENT
Start: 2022-11-10 | End: 2022-11-16 | Stop reason: HOSPADM

## 2022-11-10 RX ORDER — FERROUS SULFATE 325(65) MG
325 TABLET ORAL
Status: DISCONTINUED | OUTPATIENT
Start: 2022-11-11 | End: 2022-11-16 | Stop reason: HOSPADM

## 2022-11-10 RX ORDER — ACETAMINOPHEN 325 MG/1
650 TABLET ORAL EVERY 6 HOURS PRN
Status: DISCONTINUED | OUTPATIENT
Start: 2022-11-10 | End: 2022-11-16 | Stop reason: HOSPADM

## 2022-11-10 RX ORDER — SPIRONOLACTONE 25 MG/1
25 TABLET ORAL DAILY
Status: DISCONTINUED | OUTPATIENT
Start: 2022-11-11 | End: 2022-11-16 | Stop reason: HOSPADM

## 2022-11-10 RX ORDER — SODIUM CHLORIDE 9 MG/ML
INJECTION, SOLUTION INTRAVENOUS PRN
Status: DISCONTINUED | OUTPATIENT
Start: 2022-11-10 | End: 2022-11-16 | Stop reason: HOSPADM

## 2022-11-10 RX ORDER — FUROSEMIDE 80 MG
80 TABLET ORAL DAILY
COMMUNITY
End: 2022-11-29 | Stop reason: SDUPTHER

## 2022-11-10 RX ORDER — METOPROLOL SUCCINATE 25 MG/1
25 TABLET, EXTENDED RELEASE ORAL DAILY
Status: DISCONTINUED | OUTPATIENT
Start: 2022-11-11 | End: 2022-11-16 | Stop reason: HOSPADM

## 2022-11-10 RX ORDER — TIZANIDINE 4 MG/1
4 TABLET ORAL 3 TIMES DAILY
Status: DISCONTINUED | OUTPATIENT
Start: 2022-11-10 | End: 2022-11-16 | Stop reason: HOSPADM

## 2022-11-10 RX ORDER — SODIUM CHLORIDE 0.9 % (FLUSH) 0.9 %
5-40 SYRINGE (ML) INJECTION PRN
Status: DISCONTINUED | OUTPATIENT
Start: 2022-11-10 | End: 2022-11-16 | Stop reason: HOSPADM

## 2022-11-10 RX ORDER — POTASSIUM CHLORIDE 20 MEQ/1
20 TABLET, EXTENDED RELEASE ORAL DAILY
Status: DISCONTINUED | OUTPATIENT
Start: 2022-11-11 | End: 2022-11-16 | Stop reason: HOSPADM

## 2022-11-10 RX ORDER — FUROSEMIDE 10 MG/ML
40 INJECTION INTRAMUSCULAR; INTRAVENOUS ONCE
Status: COMPLETED | OUTPATIENT
Start: 2022-11-10 | End: 2022-11-11

## 2022-11-10 ASSESSMENT — ENCOUNTER SYMPTOMS
COUGH: 1
CHEST TIGHTNESS: 1
ABDOMINAL PAIN: 0
NAUSEA: 0
CONSTIPATION: 0
SHORTNESS OF BREATH: 1
DIARRHEA: 0
VOMITING: 0

## 2022-11-10 ASSESSMENT — PAIN DESCRIPTION - ONSET: ONSET: ON-GOING

## 2022-11-10 ASSESSMENT — PAIN - FUNCTIONAL ASSESSMENT: PAIN_FUNCTIONAL_ASSESSMENT: PREVENTS OR INTERFERES SOME ACTIVE ACTIVITIES AND ADLS

## 2022-11-10 ASSESSMENT — PAIN DESCRIPTION - ORIENTATION: ORIENTATION: RIGHT;MID

## 2022-11-10 ASSESSMENT — PAIN SCALES - GENERAL: PAINLEVEL_OUTOF10: 8

## 2022-11-10 ASSESSMENT — PAIN DESCRIPTION - DESCRIPTORS: DESCRIPTORS: SHARP

## 2022-11-10 ASSESSMENT — PAIN DESCRIPTION - PAIN TYPE: TYPE: CHRONIC PAIN

## 2022-11-10 ASSESSMENT — PAIN DESCRIPTION - FREQUENCY: FREQUENCY: CONTINUOUS

## 2022-11-10 NOTE — ED TRIAGE NOTES
Patient arrived to ED with complaints of chest pain and shortness of breath that has been ongoing for a few days. Patient with a pacemaker.

## 2022-11-11 PROBLEM — I50.9 ACUTE ON CHRONIC CONGESTIVE HEART FAILURE (HCC): Status: ACTIVE | Noted: 2022-03-19

## 2022-11-11 LAB
ANION GAP SERPL CALCULATED.3IONS-SCNC: 9 MMOL/L (ref 3–16)
BASOPHILS ABSOLUTE: 0 K/UL (ref 0–0.2)
BASOPHILS RELATIVE PERCENT: 0.6 %
BUN BLDV-MCNC: 19 MG/DL (ref 7–20)
CALCIUM SERPL-MCNC: 8.8 MG/DL (ref 8.3–10.6)
CHLORIDE BLD-SCNC: 98 MMOL/L (ref 99–110)
CHOLESTEROL, TOTAL: 115 MG/DL (ref 0–199)
CO2: 29 MMOL/L (ref 21–32)
CREAT SERPL-MCNC: 1 MG/DL (ref 0.8–1.3)
EOSINOPHILS ABSOLUTE: 0.2 K/UL (ref 0–0.6)
EOSINOPHILS RELATIVE PERCENT: 2.7 %
GFR SERPL CREATININE-BSD FRML MDRD: >60 ML/MIN/{1.73_M2}
GLUCOSE BLD-MCNC: 131 MG/DL (ref 70–99)
GLUCOSE BLD-MCNC: 147 MG/DL (ref 70–99)
GLUCOSE BLD-MCNC: 160 MG/DL (ref 70–99)
GLUCOSE BLD-MCNC: 176 MG/DL (ref 70–99)
GLUCOSE BLD-MCNC: 190 MG/DL (ref 70–99)
GLUCOSE BLD-MCNC: 84 MG/DL (ref 70–99)
HCT VFR BLD CALC: 38 % (ref 40.5–52.5)
HDLC SERPL-MCNC: 33 MG/DL (ref 40–60)
HEMOGLOBIN: 12.2 G/DL (ref 13.5–17.5)
LDL CHOLESTEROL CALCULATED: 55 MG/DL
LV EF: 58 %
LVEF MODALITY: NORMAL
LYMPHOCYTES ABSOLUTE: 1.3 K/UL (ref 1–5.1)
LYMPHOCYTES RELATIVE PERCENT: 17.2 %
MAGNESIUM: 1.8 MG/DL (ref 1.8–2.4)
MCH RBC QN AUTO: 27.5 PG (ref 26–34)
MCHC RBC AUTO-ENTMCNC: 32 G/DL (ref 31–36)
MCV RBC AUTO: 85.9 FL (ref 80–100)
MONOCYTES ABSOLUTE: 0.6 K/UL (ref 0–1.3)
MONOCYTES RELATIVE PERCENT: 7.9 %
NEUTROPHILS ABSOLUTE: 5.4 K/UL (ref 1.7–7.7)
NEUTROPHILS RELATIVE PERCENT: 71.6 %
PDW BLD-RTO: 15.7 % (ref 12.4–15.4)
PERFORMED ON: ABNORMAL
PERFORMED ON: NORMAL
PLATELET # BLD: 205 K/UL (ref 135–450)
PMV BLD AUTO: 7.5 FL (ref 5–10.5)
POTASSIUM SERPL-SCNC: 4.2 MMOL/L (ref 3.5–5.1)
RBC # BLD: 4.43 M/UL (ref 4.2–5.9)
SODIUM BLD-SCNC: 136 MMOL/L (ref 136–145)
TRIGL SERPL-MCNC: 134 MG/DL (ref 0–150)
TROPONIN: 0.14 NG/ML
TROPONIN: 0.19 NG/ML
TROPONIN: 0.2 NG/ML
VLDLC SERPL CALC-MCNC: 27 MG/DL
WBC # BLD: 7.6 K/UL (ref 4–11)

## 2022-11-11 PROCEDURE — 84484 ASSAY OF TROPONIN QUANT: CPT

## 2022-11-11 PROCEDURE — 83735 ASSAY OF MAGNESIUM: CPT

## 2022-11-11 PROCEDURE — 36415 COLL VENOUS BLD VENIPUNCTURE: CPT

## 2022-11-11 PROCEDURE — 6360000004 HC RX CONTRAST MEDICATION: Performed by: INTERNAL MEDICINE

## 2022-11-11 PROCEDURE — 85025 COMPLETE CBC W/AUTO DIFF WBC: CPT

## 2022-11-11 PROCEDURE — 80061 LIPID PANEL: CPT

## 2022-11-11 PROCEDURE — 1200000000 HC SEMI PRIVATE

## 2022-11-11 PROCEDURE — 2580000003 HC RX 258

## 2022-11-11 PROCEDURE — 99255 IP/OBS CONSLTJ NEW/EST HI 80: CPT | Performed by: INTERNAL MEDICINE

## 2022-11-11 PROCEDURE — 99233 SBSQ HOSP IP/OBS HIGH 50: CPT | Performed by: HOSPITALIST

## 2022-11-11 PROCEDURE — 6360000002 HC RX W HCPCS: Performed by: INTERNAL MEDICINE

## 2022-11-11 PROCEDURE — C8929 TTE W OR WO FOL WCON,DOPPLER: HCPCS

## 2022-11-11 PROCEDURE — 80048 BASIC METABOLIC PNL TOTAL CA: CPT

## 2022-11-11 PROCEDURE — 6370000000 HC RX 637 (ALT 250 FOR IP)

## 2022-11-11 PROCEDURE — 6360000002 HC RX W HCPCS

## 2022-11-11 RX ORDER — FUROSEMIDE 10 MG/ML
40 INJECTION INTRAMUSCULAR; INTRAVENOUS 2 TIMES DAILY
Status: DISCONTINUED | OUTPATIENT
Start: 2022-11-11 | End: 2022-11-11

## 2022-11-11 RX ORDER — FUROSEMIDE 10 MG/ML
40 INJECTION INTRAMUSCULAR; INTRAVENOUS 3 TIMES DAILY
Status: DISCONTINUED | OUTPATIENT
Start: 2022-11-11 | End: 2022-11-12

## 2022-11-11 RX ADMIN — TAMSULOSIN HYDROCHLORIDE 0.4 MG: 0.4 CAPSULE ORAL at 08:41

## 2022-11-11 RX ADMIN — FAMOTIDINE 20 MG: 20 TABLET ORAL at 08:41

## 2022-11-11 RX ADMIN — FOLIC ACID 1 MG: 1 TABLET ORAL at 08:41

## 2022-11-11 RX ADMIN — Medication 1 TABLET: at 08:41

## 2022-11-11 RX ADMIN — PREGABALIN 75 MG: 75 CAPSULE ORAL at 20:46

## 2022-11-11 RX ADMIN — METOPROLOL SUCCINATE 25 MG: 25 TABLET, EXTENDED RELEASE ORAL at 08:42

## 2022-11-11 RX ADMIN — MYCOPHENOLATE MOFETIL 500 MG: 500 TABLET, FILM COATED ORAL at 13:08

## 2022-11-11 RX ADMIN — OXYCODONE AND ACETAMINOPHEN 1 TABLET: 5; 325 TABLET ORAL at 13:08

## 2022-11-11 RX ADMIN — SODIUM CHLORIDE, PRESERVATIVE FREE 10 ML: 5 INJECTION INTRAVENOUS at 20:46

## 2022-11-11 RX ADMIN — TIZANIDINE 4 MG: 4 TABLET ORAL at 20:46

## 2022-11-11 RX ADMIN — MYCOPHENOLATE MOFETIL 500 MG: 500 TABLET, FILM COATED ORAL at 08:42

## 2022-11-11 RX ADMIN — TIZANIDINE 4 MG: 4 TABLET ORAL at 08:41

## 2022-11-11 RX ADMIN — MYCOPHENOLATE MOFETIL 500 MG: 500 TABLET, FILM COATED ORAL at 00:05

## 2022-11-11 RX ADMIN — TIZANIDINE 4 MG: 4 TABLET ORAL at 00:05

## 2022-11-11 RX ADMIN — INSULIN GLARGINE 15 UNITS: 100 INJECTION, SOLUTION SUBCUTANEOUS at 20:48

## 2022-11-11 RX ADMIN — ATORVASTATIN CALCIUM 40 MG: 40 TABLET, FILM COATED ORAL at 20:46

## 2022-11-11 RX ADMIN — FERROUS SULFATE TAB 325 MG (65 MG ELEMENTAL FE) 325 MG: 325 (65 FE) TAB at 08:41

## 2022-11-11 RX ADMIN — FUROSEMIDE 40 MG: 10 INJECTION, SOLUTION INTRAMUSCULAR; INTRAVENOUS at 13:10

## 2022-11-11 RX ADMIN — RIVAROXABAN 20 MG: 20 TABLET, FILM COATED ORAL at 08:41

## 2022-11-11 RX ADMIN — FUROSEMIDE 40 MG: 10 INJECTION, SOLUTION INTRAMUSCULAR; INTRAVENOUS at 00:06

## 2022-11-11 RX ADMIN — FUROSEMIDE 40 MG: 10 INJECTION, SOLUTION INTRAMUSCULAR; INTRAVENOUS at 20:46

## 2022-11-11 RX ADMIN — PERFLUTREN 1.5 ML: 6.52 INJECTION, SUSPENSION INTRAVENOUS at 11:19

## 2022-11-11 RX ADMIN — SODIUM CHLORIDE, PRESERVATIVE FREE 10 ML: 5 INJECTION INTRAVENOUS at 00:06

## 2022-11-11 RX ADMIN — OXYCODONE AND ACETAMINOPHEN 1 TABLET: 5; 325 TABLET ORAL at 08:52

## 2022-11-11 RX ADMIN — TIZANIDINE 4 MG: 4 TABLET ORAL at 13:08

## 2022-11-11 RX ADMIN — FUROSEMIDE 40 MG: 10 INJECTION, SOLUTION INTRAMUSCULAR; INTRAVENOUS at 08:42

## 2022-11-11 RX ADMIN — OXYCODONE AND ACETAMINOPHEN 1 TABLET: 5; 325 TABLET ORAL at 23:03

## 2022-11-11 RX ADMIN — ASPIRIN 81 MG: 81 TABLET, COATED ORAL at 08:42

## 2022-11-11 RX ADMIN — PREGABALIN 75 MG: 75 CAPSULE ORAL at 08:41

## 2022-11-11 RX ADMIN — FAMOTIDINE 20 MG: 20 TABLET ORAL at 20:46

## 2022-11-11 RX ADMIN — SODIUM CHLORIDE, PRESERVATIVE FREE 10 ML: 5 INJECTION INTRAVENOUS at 08:43

## 2022-11-11 RX ADMIN — ATORVASTATIN CALCIUM 40 MG: 40 TABLET, FILM COATED ORAL at 00:05

## 2022-11-11 RX ADMIN — OXYCODONE AND ACETAMINOPHEN 1 TABLET: 5; 325 TABLET ORAL at 17:20

## 2022-11-11 RX ADMIN — POTASSIUM CHLORIDE 20 MEQ: 1500 TABLET, EXTENDED RELEASE ORAL at 08:42

## 2022-11-11 RX ADMIN — MYCOPHENOLATE MOFETIL 500 MG: 500 TABLET, FILM COATED ORAL at 20:46

## 2022-11-11 RX ADMIN — PREGABALIN 75 MG: 75 CAPSULE ORAL at 00:05

## 2022-11-11 RX ADMIN — SPIRONOLACTONE 25 MG: 25 TABLET, FILM COATED ORAL at 08:41

## 2022-11-11 RX ADMIN — OXYCODONE AND ACETAMINOPHEN 1 TABLET: 5; 325 TABLET ORAL at 00:05

## 2022-11-11 RX ADMIN — FAMOTIDINE 20 MG: 20 TABLET ORAL at 00:05

## 2022-11-11 ASSESSMENT — PAIN DESCRIPTION - LOCATION
LOCATION: KNEE
LOCATION: KNEE;SHOULDER
LOCATION: KNEE
LOCATION: KNEE

## 2022-11-11 ASSESSMENT — PAIN DESCRIPTION - PAIN TYPE: TYPE: CHRONIC PAIN

## 2022-11-11 ASSESSMENT — PAIN SCALES - GENERAL
PAINLEVEL_OUTOF10: 2
PAINLEVEL_OUTOF10: 7
PAINLEVEL_OUTOF10: 0
PAINLEVEL_OUTOF10: 8
PAINLEVEL_OUTOF10: 7
PAINLEVEL_OUTOF10: 7
PAINLEVEL_OUTOF10: 0
PAINLEVEL_OUTOF10: 0
PAINLEVEL_OUTOF10: 7

## 2022-11-11 ASSESSMENT — PAIN DESCRIPTION - DESCRIPTORS
DESCRIPTORS: SHARP
DESCRIPTORS: ACHING
DESCRIPTORS: ACHING
DESCRIPTORS: ACHING;SHARP
DESCRIPTORS: ACHING

## 2022-11-11 ASSESSMENT — PAIN DESCRIPTION - ORIENTATION
ORIENTATION: RIGHT

## 2022-11-11 ASSESSMENT — ENCOUNTER SYMPTOMS
COUGH: 0
SHORTNESS OF BREATH: 1
CHEST TIGHTNESS: 0
WHEEZING: 0

## 2022-11-11 ASSESSMENT — PAIN SCALES - WONG BAKER
WONGBAKER_NUMERICALRESPONSE: 0
WONGBAKER_NUMERICALRESPONSE: 0

## 2022-11-11 ASSESSMENT — PAIN - FUNCTIONAL ASSESSMENT
PAIN_FUNCTIONAL_ASSESSMENT: ACTIVITIES ARE NOT PREVENTED
PAIN_FUNCTIONAL_ASSESSMENT: PREVENTS OR INTERFERES SOME ACTIVE ACTIVITIES AND ADLS

## 2022-11-11 ASSESSMENT — PAIN DESCRIPTION - FREQUENCY: FREQUENCY: INTERMITTENT

## 2022-11-11 ASSESSMENT — PAIN DESCRIPTION - ONSET: ONSET: ON-GOING

## 2022-11-11 NOTE — CARE COORDINATION
Case Management Assessment  Initial Evaluation    Date/Time of Evaluation: 11/11/2022 1:56 PM  Assessment Completed by: Ludivina Garcia    If patient is discharged prior to next notation, then this note serves as note for discharge by case management. Patient Name: Madisyn Salinas                   YOB: 1960  Diagnosis: Heart failure ([facility] [address] [I50.9]  Acute on chronic congestive heart failure, unspecified heart failure type [facility]) [I50.9]                   Date / Time: 11/10/2022  5:44 PM    Patient Admission Status: Inpatient   Readmission Risk (Low < 19, Mod (19-27), High > 27): Readmission Risk Score: 11.2    Current PCP: Wild Hopper MD  PCP verified by CM? Chart Reviewed: Yes      History Provided by: Patient  Patient Orientation: Alert and Oriented    Patient Cognition: Alert    Hospitalization in the last 30 days (Readmission):  No    If yes, Readmission Assessment in CM Navigator will be completed. Advance Directives:      Code Status: Full Code   Patient's Primary Decision Maker is: Patient Declined (Legal Next of Kin Remains as Decision Maker)      Discharge Planning:    Patient lives with: Alone Type of Home: Apartment  Primary Care Giver: Self  Patient Support Systems include: Family Members   Current Financial resources:    Current community resources:    Current services prior to admission: Home Care, Other (Comment) ([facility], and outpt cardiac rehab)            Current DME:              Type of Home Care services:  Aide Services    ADLS  Prior functional level:    Current functional level:      PT AM-PAC:   /24  OT AM-PAC:   /24    Family can provide assistance at DC: Would you like Case Management to discuss the discharge plan with any other family members/significant others, and if so, who?     Plans to Return to Present Housing: Yes  Other Identified Issues/Barriers to RETURNING to current housing: none  Potential Assistance needed at discharge: [address] Potential DME:    Patient expects to discharge to: [address] [place] [address] for transportation at discharge:      Financial    Payor: [address]  [address] / Plan: [address]  [address] / Product Type: *No Product type* /     Does insurance require precert for SNF: Yes    Potential assistance Purchasing Medications: No  Meds-to-Beds request:        [address] [place] [address], [place] [facility] [address]  [facility]  Phone: [phone] Fax: [phone]      Notes:    Factors facilitating achievement of predicted outcomes: Caregiver support, Cooperative, Pleasant, and Has needed Durable Medical Equipment at home    Barriers to discharge: Medication managment    Additional Case Management Notes: CM spoke with pt at bedside, currently active with [facility], states that he goes to outpt cardiac rehab. Pt lives in his apt alone. Referred to Jamee Loya confirmed they will follow. The Plan for Transition of Care is related to the following treatment goals of Heart failure ([address] [I50.9]  Acute on chronic congestive heart failure, unspecified heart failure type ([address] [E06.8]    IF APPLICABLE: The Patient and/or patient representative Aishwarya Schuler and his family were provided with a choice of provider and agrees with the discharge plan. Freedom of choice list with basic dialogue that supports the patient's individualized plan of care/goals and shares the quality data associated with the providers was provided to:     Patient Representative Name:       The Patient and/or Patient Representative Agree with the Discharge Plan?       Nikky Richardson  Case Management Department  Ph: [phone] Fax:

## 2022-11-11 NOTE — DISCHARGE INSTRUCTIONS
Please follow-up with your primary care physician, Dr. Yudelka Wills, within 1 week. Please continue taking your Lasix, 80 mg, daily. Please start taking your new medication, pantoprazole, every morning. Extra Heart Failure sites:   https://ImmusanT.FanTree/ --- this is American Heart Association interactive Healthier Living with Heart Failure guidebook. Please copy and paste link into search bar. Use your mouse to scroll through the pages. Lots and lots of info / tips    HF Wishon compa  --- free smart phone compa available for [employer] and [employer]. Use your phone to track sodium / fluid intake,  symptoms, weight, etc.    [employer]. Beat.no - website-- [employer] is a dialysis company. All dialysis patients follow a renal diet which IS low sodium!! This website offers free seasonal cookbooks.   Each quarter, they will release 25-30 new recipes with a breakdown of calories, sodium, glucose, etc    [email or web address]/recipes -- more free recipes

## 2022-11-11 NOTE — CONSULTS
Cardiology Consultation                                                                    Pt Name: Alverto Rodriguez  Age: 57 y.o. Sex: male  : 1960  Location: [facility]    Referring Physician: Cailin Schwartz MD  Primary cardiologist: Dr Justyna Hook at [facility] / Dr Kaylee Contreras      Reason for Consult:     Reason for Consultation/Chief Complaint: AHF    HPI:     Alverto Rodriguez is a 57 y.o. male with a past medical history of morbid obesity, HTN, HLP, DM, HFrEF with CardioMEMS implant in , CAF, myositis, Sjogren's disease, acute DVT right soleal vein after TKA 2022, new LBBB (as of 2022) s/p CRT-D 2022. Echo 2021: LVH, LVEF 60%, indeterminate diastolic function, mild to moderate MR (similar to echo 2020 and echo 10/2019)     LHC 2019: Mild CAD (LAD 20%), normal LVEF. Echo 2022: Mild LVD, LVEF 35%, moderate LAE, mild to moderate MR, normal RV. R/LHC 2022: RA 14, PA /15/, W 18, LVEDP 24, CI 1.8/2.3 (TD/AF). Minimal CAD left dominant system. Patient presented to the emergency room on 11/10 with progressively worse lower extremity edema, weight gain, dyspnea, atypical chest pains (positional) over the last 3 to 4 days. Patient was admitted for acute heart failure. Peak troponin 0.21 trending down. ECG consistent with AF, biventricular pacing. Home diuretics: Lasix 80 mg daily, metolazone 5 mg p.o. daily as needed, spironolactone 25 mg daily. Histories     Past Medical History:   has a past medical history of Arthritis, Depression, Diabetes mellitus ([address] Diastolic CHF ([address] Difficult intubation, Dyslipidemia, Herniated disc, cervical, Hypertension, Osteoporosis, and Peripheral neuropathy. Surgical History:   has a past surgical history that includes hernia repair; Breast surgery (Left, 2019); and Muscle biopsy (Left, 10/10/2019). Social History:   reports that he has never smoked. He has never used smokeless tobacco. He reports current alcohol use.  He reports that he does not currently use drugs after having used the following drugs: Cocaine. Family History:  No evidence for sudden cardiac death or premature CAD      Medications:       Home Medications  Were reviewed and are listed in nursing record. and/or listed below  Prior to Admission medications    Medication Sig Start Date End Date Taking? Authorizing Provider   furosemide (LASIX) 80 MG tablet Take 80 mg by mouth daily   Yes Historical Provider, MD   metOLazone (ZAROXOLYN) 5 MG tablet Take 5 mg by mouth daily as needed 5/6/22   Historical Provider, MD   tadalafil (CIALIS) 5 MG tablet TAKE 1 TABLET BY MOUTH ONCE DAILY 10/25/22   Historical Provider, MD   tiZANidine (ZANAFLEX) 4 MG tablet TAKE 1 TABLET BY MOUTH THREE TIMES DAILY AS NEEDED FOR PAIN 10/17/22   Historical Provider, MD   FARXIGA 10 MG tablet TAKE 1 TABLET BY MOUTH ONCE DAILY IN THE MORNING 9/27/22   Historical Provider, MD   metoprolol succinate (TOPROL XL) 25 MG extended release tablet TAKE 1 TABLET BY MOUTH ONCE DAILY 9/27/22   Historical Provider, MD   potassium chloride (KLOR-CON M) 20 MEQ extended release tablet Take 20 mEq by mouth daily 9/13/22   Historical Provider, MD [place] 391.3 (180 Fe) MG CAPS TAKE 1 CAPSULE BY MOUTH EVERY OTHER DAY 10/10/22   Historical Provider, MD   ibuprofen (ADVIL;MOTRIN) 800 MG tablet TAKE 1 TABLET BY MOUTH THREE TIMES DAILY AS NEEDED FOR PAIN 10/17/22   Historical Provider, MD   famotidine (PEPCID) 20 MG tablet Take 1 tablet by mouth 2 times daily 3/22/22   Aura Golden MD   rivaroxaban [facility]) 20 MG TABS tablet Take 1 tablet by mouth daily 3/30/22   Aura Golden MD   insulin glargine (LANTUS) 100 UNIT/ML injection vial Inject 15 Units into the skin nightly    Historical Provider, MD   pregabalin (LYRICA) 75 MG capsule Take 75 mg by mouth 2 times daily.     Historical Provider, MD   cyanocobalamin 1000 MCG tablet Take 1,000 mcg by mouth daily    Historical Provider, MD   ferrous sulfate (IRON 325) 325 (65 Fe) MG tablet Take 325 mg by mouth daily (with breakfast)    Historical Provider, MD   folic acid (FOLVITE) 1 MG tablet Take 1 mg by mouth daily    Historical Provider, MD   Multiple Vitamins-Minerals (THERAPEUTIC MULTIVITAMIN-MINERALS) tablet Take 1 tablet by mouth daily    Historical Provider, MD   mycophenolate (CELLCEPT) 500 MG tablet Take by mouth in the morning, at noon, and at bedtime    Historical Provider, MD   spironolactone (ALDACTONE) 25 MG tablet Take 25 mg by mouth daily    Historical Provider, MD   tamsulosin (FLOMAX) 0.4 MG capsule Take 0.4 mg by mouth daily    Historical Provider, MD   oxyCODONE-acetaminophen (PERCOCET) 5-325 MG per tablet Take 1 tablet by mouth every 4 hours as needed for Pain.     Historical Provider, MD   atorvastatin (LIPITOR) 40 MG tablet Take 1 tablet by mouth nightly 1/24/20   Rajendra Castle MD   aspirin 81 MG tablet Take 81 mg by mouth daily    Historical Provider, MD          Inpatient Medications:   furosemide  40 mg IntraVENous TID    sodium chloride flush  5-40 mL IntraVENous 2 times per day    aspirin  81 mg Oral Daily    atorvastatin  40 mg Oral Nightly    famotidine  20 mg Oral BID    ferrous sulfate  325 mg Oral Daily with breakfast    folic acid  1 mg Oral Daily    insulin glargine  15 Units SubCUTAneous Nightly    metoprolol succinate  25 mg Oral Daily    therapeutic multivitamin-minerals  1 tablet Oral Daily    mycophenolate  500 mg Oral TID    potassium chloride  20 mEq Oral Daily    pregabalin  75 mg Oral BID    rivaroxaban  20 mg Oral Daily    spironolactone  25 mg Oral Daily    tiZANidine  4 mg Oral TID    tamsulosin  0.4 mg Oral Daily    insulin lispro  0-8 Units SubCUTAneous TID WC    insulin lispro  0-4 Units SubCUTAneous Nightly       IV drips:   sodium chloride      dextrose         PRN:  sodium chloride flush, sodium chloride, ondansetron **OR** ondansetron, polyethylene glycol, acetaminophen **OR** acetaminophen, glucose, dextrose bolus **OR** dextrose bolus, glucagon (rDNA), dextrose, oxyCODONE-acetaminophen    Allergy:     Lisinopril, Bee venom, and Other       Review of Systems:     All 12 point review of symptoms completed. Pertinent positives identified in the HPI, all other review of symptoms negative as below. CONSTITUTIONAL: No fatigue  SKIN: No rash or pruritis. EYES: No visual changes or diplopia. No scleral icterus. ENT: No Headaches, hearing loss or vertigo. No mouth sores or sore throat. CARDIOVASCULAR: No chest pain/chest pressure/chest discomfort. No palpitations. + edema. RESPIRATORY: + dyspnea. No cough or wheezing, no sputum production. GASTROINTESTINAL: No N/V/D. No abdominal pain, appetite loss, blood in stools. GENITOURINARY: No dysuria, trouble voiding, or hematuria. MUSCULOSKELETAL:  No gait disturbance, weakness or joint complaints. NEUROLOGICAL: No headache, diplopia, change in muscle strength, numbness or tingling. No change in gait, balance, coordination, mood, affect, memory, mentation, behavior. ENDOCRINE: No excessive thirst, fluid intake, or urination. No tremor. HEMATOLOGIC: No abnormal bruising or bleeding. ALLERGY: No nasal congestion or hives.       Physical Examination:     Vitals:    11/11/22 0352 11/11/22 0608 11/11/22 0834 11/11/22 0852   BP: 108/70  100/68    Pulse: 70 71 72    Resp: 18  18 18   Temp: 97.5 °F (36.4 °C)  98.3 °F (36.8 °C)    TempSrc: Axillary  Oral    SpO2: 93%  94%    Weight:       Height:           Wt Readings from Last 3 Encounters:   11/10/22 (!) 311 lb 15.2 oz (141.5 kg)   11/08/22 (!) 316 lb (143.3 kg)   03/22/22 (!) 309 lb 11.9 oz (140.5 kg)         General Appearance:  Alert, cooperative, no distress, appears stated age Appropriate weight   Head:  Normocephalic, without obvious abnormality, atraumatic   Eyes:  PERRL, conjunctiva/corneas clear EOM intact  Ears normal   Throat no lesions       Nose: Nares normal, no drainage or sinus tenderness   Throat: Lips, mucosa, and tongue normal   Neck: Supple, symmetrical, trachea midline, no adenopathy, thyroid: not enlarged, symmetric, no tenderness/mass/nodules, no carotid bruit. Lungs:   Respirations unlabored, basilar ronchi. Chest Wall:  No tenderness or deformity   Heart:  Irregular rhythm, rate is controlled, S1, S2 normal, there is no murmur, there is no rub or gallop, cannot assess jvd, 2+ bilateral lower extremity edema   Abdomen:   Soft, non-tender, bowel sounds active all four quadrants,  no masses, no organomegaly       Extremities: Extremities normal, atraumatic, no cyanosis. Pulses: 2+ and symmetric   Skin: Skin color, texture, turgor normal, no rashes or lesions   Pysch: Normal mood and affect   Neurologic: Normal gross motor and sensory exam.  Cranial nerves intact        Labs:     Recent Labs     11/08/22  1257 11/10/22  1833 11/11/22  0526    136 136   K 4.3 3.7 4.2   BUN 16 18 19   CREATININE 0.9 0.9 1.0    98* 98*   CO2 24 28 29   GLUCOSE 138* 128* 160*   CALCIUM 9.3 8.9 8.8   MG  --   --  1.80     Recent Labs     11/08/22  1257 11/10/22  1833 11/11/22  0526   WBC 8.6 9.1 7.6   HGB 12.3* 12.0* 12.2*   HCT 37.8* 37.1* 38.0*    237 205   MCV 85.5 84.8 85.9     No results for input(s): CHOLTOT, TRIG, HDL, CHOLHDL, LDL in the last 72 hours. Invalid input(s): Cheryal Ho  No results for input(s): PTT, INR in the last 72 hours. Invalid input(s): PT  Recent Labs     11/10/22  1833 11/10/22  2018 11/11/22  0241 11/11/22  0526 11/11/22  0831   TROPONINI 0.25* 0.21* 0.20* 0.19* 0.14*     No results for input(s): BNP in the last 72 hours. No results for input(s): TSH in the last 72 hours. No results for input(s): CHOL, HDL, LDLCALC, TRIG in the last 72 hours.]    Lab Results   Component Value Date    CKTOTAL 172 03/21/2022    TROPONINI 0.14 (H) 11/11/2022         Telemetry:     Telemetry personally reviewed:  AF v paced    Assessment / Plan:     1. Acute on chronic HFrEF.   Patient has new systolic heart failure as diagnosed by echo in 08/2022 which is nonischemic in etiology (most likely due to LBBB, cannot exclude contribution by myositis). He is now volume overloaded but hemodynamically stable. 2.  CAF. 3.  CAD. It is minimal per recent LHC. 4.  Myositis. Patient is supposed to be on mycophenolate 500 mg p.o. 3 times daily. 5.  Elevated troponin. It is most likely due to myositis and/or AHF (demand ischemia). I doubt ACS in view of recent unremarkable LHC. Patient does not have any concerning symptoms.        -Continue with baby aspirin, Xarelto  - Strict I's and O's every shift and standing weights if possible, low-salt diet and daily BMP with reflex to Mg, wean supplemental oxygen to off for sats greater than 94%. -Continue with Lasix 40 mg IV every 8 hours and spironolactone 25 mg daily  - Continue with Toprol 25 mg daily  - Patient is allergic to ACEI (swelling, ? Angioedema). - No hydralazine or imdur due to low BP  - We will obtain a limited echo to reassess LVEF after placement of CRT-D in 08/2022. I have personally reviewed the reports and images of labs, radiological studies, cardiac studies including ECG's and telemetry, current and old medical records. The note was completed using EMR and Dragon dictation system. Every effort was made to ensure accuracy; however, inadvertent computerized transcription errors may be present. All questions and concerns were addressed to the patient/family. Alternatives to my treatment were discussed. I would like to thank you for providing me the opportunity to participate in the care of your patient. If you have any questions, please do not hesitate to contact me.      Cecilio Womack MD, [facility] [place], [address]  The [address]  [address] [address] [place] [address]  Ph: [phone]  Fax: [phone]

## 2022-11-11 NOTE — PLAN OF CARE
Problem: Discharge Planning  Goal: Discharge to home or other facility with appropriate resources  Outcome: Progressing  Flowsheets (Taken 11/11/2022 0254)  Discharge to home or other facility with appropriate resources:   Identify barriers to discharge with patient and caregiver   Arrange for needed discharge resources and transportation as appropriate   Identify discharge learning needs (meds, wound care, etc)   Arrange for interpreters to assist at discharge as needed   Refer to discharge planning if patient needs post-hospital services based on physician order or complex needs related to functional status, cognitive ability or social support system     Problem: Pain  Goal: Verbalizes/displays adequate comfort level or baseline comfort level  Outcome: Progressing  Flowsheets (Taken 11/11/2022 0254)  Verbalizes/displays adequate comfort level or baseline comfort level:   Encourage patient to monitor pain and request assistance   Assess pain using appropriate pain scale   Administer analgesics based on type and severity of pain and evaluate response   Implement non-pharmacological measures as appropriate and evaluate response   Consider cultural and social influences on pain and pain management   Notify Licensed Independent Practitioner if interventions unsuccessful or patient reports new pain     Problem: Chronic Conditions and Co-morbidities  Goal: Patient's chronic conditions and co-morbidity symptoms are monitored and maintained or improved  Outcome: Progressing  Flowsheets (Taken 11/11/2022 0254)  Care Plan - Patient's Chronic Conditions and Co-Morbidity Symptoms are Monitored and Maintained or Improved:   Monitor and assess patient's chronic conditions and comorbid symptoms for stability, deterioration, or improvement   Collaborate with multidisciplinary team to address chronic and comorbid conditions and prevent exacerbation or deterioration   Update acute care plan with appropriate goals if chronic or comorbid symptoms are exacerbated and prevent overall improvement and discharge     Problem: ABCDS Injury Assessment  Goal: Absence of physical injury  Outcome: Progressing  Flowsheets (Taken 11/11/2022 0254)  Absence of Physical Injury: Implement safety measures based on patient assessment     Problem: Safety - Adult  Goal: Free from fall injury  Outcome: Progressing  Flowsheets (Taken 11/11/2022 0254)  Free From Fall Injury:   Instruct family/caregiver on patient safety   Based on caregiver fall risk screen, instruct family/caregiver to ask for assistance with transferring infant if caregiver noted to have fall risk factors     Problem: Cardiovascular - Adult  Goal: Maintains optimal cardiac output and hemodynamic stability  Outcome: Progressing  Goal: Absence of cardiac dysrhythmias or at baseline  Outcome: Progressing  Flowsheets (Taken 11/11/2022 0254)  Absence of cardiac dysrhythmias or at baseline:   Monitor cardiac rate and rhythm   Assess for signs of decreased cardiac output   Administer antiarrhythmia medication and electrolyte replacement as ordered     Problem: Respiratory - Adult  Goal: Achieves optimal ventilation and oxygenation  Outcome: Progressing  Flowsheets (Taken 11/11/2022 0254)  Achieves optimal ventilation and oxygenation:   Assess for changes in respiratory status   Assess for changes in mentation and behavior   Position to facilitate oxygenation and minimize respiratory effort   Oxygen supplementation based on oxygen saturation or arterial blood gases   Encourage broncho-pulmonary hygiene including cough, deep breathe, incentive spirometry   Assess and instruct to report shortness of breath or any respiratory difficulty   Respiratory therapy support as indicated   Assess the need for suctioning and aspirate as needed     Problem: Metabolic/Fluid and Electrolytes - Adult  Goal: Electrolytes maintained within normal limits  Outcome: Progressing  Goal: Hemodynamic stability and optimal renal function maintained  Outcome: Progressing  Flowsheets (Taken 11/11/2022 0254)  Hemodynamic stability and optimal renal function maintained:   Monitor labs and assess for signs and symptoms of volume excess or deficit   Monitor intake, output and patient weight   Monitor urine specific gravity, serum osmolarity and serum sodium as indicated or ordered   Monitor response to interventions for patient's volume status, including labs, urine output, blood pressure (other measures as available)   Encourage oral intake as appropriate   Instruct patient on fluid and nutrition restrictions as appropriate

## 2022-11-11 NOTE — PROGRESS NOTES
Pharmacy  Note  - Admission Medication History    List of kkzbu-rt-hrifuynmc medications is complete. I reviewed Rx fill history via \"Complete Dispense Report\" in [employer], and spoke to patient.       The following changes made to tveys-bo-rtsbafoll medication list:    Dose or Frequency CHANGE:  1) insulin glargine 10 units PRN--> 15 units nightly   2) furosemide 40 mg daily--> 80 mg daily  3) oxycodone/APAP 10/325 --> oxycodone/APAP 5/325    REMOVED:  1) acetaminophen  2) methocarbamol       Current Outpatient Medications   Medication Instructions    aspirin 81 mg, Oral, DAILY    atorvastatin (LIPITOR) 40 mg, Oral, NIGHTLY    cyanocobalamin 1,000 mcg, Oral, DAILY    famotidine (PEPCID) 20 mg, Oral, 2 TIMES DAILY    FARXIGA 10 MG tablet TAKE 1 TABLET BY MOUTH ONCE DAILY IN THE MORNING    ferrous sulfate (IRON 325) 325 mg, Oral, DAILY WITH BREAKFAST    folic acid (FOLVITE) 1 mg, Oral, DAILY    furosemide (LASIX) 80 mg, Oral, DAILY    ibuprofen (ADVIL;MOTRIN) 800 MG tablet TAKE 1 TABLET BY MOUTH THREE TIMES DAILY AS NEEDED FOR PAIN    insulin glargine (LANTUS) 15 Units, SubCUTAneous, NIGHTLY    metOLazone (ZAROXOLYN) 5 mg, Oral, DAILY PRN    metoprolol succinate (TOPROL XL) 25 MG extended release tablet TAKE 1 TABLET BY MOUTH ONCE DAILY    Multiple Vitamins-Minerals (THERAPEUTIC MULTIVITAMIN-MINERALS) tablet 1 tablet, Oral, DAILY    mycophenolate (CELLCEPT) 500 MG tablet Oral, 3 times daily    oxyCODONE-acetaminophen (PERCOCET) 5-325 MG per tablet 1 tablet, Oral, EVERY 4 HOURS PRN    potassium chloride (KLOR-CON M) 20 MEQ extended release tablet 20 mEq, Oral, DAILY    pregabalin (LYRICA) 75 mg, Oral, 2 TIMES DAILY    PROFE 391.3 (180 Fe) MG CAPS TAKE 1 CAPSULE BY MOUTH EVERY OTHER DAY    rivaroxaban (XARELTO) 20 mg, Oral, DAILY    spironolactone (ALDACTONE) 25 mg, Oral, DAILY    tadalafil (CIALIS) 5 MG tablet TAKE 1 TABLET BY MOUTH ONCE DAILY    tamsulosin (FLOMAX) 0.4 mg, Oral, DAILY    tiZANidine (ZANAFLEX) 4 MG tablet TAKE 1 TABLET BY MOUTH THREE TIMES DAILY AS NEEDED FOR PAIN        11/10/2022 10:53 PM  Bert Cotton  PharmD Candidate   [facility] of [employer] [facility], PharmD  Main Pharmacy: [id number]  11/10/2022 10:53 PM

## 2022-11-11 NOTE — PROGRESS NOTES
Pt has rested well since MN after eating; voided without difficulty, using urinal.  Remains on RA without distress.

## 2022-11-11 NOTE — H&P
Internal Medicine  PGY 1  History & Physical      CC: Dyspnea      History Obtained From:  patient    HISTORY OF PRESENT ILLNESS:  58-year-old male with past medical history HTN, HLD, CAD, CHF (EF 35% 8/2022), DM2, AF (Xarelto), DVT, Sjogren's, aortic aneurysm who [facility]. with chief complaints of dyspnea and chest pressure. Patient reports 2 to 3 days of gradually worsening dyspnea exacerbated with exertion and accompanied by 10 pound weight gain and chest pressure. He describes chest pressure as midsternal, constant, waxing/waning, nonexertional and improved with certain positions when lying down. He also notes increased swelling to both hands and lower extremities. Symptoms are gradually worsened since onset and are similar to his past heart failure exacerbation. In ED patient HDS with BMP 2012 (last 1200 3/2022), troponin 0.25 > 0.21 (baseline 0.06-0.1). CBC, chest x-ray unremarkable. EKG without ischemic changes. On exam patient is sitting comfortably on the edge of the bed conversing without difficulty. He additionally endorses a \"prick\" sensation intermittently over the past several days, which he feels may be related to his pacemaker. He also endorses chronic cough attributed to Sjogren's; states this is stable. Patient reports he did not take his medications 1 day early last week, but is otherwise compliant. States he does this roughly once per month due to frustration with medical issues and amount of pills. Of note, patient underwent cardiac catheterization on 8/26/202 with angiography and temporary transvenous pacing wire. No interventions at that time; underwent placement of left subclavian biventricular pacemaker/ICD on 8/31/2022.     Past Medical History:        Diagnosis Date    Arthritis     Depression     Diabetes mellitus ([address]     Diastolic CHF ([address]     Difficult intubation     throat swelled    Dyslipidemia     Herniated disc, cervical     Hypertension Osteoporosis     Peripheral neuropathy        Past Surgical History:        Procedure Laterality Date    BREAST SURGERY Left 8/7/2019    LEFT BREAST MASS EXCISION; LEFT AXILLIARY LYMPH NODE EXCISION performed by Terrance Covington MD at [place] [address] Left 10/10/2019    MUSCLE BIOPSY LOWER EXTREMITY LEFT performed by Terrance Covington MD at [address] [place] [address] [address]       Medications Prior to Admission:    Medications Prior to Admission: furosemide (LASIX) 80 MG tablet, Take 80 mg by mouth daily  metOLazone (ZAROXOLYN) 5 MG tablet, Take 5 mg by mouth daily as needed  tadalafil (CIALIS) 5 MG tablet, TAKE 1 TABLET BY MOUTH ONCE DAILY  tiZANidine (ZANAFLEX) 4 MG tablet, TAKE 1 TABLET BY MOUTH THREE TIMES DAILY AS NEEDED FOR PAIN  FARXIGA 10 MG tablet, TAKE 1 TABLET BY MOUTH ONCE DAILY IN THE MORNING  metoprolol succinate (TOPROL XL) 25 MG extended release tablet, TAKE 1 TABLET BY MOUTH ONCE DAILY  potassium chloride (KLOR-CON M) 20 MEQ extended release tablet, Take 20 mEq by mouth daily  PROFE 391.3 (180 Fe) MG CAPS, TAKE 1 CAPSULE BY MOUTH EVERY OTHER DAY  ibuprofen (ADVIL;MOTRIN) 800 MG tablet, TAKE 1 TABLET BY MOUTH THREE TIMES DAILY AS NEEDED FOR PAIN  famotidine (PEPCID) 20 MG tablet, Take 1 tablet by mouth 2 times daily  rivaroxaban (XARELTO) 20 MG TABS tablet, Take 1 tablet by mouth daily  insulin glargine (LANTUS) 100 UNIT/ML injection vial, Inject 15 Units into the skin nightly  pregabalin (LYRICA) 75 MG capsule, Take 75 mg by mouth 2 times daily.   cyanocobalamin 1000 MCG tablet, Take 1,000 mcg by mouth daily  ferrous sulfate (IRON 325) 325 (65 Fe) MG tablet, Take 325 mg by mouth daily (with breakfast)  folic acid (FOLVITE) 1 MG tablet, Take 1 mg by mouth daily  Multiple Vitamins-Minerals (THERAPEUTIC MULTIVITAMIN-MINERALS) tablet, Take 1 tablet by mouth daily  mycophenolate (CELLCEPT) 500 MG tablet, Take by mouth in the morning, at noon, and at bedtime  spironolactone (ALDACTONE) 25 MG tablet, Take 25 mg by mouth daily  tamsulosin (FLOMAX) 0.4 MG capsule, Take 0.4 mg by mouth daily  oxyCODONE-acetaminophen (PERCOCET) 5-325 MG per tablet, Take 1 tablet by mouth every 4 hours as needed for Pain. atorvastatin (LIPITOR) 40 MG tablet, Take 1 tablet by mouth nightly  aspirin 81 MG tablet, Take 81 mg by mouth daily    Allergies:  Lisinopril, Bee venom, and Other    Social History:   TOBACCO:   reports that he has never smoked. He has never used smokeless tobacco.  ETOH:   reports current alcohol use. DRUGS : Denies  Patient currently lives at home    Family History:       Problem Relation Age of Onset    Heart Disease Mother     High Blood Pressure Mother     High Blood Pressure Father     High Blood Pressure Sister     Breast Cancer Brother     Heart Disease Brother     High Blood Pressure Brother        Review of Systems   Constitutional:  Positive for fatigue and unexpected weight change. Negative for chills, diaphoresis and fever. HENT:  Negative for congestion. Respiratory:  Positive for cough, chest tightness and shortness of breath. Cardiovascular:  Positive for leg swelling. Negative for chest pain and palpitations. Gastrointestinal:  Negative for abdominal pain, constipation, diarrhea, nausea and vomiting. Neurological:  Negative for dizziness, light-headedness and headaches. All other systems reviewed and are negative. Vitals:    11/10/22 2255   BP: 116/69   Pulse: 73   Resp: 18   Temp: 98.7 °F (37.1 °C)   SpO2: 96%     Physical Exam  Constitutional:       General: He is not in acute distress. Appearance: Normal appearance. He is obese. He is not ill-appearing or diaphoretic. HENT:      Head: Normocephalic and atraumatic. Mouth/Throat:      Mouth: Mucous membranes are moist.      Pharynx: Oropharynx is clear. Cardiovascular:      Rate and Rhythm: Normal rate and regular rhythm. Pulses: Normal pulses.       Heart sounds: Normal heart sounds. No murmur heard. No friction rub. No gallop. Pulmonary:      Effort: Pulmonary effort is normal. No respiratory distress. Breath sounds: Normal breath sounds. No wheezing, rhonchi or rales. Abdominal:      General: There is no distension. Palpations: Abdomen is soft. Tenderness: There is no abdominal tenderness. There is no guarding. Hernia: No hernia is present. Musculoskeletal:      Right lower leg: Edema (1+) present. Left lower leg: Edema (1+) present. Skin:     General: Skin is warm and dry. Coloration: Skin is not pale. Neurological:      Mental Status: He is alert and oriented to person, place, and time. DATA:    Labs:    CBC:   Recent Labs     11/08/22  1257 11/10/22  1833   WBC 8.6 9.1   HGB 12.3* 12.0*   HCT 37.8* 37.1*    237       BMP:   Recent Labs     11/08/22  1257 11/10/22  1833    136   K 4.3 3.7    98*   CO2 24 28   BUN 16 18   CREATININE 0.9 0.9   GLUCOSE 138* 128*     LFT's:   Recent Labs     11/08/22  1257   AST 20   ALT 12   BILITOT <0.2   ALKPHOS 98     Troponin:   Recent Labs     11/10/22  1833 11/10/22  2018   TROPONINI 0.25* 0.21*     BNP:No results for input(s): BNP in the last 72 hours. ABGs: No results for input(s): PHART, OPN4UJM, PO2ART in the last 72 hours. INR: No results for input(s): INR in the last 72 hours. U/A:No results for input(s): NITRITE, COLORU, PHUR, LABCAST, WBCUA, RBCUA, MUCUS, TRICHOMONAS, YEAST, BACTERIA, CLARITYU, SPECGRAV, LEUKOCYTESUR, UROBILINOGEN, BILIRUBINUR, BLOODU, GLUCOSEU, AMORPHOUS in the last 72 hours. Invalid input(s): KETONESU    XR CHEST (2 VW)   Final Result      No acute disease. ASSESSMENT AND PLAN:    Acute on chronic CHF exacerbation  P/w 3 days worsening dyspnea, BLE edema, chest pressure. BNP 2012 (1200 in 3/2022), Trp 0.25. CXR negative. EKG w/o ischemic changes. Hx HFrEF (35% 8/2022). S/p cath 8/2022 with BiV/ICD placement.    - Admit w/ HF team  - Give Lasix 40mg IV once  - Start Lasix 40mg IV BID  - Telemetry  - Trend BNP q3 days  - HF diet  - Strict I/Os  - Daily weights  - Resume home Metoprolol, spironolactone    Troponinemia  Trp 0.25 > 0.21 (baseline 0.6-1.0). EKG w/I ischemic changes.  Likely 2/2 CHF exacerbation.   - Trend Trp q3  - Telemetry    Chronic Issues  DM2: MDSS, POCT, hypoglycemia protocol  R knee OA: Home percocet  GERD: Home pepcid  CAD: Home statin, ASA  Anemia: Home iron, Daily CBC    Code Status: Full Code  FEN: GD  PPX: Xarelto  DISPO: Lizbet Stack MD   11/10/2022,  11:20 PM

## 2022-11-11 NOTE — ED PROVIDER NOTES
ED Attending Attestation Note     Date of evaluation: 11/10/2022    This patient was seen by the advance practice provider. I have seen and examined the patient, agree with the workup, evaluation, management and diagnosis. The care plan has been discussed. I have reviewed the ECG and concur with the JOSEPHINE's interpretation. My assessment reveals patient with CHF here with SOB and fluid retention, mild chest pressure. Not hypoxic but workup revealing elevated trop and BNP suggesting CHF exacerbation and will need to bring in for serial trop and diureses.      Joann Langley MD  11/10/22 5245

## 2022-11-11 NOTE — PROGRESS NOTES
4 Eyes Admission Assessment     I agree as the admission nurse that 2 RN's have performed a thorough Head to Toe Skin Assessment on the patient. ALL assessment sites listed below have been assessed on admission. Areas assessed by both nurses: Allison Duff and Cherri Sermon  [x]   Head, Face, and Ears   [x]   Shoulders, Back, and Chest  [x]   Arms, Elbows, and Hands   [x]   Coccyx, Sacrum, and Ischium  [x]   Legs, Feet, and Heels    Small abrasions BLE; dry skin BLE        Does the Patient have Skin Breakdown?   No         Titus Prevention initiated:  yes   Wound Care Orders initiated:  No      WOC nurse consulted for Pressure Injury (Stage 3,4, Unstageable, DTI, NWPT, and Complex wounds) or Titus score 18 or lower:  NA      Nurse 1 eSignature: Electronically signed by Stevie Puente RN on 11/11/22 at 6:21 AM EST    **SHARE this note so that the co-signing nurse is able to place an eSignature**    Nurse 2 eSignature: Electronically signed by Maru Askew RN on 11/10/22 at 11:28 PM EST

## 2022-11-11 NOTE — PROGRESS NOTES
Progress Note    Admit Date: 11/10/2022  Day: 1  Diet: ADULT DIET; Regular; Low Fat/Low Chol/High Fiber/2 gm Na    CC: Dyspnea    Interval history: Patient was seen by the Care team in the cardiology department while he was receiving his echo. It was explained to the patient importance of healthy diet and taking his medications every day. Explained to the patient that if his echo comes back above 30% he will be able to leave today. We explained to the patient the importance of following up with his cardiologist from [facility].    HPI: 78-year-old male with past medical history HTN, HLD, CAD, CHF (EF 35% 8/2022), DM2, AF (Xarelto), DVT, Sjogren's, aortic aneurysm who presented to [facility]. with chief complaints of dyspnea and chest pressure. Patient reports 2 to 3 days of gradually worsening dyspnea exacerbated with exertion and accompanied by 10 pound weight gain and chest pressure. He describes chest pressure as midsternal, constant, waxing/waning, nonexertional and improved with certain positions when lying down. He also notes increased swelling to both hands and lower extremities. Symptoms are gradually worsened since onset and are similar to his past heart failure exacerbation. In ED patient HDS with BMP 2012 (last 1200 3/2022), troponin 0.25 > 0.21 (baseline 0.06-0.1). CBC, chest x-ray unremarkable. EKG without ischemic changes. On exam patient is sitting comfortably on the edge of the bed conversing without difficulty. He additionally endorses a \"prick\" sensation intermittently over the past several days, which he feels may be related to his pacemaker. He also endorses chronic cough attributed to Sjogren's; states this is stable. Patient reports he did not take his medications 1 day early last week, but is otherwise compliant. States he does this roughly once per month due to frustration with medical issues and amount of pills.      Of note, patient underwent cardiac catheterization on 8/26/202 with angiography and temporary transvenous pacing wire. No interventions at that time; underwent placement of left subclavian biventricular pacemaker/ICD on 8/31/2022. Medications:     Scheduled Meds:   furosemide  40 mg IntraVENous TID    sodium chloride flush  5-40 mL IntraVENous 2 times per day    aspirin  81 mg Oral Daily    atorvastatin  40 mg Oral Nightly    famotidine  20 mg Oral BID    ferrous sulfate  325 mg Oral Daily with breakfast    folic acid  1 mg Oral Daily    insulin glargine  15 Units SubCUTAneous Nightly    metoprolol succinate  25 mg Oral Daily    therapeutic multivitamin-minerals  1 tablet Oral Daily    mycophenolate  500 mg Oral TID    potassium chloride  20 mEq Oral Daily    pregabalin  75 mg Oral BID    rivaroxaban  20 mg Oral Daily    spironolactone  25 mg Oral Daily    tiZANidine  4 mg Oral TID    tamsulosin  0.4 mg Oral Daily    insulin lispro  0-8 Units SubCUTAneous TID WC    insulin lispro  0-4 Units SubCUTAneous Nightly     Continuous Infusions:   sodium chloride      dextrose       PRN Meds:sodium chloride flush, sodium chloride, ondansetron **OR** ondansetron, polyethylene glycol, acetaminophen **OR** acetaminophen, glucose, dextrose bolus **OR** dextrose bolus, glucagon (rDNA), dextrose, oxyCODONE-acetaminophen    Objective:   Vitals:   T-max:  Patient Vitals for the past 8 hrs:   BP Temp Temp src Pulse Resp SpO2   11/11/22 1308 128/87 98.1 °F (36.7 °C) Oral 77 18 93 %   11/11/22 0852 -- -- -- -- 18 --   11/11/22 0834 100/68 98.3 °F (36.8 °C) Oral 72 18 94 %       Intake/Output Summary (Last 24 hours) at 11/11/2022 1420  Last data filed at 11/11/2022 0713  Gross per 24 hour   Intake 250 ml   Output 1425 ml   Net -1175 ml       Review of Systems   Constitutional:  Negative for chills, fatigue and fever. Respiratory:  Positive for shortness of breath. Negative for cough, chest tightness and wheezing. Cardiovascular:  Positive for leg swelling. Negative for chest pain and palpitations. Psychiatric/Behavioral:  Negative for confusion. Physical Exam  Constitutional:       Appearance: Normal appearance. He is obese. He is not ill-appearing. HENT:      Head: Normocephalic and atraumatic. Right Ear: External ear normal.      Left Ear: External ear normal.      Nose: Nose normal.      Mouth/Throat:      Mouth: Mucous membranes are moist.   Eyes:      Extraocular Movements: Extraocular movements intact. Cardiovascular:      Rate and Rhythm: Normal rate and regular rhythm. Pulses: Normal pulses. Heart sounds: Normal heart sounds. Pulmonary:      Effort: No respiratory distress. Breath sounds: No wheezing. Neurological:      General: No focal deficit present. Mental Status: He is alert and oriented to person, place, and time. Psychiatric:         Mood and Affect: Mood normal.         Behavior: Behavior normal.       LABS:    CBC:   Recent Labs     11/10/22  1833 11/11/22  0526   WBC 9.1 7.6   HGB 12.0* 12.2*   HCT 37.1* 38.0*    205   MCV 84.8 85.9     Renal:    Recent Labs     11/10/22  1833 11/11/22  0526    136   K 3.7 4.2   CL 98* 98*   CO2 28 29   BUN 18 19   CREATININE 0.9 1.0   GLUCOSE 128* 160*   CALCIUM 8.9 8.8   MG  --  1.80   ANIONGAP 10 9     Hepatic: No results for input(s): AST, ALT, BILITOT, BILIDIR, PROT, LABALBU, ALKPHOS in the last 72 hours. Troponin:   Recent Labs     11/11/22  0241 11/11/22  0526 11/11/22  0831   TROPONINI 0.20* 0.19* 0.14*     BNP: No results for input(s): BNP in the last 72 hours. Lipids: No results for input(s): CHOL, HDL in the last 72 hours. Invalid input(s): LDLCALCU, TRIGLYCERIDE  ABGs:  No results for input(s): PHART, BSJ2EPG, PO2ART, KSC3EFO, BEART, THGBART, H7KCPZPU, NPE6JIE in the last 72 hours. INR: No results for input(s): INR in the last 72 hours.   Lactate: No results for input(s): LACTATE in the last 72

## 2022-11-11 NOTE — DISCHARGE INSTR - COC
Continuity of Care Form    Patient Name: Benson Sylvester   :  1960  MRN:  [id number]    Admit date:  11/10/2022  Discharge date:  ***    Code Status Order: Full Code   Advance Directives:     Admitting Physician:  Herman Wiseman MD  PCP: Halina Gimenez MD    Discharging Nurse: [facility] Unit/Room#: 2048/3087-99  Discharging Unit Phone Number: [phone]    Emergency Contact:   No emergency contact information on file.     Past Surgical History:  Past Surgical History:   Procedure Laterality Date    BREAST SURGERY Left 2019    LEFT BREAST MASS EXCISION; LEFT AXILLIARY LYMPH NODE EXCISION performed by Romina Field MD at [place] [address] Left 10/10/2019    MUSCLE BIOPSY LOWER EXTREMITY LEFT performed by Romina Field MD at [facility] OR       Immunization History:   Immunization History   Administered Date(s) Administered    COVID-19, MODERNA Booster BLUE border, (age 18y+), IM, 50mcg/0.25mL 2022    COVID-19, PFIZER PURPLE top, DILUTE for use, (age 15 y+), 30mcg/0.3mL 2021, 2021, 2021    Influenza, FLUCELVAX, (age 10 mo+), MDCK, PF, 0.5mL 2022    Tdap (Boostrix, Adacel) 2019       Active Problems:  Patient Active Problem List   Diagnosis Code    NSTEMI (non-ST elevated myocardial infarction) ([facility] I21.4    Diabetes mellitus ([facility] E11.9    Hypertension I10    Peripheral neuropathy G62.9    Generalized weakness R53.1    Obesity due to excess calories E66.09    Heart failure (HCC) I50.9    Leg swelling M79.89    Mixed hyperlipidemia E78.2    Hyperglycemia R73.9    Sjogren's syndrome (HCC) M35.00    Aortic dilatation (HCC) I77.819    Acute on chronic congestive heart failure (HCC) I50.9    Shortness of breath R06.02    Paroxysmal atrial fibrillation (HCC) I48.0    Atypical atrial flutter (HCC) I48.4    Nonischemic cardiomyopathy ([facility] I42.8       Isolation/Infection:   Isolation            No Isolation Patient Infection Status       Infection Onset Added Last Indicated Last Indicated By Review Planned Expiration Resolved Resolved By    None active    Resolved    COVID-19 (Rule Out) 11/10/22 11/10/22 11/10/22 COVID-19 & Influenza Combo (Ordered)   11/10/22 Rule-Out Test Resulted    COVID-19 (Rule Out) 03/19/22 03/19/22 03/19/22 COVID-19 (Ordered)   03/20/22 Rule-Out Test Resulted            Nurse Assessment:  Last Vital Signs: /87   Pulse 77   Temp 98.1 °F (36.7 °C) (Oral)   Resp 18   Ht 5' 11\" (1.803 m)   Wt (!) 311 lb 15.2 oz (141.5 kg)   SpO2 93%   BMI 43.51 kg/m²     Last documented pain score (0-10 scale): Pain Level: 7  Last Weight:   Wt Readings from Last 1 Encounters:   11/10/22 (!) 311 lb 15.2 oz (141.5 kg)     Mental Status:  oriented and alert    IV Access:  None    Nursing Mobility/ADLs:  Walking   Assisted  Transfer  Independent  Bathing  Independent  Dressing  Independent  Toileting  Independent  Feeding  Independent  Med Admin  Independent  Med Delivery   none    Wound Care Documentation and Therapy:  Incision 08/07/19 Axilla Left (Active)   Number of days: 5711       Incision 08/07/19 Breast Left (Active)   Number of days: 1121       Incision 08/07/19 Shoulder Right (Active)   Number of days: 1192       Incision 10/10/19 Thigh Anterior;Distal;Left (Active)   Number of days: 1127        Elimination:  Continence: Bowel: Yes  Bladder: Yes  Urinary Catheter: None   Colostomy/Ileostomy/Ileal Conduit: No       Date of Last BM: 11/16/2022    Intake/Output Summary (Last 24 hours) at 11/11/2022 1526  Last data filed at 11/11/2022 0713  Gross per 24 hour   Intake 250 ml   Output 1425 ml   Net -1175 ml     I/O last 3 completed shifts: In: 250 [P.O.:240; I.V.:10]  Out: 675 [Urine:675]    Safety Concerns:     None    Impairments/Disabilities:      None    Nutrition Therapy:  Current Nutrition Therapy:   - Oral Diet:  Cardiac    Routes of Feeding: Oral  Liquids:  Thin Liquids  Daily Fluid Restriction: yes  Last Modified Barium Swallow with Video (Video Swallowing Test): done on 11/15    Treatments at the Time of Hospital Discharge:   Respiratory Treatments:   Oxygen Therapy:  is not on home oxygen therapy. Ventilator:    - No ventilator support    Heart Failure Instructions for Daily Management  Patient was treated for acute on chronic systolic heart failure. he  will require the following:    Please weigh daily on the same scale and approximately the same time of day. Report weight gain of 3 pounds/day or 5 pounds/week to : [address] [facility] [phone] and Steve Sorensen [phone]. Please use hospital discharge weight as baseline reference. Please monitor for signs and symptoms of and report to MD:  Worsening Heart Failure: sudden weight gain, shortness of breath, lower extremity or general edema/swelling, abdominal bloating/swelling, inability to lie flat, intolerance to usual activity, or cough (especially at night). Report these finding even if no increase in weight. Dehydration:  having difficulty or a decrease in urination, dizziness, worsening fatigue, or new onset/worsening of generalized weakness. Please continue a LOW SODIUM diet and LIMIT fluid intake to 48 - 64 ounces ( 1.5 - 2 liters) per day. Call [address] [facility] [phone] and Steve Sorensen [phone] with any questions or concerns. Please continue heart failure education to patient and family/support system. See After Visit Summary for hospital follow up appointment details. Consider spiritual care referral for support and/or completion of advance directives . Consider: [place] [address] telehealth program if patient agreeable and able to participate and palliative care consult for ongoing goals of care, end of life, and/or chronic disease management discussions. Patient's primary cardiologist is Dr Taylor Vines. Please draw BMP on 11/18 and fax results to Dr Rick Briggs office for their review. Rehab Therapies:   Weight Bearing Status/Restrictions: No weight bearing restrictions  Other Medical Equipment (for information only, NOT a DME order):  cane  Other Treatments:     Patient's personal belongings (please select all that are sent with patient):  None    RN SIGNATURE:  Electronically signed by Kayli Monroy RN on 11/16/22 at 12:21 PM EST    CASE MANAGEMENT/SOCIAL WORK SECTION    Inpatient Status Date: ***    Readmission Risk Assessment Score:  Readmission Risk              Risk of Unplanned Readmission:  12           Discharging to Facility/ Agency   Name:   Address:  Phone:  Fax:    Dialysis Facility (if applicable)   Name:  Address:  Dialysis Schedule:  Phone:  Fax:    / signature: {Esignature:[id number]}    PHYSICIAN SECTION    Prognosis: {Prognosis:6289537380}    Condition at Discharge: [address] [place] [address] Patient Condition:[id number]}    Rehab Potential (if transferring to Rehab): {Prognosis:0184379139}    Recommended Labs or Other Treatments After Discharge: ***    Physician Certification: I certify the above information and transfer of Clemon Kin  is necessary for the continuing treatment of the diagnosis listed and that he requires {Admit to Appropriate Level of Care:64514} for {GREATER/LESS:514300395} 30 days.      Update Admission H&P: {CHP DME Changes in ZCPDQ:288518359}    PHYSICIAN SIGNATURE:  {Esignature:[id number]}

## 2022-11-11 NOTE — PROGRESS NOTES
Pt has CardioMems device that is monitored by [facility] / Dr Mae Presley. Writer contacted by [facility], [facility], requesting reading to be done. Pt agreeable. Pt reports he takes his usual home reading lying flat on his pillow. Readings obtained with patient flat.    4 readings obtained - best waveform / result: 38/24 (30). Pt's PAD goal is 18. Last reading 11/8 40/21 (29).

## 2022-11-11 NOTE — ED NOTES
Report called to Rema Rogers on PCU.  All questions answered to the best of my ability      Aranza Duncan, [facility]  11/10/22 5728

## 2022-11-11 NOTE — DISCHARGE SUMMARY
INTERNAL MEDICINE DEPARTMENT AT [address] [place] [address]  DISCHARGE SUMMARY    Patient ID: Joanne Pugh                                             Discharge Date: 11/11/2022   Patient's PCP: Morgan Wood MD                                          Discharge Physician: Jung Moore MD MD  Admit Date: 11/10/2022   Admitting Physician: Karen Boyle MD    PROBLEMS DURING HOSPITALIZATION:  Present on Admission:   Heart failure ([facility] [address]   Acute on chronic congestive heart failure ([facility] [address]      DISCHARGE DIAGNOSES:      Acute on chronic CHF Exacerbation  Troponinemia  DMT2  GERD  CAD  Anemia      HPI:  57-year-old male with past medical history HTN, HLD, CAD, CHF (EF 35% 8/2022), DM2, AF (Xarelto), DVT, Sjogren's, aortic aneurysm who presented to [facility], [facility]. with chief complaints of dyspnea and chest pressure. Patient reports 2 to 3 days of gradually worsening dyspnea exacerbated with exertion and accompanied by 10 pound weight gain and chest pressure. He describes chest pressure as midsternal, constant, waxing/waning, nonexertional and improved with certain positions when lying down. He also notes increased swelling to both hands and lower extremities. Symptoms are gradually worsened since onset and are similar to his past heart failure exacerbation. In ED patient HDS with BMP 2012 (last 1200 3/2022), troponin 0.25 > 0.21 (baseline 0.06-0.1). CBC, chest x-ray unremarkable. EKG without ischemic changes. On exam patient is sitting comfortably on the edge of the bed conversing without difficulty. He additionally endorses a \"prick\" sensation intermittently over the past several days, which he feels may be related to his pacemaker. He also endorses chronic cough attributed to Sjogren's; states this is stable. Patient reports he did not take his medications 1 day early last week, but is otherwise compliant.   States he does this roughly once per month due to frustration with medical issues and amount of pills.     Of note, patient underwent cardiac catheterization on 8/26/202 with angiography and temporary transvenous pacing wire. No interventions at that time; underwent placement of left subclavian biventricular pacemaker/ICD on 8/31/2022. The following issues were addressed during hospitalization:    Acute on chronic CHF Exacerbation  Patient was admitted for acute on chronic heart failure with reduced ejection fraction. Patient is a frequent visitor of the hospital and sees cardiology at [facility].  3 months ago patient received an echocardiogram showing 35% ejection fraction and received a biventricular pacemaker. On presentation patient received an EKG that did not show any ischemia, patient states he is a 10 pound weight gain currently. Patient tested negative for COVID and flu. Patient had elevated BNP from 1200 at baseline to 2000. On presentation patient is troponins were elevated most likely due to myositis and/or demand ischemia from heart failure. Patient's troponins went up to 0.25 and then down to 0.14 with a baseline of around 0.1. It is thought that the cause of this heart pathology is due to myositis or demand ischemia. The cardiologist who saw him doubts that ACS is the cause due to recent unremarkable left heart catheterization. .  Cardiology was consulted and recommended for patient to receive an echo, continue with baby aspirin and Xarelto for A. fib, continue with Lasix and spironolactone daily as well as Toprol. Patient does state that he has a very unhealthy diet and once a month decides not to take his medications. Physical Exam:  Physical Exam  Constitutional:       Appearance: Normal appearance. He is obese. He is not ill-appearing or toxic-appearing. HENT:      Head: Normocephalic and atraumatic.       Right Ear: External ear normal.      Left Ear: External ear normal.      Nose: Nose normal.      Mouth/Throat:      Mouth: Mucous membranes are moist. Cardiovascular:      Rate and Rhythm: Normal rate and regular rhythm. Pulses: Normal pulses. Pulmonary:      Effort: Pulmonary effort is normal.   Neurological:      General: No focal deficit present. Mental Status: He is alert and oriented to person, place, and time.    Psychiatric:         Mood and Affect: Mood normal.         Behavior: Behavior normal.        Consults: cardiology  Significant Diagnostic Studies:   ECHO of heart  Treatments: cardiac meds: furosemide  Disposition: home  Discharged Condition: Stable  Follow Up: Primary Care Physician in one week    DISCHARGE MEDICATION:       Medication List        ASK your doctor about these medications      aspirin 81 MG tablet     atorvastatin 40 MG tablet  Commonly known as: LIPITOR  Take 1 tablet by mouth nightly     cyanocobalamin 1000 MCG tablet     famotidine 20 MG tablet  Commonly known as: PEPCID  Take 1 tablet by mouth 2 times daily     Farxiga 10 MG tablet  Generic drug: dapagliflozin     ferrous sulfate 325 (65 Fe) MG tablet  Commonly known as: IRON 317     folic acid 1 MG tablet  Commonly known as: FOLVITE     furosemide 80 MG tablet  Commonly known as: LASIX  Ask about: Which instructions should I use?     ibuprofen 800 MG tablet  Commonly known as: ADVIL;MOTRIN     insulin glargine 100 UNIT/ML injection vial  Commonly known as: LANTUS     metOLazone 5 MG tablet  Commonly known as: ZAROXOLYN     metoprolol succinate 25 MG extended release tablet  Commonly known as: TOPROL XL     mycophenolate 500 MG tablet  Commonly known as: CELLCEPT     oxyCODONE-acetaminophen 5-325 MG per tablet  Commonly known as: PERCOCET     potassium chloride 20 MEQ extended release tablet  Commonly known as: KLOR-CON M     pregabalin 75 MG capsule  Commonly known as: LYRICA     ProFe 391.3 (180 Fe) MG Caps  Generic drug: Polysaccharide Iron Complex     rivaroxaban 20 MG Tabs tablet  Commonly known as: XARELTO  Take 1 tablet by mouth daily     spironolactone 25 MG tablet  Commonly known as: ALDACTONE     tadalafil 5 MG tablet  Commonly known as: CIALIS     tamsulosin 0.4 MG capsule  Commonly known as: FLOMAX     therapeutic multivitamin-minerals tablet     tiZANidine 4 MG tablet  Commonly known as: ZANAFLEX               Activity: activity as tolerated  Diet: cardiac diet  Wound Care: none needed    Time Spent on discharge is more than 30 minutes    Signed:  Yaniv Diaz MD,  PGY-1  11/11/2022

## 2022-11-12 LAB
ANION GAP SERPL CALCULATED.3IONS-SCNC: 10 MMOL/L (ref 3–16)
BASOPHILS ABSOLUTE: 0.1 K/UL (ref 0–0.2)
BASOPHILS RELATIVE PERCENT: 0.8 %
BUN BLDV-MCNC: 25 MG/DL (ref 7–20)
CALCIUM SERPL-MCNC: 9.1 MG/DL (ref 8.3–10.6)
CHLORIDE BLD-SCNC: 96 MMOL/L (ref 99–110)
CO2: 28 MMOL/L (ref 21–32)
CREAT SERPL-MCNC: 1.2 MG/DL (ref 0.8–1.3)
EOSINOPHILS ABSOLUTE: 0.2 K/UL (ref 0–0.6)
EOSINOPHILS RELATIVE PERCENT: 2.9 %
GFR SERPL CREATININE-BSD FRML MDRD: >60 ML/MIN/{1.73_M2}
GLUCOSE BLD-MCNC: 141 MG/DL (ref 70–99)
GLUCOSE BLD-MCNC: 164 MG/DL (ref 70–99)
GLUCOSE BLD-MCNC: 173 MG/DL (ref 70–99)
GLUCOSE BLD-MCNC: 213 MG/DL (ref 70–99)
GLUCOSE BLD-MCNC: 79 MG/DL (ref 70–99)
HCT VFR BLD CALC: 38.9 % (ref 40.5–52.5)
HEMOGLOBIN: 12.9 G/DL (ref 13.5–17.5)
LYMPHOCYTES ABSOLUTE: 0.9 K/UL (ref 1–5.1)
LYMPHOCYTES RELATIVE PERCENT: 12.8 %
MAGNESIUM: 1.9 MG/DL (ref 1.8–2.4)
MCH RBC QN AUTO: 28.6 PG (ref 26–34)
MCHC RBC AUTO-ENTMCNC: 33.2 G/DL (ref 31–36)
MCV RBC AUTO: 86.1 FL (ref 80–100)
MONOCYTES ABSOLUTE: 0.7 K/UL (ref 0–1.3)
MONOCYTES RELATIVE PERCENT: 9.3 %
NEUTROPHILS ABSOLUTE: 5.3 K/UL (ref 1.7–7.7)
NEUTROPHILS RELATIVE PERCENT: 74.2 %
PDW BLD-RTO: 15.7 % (ref 12.4–15.4)
PERFORMED ON: ABNORMAL
PERFORMED ON: NORMAL
PLATELET # BLD: 216 K/UL (ref 135–450)
PMV BLD AUTO: 7.3 FL (ref 5–10.5)
POTASSIUM SERPL-SCNC: 4.3 MMOL/L (ref 3.5–5.1)
RBC # BLD: 4.51 M/UL (ref 4.2–5.9)
SODIUM BLD-SCNC: 134 MMOL/L (ref 136–145)
WBC # BLD: 7.2 K/UL (ref 4–11)

## 2022-11-12 PROCEDURE — 80048 BASIC METABOLIC PNL TOTAL CA: CPT

## 2022-11-12 PROCEDURE — 6360000002 HC RX W HCPCS

## 2022-11-12 PROCEDURE — 36415 COLL VENOUS BLD VENIPUNCTURE: CPT

## 2022-11-12 PROCEDURE — 1200000000 HC SEMI PRIVATE

## 2022-11-12 PROCEDURE — 6370000000 HC RX 637 (ALT 250 FOR IP)

## 2022-11-12 PROCEDURE — 99233 SBSQ HOSP IP/OBS HIGH 50: CPT | Performed by: INTERNAL MEDICINE

## 2022-11-12 PROCEDURE — 85025 COMPLETE CBC W/AUTO DIFF WBC: CPT

## 2022-11-12 PROCEDURE — 83735 ASSAY OF MAGNESIUM: CPT

## 2022-11-12 PROCEDURE — 2580000003 HC RX 258: Performed by: INTERNAL MEDICINE

## 2022-11-12 PROCEDURE — 2580000003 HC RX 258

## 2022-11-12 PROCEDURE — 6360000002 HC RX W HCPCS: Performed by: INTERNAL MEDICINE

## 2022-11-12 PROCEDURE — 99233 SBSQ HOSP IP/OBS HIGH 50: CPT | Performed by: HOSPITALIST

## 2022-11-12 RX ADMIN — Medication 1 TABLET: at 08:34

## 2022-11-12 RX ADMIN — ATORVASTATIN CALCIUM 40 MG: 40 TABLET, FILM COATED ORAL at 21:32

## 2022-11-12 RX ADMIN — FOLIC ACID 1 MG: 1 TABLET ORAL at 08:34

## 2022-11-12 RX ADMIN — FUROSEMIDE 5 MG/HR: 10 INJECTION INTRAMUSCULAR; INTRAVENOUS at 12:30

## 2022-11-12 RX ADMIN — SPIRONOLACTONE 25 MG: 25 TABLET, FILM COATED ORAL at 08:34

## 2022-11-12 RX ADMIN — MYCOPHENOLATE MOFETIL 500 MG: 500 TABLET, FILM COATED ORAL at 16:14

## 2022-11-12 RX ADMIN — TAMSULOSIN HYDROCHLORIDE 0.4 MG: 0.4 CAPSULE ORAL at 08:34

## 2022-11-12 RX ADMIN — TIZANIDINE 4 MG: 4 TABLET ORAL at 21:32

## 2022-11-12 RX ADMIN — PREGABALIN 75 MG: 75 CAPSULE ORAL at 08:34

## 2022-11-12 RX ADMIN — MYCOPHENOLATE MOFETIL 500 MG: 500 TABLET, FILM COATED ORAL at 21:31

## 2022-11-12 RX ADMIN — TIZANIDINE 4 MG: 4 TABLET ORAL at 08:34

## 2022-11-12 RX ADMIN — FERROUS SULFATE TAB 325 MG (65 MG ELEMENTAL FE) 325 MG: 325 (65 FE) TAB at 08:34

## 2022-11-12 RX ADMIN — RIVAROXABAN 20 MG: 20 TABLET, FILM COATED ORAL at 08:34

## 2022-11-12 RX ADMIN — SODIUM CHLORIDE, PRESERVATIVE FREE 10 ML: 5 INJECTION INTRAVENOUS at 08:36

## 2022-11-12 RX ADMIN — FAMOTIDINE 20 MG: 20 TABLET ORAL at 08:34

## 2022-11-12 RX ADMIN — TIZANIDINE 4 MG: 4 TABLET ORAL at 16:13

## 2022-11-12 RX ADMIN — INSULIN GLARGINE 15 UNITS: 100 INJECTION, SOLUTION SUBCUTANEOUS at 21:36

## 2022-11-12 RX ADMIN — MYCOPHENOLATE MOFETIL 500 MG: 500 TABLET, FILM COATED ORAL at 08:34

## 2022-11-12 RX ADMIN — INSULIN LISPRO 2 UNITS: 100 INJECTION, SOLUTION INTRAVENOUS; SUBCUTANEOUS at 18:04

## 2022-11-12 RX ADMIN — PREGABALIN 75 MG: 75 CAPSULE ORAL at 21:32

## 2022-11-12 RX ADMIN — OXYCODONE AND ACETAMINOPHEN 1 TABLET: 5; 325 TABLET ORAL at 19:42

## 2022-11-12 RX ADMIN — METOPROLOL SUCCINATE 25 MG: 25 TABLET, EXTENDED RELEASE ORAL at 08:34

## 2022-11-12 RX ADMIN — FUROSEMIDE 40 MG: 10 INJECTION, SOLUTION INTRAMUSCULAR; INTRAVENOUS at 08:34

## 2022-11-12 RX ADMIN — ASPIRIN 81 MG: 81 TABLET, COATED ORAL at 08:34

## 2022-11-12 RX ADMIN — POTASSIUM CHLORIDE 20 MEQ: 1500 TABLET, EXTENDED RELEASE ORAL at 08:34

## 2022-11-12 RX ADMIN — FAMOTIDINE 20 MG: 20 TABLET ORAL at 21:31

## 2022-11-12 RX ADMIN — OXYCODONE AND ACETAMINOPHEN 1 TABLET: 5; 325 TABLET ORAL at 08:34

## 2022-11-12 ASSESSMENT — PAIN DESCRIPTION - FREQUENCY
FREQUENCY: INTERMITTENT

## 2022-11-12 ASSESSMENT — PAIN SCALES - GENERAL
PAINLEVEL_OUTOF10: 6
PAINLEVEL_OUTOF10: 7
PAINLEVEL_OUTOF10: 4
PAINLEVEL_OUTOF10: 7
PAINLEVEL_OUTOF10: 7
PAINLEVEL_OUTOF10: 3
PAINLEVEL_OUTOF10: 8
PAINLEVEL_OUTOF10: 0

## 2022-11-12 ASSESSMENT — PAIN DESCRIPTION - ORIENTATION
ORIENTATION: RIGHT

## 2022-11-12 ASSESSMENT — PAIN DESCRIPTION - DESCRIPTORS
DESCRIPTORS: SORE;ACHING
DESCRIPTORS: ACHING
DESCRIPTORS: ACHING
DESCRIPTORS: SORE;SHARP
DESCRIPTORS: ACHING
DESCRIPTORS: SHARP;SORE

## 2022-11-12 ASSESSMENT — PAIN DESCRIPTION - LOCATION
LOCATION: KNEE
LOCATION: KNEE;SHOULDER
LOCATION: KNEE
LOCATION: SHOULDER

## 2022-11-12 ASSESSMENT — PAIN DESCRIPTION - ONSET
ONSET: ON-GOING

## 2022-11-12 ASSESSMENT — PAIN DESCRIPTION - PAIN TYPE
TYPE: CHRONIC PAIN

## 2022-11-12 ASSESSMENT — ENCOUNTER SYMPTOMS
COUGH: 0
CHEST TIGHTNESS: 0
SHORTNESS OF BREATH: 1
WHEEZING: 0

## 2022-11-12 NOTE — PROGRESS NOTES
Progress Note    Admit Date: 11/10/2022  Day: 2  Diet: ADULT DIET; Regular; Low Fat/Low Chol/High Fiber/2 gm Na    CC: Dyspnea    Interval history: Patient was seen at bedside and he was in no acute distress. He continues to be diuresed with 40 IV Lasix TID. He had good UOP with 1900 over the last 24 hours. He had a normal BM this AM without obvious bleeding. He does not want to go home too early and would like to continue to be diuresed. He denies chest pain, nausea, vomiting, diarrhea, constipation. HPI: 53-year-old male with past medical history HTN, HLD, CAD, CHF (EF 35% 8/2022), DM2, AF (Xarelto), DVT, Sjogren's, aortic aneurysm who presented to [facility]. with chief complaints of dyspnea and chest pressure. Patient reports 2 to 3 days of gradually worsening dyspnea exacerbated with exertion and accompanied by 10 pound weight gain and chest pressure. He describes chest pressure as midsternal, constant, waxing/waning, nonexertional and improved with certain positions when lying down. He also notes increased swelling to both hands and lower extremities. Symptoms are gradually worsened since onset and are similar to his past heart failure exacerbation. In ED patient HDS with BMP 2012 (last 1200 3/2022), troponin 0.25 > 0.21 (baseline 0.06-0.1). CBC, chest x-ray unremarkable. EKG without ischemic changes. On exam patient is sitting comfortably on the edge of the bed conversing without difficulty. He additionally endorses a \"prick\" sensation intermittently over the past several days, which he feels may be related to his pacemaker. He also endorses chronic cough attributed to Sjogren's; states this is stable. Patient reports he did not take his medications 1 day early last week, but is otherwise compliant. States he does this roughly once per month due to frustration with medical issues and amount of pills.      Of note, patient underwent cardiac catheterization on 8/26/202 with angiography and temporary transvenous pacing wire. No interventions at that time; underwent placement of left subclavian biventricular pacemaker/ICD on 8/31/2022. Medications:     Scheduled Meds:   furosemide  40 mg IntraVENous TID    sodium chloride flush  5-40 mL IntraVENous 2 times per day    aspirin  81 mg Oral Daily    atorvastatin  40 mg Oral Nightly    famotidine  20 mg Oral BID    ferrous sulfate  325 mg Oral Daily with breakfast    folic acid  1 mg Oral Daily    insulin glargine  15 Units SubCUTAneous Nightly    metoprolol succinate  25 mg Oral Daily    therapeutic multivitamin-minerals  1 tablet Oral Daily    mycophenolate  500 mg Oral TID    potassium chloride  20 mEq Oral Daily    pregabalin  75 mg Oral BID    rivaroxaban  20 mg Oral Daily    spironolactone  25 mg Oral Daily    tiZANidine  4 mg Oral TID    tamsulosin  0.4 mg Oral Daily    insulin lispro  0-8 Units SubCUTAneous TID WC    insulin lispro  0-4 Units SubCUTAneous Nightly     Continuous Infusions:   sodium chloride      dextrose       PRN Meds:sodium chloride flush, sodium chloride, ondansetron **OR** ondansetron, polyethylene glycol, acetaminophen **OR** acetaminophen, glucose, dextrose bolus **OR** dextrose bolus, glucagon (rDNA), dextrose, oxyCODONE-acetaminophen    Objective:   Vitals:   T-max:  Patient Vitals for the past 8 hrs:   BP Temp Temp src Pulse Resp SpO2 Weight   11/12/22 0627 -- -- -- 76 -- -- --   11/12/22 0500 101/67 98.6 °F (37 °C) Oral 74 18 95 % --   11/12/22 0444 -- -- -- -- -- -- (!) 308 lb 10.3 oz (140 kg)   11/12/22 0232 -- -- -- 70 -- -- --         Intake/Output Summary (Last 24 hours) at 11/12/2022 0804  Last data filed at 11/12/2022 2101  Gross per 24 hour   Intake 600 ml   Output 1150 ml   Net -550 ml         Review of Systems   Constitutional:  Negative for chills, fatigue and fever. Respiratory:  Positive for shortness of breath. Negative for cough, chest tightness and wheezing.     Cardiovascular:  Positive for leg swelling. Negative for chest pain and palpitations. Psychiatric/Behavioral:  Negative for confusion. Physical Exam  Constitutional:       Appearance: Normal appearance. He is obese. He is not ill-appearing. HENT:      Head: Normocephalic and atraumatic. Right Ear: External ear normal.      Left Ear: External ear normal.      Nose: Nose normal.      Mouth/Throat:      Mouth: Mucous membranes are moist.   Eyes:      Extraocular Movements: Extraocular movements intact. Cardiovascular:      Rate and Rhythm: Normal rate and regular rhythm. Pulses: Normal pulses. Heart sounds: Normal heart sounds. Pulmonary:      Effort: No respiratory distress. Breath sounds: No wheezing. Neurological:      General: No focal deficit present. Mental Status: He is alert and oriented to person, place, and time. Psychiatric:         Mood and Affect: Mood normal.         Behavior: Behavior normal.       LABS:    CBC:   Recent Labs     11/10/22  1833 11/11/22  0526 11/12/22  0635   WBC 9.1 7.6 7.2   HGB 12.0* 12.2* 12.9*   HCT 37.1* 38.0* 38.9*    205 216   MCV 84.8 85.9 86.1       Renal:    Recent Labs     11/10/22  1833 11/11/22  0526 11/12/22  0635    136 134*   K 3.7 4.2 4.3   CL 98* 98* 96*   CO2 28 29 28   BUN 18 19 25*   CREATININE 0.9 1.0 1.2   GLUCOSE 128* 160* 141*   CALCIUM 8.9 8.8 9.1   MG  --  1.80 1.90   ANIONGAP 10 9 10       Hepatic: No results for input(s): AST, ALT, BILITOT, BILIDIR, PROT, LABALBU, ALKPHOS in the last 72 hours. Troponin:   Recent Labs     11/11/22  0241 11/11/22 0526 11/11/22  0831   TROPONINI 0.20* 0.19* 0.14*       BNP: No results for input(s): BNP in the last 72 hours. Lipids:   Recent Labs     11/11/22 0526   CHOL 115   HDL 33*     ABGs:  No results for input(s): PHART, VPE0CVL, PO2ART, IWW7OPI, BEART, THGBART, P2MDCYDG, ODD6TMH in the last 72 hours. INR: No results for input(s): INR in the last 72 hours.   Lactate: No results for input(s): LACTATE in the last 72 hours. Cultures:  -----------------------------------------------------------------  RAD:   XR CHEST (2 VW)   Final Result      No acute disease. Assessment/Plan:     Acute on chronic CHF exacerbation  P/w 3 days worsening dyspnea, BLE edema, chest pressure. BNP 2012 (1200 in 3/2022), Trp 0.25. CXR negative. EKG w/o ischemic changes. Hx HFrEF (35% 8/2022). S/p cath 8/2022 with BiV/ICD placement. - Continue Lasix 40 IV TID  - Continue Telemetry  - Trend BNP q3 days  - Cardiology consulted and recs appreciated  - ECHO from 11/11/22 pending  - Strict I/Os. Daily weights, HF diet, Optimize lytes  - Resume home Metoprolol, spironolactone     Troponinemia  Trp 0.25 > 0.21 (baseline 0.6-1.0). EKG w/I ischemic changes.  Likely 2/2 CHF exacerbation.   - Troponin stabilized  - Telemetry     Chronic Issues  DM2: MDSS, POCT, hypoglycemia protocol  R knee OA: Home percocet  GERD: Home pepcid  CAD: Home statin, ASA  Anemia: Home iron, Daily CBC     Code Status: Full Code  FEN: GD  PPX: Xarelto  DISPO: IP  ELOS:1  Barriers to Discharge: Continued diuresis    Lorenzo Johnson MD, PGY-3  11/12/22  8:04 AM    This patient has been staffed and discussed with Rubin Kim MD.

## 2022-11-12 NOTE — PROGRESS NOTES
Cardiology Consult Service  Daily Progress Note        Admit Date:  11/10/2022  Primary cardiologist: Dr Mila Miller at [facility] / Dr Marina Sue    Reason for Consultation/Chief Complaint: AHF    Subjective:     Terence Garcia is a 57 y.o. male with a past medical history of morbid obesity, HTN, HLP, DM, HFrEF with CardioMEMS implant in 2021, CAF, myositis, Sjogren's disease, acute DVT right soleal vein after TKA 03/2022, new LBBB (as of 03/2022) s/p CRT-D 08/2022. Echo 03/2021: LVH, LVEF 60%, indeterminate diastolic function, mild to moderate MR (similar to echo 01/2020 and echo 10/2019)     LHC 06/2019: Mild CAD (LAD 20%), normal LVEF. Echo 08/2022: Mild LVD, LVEF 35%, moderate LAE, mild to moderate MR, normal RV. R/LHC 08/2022: RA 14, PA 31/15/21, W 18, LVEDP 24, CI 1.8/2.3 (TD/AF). Minimal CAD left dominant system. Patient presented to the emergency room on 11/10 with progressively worse lower extremity edema, weight gain, dyspnea, atypical chest pains (positional) over the last 3 to 4 days. Patient was admitted for acute heart failure. Peak troponin 0.21 trending down. ECG consistent with AF, biventricular pacing. Home diuretics: Lasix 80 mg daily, metolazone 5 mg p.o. daily as needed, spironolactone 25 mg daily. Interval history:  Patient continues to have some exertional dyspnea. I's and O's -1.8 L, weight down 3 pounds, sodium down 134, creatinine 1.2 from 1.0.     Objective:     Medications:   sodium chloride flush  5-40 mL IntraVENous 2 times per day    aspirin  81 mg Oral Daily    atorvastatin  40 mg Oral Nightly    famotidine  20 mg Oral BID    ferrous sulfate  325 mg Oral Daily with breakfast    folic acid  1 mg Oral Daily    insulin glargine  15 Units SubCUTAneous Nightly    metoprolol succinate  25 mg Oral Daily    therapeutic multivitamin-minerals  1 tablet Oral Daily    mycophenolate  500 mg Oral TID    potassium chloride  20 mEq Oral Daily    pregabalin  75 mg Oral BID    rivaroxaban 20 mg Oral Daily    spironolactone  25 mg Oral Daily    tiZANidine  4 mg Oral TID    tamsulosin  0.4 mg Oral Daily    insulin lispro  0-8 Units SubCUTAneous TID WC    insulin lispro  0-4 Units SubCUTAneous Nightly       IV drips:   furosemide (LASIX) 1mg/ml infusion 5 mg/hr (11/12/22 1230)    sodium chloride      dextrose         PRN:  sodium chloride flush, sodium chloride, ondansetron **OR** ondansetron, polyethylene glycol, acetaminophen **OR** acetaminophen, glucose, dextrose bolus **OR** dextrose bolus, glucagon (rDNA), dextrose, oxyCODONE-acetaminophen    Vitals:    11/12/22 0500 11/12/22 0627 11/12/22 0834 11/12/22 1202   BP: 101/67  126/78 123/86   Pulse: 74 76 73    Resp: 18  18 19   Temp: 98.6 °F (37 °C)  98.6 °F (37 °C) 98.2 °F (36.8 °C)   TempSrc: Oral  Oral Oral   SpO2: 95%  94%    Weight:       Height:           Intake/Output Summary (Last 24 hours) at 11/12/2022 1403  Last data filed at 11/12/2022 1202  Gross per 24 hour   Intake 600 ml   Output 1950 ml   Net -1350 ml     I/O last 3 completed shifts: In: 65 [P.O.:840; I.V.:10]  Out: 2575 [Urine:2575]  Wt Readings from Last 3 Encounters:   11/12/22 (!) 308 lb 10.3 oz (140 kg)   11/08/22 (!) 316 lb (143.3 kg)   03/22/22 (!) 309 lb 11.9 oz (140.5 kg)       Admit Wt: Weight: (!) 311 lb 15.2 oz (141.5 kg)   Todays Wt: Weight: (!) 308 lb 10.3 oz (140 kg)    TELEMETRY personally reviewed: Atrial fibrillation with V pacing. Physical Exam:         General Appearance:  Alert, cooperative, no distress, appears stated age Appropriate weight   Head:  Normocephalic, without obvious abnormality, atraumatic   Eyes:  PERRL, conjunctiva/corneas clear EOM intact  Ears normal   Throat no lesions       Nose: Nares normal, no drainage or sinus tenderness   Throat: Lips, mucosa, and tongue normal   Neck: Supple, symmetrical, trachea midline, no adenopathy, thyroid: not enlarged, symmetric, no tenderness/mass/nodules, no carotid bruit.         Lungs:   Normal respiratory rate, lungs with basilar ronchi bilaterally. Chest Wall:  No tenderness or deformity   Heart:  Regular rhythm, rate is controlled, S1, S2 normal, there is no murmur, there is no rub or gallop, cannot assess jvd, 2+ bilateral lower extremity edema   Abdomen:   Soft, non-tender, bowel sounds active all four quadrants,  no masses, no organomegaly       Extremities: Extremities normal, atraumatic, no cyanosis. Pulses: 2+ and symmetric   Skin: Skin color, texture, turgor normal, no rashes or lesions   Pysch: Normal mood and affect   Neurologic: Normal gross motor and sensory exam.  Cranial nerves intact       Labs:   Recent Labs     11/10/22  1833 11/11/22  0526 11/12/22  0635    136 134*   K 3.7 4.2 4.3   BUN 18 19 25*   CREATININE 0.9 1.0 1.2   CL 98* 98* 96*   CO2 28 29 28   GLUCOSE 128* 160* 141*   CALCIUM 8.9 8.8 9.1   MG  --  1.80 1.90     Recent Labs     11/10/22  1833 11/11/22  0526 11/12/22  0635   WBC 9.1 7.6 7.2   HGB 12.0* 12.2* 12.9*   HCT 37.1* 38.0* 38.9*    205 216   MCV 84.8 85.9 86.1     Recent Labs     11/11/22  0526   TRIG 134   HDL 33*     No results for input(s): PTT, INR in the last 72 hours. Invalid input(s): PT  Recent Labs     11/10/22  1833 11/10/22  2018 11/11/22  0241 11/11/22  0526 11/11/22  0831   TROPONINI 0.25* 0.21* 0.20* 0.19* 0.14*     No results for input(s): BNP in the last 72 hours. No results for input(s): NTPROBNP in the last 72 hours. No results for input(s): TSH in the last 72 hours. Imaging:       Assessment & Plan:     1. Acute on chronic HFrEF. Patient has new systolic heart failure as diagnosed by echo in 08/2022 which is nonischemic in etiology (most likely due to LBBB, cannot exclude contribution by myositis). He is now volume overloaded but hemodynamically stable. 2.  CAF. 3.  CAD. It is minimal per recent LHC. 4.  Myositis. Patient is supposed to be on mycophenolate 500 mg p.o. 3 times daily. 5.  Elevated troponin.   It is most likely due to myositis and/or AHF (demand ischemia). I doubt ACS in view of recent unremarkable LHC. Patient does not have any concerning symptoms.        -Continue with baby aspirin, Xarelto  - Strict I's and O's every shift and standing weights if possible, low-salt diet and daily BMP with reflex to Mg, wean supplemental oxygen to off for sats greater than 94%. -We will change Lasix 40 mg IV every 8 hours to Lasix drip at 5 mg/h.  - Continue with spironolactone 25 mg daily  - Continue with Toprol 25 mg daily  - Patient is allergic to ACEI (swelling, ? Angioedema). - No hydralazine or imdur due to low BP  - We will obtain a limited echo to reassess LVEF after placement of CRT-D in 08/2022. I have spent 35 minutes of face to face time with the patient with more than 50% spent counseling and coordinating care. I have personally reviewed the reports and images of labs, radiological studies, cardiac studies including ECG's and telemetry, current and old medical records. The note was completed using EMR and Dragon dictation system. Every effort was made to ensure accuracy; however, inadvertent computerized transcription errors may be present. All questions and concerns were addressed to the patient/family. Alternatives to my treatment were discussed. Thank you for allowing to us to participate in the zeke or [facility]. Please call our service with questions.     Aranza Michele MD, [facility] [place], [address]  The [address]  [address] [address] [place] [address]  Ph: [phone]  Fax: [phone]

## 2022-11-12 NOTE — PLAN OF CARE
Problem: Discharge Planning  Goal: Discharge to home or other facility with appropriate resources  Outcome: Progressing  Flowsheets (Taken 11/12/2022 0114)  Discharge to home or other facility with appropriate resources:   Identify barriers to discharge with patient and caregiver   Arrange for needed discharge resources and transportation as appropriate   Identify discharge learning needs (meds, wound care, etc)   Arrange for interpreters to assist at discharge as needed   Refer to discharge planning if patient needs post-hospital services based on physician order or complex needs related to functional status, cognitive ability or social support system     Problem: Pain  Goal: Verbalizes/displays adequate comfort level or baseline comfort level  Outcome: Progressing  Flowsheets  Taken 11/12/2022 0114 by Rocio Breen RN  Verbalizes/displays adequate comfort level or baseline comfort level:   Encourage patient to monitor pain and request assistance   Assess pain using appropriate pain scale   Administer analgesics based on type and severity of pain and evaluate response   Implement non-pharmacological measures as appropriate and evaluate response   Consider cultural and social influences on pain and pain management   Notify Licensed Independent Practitioner if interventions unsuccessful or patient reports new pain  Taken 11/11/2022 1551 by Jose Monteiro RN  Verbalizes/displays adequate comfort level or baseline comfort level:   Encourage patient to monitor pain and request assistance   Assess pain using appropriate pain scale   Administer analgesics based on type and severity of pain and evaluate response   Implement non-pharmacological measures as appropriate and evaluate response   Consider cultural and social influences on pain and pain management   Notify Licensed Independent Practitioner if interventions unsuccessful or patient reports new pain  Taken 11/11/2022 1308 by Jose Monteiro RN  Verbalizes/displays adequate comfort level or baseline comfort level:   Encourage patient to monitor pain and request assistance   Assess pain using appropriate pain scale   Administer analgesics based on type and severity of pain and evaluate response   Implement non-pharmacological measures as appropriate and evaluate response   Consider cultural and social influences on pain and pain management     Problem: Chronic Conditions and Co-morbidities  Goal: Patient's chronic conditions and co-morbidity symptoms are monitored and maintained or improved  Outcome: Progressing  Flowsheets (Taken 11/12/2022 0114)  Care Plan - Patient's Chronic Conditions and Co-Morbidity Symptoms are Monitored and Maintained or Improved:   Monitor and assess patient's chronic conditions and comorbid symptoms for stability, deterioration, or improvement   Collaborate with multidisciplinary team to address chronic and comorbid conditions and prevent exacerbation or deterioration   Update acute care plan with appropriate goals if chronic or comorbid symptoms are exacerbated and prevent overall improvement and discharge     Problem: ABCDS Injury Assessment  Goal: Absence of physical injury  Outcome: Progressing  Flowsheets (Taken 11/12/2022 0114)  Absence of Physical Injury: Implement safety measures based on patient assessment     Problem: Safety - Adult  Goal: Free from fall injury  Outcome: Progressing  Flowsheets (Taken 11/12/2022 0114)  Free From Fall Injury: Instruct family/caregiver on patient safety     Problem: Cardiovascular - Adult  Goal: Maintains optimal cardiac output and hemodynamic stability  Outcome: Progressing  Goal: Absence of cardiac dysrhythmias or at baseline  Outcome: Progressing  Flowsheets (Taken 11/12/2022 0114)  Absence of cardiac dysrhythmias or at baseline:   Monitor cardiac rate and rhythm   Assess for signs of decreased cardiac output   Administer antiarrhythmia medication and electrolyte replacement as ordered     Problem: Respiratory - Adult  Goal: Achieves optimal ventilation and oxygenation  Outcome: Progressing  Flowsheets (Taken 11/12/2022 0114)  Achieves optimal ventilation and oxygenation:   Assess for changes in respiratory status   Assess for changes in mentation and behavior   Position to facilitate oxygenation and minimize respiratory effort   Oxygen supplementation based on oxygen saturation or arterial blood gases   Encourage broncho-pulmonary hygiene including cough, deep breathe, incentive spirometry   Assess the need for suctioning and aspirate as needed   Assess and instruct to report shortness of breath or any respiratory difficulty   Respiratory therapy support as indicated     Problem: Metabolic/Fluid and Electrolytes - Adult  Goal: Electrolytes maintained within normal limits  Outcome: Progressing  Goal: Hemodynamic stability and optimal renal function maintained  Outcome: Progressing  Flowsheets (Taken 11/12/2022 0114)  Hemodynamic stability and optimal renal function maintained:   Monitor labs and assess for signs and symptoms of volume excess or deficit   Monitor intake, output and patient weight   Monitor urine specific gravity, serum osmolarity and serum sodium as indicated or ordered   Monitor response to interventions for patient's volume status, including labs, urine output, blood pressure (other measures as available)   Encourage oral intake as appropriate   Instruct patient on fluid and nutrition restrictions as appropriate

## 2022-11-13 LAB
ANION GAP SERPL CALCULATED.3IONS-SCNC: 12 MMOL/L (ref 3–16)
BASOPHILS ABSOLUTE: 0.1 K/UL (ref 0–0.2)
BASOPHILS RELATIVE PERCENT: 0.7 %
BUN BLDV-MCNC: 28 MG/DL (ref 7–20)
CALCIUM SERPL-MCNC: 9 MG/DL (ref 8.3–10.6)
CHLORIDE BLD-SCNC: 98 MMOL/L (ref 99–110)
CO2: 27 MMOL/L (ref 21–32)
CREAT SERPL-MCNC: 1.2 MG/DL (ref 0.8–1.3)
EOSINOPHILS ABSOLUTE: 0.2 K/UL (ref 0–0.6)
EOSINOPHILS RELATIVE PERCENT: 2.8 %
GFR SERPL CREATININE-BSD FRML MDRD: >60 ML/MIN/{1.73_M2}
GLUCOSE BLD-MCNC: 133 MG/DL (ref 70–99)
GLUCOSE BLD-MCNC: 161 MG/DL (ref 70–99)
GLUCOSE BLD-MCNC: 170 MG/DL (ref 70–99)
GLUCOSE BLD-MCNC: 173 MG/DL (ref 70–99)
GLUCOSE BLD-MCNC: 204 MG/DL (ref 70–99)
GLUCOSE BLD-MCNC: 208 MG/DL (ref 70–99)
GLUCOSE BLD-MCNC: 364 MG/DL (ref 70–99)
GLUCOSE BLD-MCNC: 429 MG/DL (ref 70–99)
HCT VFR BLD CALC: 38.9 % (ref 40.5–52.5)
HEMOGLOBIN: 12.4 G/DL (ref 13.5–17.5)
LYMPHOCYTES ABSOLUTE: 1.1 K/UL (ref 1–5.1)
LYMPHOCYTES RELATIVE PERCENT: 14.5 %
MAGNESIUM: 1.9 MG/DL (ref 1.8–2.4)
MCH RBC QN AUTO: 27.7 PG (ref 26–34)
MCHC RBC AUTO-ENTMCNC: 32 G/DL (ref 31–36)
MCV RBC AUTO: 86.5 FL (ref 80–100)
MONOCYTES ABSOLUTE: 0.6 K/UL (ref 0–1.3)
MONOCYTES RELATIVE PERCENT: 7.9 %
NEUTROPHILS ABSOLUTE: 5.7 K/UL (ref 1.7–7.7)
NEUTROPHILS RELATIVE PERCENT: 74.1 %
PDW BLD-RTO: 15.6 % (ref 12.4–15.4)
PERFORMED ON: ABNORMAL
PLATELET # BLD: 244 K/UL (ref 135–450)
PMV BLD AUTO: 7.5 FL (ref 5–10.5)
POTASSIUM SERPL-SCNC: 4.7 MMOL/L (ref 3.5–5.1)
PRO-BNP: 855 PG/ML (ref 0–124)
RBC # BLD: 4.5 M/UL (ref 4.2–5.9)
SODIUM BLD-SCNC: 137 MMOL/L (ref 136–145)
WBC # BLD: 7.7 K/UL (ref 4–11)

## 2022-11-13 PROCEDURE — 80048 BASIC METABOLIC PNL TOTAL CA: CPT

## 2022-11-13 PROCEDURE — 83880 ASSAY OF NATRIURETIC PEPTIDE: CPT

## 2022-11-13 PROCEDURE — 83735 ASSAY OF MAGNESIUM: CPT

## 2022-11-13 PROCEDURE — 6360000002 HC RX W HCPCS: Performed by: INTERNAL MEDICINE

## 2022-11-13 PROCEDURE — 2580000003 HC RX 258: Performed by: INTERNAL MEDICINE

## 2022-11-13 PROCEDURE — 85025 COMPLETE CBC W/AUTO DIFF WBC: CPT

## 2022-11-13 PROCEDURE — 2580000003 HC RX 258

## 2022-11-13 PROCEDURE — 36415 COLL VENOUS BLD VENIPUNCTURE: CPT

## 2022-11-13 PROCEDURE — 99233 SBSQ HOSP IP/OBS HIGH 50: CPT | Performed by: INTERNAL MEDICINE

## 2022-11-13 PROCEDURE — 99233 SBSQ HOSP IP/OBS HIGH 50: CPT | Performed by: HOSPITALIST

## 2022-11-13 PROCEDURE — 2060000000 HC ICU INTERMEDIATE R&B

## 2022-11-13 PROCEDURE — 6360000002 HC RX W HCPCS

## 2022-11-13 PROCEDURE — 6370000000 HC RX 637 (ALT 250 FOR IP)

## 2022-11-13 RX ADMIN — SODIUM CHLORIDE, PRESERVATIVE FREE 10 ML: 5 INJECTION INTRAVENOUS at 08:21

## 2022-11-13 RX ADMIN — SPIRONOLACTONE 25 MG: 25 TABLET, FILM COATED ORAL at 08:17

## 2022-11-13 RX ADMIN — PREGABALIN 75 MG: 75 CAPSULE ORAL at 08:18

## 2022-11-13 RX ADMIN — FAMOTIDINE 20 MG: 20 TABLET ORAL at 21:28

## 2022-11-13 RX ADMIN — OXYCODONE AND ACETAMINOPHEN 1 TABLET: 5; 325 TABLET ORAL at 21:27

## 2022-11-13 RX ADMIN — TIZANIDINE 4 MG: 4 TABLET ORAL at 08:17

## 2022-11-13 RX ADMIN — FUROSEMIDE 5 MG/HR: 10 INJECTION INTRAMUSCULAR; INTRAVENOUS at 01:43

## 2022-11-13 RX ADMIN — ASPIRIN 81 MG: 81 TABLET, COATED ORAL at 08:18

## 2022-11-13 RX ADMIN — METOPROLOL SUCCINATE 25 MG: 25 TABLET, EXTENDED RELEASE ORAL at 08:17

## 2022-11-13 RX ADMIN — ATORVASTATIN CALCIUM 40 MG: 40 TABLET, FILM COATED ORAL at 21:27

## 2022-11-13 RX ADMIN — FAMOTIDINE 20 MG: 20 TABLET ORAL at 08:17

## 2022-11-13 RX ADMIN — TIZANIDINE 4 MG: 4 TABLET ORAL at 21:28

## 2022-11-13 RX ADMIN — Medication 1 TABLET: at 08:18

## 2022-11-13 RX ADMIN — POTASSIUM CHLORIDE 20 MEQ: 1500 TABLET, EXTENDED RELEASE ORAL at 08:17

## 2022-11-13 RX ADMIN — MYCOPHENOLATE MOFETIL 500 MG: 500 TABLET, FILM COATED ORAL at 21:31

## 2022-11-13 RX ADMIN — RIVAROXABAN 20 MG: 20 TABLET, FILM COATED ORAL at 08:18

## 2022-11-13 RX ADMIN — OXYCODONE AND ACETAMINOPHEN 1 TABLET: 5; 325 TABLET ORAL at 08:17

## 2022-11-13 RX ADMIN — FOLIC ACID 1 MG: 1 TABLET ORAL at 08:17

## 2022-11-13 RX ADMIN — TAMSULOSIN HYDROCHLORIDE 0.4 MG: 0.4 CAPSULE ORAL at 08:18

## 2022-11-13 RX ADMIN — PREGABALIN 75 MG: 75 CAPSULE ORAL at 21:28

## 2022-11-13 RX ADMIN — INSULIN GLARGINE 15 UNITS: 100 INJECTION, SOLUTION SUBCUTANEOUS at 21:28

## 2022-11-13 RX ADMIN — MYCOPHENOLATE MOFETIL 500 MG: 500 TABLET, FILM COATED ORAL at 08:20

## 2022-11-13 RX ADMIN — TIZANIDINE 4 MG: 4 TABLET ORAL at 13:36

## 2022-11-13 RX ADMIN — MYCOPHENOLATE MOFETIL 500 MG: 500 TABLET, FILM COATED ORAL at 13:36

## 2022-11-13 RX ADMIN — FERROUS SULFATE TAB 325 MG (65 MG ELEMENTAL FE) 325 MG: 325 (65 FE) TAB at 08:18

## 2022-11-13 ASSESSMENT — ENCOUNTER SYMPTOMS
CHEST TIGHTNESS: 0
WHEEZING: 0
COUGH: 0
SHORTNESS OF BREATH: 0

## 2022-11-13 ASSESSMENT — PAIN DESCRIPTION - DESCRIPTORS
DESCRIPTORS: ACHING;DISCOMFORT
DESCRIPTORS: ACHING;CRAMPING
DESCRIPTORS: ACHING;DISCOMFORT

## 2022-11-13 ASSESSMENT — PAIN SCALES - GENERAL
PAINLEVEL_OUTOF10: 5
PAINLEVEL_OUTOF10: 0
PAINLEVEL_OUTOF10: 7

## 2022-11-13 ASSESSMENT — PAIN DESCRIPTION - PAIN TYPE
TYPE: CHRONIC PAIN
TYPE: CHRONIC PAIN

## 2022-11-13 ASSESSMENT — PAIN SCALES - WONG BAKER
WONGBAKER_NUMERICALRESPONSE: 0
WONGBAKER_NUMERICALRESPONSE: 0

## 2022-11-13 ASSESSMENT — PAIN DESCRIPTION - LOCATION
LOCATION: KNEE;SHOULDER
LOCATION: KNEE
LOCATION: KNEE;SHOULDER

## 2022-11-13 ASSESSMENT — PAIN DESCRIPTION - ORIENTATION
ORIENTATION: RIGHT;LEFT
ORIENTATION: RIGHT;LEFT;UPPER;LOWER

## 2022-11-13 ASSESSMENT — PAIN DESCRIPTION - FREQUENCY
FREQUENCY: CONTINUOUS
FREQUENCY: INTERMITTENT

## 2022-11-13 ASSESSMENT — PAIN DESCRIPTION - ONSET
ONSET: ON-GOING
ONSET: GRADUAL

## 2022-11-13 NOTE — PROGRESS NOTES
Cardiology Consult Service  Daily Progress Note        Admit Date:  11/10/2022  Primary cardiologist: Dr Jemma Marsh at [facility] / Dr Evy Deshpande    Reason for Consultation/Chief Complaint: AHF    Subjective:     Ari Saavedra is a 57 y.o. male with a past medical history of morbid obesity, HTN, HLP, DM, HFrEF with CardioMEMS implant in 2021, CAF, myositis, Sjogren's disease, acute DVT right soleal vein after TKA 03/2022, new LBBB (as of 03/2022) s/p CRT-D 08/2022. Echo 03/2021: LVH, LVEF 60%, indeterminate diastolic function, mild to moderate MR (similar to echo 01/2020 and echo 10/2019)     LHC 06/2019: Mild CAD (LAD 20%), normal LVEF. Echo 08/2022: Mild LVD, LVEF 35%, moderate LAE, mild to moderate MR, normal RV. R/LHC 08/2022: RA 14, PA 31/15/21, W 18, LVEDP 24, CI 1.8/2.3 (TD/AF). Minimal CAD left dominant system. Patient presented to the emergency room on 11/10 with progressively worse lower extremity edema, weight gain, dyspnea, atypical chest pains (positional) over the last 3 to 4 days. Patient was admitted for acute heart failure. Peak troponin 0.21 trending down. ECG consistent with AF, biventricular pacing. Home diuretics: Lasix 80 mg daily, metolazone 5 mg p.o. daily as needed, spironolactone 25 mg daily. Interval history:  Patient is currently off supplemental oxygen. He reports some improvement with his dyspnea but not back at his baseline. Weight down 1 pound, I's and O's -1.3 L. Creatinine stable at 1.2.     Objective:     Medications:   sodium chloride flush  5-40 mL IntraVENous 2 times per day    aspirin  81 mg Oral Daily    atorvastatin  40 mg Oral Nightly    famotidine  20 mg Oral BID    ferrous sulfate  325 mg Oral Daily with breakfast    folic acid  1 mg Oral Daily    insulin glargine  15 Units SubCUTAneous Nightly    metoprolol succinate  25 mg Oral Daily    therapeutic multivitamin-minerals  1 tablet Oral Daily    mycophenolate  500 mg Oral TID    potassium chloride  20 mEq Oral Daily    pregabalin  75 mg Oral BID    rivaroxaban  20 mg Oral Daily    spironolactone  25 mg Oral Daily    tiZANidine  4 mg Oral TID    tamsulosin  0.4 mg Oral Daily    insulin lispro  0-8 Units SubCUTAneous TID WC    insulin lispro  0-4 Units SubCUTAneous Nightly       IV drips:   furosemide (LASIX) 1mg/ml infusion 10 mg/hr (11/13/22 1219)    sodium chloride      dextrose         PRN:  sodium chloride flush, sodium chloride, ondansetron **OR** ondansetron, polyethylene glycol, acetaminophen **OR** acetaminophen, glucose, dextrose bolus **OR** dextrose bolus, glucagon (rDNA), dextrose, oxyCODONE-acetaminophen    Vitals:    11/13/22 0600 11/13/22 0817 11/13/22 0829 11/13/22 1221   BP:  (!) 143/82 98/62 98/65   Pulse: 71 (!) 103 71 71   Resp:  18 18 18   Temp:  98 °F (36.7 °C) 98 °F (36.7 °C) 98.1 °F (36.7 °C)   TempSrc:  Oral Oral Oral   SpO2:   92% 91%   Weight:       Height:           Intake/Output Summary (Last 24 hours) at 11/13/2022 1616  Last data filed at 11/13/2022 0748  Gross per 24 hour   Intake 740 ml   Output 2175 ml   Net -1435 ml       I/O last 3 completed shifts: In: 1389 [P.O.:1690]  Out: 3700 [Urine:3700]  Wt Readings from Last 3 Encounters:   11/13/22 (!) 307 lb 1.6 oz (139.3 kg)   11/08/22 (!) 316 lb (143.3 kg)   03/22/22 (!) 309 lb 11.9 oz (140.5 kg)       Admit Wt: Weight: (!) 311 lb 15.2 oz (141.5 kg)   Todays Wt: Weight: (!) 307 lb 1.6 oz (139.3 kg)    TELEMETRY personally reviewed: Atrial fibrillation with V pacing.      Physical Exam:         General Appearance:  Alert, cooperative, no distress, appears stated age Appropriate weight   Head:  Normocephalic, without obvious abnormality, atraumatic   Eyes:  PERRL, conjunctiva/corneas clear EOM intact  Ears normal   Throat no lesions       Nose: Nares normal, no drainage or sinus tenderness   Throat: Lips, mucosa, and tongue normal   Neck: Supple, symmetrical, trachea midline, no adenopathy, thyroid: not enlarged, symmetric, no tenderness/mass/nodules, no carotid bruit. Lungs:   Normal respiratory rate, lungs with basilar ronchi bilaterally. Chest Wall:  No tenderness or deformity   Heart:  Regular rhythm, rate is controlled, S1, S2 normal, there is no murmur, there is no rub or gallop, cannot assess jvd, 2+ bilateral lower extremity edema   Abdomen:   Soft, non-tender, bowel sounds active all four quadrants,  no masses, no organomegaly       Extremities: Extremities normal, atraumatic, no cyanosis. Pulses: 2+ and symmetric   Skin: Skin color, texture, turgor normal, no rashes or lesions   Pysch: Normal mood and affect   Neurologic: Normal gross motor and sensory exam.  Cranial nerves intact       Labs:   Recent Labs     11/11/22  0526 11/12/22  0635 11/13/22  0545    134* 137   K 4.2 4.3 4.7   BUN 19 25* 28*   CREATININE 1.0 1.2 1.2   CL 98* 96* 98*   CO2 29 28 27   GLUCOSE 160* 141* 204*   CALCIUM 8.8 9.1 9.0   MG 1.80 1.90 1.90       Recent Labs     11/11/22  0526 11/12/22  0635 11/13/22  0545   WBC 7.6 7.2 7.7   HGB 12.2* 12.9* 12.4*   HCT 38.0* 38.9* 38.9*    216 244   MCV 85.9 86.1 86.5       Recent Labs     11/11/22  0526   TRIG 134   HDL 33*       No results for input(s): PTT, INR in the last 72 hours. Invalid input(s): PT  Recent Labs     11/10/22  1833 11/10/22  2018 11/11/22  0241 11/11/22  0526 11/11/22  0831   TROPONINI 0.25* 0.21* 0.20* 0.19* 0.14*       No results for input(s): BNP in the last 72 hours. No results for input(s): NTPROBNP in the last 72 hours. No results for input(s): TSH in the last 72 hours. Imaging:       Assessment & Plan:     1. Acute on chronic HFrEF. Patient has new systolic heart failure as diagnosed by echo in 08/2022 which is nonischemic in etiology (most likely due to LBBB, cannot exclude contribution by myositis). He is now volume overloaded but hemodynamically stable. 2.  CAF. 3.  CAD. It is minimal per recent LHC. 4.  Myositis.   Patient is supposed to be on mycophenolate 500 mg p.o. 3 times daily. 5.  Elevated troponin. It is most likely due to myositis and/or AHF (demand ischemia). I doubt ACS in view of recent unremarkable LHC. Patient does not have any concerning symptoms.        -Continue with baby aspirin, Xarelto  - Strict I's and O's every shift and standing weights if possible, low-salt diet and daily BMP with reflex to Mg, wean supplemental oxygen to off for sats greater than 94%. -We will change Lasix drip to 10 mg/h.  - Continue with spironolactone 25 mg daily  - Continue with Toprol 25 mg daily  - Patient is allergic to ACEI (swelling, ? Angioedema). - No hydralazine or imdur due to low BP  - We will obtain a limited echo to reassess LVEF after placement of CRT-D in 08/2022. I have spent 35 minutes of face to face time with the patient with more than 50% spent counseling and coordinating care. I have personally reviewed the reports and images of labs, radiological studies, cardiac studies including ECG's and telemetry, current and old medical records. The note was completed using EMR and Dragon dictation system. Every effort was made to ensure accuracy; however, inadvertent computerized transcription errors may be present. All questions and concerns were addressed to the patient/family. Alternatives to my treatment were discussed. Thank you for allowing to us to participate in the care or [facility] Patient. Please call our service with questions.     Martita Sinha MD, [address] [facility], [address]  The [address]  [facility] [place] [address]  Ph: [phone]  Fax: [phone]

## 2022-11-13 NOTE — CONSULTS
[address]   HEART FAILURE PROGRAM      [facility]:  [id number]  AGE: 57 y.o.    GENDER: male  : 1960  TODAY'S DATE:  2022    Subjective:     VISIT TYPE: evaluation     ADMITTING PHYSICIAN:  Vincent Fontanez MD    PAST MEDICAL HISTORY        Diagnosis Date    Arthritis     Depression     Diabetes mellitus ([facility]     Diastolic CHF ([facility]     Difficult intubation     throat swelled    Dyslipidemia     Herniated disc, cervical     Hypertension     Osteoporosis     Peripheral neuropathy        SOCIAL HISTORY    Social History     Tobacco Use    Smoking status: Never    Smokeless tobacco: Never   Vaping Use    Vaping Use: Never used   Substance Use Topics    Alcohol use: Yes     Comment: occ    Drug use: Not Currently     Types: Cocaine     Comment: 50 years ago- no longer       ALLERGIES    Allergies   Allergen Reactions    Lisinopril Swelling    Bee Venom Swelling    Other Itching     Itch like crazy EKG TAPE       MEDICATIONS  Scheduled Meds:   sodium chloride flush  5-40 mL IntraVENous 2 times per day    aspirin  81 mg Oral Daily    atorvastatin  40 mg Oral Nightly    famotidine  20 mg Oral BID    ferrous sulfate  325 mg Oral Daily with breakfast    folic acid  1 mg Oral Daily    insulin glargine  15 Units SubCUTAneous Nightly    metoprolol succinate  25 mg Oral Daily    therapeutic multivitamin-minerals  1 tablet Oral Daily    mycophenolate  500 mg Oral TID    potassium chloride  20 mEq Oral Daily    pregabalin  75 mg Oral BID    rivaroxaban  20 mg Oral Daily    spironolactone  25 mg Oral Daily    tiZANidine  4 mg Oral TID    tamsulosin  0.4 mg Oral Daily    insulin lispro  0-8 Units SubCUTAneous TID WC    insulin lispro  0-4 Units SubCUTAneous Nightly       ADMIT DATE: 11/10/2022      Objective:     ADMISSION DIAGNOSIS:   Heart failure (HCC) [I50.9]  Acute on chronic congestive heart failure, unspecified heart failure type ([facility] [I50.9]     BP 118/71   Pulse 71   Temp 98.3 °F (36.8 °C) (Oral)   Resp 20   Ht 5' 11\" (1.803 m)   Wt (!) 308 lb 10.3 oz (140 kg)   SpO2 91%   BMI 43.05 kg/m²     ADMIT:  Weight: (!) 311 lb 15.2 oz (141.5 kg)    TODAY: Weight: (!) 308 lb 10.3 oz (140 kg)    Wt Readings from Last 10 Encounters:   11/12/22 (!) 308 lb 10.3 oz (140 kg)   11/08/22 (!) 316 lb (143.3 kg)   03/22/22 (!) 309 lb 11.9 oz (140.5 kg)   01/24/20 272 lb 4.3 oz (123.5 kg)   10/17/19 291 lb 9.6 oz (132.3 kg)   10/11/19 285 lb 4.4 oz (129.4 kg)   09/10/19 286 lb (129.7 kg)   08/14/19 277 lb (125.6 kg)   08/07/19 283 lb (128.4 kg)   08/05/19 286 lb 9.6 oz (130 kg)          Intake/Output Summary (Last 24 hours) at 11/13/2022 0218  Last data filed at 11/13/2022 0103  Gross per 24 hour   Intake 500 ml   Output 2475 ml   Net -1975 ml       LABS  BMP:   Lab Results   Component Value Date/Time     11/12/2022 06:35 AM    K 4.3 11/12/2022 06:35 AM    K 3.7 11/10/2022 06:33 PM    CL 96 11/12/2022 06:35 AM    CO2 28 11/12/2022 06:35 AM    BUN 25 11/12/2022 06:35 AM    LABALBU 4.3 11/08/2022 12:57 PM    CREATININE 1.2 11/12/2022 06:35 AM    CALCIUM 9.1 11/12/2022 06:35 AM    GFRAA >60 03/22/2022 05:33 AM    LABGLOM >60 11/12/2022 06:35 AM    GLUCOSE 141 11/12/2022 06:35 AM     CBC:   Recent Labs     11/10/22  1833 11/11/22  0526 11/12/22  0635   WBC 9.1 7.6 7.2   HGB 12.0* 12.2* 12.9*   HCT 37.1* 38.0* 38.9*   MCV 84.8 85.9 86.1    205 216     BNP: No results found for: BNP    ECHOCARDIOGRAM: 03/21/2022   Summary   Left ventricular cavity size is at the upper limits of normal. There is mild   concentric left ventricular hypertrophy. Overall left ventricular systolic   function appears normal with an ejection fraction of 55-60%. No regional   wall motion abnormalities are noted. Indeterminate diastolic function. Mild mitral regurgitation is present. The left atrium is moderately dilated.     Assessment:     CONSULTS:   IP CONSULT TO PRIMARY CARE PROVIDER  IP CONSULT TO HEART FAILURE NURSE/COORDINATOR  IP CONSULT TO DIETITIAN  IP CONSULT TO CARDIOLOGY    Patient has a CARDIOLOGY CONSULT: Yes      Patient taking an ACEI/ARB/ARNI:  No: allergic Lisinopril -- \"swelling\"    Patient taking a BETA BLOCKER:  Yes    Patient taking ALDACTONE:  Yes    Patient taking SGLT2:  Yes     SCALE AVAILABLE:  Yes     EDUCATION STATUS: Patient   [x]  Provided both written and verbal education on Heart Failure signs/symptoms. [x]  Provided instructions on daily medications. [x]  Provided instructions to monitor and record weight daily. [x]  Provided instructions to call if weight increases 3 lbs in one day or 5 lbs in one week. [x]  Received verbal acknowledgment/understanding of Heart Failure related causes. [x]  Provided instructions on how to maintain a low sodium diet. [x]  Provided recommendations on activity and exercise    []  Provided recommendations for smoking cessation programs   [x]  Other:  HF RN consult received from Dr Luis Leslie as part of HF order  set. Chart reviewed. Pt is known to HF RN services. Writer met with patient fall 2021 and provided extensive education at that time. Pt has CardioMems device implanted that is monitored by [employer]. He is quite knowledgeable about his disease and can readily speak to overall management. The question is if he actually implements his knowledge. Pt follows with Dr Demetra Titus. Chart review reveals pt is compliant with follow up appts. He is currently active with cardiac rehab. Pt plans to discharge back home where he lives alone. He is active with [facility] P[facility] and will resume services once discharged. Cardiology follow up arranged and pt aware of appt date. Voices not barriers to attending. Overall, very pleasant AA man. 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 Pt offers no further questions and denies need for additional education. CURRENT DIET: ADULT DIET;  Regular; Low Fat/Low Chol/High Fiber/2 gm Na    EDUCATIONAL MATERIALS PROVIDED:    Titles and material given:  Yes   [x]  [address] HF Guide : What is HF; Warning Signs of HF flare; Making Changes to Your Diet; Medicines to Help Your Heart  []  Weight Log; [facility] HF Zone Tool   []  How to Read a Food Label; Sample Daily Low Sodium Menu; Low Sodium Shopping Guide  []  Sodium Content in Foods  []  [facility]   [x]  [address] phone compa  []  When Smokers Quit   []  Sleep Disorders and Your Heart   [] [facility] education material: HF general info; HF zone tool; Low Sodium Diet, Fluid Restrictions  [x]  Other:  AHA HF interactive workbook link      PATIENT/CAREGIVER TEACHING:   Level of patient/caregiver understanding able to:   [x] Verbalize understanding   [] Demonstrate understanding       [x] Teach back        [] Needs reinforcement     []  Other:      TEACHING TIME:  60 minutes       Plan:       DISCHARGE PLAN:  Placement for patient upon discharge: home with support   Hospice Care:  no  Code Status: Full Code  Discharge appointment scheduled: Yes     RECOMMENDATIONS:   [x]  Encourage to call Heart Failure Resource Line with any questions or concerns. [x]  Educate further Mr. Casandra Chong on fluid restriction 48 oz- 64 oz during inpatient stay so he can understand how to measure intake at home. [x]  Continue to educate on S/S of Heart Failure.   [x]  Emphasize daily weights, diet, and if changes, to call Heart Failure Resource Line  [x]  Cardiac Rehab referral: currently participating with [facility]  [x]  Sleep Medicine referral: denies need  [] Other:            Electronically signed by Ryland Guillen RN, BSN CHFN  on 11/13/2022 at 2:18 AM

## 2022-11-13 NOTE — PROGRESS NOTES
Continues on Lasix drip per order; denies SOA, chest pain/discomfort; remains on RA with adequate saturations. VSS.

## 2022-11-13 NOTE — PROGRESS NOTES
Progress Note    Admit Date: 11/10/2022  Day: 3  Diet: ADULT DIET; Regular; Low Fat/Low Chol/High Fiber/2 gm Na    CC: Dyspnea    Interval history: Patient was seen at bedside and he was in no acute distress. He continues to be diuresed with Lasix gtt. He had good UOP with 3.3L over the last 24 hours. Echo from 11/11 still pending. Patient was agreeable to working with physical and Occupational Therapy before discharge. He denies chest pain, nausea, vomiting, diarrhea, constipation. Glucose of 133 this AM.    HPI: 56-year-old male with past medical history HTN, HLD, CAD, CHF (EF 35% 8/2022), DM2, AF (Xarelto), DVT, Sjogren's, aortic aneurysm who presented to [facility], [facility]. with chief complaints of dyspnea and chest pressure. Patient reports 2 to 3 days of gradually worsening dyspnea exacerbated with exertion and accompanied by 10 pound weight gain and chest pressure. He describes chest pressure as midsternal, constant, waxing/waning, nonexertional and improved with certain positions when lying down. He also notes increased swelling to both hands and lower extremities. Symptoms are gradually worsened since onset and are similar to his past heart failure exacerbation. In ED patient HDS with BMP 2012 (last 1200 3/2022), troponin 0.25 > 0.21 (baseline 0.06-0.1). CBC, chest x-ray unremarkable. EKG without ischemic changes. On exam patient is sitting comfortably on the edge of the bed conversing without difficulty. He additionally endorses a \"prick\" sensation intermittently over the past several days, which he feels may be related to his pacemaker. He also endorses chronic cough attributed to Sjogren's; states this is stable. Patient reports he did not take his medications 1 day early last week, but is otherwise compliant. States he does this roughly once per month due to frustration with medical issues and amount of pills.      Of note, patient underwent cardiac catheterization on 8/26/202 with angiography and temporary transvenous pacing wire. No interventions at that time; underwent placement of left subclavian biventricular pacemaker/ICD on 8/31/2022. Medications:     Scheduled Meds:   sodium chloride flush  5-40 mL IntraVENous 2 times per day    aspirin  81 mg Oral Daily    atorvastatin  40 mg Oral Nightly    famotidine  20 mg Oral BID    ferrous sulfate  325 mg Oral Daily with breakfast    folic acid  1 mg Oral Daily    insulin glargine  15 Units SubCUTAneous Nightly    metoprolol succinate  25 mg Oral Daily    therapeutic multivitamin-minerals  1 tablet Oral Daily    mycophenolate  500 mg Oral TID    potassium chloride  20 mEq Oral Daily    pregabalin  75 mg Oral BID    rivaroxaban  20 mg Oral Daily    spironolactone  25 mg Oral Daily    tiZANidine  4 mg Oral TID    tamsulosin  0.4 mg Oral Daily    insulin lispro  0-8 Units SubCUTAneous TID WC    insulin lispro  0-4 Units SubCUTAneous Nightly     Continuous Infusions:   furosemide (LASIX) 1mg/ml infusion 5 mg/hr (11/13/22 0143)    sodium chloride      dextrose       PRN Meds:sodium chloride flush, sodium chloride, ondansetron **OR** ondansetron, polyethylene glycol, acetaminophen **OR** acetaminophen, glucose, dextrose bolus **OR** dextrose bolus, glucagon (rDNA), dextrose, oxyCODONE-acetaminophen    Objective:   Vitals:   T-max:  Patient Vitals for the past 8 hrs:   BP Temp Temp src Pulse Resp SpO2 Weight   11/13/22 0600 -- -- -- 71 -- -- --   11/13/22 0551 -- -- -- -- -- -- (!) 307 lb 1.6 oz (139.3 kg)   11/13/22 0450 128/78 98.4 °F (36.9 °C) Oral 73 16 90 % --   11/13/22 0150 -- -- -- 71 -- -- --   11/13/22 0011 118/71 98.3 °F (36.8 °C) Oral 82 20 91 % --         Intake/Output Summary (Last 24 hours) at 11/13/2022 0741  Last data filed at 11/13/2022 0551  Gross per 24 hour   Intake 1090 ml   Output 3350 ml   Net -2260 ml         Review of Systems   Constitutional:  Negative for chills, fatigue and fever.    Respiratory:  Negative for cough, 72 hours. Lipids:   Recent Labs     11/11/22  0526   CHOL 115   HDL 33*       ABGs:  No results for input(s): PHART, NJL7KVX, PO2ART, LFY7MED, BEART, THGBART, J5UTMQPP, HBU0OFT in the last 72 hours. INR: No results for input(s): INR in the last 72 hours. Lactate: No results for input(s): LACTATE in the last 72 hours. Cultures:  -----------------------------------------------------------------  RAD:   XR CHEST (2 VW)   Final Result      No acute disease. Assessment/Plan:     Acute on chronic CHF exacerbation  P/w 3 days worsening dyspnea, BLE edema, chest pressure. BNP 2012 (1200 in 3/2022), Trp 0.25. CXR negative. EKG w/o ischemic changes. Hx HFrEF (35% 8/2022). S/p cath 8/2022 with BiV/ICD placement. - Lasix gtt started by cardiology  - Continue Telemetry  - Trend BNP q3 days (down to 800's from 2,000)  - Cardiology consulted and recs appreciated  - ECHO from 11/11/22 pending  - Strict I/Os. Daily weights, HF diet, Optimize lytes  - Continue home Metoprolol, spironolactone     Troponinemia  Trp 0.25 > 0.21 > 0.14 (baseline 0.6-1.0). EKG w/I ischemic changes.  Likely 2/2 CHF exacerbation.   - Troponin stabilized  - Telemetry     Chronic Issues  DM2: MDSS, POCT, hypoglycemia protocol  R knee OA: Home percocet  GERD: Home pepcid  CAD: Home statin, ASA  Anemia: Home iron, Daily CBC     Code Status: Full Code  FEN: GD  PPX: Xarelto  DISPO: IP  ELOS:1  Barriers to Discharge: Continued diuresis    Lyle Field MD, PGY-1  11/13/22  7:41 AM    This patient has been staffed and discussed with Herman Wiseman MD.

## 2022-11-14 ENCOUNTER — APPOINTMENT (OUTPATIENT)
Dept: CARDIAC REHAB | Age: 62
End: 2022-11-14
Payer: COMMERCIAL

## 2022-11-14 LAB
ANION GAP SERPL CALCULATED.3IONS-SCNC: 9 MMOL/L (ref 3–16)
BASOPHILS ABSOLUTE: 0.1 K/UL (ref 0–0.2)
BASOPHILS RELATIVE PERCENT: 0.9 %
BUN BLDV-MCNC: 28 MG/DL (ref 7–20)
CALCIUM SERPL-MCNC: 9.3 MG/DL (ref 8.3–10.6)
CHLORIDE BLD-SCNC: 95 MMOL/L (ref 99–110)
CO2: 32 MMOL/L (ref 21–32)
CREAT SERPL-MCNC: 1.1 MG/DL (ref 0.8–1.3)
EOSINOPHILS ABSOLUTE: 0.2 K/UL (ref 0–0.6)
EOSINOPHILS RELATIVE PERCENT: 2.6 %
GFR SERPL CREATININE-BSD FRML MDRD: >60 ML/MIN/{1.73_M2}
GLUCOSE BLD-MCNC: 104 MG/DL (ref 70–99)
GLUCOSE BLD-MCNC: 134 MG/DL (ref 70–99)
GLUCOSE BLD-MCNC: 153 MG/DL (ref 70–99)
GLUCOSE BLD-MCNC: 158 MG/DL (ref 70–99)
GLUCOSE BLD-MCNC: 221 MG/DL (ref 70–99)
GLUCOSE BLD-MCNC: 221 MG/DL (ref 70–99)
HCT VFR BLD CALC: 40.8 % (ref 40.5–52.5)
HEMOGLOBIN: 13.6 G/DL (ref 13.5–17.5)
LYMPHOCYTES ABSOLUTE: 1 K/UL (ref 1–5.1)
LYMPHOCYTES RELATIVE PERCENT: 14.7 %
MAGNESIUM: 1.7 MG/DL (ref 1.8–2.4)
MCH RBC QN AUTO: 28.2 PG (ref 26–34)
MCHC RBC AUTO-ENTMCNC: 33.4 G/DL (ref 31–36)
MCV RBC AUTO: 84.4 FL (ref 80–100)
MONOCYTES ABSOLUTE: 0.6 K/UL (ref 0–1.3)
MONOCYTES RELATIVE PERCENT: 8.8 %
NEUTROPHILS ABSOLUTE: 4.9 K/UL (ref 1.7–7.7)
NEUTROPHILS RELATIVE PERCENT: 73 %
PDW BLD-RTO: 15.8 % (ref 12.4–15.4)
PERFORMED ON: ABNORMAL
PLATELET # BLD: 215 K/UL (ref 135–450)
PMV BLD AUTO: 7.2 FL (ref 5–10.5)
POTASSIUM SERPL-SCNC: 4.4 MMOL/L (ref 3.5–5.1)
RBC # BLD: 4.83 M/UL (ref 4.2–5.9)
SODIUM BLD-SCNC: 136 MMOL/L (ref 136–145)
WBC # BLD: 6.7 K/UL (ref 4–11)

## 2022-11-14 PROCEDURE — 97530 THERAPEUTIC ACTIVITIES: CPT

## 2022-11-14 PROCEDURE — 99233 SBSQ HOSP IP/OBS HIGH 50: CPT | Performed by: HOSPITALIST

## 2022-11-14 PROCEDURE — 36415 COLL VENOUS BLD VENIPUNCTURE: CPT

## 2022-11-14 PROCEDURE — 99233 SBSQ HOSP IP/OBS HIGH 50: CPT | Performed by: INTERNAL MEDICINE

## 2022-11-14 PROCEDURE — 6360000002 HC RX W HCPCS

## 2022-11-14 PROCEDURE — 85025 COMPLETE CBC W/AUTO DIFF WBC: CPT

## 2022-11-14 PROCEDURE — 6360000002 HC RX W HCPCS: Performed by: INTERNAL MEDICINE

## 2022-11-14 PROCEDURE — 97535 SELF CARE MNGMENT TRAINING: CPT

## 2022-11-14 PROCEDURE — 83735 ASSAY OF MAGNESIUM: CPT

## 2022-11-14 PROCEDURE — 2060000000 HC ICU INTERMEDIATE R&B

## 2022-11-14 PROCEDURE — 97165 OT EVAL LOW COMPLEX 30 MIN: CPT

## 2022-11-14 PROCEDURE — 2580000003 HC RX 258

## 2022-11-14 PROCEDURE — 80048 BASIC METABOLIC PNL TOTAL CA: CPT

## 2022-11-14 PROCEDURE — 6370000000 HC RX 637 (ALT 250 FOR IP)

## 2022-11-14 PROCEDURE — 2580000003 HC RX 258: Performed by: INTERNAL MEDICINE

## 2022-11-14 PROCEDURE — 97116 GAIT TRAINING THERAPY: CPT

## 2022-11-14 PROCEDURE — 97162 PT EVAL MOD COMPLEX 30 MIN: CPT

## 2022-11-14 RX ORDER — PANTOPRAZOLE SODIUM 40 MG/1
40 TABLET, DELAYED RELEASE ORAL
Status: DISCONTINUED | OUTPATIENT
Start: 2022-11-15 | End: 2022-11-16 | Stop reason: HOSPADM

## 2022-11-14 RX ORDER — LANOLIN ALCOHOL/MO/W.PET/CERES
400 CREAM (GRAM) TOPICAL ONCE
Status: COMPLETED | OUTPATIENT
Start: 2022-11-14 | End: 2022-11-14

## 2022-11-14 RX ADMIN — MYCOPHENOLATE MOFETIL 500 MG: 500 TABLET, FILM COATED ORAL at 14:51

## 2022-11-14 RX ADMIN — FERROUS SULFATE TAB 325 MG (65 MG ELEMENTAL FE) 325 MG: 325 (65 FE) TAB at 09:34

## 2022-11-14 RX ADMIN — FOLIC ACID 1 MG: 1 TABLET ORAL at 09:33

## 2022-11-14 RX ADMIN — FUROSEMIDE 10 MG/HR: 10 INJECTION INTRAMUSCULAR; INTRAVENOUS at 23:57

## 2022-11-14 RX ADMIN — RIVAROXABAN 20 MG: 20 TABLET, FILM COATED ORAL at 09:34

## 2022-11-14 RX ADMIN — MYCOPHENOLATE MOFETIL 500 MG: 500 TABLET, FILM COATED ORAL at 19:45

## 2022-11-14 RX ADMIN — OXYCODONE AND ACETAMINOPHEN 1 TABLET: 5; 325 TABLET ORAL at 01:40

## 2022-11-14 RX ADMIN — SODIUM CHLORIDE, PRESERVATIVE FREE 10 ML: 5 INJECTION INTRAVENOUS at 19:47

## 2022-11-14 RX ADMIN — SPIRONOLACTONE 25 MG: 25 TABLET, FILM COATED ORAL at 09:34

## 2022-11-14 RX ADMIN — ATORVASTATIN CALCIUM 40 MG: 40 TABLET, FILM COATED ORAL at 19:45

## 2022-11-14 RX ADMIN — FUROSEMIDE 10 MG/HR: 10 INJECTION INTRAMUSCULAR; INTRAVENOUS at 01:32

## 2022-11-14 RX ADMIN — OXYCODONE AND ACETAMINOPHEN 1 TABLET: 5; 325 TABLET ORAL at 15:23

## 2022-11-14 RX ADMIN — FAMOTIDINE 20 MG: 20 TABLET ORAL at 09:34

## 2022-11-14 RX ADMIN — OXYCODONE AND ACETAMINOPHEN 1 TABLET: 5; 325 TABLET ORAL at 19:44

## 2022-11-14 RX ADMIN — FUROSEMIDE 10 MG/HR: 10 INJECTION INTRAMUSCULAR; INTRAVENOUS at 13:21

## 2022-11-14 RX ADMIN — INSULIN LISPRO 2 UNITS: 100 INJECTION, SOLUTION INTRAVENOUS; SUBCUTANEOUS at 11:52

## 2022-11-14 RX ADMIN — TIZANIDINE 4 MG: 4 TABLET ORAL at 09:34

## 2022-11-14 RX ADMIN — POTASSIUM CHLORIDE 20 MEQ: 1500 TABLET, EXTENDED RELEASE ORAL at 09:34

## 2022-11-14 RX ADMIN — MYCOPHENOLATE MOFETIL 500 MG: 500 TABLET, FILM COATED ORAL at 09:34

## 2022-11-14 RX ADMIN — PREGABALIN 75 MG: 75 CAPSULE ORAL at 19:45

## 2022-11-14 RX ADMIN — TIZANIDINE 4 MG: 4 TABLET ORAL at 19:45

## 2022-11-14 RX ADMIN — METOPROLOL SUCCINATE 25 MG: 25 TABLET, EXTENDED RELEASE ORAL at 09:33

## 2022-11-14 RX ADMIN — MAGNESIUM OXIDE TAB 400 MG (240 MG ELEMENTAL MG) 400 MG: 400 (240 MG) TAB at 09:34

## 2022-11-14 RX ADMIN — Medication 1 TABLET: at 09:33

## 2022-11-14 RX ADMIN — INSULIN GLARGINE 15 UNITS: 100 INJECTION, SOLUTION SUBCUTANEOUS at 20:00

## 2022-11-14 RX ADMIN — TIZANIDINE 4 MG: 4 TABLET ORAL at 14:51

## 2022-11-14 RX ADMIN — ASPIRIN 81 MG: 81 TABLET, COATED ORAL at 09:34

## 2022-11-14 RX ADMIN — TAMSULOSIN HYDROCHLORIDE 0.4 MG: 0.4 CAPSULE ORAL at 09:34

## 2022-11-14 RX ADMIN — PREGABALIN 75 MG: 75 CAPSULE ORAL at 09:34

## 2022-11-14 ASSESSMENT — PAIN DESCRIPTION - ONSET
ONSET: ON-GOING
ONSET: ON-GOING

## 2022-11-14 ASSESSMENT — PAIN DESCRIPTION - ORIENTATION
ORIENTATION: RIGHT;LEFT
ORIENTATION: RIGHT
ORIENTATION: RIGHT;LEFT;LOWER;UPPER

## 2022-11-14 ASSESSMENT — PAIN DESCRIPTION - PAIN TYPE
TYPE: CHRONIC PAIN
TYPE: CHRONIC PAIN

## 2022-11-14 ASSESSMENT — ENCOUNTER SYMPTOMS
COUGH: 0
WHEEZING: 0
SHORTNESS OF BREATH: 0
CHEST TIGHTNESS: 0

## 2022-11-14 ASSESSMENT — PAIN DESCRIPTION - LOCATION
LOCATION: KNEE;SHOULDER
LOCATION: KNEE;SHOULDER
LOCATION: SHOULDER
LOCATION: SHOULDER

## 2022-11-14 ASSESSMENT — PAIN SCALES - GENERAL
PAINLEVEL_OUTOF10: 9
PAINLEVEL_OUTOF10: 8
PAINLEVEL_OUTOF10: 7
PAINLEVEL_OUTOF10: 6
PAINLEVEL_OUTOF10: 3
PAINLEVEL_OUTOF10: 7

## 2022-11-14 ASSESSMENT — PAIN DESCRIPTION - DESCRIPTORS
DESCRIPTORS: ACHING;DISCOMFORT;NAGGING
DESCRIPTORS: ACHING
DESCRIPTORS: ACHING;DISCOMFORT

## 2022-11-14 ASSESSMENT — PAIN DESCRIPTION - FREQUENCY
FREQUENCY: CONTINUOUS
FREQUENCY: CONTINUOUS

## 2022-11-14 NOTE — PROGRESS NOTES
Cardiology Consult Service  Daily Progress Note        Admit Date:  11/10/2022  Primary cardiologist: Dr Vanessa Arias at [facility] / Dr Maddie Harding    Reason for Consultation/Chief Complaint: AHF    Subjective:     Bryant Mancuso is a 57 y.o. male with a past medical history of morbid obesity, HTN, HLP, DM, HFrEF with CardioMEMS implant in 2021, CAF, myositis, Sjogren's disease, acute DVT right soleal vein after TKA 03/2022, new LBBB (as of 03/2022) s/p CRT-D 08/2022. Echo 03/2021: LVH, LVEF 60%, indeterminate diastolic function, mild to moderate MR (similar to echo 01/2020 and echo 10/2019)     LHC 06/2019: Mild CAD (LAD 20%), normal LVEF. Echo 08/2022: Mild LVD, LVEF 35%, moderate LAE, mild to moderate MR, normal RV. R/LHC 08/2022: RA 14, PA 31/15/21, W 18, LVEDP 24, CI 1.8/2.3 (TD/AF). Minimal CAD left dominant system. Patient presented to the emergency room on 11/10 with progressively worse lower extremity edema, weight gain, dyspnea, atypical chest pains (positional) over the last 3 to 4 days. Patient was admitted for acute heart failure. Peak troponin 0.21 trending down. ECG consistent with AF, biventricular pacing. Home diuretics: Lasix 80 mg daily, metolazone 5 mg p.o. daily as needed, spironolactone 25 mg daily. Limited echo 11/2022: Normal LV, EF 55 to 60%, moderate LAE, normal RV. Interval history:  Patient is currently off supplemental oxygen. I's and O's -2.8 L, -1.4 L. Creatinine 1.1 from 1.2.     Objective:     Medications:   [START ON 11/15/2022] pantoprazole  40 mg Oral QAM AC    sodium chloride flush  5-40 mL IntraVENous 2 times per day    aspirin  81 mg Oral Daily    atorvastatin  40 mg Oral Nightly    ferrous sulfate  325 mg Oral Daily with breakfast    folic acid  1 mg Oral Daily    insulin glargine  15 Units SubCUTAneous Nightly    metoprolol succinate  25 mg Oral Daily    therapeutic multivitamin-minerals  1 tablet Oral Daily    mycophenolate  500 mg Oral TID    potassium chloride 20 mEq Oral Daily    pregabalin  75 mg Oral BID    rivaroxaban  20 mg Oral Daily    spironolactone  25 mg Oral Daily    tiZANidine  4 mg Oral TID    tamsulosin  0.4 mg Oral Daily    insulin lispro  0-8 Units SubCUTAneous TID WC    insulin lispro  0-4 Units SubCUTAneous Nightly       IV drips:   furosemide (LASIX) 1mg/ml infusion 10 mg/hr (11/14/22 1321)    sodium chloride      dextrose         PRN:  sodium chloride flush, sodium chloride, ondansetron **OR** ondansetron, polyethylene glycol, acetaminophen **OR** acetaminophen, glucose, dextrose bolus **OR** dextrose bolus, glucagon (rDNA), dextrose, oxyCODONE-acetaminophen    Vitals:    11/14/22 0406 11/14/22 0855 11/14/22 0928 11/14/22 1127   BP: 130/89  113/71 119/77   Pulse: 73  71 70   Resp: 12  17 16   Temp: 97.5 °F (36.4 °C)  97.7 °F (36.5 °C) 98 °F (36.7 °C)   TempSrc: Oral  Oral Oral   SpO2: 95%  93% 92%   Weight:  (!) 300 lb 11.3 oz (136.4 kg)     Height:           Intake/Output Summary (Last 24 hours) at 11/14/2022 1348  Last data filed at 11/14/2022 1025  Gross per 24 hour   Intake 1089 ml   Output 5200 ml   Net -4111 ml       I/O last 3 completed shifts: In: 8990 [P.O.:1300; I.V.:289]  Out: 6075 [Urine:6075]  Wt Readings from Last 3 Encounters:   11/14/22 (!) 300 lb 11.3 oz (136.4 kg)   11/08/22 (!) 316 lb (143.3 kg)   03/22/22 (!) 309 lb 11.9 oz (140.5 kg)       Admit Wt: Weight: (!) 311 lb 15.2 oz (141.5 kg)   Todays Wt: Weight: (!) 300 lb 11.3 oz (136.4 kg)    TELEMETRY personally reviewed: Atrial fibrillation with V pacing.      Physical Exam:         General Appearance:  Alert, cooperative, no distress, appears stated age Appropriate weight   Head:  Normocephalic, without obvious abnormality, atraumatic   Eyes:  PERRL, conjunctiva/corneas clear EOM intact  Ears normal   Throat no lesions       Nose: Nares normal, no drainage or sinus tenderness   Throat: Lips, mucosa, and tongue normal   Neck: Supple, symmetrical, trachea midline, no adenopathy, thyroid: not enlarged, symmetric, no tenderness/mass/nodules, no carotid bruit. Lungs:   Normal respiratory rate, lungs CTA bilaterally. Chest Wall:  No tenderness or deformity   Heart:  Regular rhythm, rate is controlled, S1, S2 normal, there is no murmur, there is no rub or gallop, cannot assess jvd, min bilateral lower extremity edema   Abdomen:   Soft, non-tender, bowel sounds active all four quadrants,  no masses, no organomegaly       Extremities: Extremities normal, atraumatic, no cyanosis. Pulses: 2+ and symmetric   Skin: Skin color, texture, turgor normal, no rashes or lesions   Pysch: Normal mood and affect   Neurologic: Normal gross motor and sensory exam.  Cranial nerves intact       Labs:   Recent Labs     11/12/22  0635 11/13/22  0545 11/14/22  0501   * 137 136   K 4.3 4.7 4.4   BUN 25* 28* 28*   CREATININE 1.2 1.2 1.1   CL 96* 98* 95*   CO2 28 27 32   GLUCOSE 141* 204* 134*   CALCIUM 9.1 9.0 9.3   MG 1.90 1.90 1.70*       Recent Labs     11/12/22  0635 11/13/22  0545 11/14/22  0501   WBC 7.2 7.7 6.7   HGB 12.9* 12.4* 13.6   HCT 38.9* 38.9* 40.8    244 215   MCV 86.1 86.5 84.4       No results for input(s): CHOLTOT, TRIG, HDL, CHOLHDL, LDL in the last 72 hours. Invalid input(s): Kjeddy Tamayo    No results for input(s): PTT, INR in the last 72 hours. Invalid input(s): PT  No results for input(s): CKTOTAL, CKMB, CKMBINDEX, TROPONINI in the last 72 hours. No results for input(s): BNP in the last 72 hours. No results for input(s): NTPROBNP in the last 72 hours. No results for input(s): TSH in the last 72 hours. Imaging:       Assessment & Plan:     1. Acute on chronic HFrEF. Patient has new systolic heart failure as diagnosed by echo in 08/2022 which is nonischemic in etiology (most likely due to LBBB, cannot exclude contribution by myositis). He is now relatively euvolemic and hemodynamically stable. 2.  CAF. 3.  CAD. It is minimal per recent LHC.   4. Myositis. Patient is supposed to be on mycophenolate 500 mg p.o. 3 times daily. 5.  Elevated troponin. It is most likely due to myositis and/or AHF (demand ischemia). I doubt ACS in view of recent unremarkable LHC. Patient does not have any concerning symptoms.        -Continue with baby aspirin, Xarelto  - Strict I's and O's every shift and standing weights if possible, low-salt diet and daily BMP with reflex to Mg, wean supplemental oxygen to off for sats greater than 94%. - Cw Lasix drip 10 mg/h.  - Continue with spironolactone 25 mg daily  - Continue with Toprol 25 mg daily  - Patient is allergic to ACEI (swelling, ? Angioedema). - No hydralazine or imdur due to low BP  -We will likely switch his Lasix drip to p.o. diuretics tomorrow. I have spent 35 minutes of face to face time with the patient with more than 50% spent counseling and coordinating care. I have personally reviewed the reports and images of labs, radiological studies, cardiac studies including ECG's and telemetry, current and old medical records. The note was completed using EMR and Dragon dictation system. Every effort was made to ensure accuracy; however, inadvertent computerized transcription errors may be present. All questions and concerns were addressed to the patient/family. Alternatives to my treatment were discussed. Thank you for allowing to us to participate in the care or Hiram Fischer. Please call our service with questions.     Julia Soto MD, [facility] [place], [address]  The [address] [address]  Ph: [phone]  Fax: [phone]

## 2022-11-14 NOTE — PROGRESS NOTES
Patient admitted to [facility] via [address] from PCU for diagnosis of HF exacerbation. Patient is alert and oriented to room including call light and bed controls. Admission assessment completed - see admission flowsheet documentation. Patient is a High fall risk. Safety measures instituted per policy. Bed alarm on. Patient oriented to unit policies and procedures including: pain management practices, unit safety precautions, family rapid response, q4h vital signs and assessments, daily 4am lab draws, etc.  Also discussed use of call light and how to get in touch with nursing staff. Stressed the importance of calling out immediately for any changes in condition including but not limited to: pain, chills, fever, nausea, vomiting, diarrhea, chest pain, sob/dickerson, assistance with toileting, bleeding, or any other symptoms that are out of the ordinary for the patient. Patient verbalizes understanding of all instructions and will call for assistance as needed.

## 2022-11-14 NOTE — PROGRESS NOTES
Speech Language Pathology    Order received, chard reviewed. Consult is for evaluation in conjunction with esophagram. Notified radiology. SLP to attempt tomorrow when can be scheduled in conjunction.      Enid Stanley M.A., [facility]  Speech-Language Pathologist  Pager [phone]

## 2022-11-14 NOTE — PLAN OF CARE
Problem: Safety - Adult  Goal: Free from fall injury  Outcome: Progressing  Pt is a High fall risk. See Darlyn Sarah Fall Score and ABCDS Injury Risk assessments. Explained fall risk precautions to pt and rationale behind their use to keep the patient safe. Pt bed is in low position, side rails up, call light and belongings are in reach. Fall wristband applied and present on pts wrist.  Bed alarm on. Pt encouraged to call for assistance. Will continue with hourly rounds for PO intake, pain needs, toileting and repositioning as needed. Problem: Discharge Planning  Goal: Discharge to home or other facility with appropriate resources  Outcome: Progressing     Problem: Pain  Goal: Verbalizes/displays adequate comfort level or baseline comfort level  Outcome: Progressing   Pt complained of 7-9/10 chronic shoulder and knee pain. Pt given PRN Percocet. Pt satisfied and encouraged to call out if pain worsens/returns. Verbalized understanding. Will continue to monitor.

## 2022-11-14 NOTE — PROGRESS NOTES
last week, but is otherwise compliant. States he does this roughly once per month due to frustration with medical issues and amount of pills. Of note, patient underwent cardiac catheterization on 8/26/202 with angiography and temporary transvenous pacing wire. No interventions at that time; underwent placement of left subclavian biventricular pacemaker/ICD on 8/31/2022.     Medications:     Scheduled Meds:   [START ON 11/15/2022] pantoprazole  40 mg Oral QAM AC    sodium chloride flush  5-40 mL IntraVENous 2 times per day    aspirin  81 mg Oral Daily    atorvastatin  40 mg Oral Nightly    ferrous sulfate  325 mg Oral Daily with breakfast    folic acid  1 mg Oral Daily    insulin glargine  15 Units SubCUTAneous Nightly    metoprolol succinate  25 mg Oral Daily    therapeutic multivitamin-minerals  1 tablet Oral Daily    mycophenolate  500 mg Oral TID    potassium chloride  20 mEq Oral Daily    pregabalin  75 mg Oral BID    rivaroxaban  20 mg Oral Daily    spironolactone  25 mg Oral Daily    tiZANidine  4 mg Oral TID    tamsulosin  0.4 mg Oral Daily    insulin lispro  0-8 Units SubCUTAneous TID WC    insulin lispro  0-4 Units SubCUTAneous Nightly     Continuous Infusions:   furosemide (LASIX) 1mg/ml infusion 10 mg/hr (11/14/22 1321)    sodium chloride      dextrose       PRN Meds:sodium chloride flush, sodium chloride, ondansetron **OR** ondansetron, polyethylene glycol, acetaminophen **OR** acetaminophen, glucose, dextrose bolus **OR** dextrose bolus, glucagon (rDNA), dextrose, oxyCODONE-acetaminophen    Objective:   Vitals:   T-max:  Patient Vitals for the past 8 hrs:   BP Temp Temp src Pulse Resp SpO2 Weight   11/14/22 1127 119/77 98 °F (36.7 °C) Oral 70 16 92 % --   11/14/22 0928 113/71 97.7 °F (36.5 °C) Oral 71 17 93 % --   11/14/22 0855 -- -- -- -- -- -- (!) 300 lb 11.3 oz (136.4 kg)         Intake/Output Summary (Last 24 hours) at 11/14/2022 1402  Last data filed at 11/14/2022 1025  Gross per 24 hour Intake 1089 ml   Output 5200 ml   Net -4111 ml         Review of Systems   Constitutional:  Negative for chills, fatigue and fever. Respiratory:  Negative for cough, chest tightness, shortness of breath and wheezing. Cardiovascular:  Negative for chest pain, palpitations and leg swelling. Neurological:  Negative for dizziness. Psychiatric/Behavioral:  Negative for confusion. Physical Exam  Constitutional:       Appearance: Normal appearance. He is obese. He is not ill-appearing or diaphoretic. HENT:      Head: Normocephalic and atraumatic. Right Ear: External ear normal.      Left Ear: External ear normal.      Nose: Nose normal.      Mouth/Throat:      Mouth: Mucous membranes are moist.   Eyes:      Extraocular Movements: Extraocular movements intact. Cardiovascular:      Rate and Rhythm: Normal rate and regular rhythm. Pulses: Normal pulses. Heart sounds: Normal heart sounds. Pulmonary:      Effort: No respiratory distress. Breath sounds: Normal breath sounds. No wheezing. Musculoskeletal:      Right lower leg: Edema present. Left lower leg: Edema present. Comments: Edema has greatly imroved   Neurological:      General: No focal deficit present. Mental Status: He is alert and oriented to person, place, and time.    Psychiatric:         Mood and Affect: Mood normal.         Behavior: Behavior normal.       LABS:    CBC:   Recent Labs     11/12/22  0635 11/13/22  0545 11/14/22  0501   WBC 7.2 7.7 6.7   HGB 12.9* 12.4* 13.6   HCT 38.9* 38.9* 40.8    244 215   MCV 86.1 86.5 84.4       Renal:    Recent Labs     11/12/22  0635 11/13/22  0545 11/14/22  0501   * 137 136   K 4.3 4.7 4.4   CL 96* 98* 95*   CO2 28 27 32   BUN 25* 28* 28*   CREATININE 1.2 1.2 1.1   GLUCOSE 141* 204* 134*   CALCIUM 9.1 9.0 9.3   MG 1.90 1.90 1.70*   ANIONGAP 10 12 9       Hepatic: No results for input(s): AST, ALT, BILITOT, BILIDIR, PROT, LABALBU, ALKPHOS in the last 72 hours.  Troponin:   No results for input(s): TROPONINI in the last 72 hours. BNP: No results for input(s): BNP in the last 72 hours. Lipids: No results for input(s): CHOL, HDL in the last 72 hours. Invalid input(s): LDLCALCU, TRIGLYCERIDE    ABGs:  No results for input(s): PHART, HMN6NBG, PO2ART, RVK0XTD, BEART, THGBART, T4DQZVIQ, NRX6YQO in the last 72 hours. INR: No results for input(s): INR in the last 72 hours. Lactate: No results for input(s): LACTATE in the last 72 hours. Cultures:  -----------------------------------------------------------------  RAD:   XR CHEST (2 VW)   Final Result      No acute disease. Assessment/Plan:    53-year-old male with past medical history HTN, HLD, CAD, CHF (EF 35% 8/2022), DM2, AF (Xarelto), DVT, Sjogren's, aortic aneurysm who presented to [facility]. with chief complaints of dyspnea and chest pressure. Acute on chronic CHF exacerbation  P/w 3 days worsening dyspnea, BLE edema, chest pressure. BNP 2012 (1200 in 3/2022), Trp 0.25. CXR negative. EKG w/o ischemic changes. Hx HFrEF (35% 8/2022). S/p cath 8/2022 with BiV/ICD placement. - Cardiology increased Lasix drip from 5 mg/h to 10 mg/h and plans on transitioning to p.o. tomorrow. - Continue Telemetry  - Trend BNP q3 days (down to 800's from 2,000)  - Cardiology consulted and recs appreciated  - ECHO from 11/11/22 pending  - Strict I/Os. Daily weights, HF diet, Optimize lytes  - Continue home Metoprolol, spironolactone     Troponinemia - discontinued  Trp 0.25 > 0.21 > 0.14 (baseline 0.6-1.0). EKG w/I ischemic changes. Likely 2/2 CHF exacerbation.   - Troponin stabilized  - Telemetry       3. GERD:   - Consulted GI with recs of possible barium swallow and if pursuing treatment to do it as outpatient. - Per GI recs, transitioned from famotidine to pantoprazole.       4. Chronic Issues  DM2: MDSS, POCT, hypoglycemia protocol  R knee OA: Home percocet  CAD: Home statin, ASA  Anemia: Home iron, Daily CBC     Code Status: Full Code  FEN: GD  PPX: Xarelto  DISPO: IP  ELOS:1  Barriers to Discharge: Continued diuresis    Santiago Goins MD, PGY-1  11/14/22  2:02 PM    This patient has been staffed and discussed with Lan Pride MD.

## 2022-11-14 NOTE — PROGRESS NOTES
Physical Therapy  Facility/Department: [address]  Physical Therapy Initial Assessment and treatment    Name: Shaka Fu  : 1960  MRN: [id number]  Date of Service: 2022    Discharge Recommendations:Richard Cat scored a 18/24 on the AM-PAC short mobility form. Current research shows that an AM-PAC score of 18 or greater is typically associated with a discharge to the patient's home setting. Based on the patient's AM-PAC score and their current functional mobility deficits, it is recommended that the patient have 2-3 sessions per week of Physical Therapy at d/c to increase the patient's independence. At this time, this patient demonstrates the endurance and safety to discharge home with home services and a follow up treatment frequency of 2-3x/wk. Please see assessment section for further patient specific details. If patient discharges prior to next session this note will serve as a discharge summary. Please see below for the latest assessment towards goals. PT Equipment Recommendations  Equipment Needed: No (pt owns RW and [id number])      Patient Diagnosis(es): The primary encounter diagnosis was Acute on chronic congestive heart failure, unspecified heart failure type ([address] A diagnosis of Acute on chronic systolic congestive heart failure (HCC) was also pertinent to this visit. Past Medical History:  has a past medical history of Arthritis, Depression, Diabetes mellitus ([address] Diastolic CHF ([address] Difficult intubation, Dyslipidemia, Herniated disc, cervical, Hypertension, Osteoporosis, and Peripheral neuropathy. Past Surgical History:  has a past surgical history that includes hernia repair; Breast surgery (Left, 2019); and Muscle biopsy (Left, 10/10/2019).     Assessment   Body Structures, Functions, Activity Limitations Requiring Skilled Therapeutic Intervention: Decreased functional mobility   Assessment: The pt is a 58 y/o male who presents with acute CHF exacerbation. He is typically mod I at home with use of SPC in his home and rollator in the community. He performs his own ADLs and has an aide for assist with iADLs. He has had several falls this year. The pt presents with decreased LE strength, impaired balance requiring use of RW with mobility, impaired gait mechanics, and decreased activity tolerance. He would benefit from further PT in the acute setting as well as HHPT to improve his independence and safety with mobility. PT recommending he use RW or rollator at all times for mobility and pt aware; verbalizing understanding. Treatment Diagnosis: impaired mobility 2/2 CHF exacerbation  Therapy Prognosis: Good  Decision Making: Medium Complexity  Requires PT Follow-Up: Yes  Activity Tolerance  Activity Tolerance: Patient tolerated evaluation without incident;Patient limited by fatigue     Plan   Physcial Therapy Plan  General Plan:  (2-5)  Current Treatment Recommendations: Strengthening, Balance training, Functional mobility training, Transfer training, Gait training, Neuromuscular re-education, Therapeutic activities, Patient/Caregiver education & training  Safety Devices  Type of Devices: All fall risk precautions in place, Call light within reach, Chair alarm in place, Gait belt, Left in chair, Nurse notified     Restrictions  Position Activity Restriction  Other position/activity restrictions: up as tolerated     Subjective   Pain: generalized several places knee 6/10, shoulders 5/10, neck 4/10 feet 5/10; dull pain in inner thigh on the R  General  Chart Reviewed: Yes  Patient assessed for rehabilitation services?: Yes  Additional Pertinent Hx: Rita Cheatham is a 57 y.o. male with a past medical history of morbid obesity, HTN, HLP, DM, HFrEF with CardioMEMS implant in 2021, CAF, myositis, Sjogren's disease, acute DVT right soleal vein after TKA 03/2022, new LBBB (as of 03/2022) s/p CRT-D 08/2022.   Family / Caregiver Present: No  Referring Practitioner: Jyothi Cheung Yash Steven MD  Diagnosis: CHF exacerbation  Follows Commands: Within Functional Limits  General Comment  Comments: The pt presents supine in bed and willing to work with therapist.  Subjective  Subjective: \"I didn't know that cardiac rehab wasn't physical therapy. \"         Social/Functional History  Social/Functional History  Lives With: Alone  Type of Home: Apartment (senior apartment)  Home Layout: One level  Home Access: Stairs to enter with rails  Entrance Stairs - Number of Steps: 8  Entrance Stairs - Rails: Both  Bathroom Shower/Tub: Walk-in shower, Shower chair with back  Bathroom Toilet: Handicap height  Bathroom Equipment: Grab bars in shower, Hand-held shower, Grab bars around toilet  Home Equipment: Cane, MDLIVE2 AorTx, Grab bars, Walker, rolling, Rollator, Long-handled shoehorn, Reacher, Sock aid  Has the patient had two or more falls in the past year or any fall with injury in the past year?: Yes (4-5 falls he states from muscle weakness due to sjogrens)  Receives Help From: Home health (aide states that he should get 14 hrs but they don't come)  ADL Assistance: [address]: Needs assistance (he does cooking, 30% cleaning, aide does laundry)  Homemaking Responsibilities: Yes  Ambulation Assistance: Independent (typically uses a cane but uses rollator when he goes out in community)  Transfer Assistance: Independent  Active : No  Patient's  Info: insurance provides rides  Occupation: Retired  Type of Occupation: , construction  Additional Comments: cardiac rehab  [address]: Impaired  Vision Exceptions: Wears glasses for reading  Hearing  Hearing: Within functional limits    Cognition   Orientation  Overall Orientation Status: Within Functional Limits  Cognition  Overall Cognitive Status: WFL     Objective   Heart Rate: 70  Heart Rate Source: Telemetry  BP: 119/77  BP Location: Left upper arm  BP Method: Automatic  Patient Position: Supine  MAP (Calculated): 91  Resp: 16  SpO2: 92 %  O2 Device: None (Room air)          Bed Mobility Training  Bed Mobility Training: Yes  Supine to Sit: Contact-guard assistance;Minimum assistance; Additional time  Sit to Supine:  (left in chair end of session)  Balance  Sitting: Intact  Standing: With support  Transfer Training  Transfer Training: Yes  Sit to Stand: Stand-by assistance  Stand to Sit: Stand-by assistance  Toilet Transfer: Stand-by assistance  Gait  Overall Level of Assistance: Contact-guard assistance  Assistive Device: Walker, rolling  Bed mobility  Supine to Sit: Minimal assistance (HOB elevated; assist at trunk)  Scooting: Stand by assistance (to EOB)  Transfers  Sit to Stand: Stand by assistance (from bed)  Stand to Sit: Stand by assistance  Comment: with use of RW  Ambulation  Surface: Level tile  Device: Rolling Walker  Assistance: Contact guard assistance  Quality of Gait: pt with slow cassie, decreased step length and height, step to pattern  Distance: 150'  Comments: Increased time to complete, pt unsteady when taking a few steps with just cane initially and instructed to use walker at all times with mobility.   Stairs/Curb  Stairs?: No     Balance  Sitting - Static: Good  Sitting - Dynamic: Fair;+  Standing - Static: Fair  Standing - Dynamic: Fair;-  Comments: Pt SBA to CGA for mobility with use of RW   Treatment:  Functional mobility training and pt education    AM-PAC Score  AM-PAC Inpatient Mobility Raw Score : 18 (11/14/22 1226)  AM-PAC Inpatient T-Scale Score : 43.63 (11/14/22 1226)  Mobility Inpatient CMS 0-100% Score: 46.58 (11/14/22 1226)  Mobility Inpatient CMS G-Code Modifier : CK (11/14/22 1226)         Goals  Short Term Goals  Time Frame for Short Term Goals: By discharge  Short Term Goal 1: The pt will perform bed mobility with supervision from flat bed without use of rail  Short Term Goal 2: The pt will transfer with supervision and RW  Short Term Goal 3: The pt will ambulate with RW x 150' and supervision  Patient Goals   Patient Goals :  To go home and get stronger       Education  Patient Education  Education Given To: Patient  Education Provided: Role of Therapy;Plan of Care;Transfer Training  Education Provided Comments: pt educated on use of walker at all times  Education Method: Verbal  Barriers to Learning: None  Education Outcome: Verbalized understanding;Continued education needed      Therapy Time   Individual Concurrent Group Co-treatment   Time In 0830         Time Out 0910         Minutes 40         Timed Code Treatment Minutes: 25 Minutes   Timed Code Treatment Minutes:  25 Minutes    Total Treatment Minutes:  40 minutes      Leah Dee, PT

## 2022-11-14 NOTE — CONSULTS
[address] [place] [address]  GI Consultation                                                                 Patient: Lidia Chase  : 1960       Date:  2022    Subjective:       History of Present Illness  Patient is a 57 y.o.  male admitted with Heart failure ([address] [I50.9]  Acute on chronic congestive heart failure, unspecified heart failure type ([address] [I50.9] who is seen in consult for GERD. Patient presented with complaints of chest pressure and dyspnea. Patient reported 2-3 days of progressively worsening dyspnea w/ exertion and 10 lb weight gain. He had associated chest pressure: constant, midsternal, and nonexertional. Symptoms were similar to his prior episodes of heart failure exacerbation. Patient was admitted for heart failure exacerbation and started on diuresis. Patient reports that he has continued issues with keeping things down. Denies vomitus. Instead reports that when he eats solid foods or takes pills, things are okay for a short while, but 5 or 10 minutes later, the pill or food comes back up. Reports some chest tightness, but denies consistent feeling of food getting stuck. He usually doesn't have issues with liquids. Denies bloating, abdominal pain, constipation/diarrhea. Patient reports some mild improvement with the previous botox injections, but on retrospection is unsure how much of an improvement there actually was. He is frustrated with his chronic health issues and wishes there were something to just fix the problem, rather than temporary relief.     Past Medical History:   Diagnosis Date    Arthritis     Depression     Diabetes mellitus ([address]     Diastolic CHF ([address]     Difficult intubation     throat swelled    Dyslipidemia     Herniated disc, cervical     Hypertension     Osteoporosis     Peripheral neuropathy       Past Surgical History:   Procedure Laterality Date    BREAST SURGERY Left 2019    LEFT BREAST MASS EXCISION; LEFT AXILLIARY LYMPH NODE EXCISION performed by Yuliana Dan MD at [facility] [address] Left 10/10/2019    MUSCLE BIOPSY LOWER EXTREMITY LEFT performed by Yuliana Dan MD at [address]      Past Endoscopic History   EGD w/ Botox Injection w/ Dr. Laurie Maddox 3/10/22  1. Mild esophageal resistance on intubation, otherwise normal appearing mucosa with botox injected   2. Normal gastric mucosa, retroflexion without hiatal hernia, no mass lesion seen  3. Normal duodenal bulb and 2nd portion of duodenum     Colonoscopy + EGD w/ intramuscular injection of Botox into LES,3/16/21 w/ Dr. Franki Patel  1. Normal esophageal mucosa  2. Mild resistance with passage of endoscope through LES status post Botox injection  3. Normal stomach  4. Normal duodenum  5. Normal colonoscopy to the terminal ileum  6. Small internal hemorrhoids    Esophageal Manometry 11/1/2019  Abnormal study. This may be consistent with gastroesophageal junction outflow obstruction. Admission Meds  No current facility-administered medications on file prior to encounter.      Current Outpatient Medications on File Prior to Encounter   Medication Sig Dispense Refill    furosemide (LASIX) 80 MG tablet Take 80 mg by mouth daily      metOLazone (ZAROXOLYN) 5 MG tablet Take 5 mg by mouth daily as needed      tadalafil (CIALIS) 5 MG tablet TAKE 1 TABLET BY MOUTH ONCE DAILY      tiZANidine (ZANAFLEX) 4 MG tablet TAKE 1 TABLET BY MOUTH THREE TIMES DAILY AS NEEDED FOR PAIN      FARXIGA 10 MG tablet TAKE 1 TABLET BY MOUTH ONCE DAILY IN THE MORNING      metoprolol succinate (TOPROL XL) 25 MG extended release tablet TAKE 1 TABLET BY MOUTH ONCE DAILY      potassium chloride (KLOR-CON M) 20 MEQ extended release tablet Take 20 mEq by mouth daily      PROFE 391.3 (180 Fe) MG CAPS TAKE 1 CAPSULE BY MOUTH EVERY OTHER DAY      ibuprofen (ADVIL;MOTRIN) 800 MG tablet TAKE 1 TABLET BY MOUTH THREE TIMES DAILY AS NEEDED FOR PAIN      famotidine (PEPCID) 20 MG tablet Take 1 tablet by mouth 2 times daily 60 tablet 3    rivaroxaban (XARELTO) 20 MG TABS tablet Take 1 tablet by mouth daily 90 tablet 2    insulin glargine (LANTUS) 100 UNIT/ML injection vial Inject 15 Units into the skin nightly      pregabalin (LYRICA) 75 MG capsule Take 75 mg by mouth 2 times daily. cyanocobalamin 1000 MCG tablet Take 1,000 mcg by mouth daily      ferrous sulfate (IRON 325) 325 (65 Fe) MG tablet Take 325 mg by mouth daily (with breakfast)      folic acid (FOLVITE) 1 MG tablet Take 1 mg by mouth daily      Multiple Vitamins-Minerals (THERAPEUTIC MULTIVITAMIN-MINERALS) tablet Take 1 tablet by mouth daily      mycophenolate (CELLCEPT) 500 MG tablet Take by mouth in the morning, at noon, and at bedtime      spironolactone (ALDACTONE) 25 MG tablet Take 25 mg by mouth daily      tamsulosin (FLOMAX) 0.4 MG capsule Take 0.4 mg by mouth daily      oxyCODONE-acetaminophen (PERCOCET) 5-325 MG per tablet Take 1 tablet by mouth every 4 hours as needed for Pain. atorvastatin (LIPITOR) 40 MG tablet Take 1 tablet by mouth nightly 30 tablet 3    aspirin 81 MG tablet Take 81 mg by mouth daily         Allergies  Allergies   Allergen Reactions    Lisinopril Swelling    Bee Venom Swelling    Other Itching     Itch like crazy EKG TAPE      Social   Social History     Tobacco Use    Smoking status: Never    Smokeless tobacco: Never   Substance Use Topics    Alcohol use: Yes     Comment: occ        Family History   Problem Relation Age of Onset    Heart Disease Mother     High Blood Pressure Mother     High Blood Pressure Father     High Blood Pressure Sister     Breast Cancer Brother     Heart Disease Brother     High Blood Pressure Brother       No family history of colon cancer, Crohn's disease, or ulcerative colitis. Review of Systems  Pertinent items are noted in HPI.        Physical Exam    /71   Pulse 71   Temp 97.7 °F (36.5 °C) (Oral)   Resp 17   Ht 5' 11\" (1.803 m)   Wt (!) 300 lb 11.3 oz (136.4 kg)   SpO2 93%   BMI 41.94 kg/m²   General appearance: alert, cooperative, no distress, appears stated age  Anicteric, No Jaundice  Head: Normocephalic, without obvious abnormality  Lungs: Bibasilar crackles, Normal Effort  Heart: regular rate and rhythm, normal S1 and S2, no murmurs or rubs  Abdomen: soft, non-tender; bowel sounds normal; no masses,  no organomegaly  Extremities: atraumatic, no cyanosis, LE edema  Skin: warm and dry  Neuro: intact  AAOX3    Data Review:    Recent Labs     11/12/22  0635 11/13/22  0545 11/14/22  0501   WBC 7.2 7.7 6.7   HGB 12.9* 12.4* 13.6   HCT 38.9* 38.9* 40.8   MCV 86.1 86.5 84.4    244 215     Recent Labs     11/12/22 0635 11/13/22  0545 11/14/22  0501   * 137 136   K 4.3 4.7 4.4   CL 96* 98* 95*   CO2 28 27 32   BUN 25* 28* 28*   CREATININE 1.2 1.2 1.1         Assessment:     Principal Problem:    Heart failure (HCC)  Active Problems:    Acute on chronic congestive heart failure (HCC)  Resolved Problems:    * No resolved hospital problems. *    Regurgitation, Reflux symptoms:  -There may be some concern for patient's hyperactive LES being a precursor to mild achalasia. Recommendations:     Would swap Pepcid for PPI: protonix 40mg daily  Can do MBS/esophagram to evaluate esophageal motility; patient's last evaluation was 2019. If intervention is indicated, Options include: repeat botox injection, balloon dilation, and surgical intervention. Patient may not be a great surgical candidate, but procedure would be elective. Recommend he follow-up outpatient for discussion of next steps once his CHF exacerbation has resolved.     In addition, recommend dietary/lifestyle changes:  -Make sure to eat while sitting upright  -Eat small bites, chewed thoroughly and with liquids in between bites    Patient was discussed with attending physician Dr. Denisse Chisholm MD  Internal medicine resident, PGY-3  Perfect serve    Thank you for the opportunity to participate in [facility]'s care.     Patient seen examined discussed agree with note

## 2022-11-14 NOTE — PROGRESS NOTES
4 Eyes Admission Assessment     I agree as the admission nurse that 2 RN's have performed a thorough Head to Toe Skin Assessment on the patient. ALL assessment sites listed below have been assessed on admission. Areas assessed by both nurses: Patluciae Poppy  [x]   Head, Face, and Ears   [x]   Shoulders, Back, and Chest  [x]   Arms, Elbows, and Hands   [x]   Coccyx, Sacrum, and Ischium  [x]   Legs, Feet, and Heels        Does the Patient have Skin Breakdown? No    Healing scabbing on bilateral LE.   Titus Prevention initiated:  No   Wound Care Orders initiated:  No      [facility] nurse consulted for Pressure Injury (Stage 3,4, Unstageable, DTI, NWPT, and Complex wounds) or Titus score 18 or lower:  No      Nurse 1 eSignature: Electronically signed by Kevin Valdivia RN on 11/13/22 at 10:34 PM EST    **SHARE this note so that the co-signing nurse is able to place an eSignature**    Nurse 2 eSignature: Electronically signed by Becca Rosenberg RN on 11/14/22 at 2:04 AM EST

## 2022-11-14 NOTE — PROGRESS NOTES
Occupational Therapy  Facility/Department: [facility]  Occupational Therapy Initial Assessment    Name: Lawyer Pendleton  : 1960  MRN: [id number]  Date of Service: 2022    Discharge Recommendations: Lawyer Pendleton scored a 18/24 on the AM-PAC ADL Inpatient form. Current research shows that an AM-PAC score of 18 or greater is typically associated with a discharge to the patient's home setting. Based on the patient's AM-PAC score, and their current ADL deficits, it is recommended that the patient have 2-3 sessions per week of Occupational Therapy at d/c to increase the patient's independence. At this time, this patient demonstrates the endurance and safety to discharge home with (home vs OP services) and a follow up treatment frequency of 2-3x/wk. Please see assessment section for further patient specific details. If patient discharges prior to next session this note will serve as a discharge summary. Please see below for the latest assessment towards goals. HOME HEALTH CARE: LEVEL 3 SAFETY     - Initial home health evaluation to occur within 24-48 hours, in patient home   - Therapy evaluations in home within 24-48 hours of discharge; including DME and home safety   - Frontload therapy 5 days, then 3x a week   - Therapy to evaluate if patient has [address] needs for personal care   -  evaluation within 24-48 hours, includes evaluation of resources and insurance to determine [place], [place], LTC, and Medicaid options       OT Equipment Recommendations  Equipment Needed: No  Other: reporting has reacher, shoe horn, sock aid       Patient Diagnosis(es): The primary encounter diagnosis was Acute on chronic congestive heart failure, unspecified heart failure type ([address] A diagnosis of Acute on chronic systolic congestive heart failure (HCC) was also pertinent to this visit.   Past Medical History:  has a past medical history of Arthritis, Depression, Diabetes mellitus ([address] Diastolic CHF ([facility] Difficult intubation, Dyslipidemia, Herniated disc, cervical, Hypertension, Osteoporosis, and Peripheral neuropathy. Past Surgical History:  has a past surgical history that includes hernia repair; Breast surgery (Left, 8/7/2019); and Muscle biopsy (Left, 10/10/2019). Treatment Diagnosis: impaired mobility, transfers, ADL      Assessment   Performance deficits / Impairments: Decreased functional mobility ; Decreased ADL status; Decreased endurance  Assessment: From home alone, goes to cardiac rehab, has aid ~14 hours a week, reporting inconstancies with them assisting him. Admit with HF, completing mobility with CGA for ambulation, SBA transfers, using RW for mobility. Mobility in room, bathroom, hallway. Pt would benefit from cont OT while in acute care. Plans to rtn home at [place]. Would benefit from Adriana . Treatment Diagnosis: impaired mobility, transfers, ADL  Decision Making: Low Complexity  REQUIRES OT FOLLOW-UP: Yes  Activity Tolerance  Activity Tolerance: Patient Tolerated treatment well        Plan   Occupational Therapy Plan  Times Per Week: 2-5     Restrictions  Position Activity Restriction  Other position/activity restrictions: up as tolerated    Subjective   General  Chart Reviewed: Yes  Patient assessed for rehabilitation services?: Yes  Additional Pertinent Hx: 78-year-old male with past medical history HTN, HLD, CAD, CHF (EF 35% 8/2022), DM2, AF, DVT, Sjogren's, aortic aneurysm who presented to [facility]. with chief complaints of dyspnea and chest pressure. Referring Practitioner: Juventino Talbot MD  Diagnosis: HF  Subjective  Subjective:  In bed agreeable to session     Social/Functional History  Social/Functional History  Lives With: Alone  Type of Home: Apartment (senior apartment)  Home Layout: One level  Home Access: Stairs to enter with rails  Entrance Stairs - Number of Steps: 8  Entrance Stairs - Rails: Both  Bathroom Shower/Tub: Walk-in shower, Shower chair with back  Bathroom Toilet: Handicap height  Bathroom Equipment: Grab bars in shower, Hand-held shower, Grab bars around toilet  Home Equipment: Cane, Crutches, Grab bars, Walker, rolling, Rollator, Long-handled shoehorn, Reacher, Sock aid  Has the patient had two or more falls in the past year or any fall with injury in the past year?: Yes (4-5 falls he states from muscle weakness due to sjogrens)  Receives Help From: Home health (aide states that he should get 14 hrs but they don't come)  ADL Assistance: Independent  Homemaking Assistance: Needs assistance (he does cooking, 30% cleaning, aide does laundry)  Homemaking Responsibilities: Yes  Ambulation Assistance: Independent (typically uses a cane but uses rollator when he goes out in community)  Transfer Assistance: Independent  Active : No  Patient's  Info: insurance provides rides  Occupation: Retired  Type of Occupation: , construction  Additional Comments: cardiac rehab                    Safety Devices  Type of Devices: All fall risk precautions in place;Call light within reach; Chair alarm in place;Gait belt;Left in chair;Nurse notified  Bed Mobility Training  Bed Mobility Training: Yes  Supine to Sit: Contact-guard assistance;Minimum assistance; Additional time  Sit to Supine:  (left in chair end of session)  Balance  Sitting: Intact  Standing: With support  Transfer Training  Transfer Training: Yes  Sit to Stand: Stand-by assistance  Stand to Sit: Stand-by assistance  Toilet Transfer: Stand-by assistance  Gait  Overall Level of Assistance: Contact-guard assistance  Assistive Device: Walker, rolling     AROM: Generally decreased, functional  Strength: Generally decreased, functional  ADL  Feeding: Independent  Grooming: Supervision  LE Dressing:  (Jody to adjust socks, uses AE at home)  Toileting: Stand by assistance              Vision  Vision: Impaired  Vision Exceptions: Wears glasses for reading  Hearing  Hearing: Within functional limits  Cognition  Overall Cognitive Status: WFL  Orientation  Overall Orientation Status: Within Functional Limits                  Education Given To: Patient  Education Provided: Role of Therapy;Plan of Care;ADL Adaptive Strategies;Transfer Training;Energy Conservation  Education Method: Demonstration;Verbal  Barriers to Learning: None  Education Outcome: Continued education needed                        AM-PAC Score        AM-PAC Inpatient Daily Activity Raw Score: 18 (11/14/22 1217)  AM-PAC Inpatient ADL T-Scale Score : 38.66 (11/14/22 1217)  ADL Inpatient CMS 0-100% Score: 46.65 (11/14/22 1217)  ADL Inpatient CMS G-Code Modifier : CK (11/14/22 1217)        Goals  Short Term Goals  Time Frame for Short Term Goals: dc  Short Term Goal 1: supine to sit SPVN  Short Term Goal 2: sit<>stand SPVN  Short Term Goal 3: Fx mobility SBA  Short Term Goal 4: LB dressing SBA + AE  Short Term Goal 5: toileting SPVN       Therapy Time   Individual Concurrent Group Co-treatment   Time In 0830         Time Out 0908         Minutes 38          Timed Code Treatment Minutes:   23    Total Treatment Minutes:  [address] [facility]

## 2022-11-15 ENCOUNTER — APPOINTMENT (OUTPATIENT)
Dept: GENERAL RADIOLOGY | Age: 62
DRG: 194 | End: 2022-11-15
Payer: COMMERCIAL

## 2022-11-15 LAB
ANION GAP SERPL CALCULATED.3IONS-SCNC: 14 MMOL/L (ref 3–16)
BASOPHILS ABSOLUTE: 0.1 K/UL (ref 0–0.2)
BASOPHILS RELATIVE PERCENT: 0.9 %
BUN BLDV-MCNC: 37 MG/DL (ref 7–20)
CALCIUM SERPL-MCNC: 9.4 MG/DL (ref 8.3–10.6)
CHLORIDE BLD-SCNC: 92 MMOL/L (ref 99–110)
CO2: 30 MMOL/L (ref 21–32)
CREAT SERPL-MCNC: 1.7 MG/DL (ref 0.8–1.3)
EOSINOPHILS ABSOLUTE: 0.3 K/UL (ref 0–0.6)
EOSINOPHILS RELATIVE PERCENT: 3.6 %
GFR SERPL CREATININE-BSD FRML MDRD: 44 ML/MIN/{1.73_M2}
GLUCOSE BLD-MCNC: 104 MG/DL (ref 70–99)
GLUCOSE BLD-MCNC: 136 MG/DL (ref 70–99)
GLUCOSE BLD-MCNC: 170 MG/DL (ref 70–99)
GLUCOSE BLD-MCNC: 207 MG/DL (ref 70–99)
GLUCOSE BLD-MCNC: 226 MG/DL (ref 70–99)
HCT VFR BLD CALC: 43 % (ref 40.5–52.5)
HEMOGLOBIN: 14.3 G/DL (ref 13.5–17.5)
LYMPHOCYTES ABSOLUTE: 1.3 K/UL (ref 1–5.1)
LYMPHOCYTES RELATIVE PERCENT: 17.6 %
MAGNESIUM: 1.9 MG/DL (ref 1.8–2.4)
MCH RBC QN AUTO: 28.4 PG (ref 26–34)
MCHC RBC AUTO-ENTMCNC: 33.2 G/DL (ref 31–36)
MCV RBC AUTO: 85.7 FL (ref 80–100)
MONOCYTES ABSOLUTE: 0.9 K/UL (ref 0–1.3)
MONOCYTES RELATIVE PERCENT: 11.8 %
NEUTROPHILS ABSOLUTE: 5 K/UL (ref 1.7–7.7)
NEUTROPHILS RELATIVE PERCENT: 66.1 %
PDW BLD-RTO: 15.6 % (ref 12.4–15.4)
PERFORMED ON: ABNORMAL
PLATELET # BLD: 224 K/UL (ref 135–450)
PMV BLD AUTO: 7.3 FL (ref 5–10.5)
POTASSIUM SERPL-SCNC: 4.9 MMOL/L (ref 3.5–5.1)
RBC # BLD: 5.01 M/UL (ref 4.2–5.9)
SODIUM BLD-SCNC: 136 MMOL/L (ref 136–145)
WBC # BLD: 7.6 K/UL (ref 4–11)

## 2022-11-15 PROCEDURE — 99452 NTRPROF PH1/NTRNET/EHR RFRL: CPT | Performed by: INTERNAL MEDICINE

## 2022-11-15 PROCEDURE — 92526 ORAL FUNCTION THERAPY: CPT

## 2022-11-15 PROCEDURE — 92611 MOTION FLUOROSCOPY/SWALLOW: CPT

## 2022-11-15 PROCEDURE — 74220 X-RAY XM ESOPHAGUS 1CNTRST: CPT

## 2022-11-15 PROCEDURE — 92610 EVALUATE SWALLOWING FUNCTION: CPT

## 2022-11-15 PROCEDURE — 6370000000 HC RX 637 (ALT 250 FOR IP)

## 2022-11-15 PROCEDURE — 6360000002 HC RX W HCPCS: Performed by: INTERNAL MEDICINE

## 2022-11-15 PROCEDURE — 74230 X-RAY XM SWLNG FUNCJ C+: CPT

## 2022-11-15 PROCEDURE — 99233 SBSQ HOSP IP/OBS HIGH 50: CPT | Performed by: HOSPITALIST

## 2022-11-15 PROCEDURE — 83735 ASSAY OF MAGNESIUM: CPT

## 2022-11-15 PROCEDURE — 36415 COLL VENOUS BLD VENIPUNCTURE: CPT

## 2022-11-15 PROCEDURE — 2060000000 HC ICU INTERMEDIATE R&B

## 2022-11-15 PROCEDURE — 80048 BASIC METABOLIC PNL TOTAL CA: CPT

## 2022-11-15 PROCEDURE — 85025 COMPLETE CBC W/AUTO DIFF WBC: CPT

## 2022-11-15 PROCEDURE — 2580000003 HC RX 258: Performed by: INTERNAL MEDICINE

## 2022-11-15 PROCEDURE — 6360000002 HC RX W HCPCS

## 2022-11-15 PROCEDURE — 2580000003 HC RX 258

## 2022-11-15 RX ORDER — LANOLIN ALCOHOL/MO/W.PET/CERES
200 CREAM (GRAM) TOPICAL ONCE
Status: COMPLETED | OUTPATIENT
Start: 2022-11-15 | End: 2022-11-15

## 2022-11-15 RX ADMIN — TAMSULOSIN HYDROCHLORIDE 0.4 MG: 0.4 CAPSULE ORAL at 08:05

## 2022-11-15 RX ADMIN — SODIUM CHLORIDE, PRESERVATIVE FREE 10 ML: 5 INJECTION INTRAVENOUS at 20:30

## 2022-11-15 RX ADMIN — SPIRONOLACTONE 25 MG: 25 TABLET, FILM COATED ORAL at 08:06

## 2022-11-15 RX ADMIN — Medication 1 TABLET: at 08:05

## 2022-11-15 RX ADMIN — MYCOPHENOLATE MOFETIL 500 MG: 500 TABLET, FILM COATED ORAL at 08:06

## 2022-11-15 RX ADMIN — TIZANIDINE 4 MG: 4 TABLET ORAL at 15:06

## 2022-11-15 RX ADMIN — OXYCODONE AND ACETAMINOPHEN 1 TABLET: 5; 325 TABLET ORAL at 20:28

## 2022-11-15 RX ADMIN — PANTOPRAZOLE SODIUM 40 MG: 40 TABLET, DELAYED RELEASE ORAL at 06:44

## 2022-11-15 RX ADMIN — PREGABALIN 75 MG: 75 CAPSULE ORAL at 08:05

## 2022-11-15 RX ADMIN — PREGABALIN 75 MG: 75 CAPSULE ORAL at 20:28

## 2022-11-15 RX ADMIN — MAGNESIUM OXIDE TAB 400 MG (240 MG ELEMENTAL MG) 200 MG: 400 (240 MG) TAB at 08:05

## 2022-11-15 RX ADMIN — SODIUM CHLORIDE, PRESERVATIVE FREE 10 ML: 5 INJECTION INTRAVENOUS at 08:06

## 2022-11-15 RX ADMIN — FOLIC ACID 1 MG: 1 TABLET ORAL at 08:06

## 2022-11-15 RX ADMIN — INSULIN LISPRO 2 UNITS: 100 INJECTION, SOLUTION INTRAVENOUS; SUBCUTANEOUS at 16:50

## 2022-11-15 RX ADMIN — POTASSIUM CHLORIDE 20 MEQ: 1500 TABLET, EXTENDED RELEASE ORAL at 08:05

## 2022-11-15 RX ADMIN — FERROUS SULFATE TAB 325 MG (65 MG ELEMENTAL FE) 325 MG: 325 (65 FE) TAB at 08:06

## 2022-11-15 RX ADMIN — MYCOPHENOLATE MOFETIL 500 MG: 500 TABLET, FILM COATED ORAL at 20:29

## 2022-11-15 RX ADMIN — RIVAROXABAN 20 MG: 20 TABLET, FILM COATED ORAL at 08:06

## 2022-11-15 RX ADMIN — INSULIN GLARGINE 15 UNITS: 100 INJECTION, SOLUTION SUBCUTANEOUS at 20:35

## 2022-11-15 RX ADMIN — TIZANIDINE 4 MG: 4 TABLET ORAL at 08:06

## 2022-11-15 RX ADMIN — ATORVASTATIN CALCIUM 40 MG: 40 TABLET, FILM COATED ORAL at 20:29

## 2022-11-15 RX ADMIN — TIZANIDINE 4 MG: 4 TABLET ORAL at 20:29

## 2022-11-15 RX ADMIN — OXYCODONE AND ACETAMINOPHEN 1 TABLET: 5; 325 TABLET ORAL at 04:47

## 2022-11-15 RX ADMIN — FUROSEMIDE 10 MG/HR: 10 INJECTION INTRAMUSCULAR; INTRAVENOUS at 06:46

## 2022-11-15 RX ADMIN — MYCOPHENOLATE MOFETIL 500 MG: 500 TABLET, FILM COATED ORAL at 15:06

## 2022-11-15 RX ADMIN — OXYCODONE AND ACETAMINOPHEN 1 TABLET: 5; 325 TABLET ORAL at 00:29

## 2022-11-15 RX ADMIN — METOPROLOL SUCCINATE 25 MG: 25 TABLET, EXTENDED RELEASE ORAL at 08:05

## 2022-11-15 RX ADMIN — ASPIRIN 81 MG: 81 TABLET, COATED ORAL at 08:06

## 2022-11-15 ASSESSMENT — PAIN - FUNCTIONAL ASSESSMENT
PAIN_FUNCTIONAL_ASSESSMENT: PREVENTS OR INTERFERES SOME ACTIVE ACTIVITIES AND ADLS

## 2022-11-15 ASSESSMENT — PAIN SCALES - GENERAL
PAINLEVEL_OUTOF10: 7
PAINLEVEL_OUTOF10: 7
PAINLEVEL_OUTOF10: 5
PAINLEVEL_OUTOF10: 7
PAINLEVEL_OUTOF10: 8
PAINLEVEL_OUTOF10: 7
PAINLEVEL_OUTOF10: 6

## 2022-11-15 ASSESSMENT — PAIN SCALES - WONG BAKER
WONGBAKER_NUMERICALRESPONSE: 0

## 2022-11-15 ASSESSMENT — PAIN DESCRIPTION - ORIENTATION
ORIENTATION: RIGHT;LEFT
ORIENTATION: RIGHT
ORIENTATION: RIGHT;LEFT

## 2022-11-15 ASSESSMENT — PAIN DESCRIPTION - LOCATION
LOCATION: SHOULDER
LOCATION: SHOULDER
LOCATION: KNEE
LOCATION: KNEE
LOCATION: SHOULDER

## 2022-11-15 ASSESSMENT — PAIN DESCRIPTION - FREQUENCY
FREQUENCY: CONTINUOUS

## 2022-11-15 ASSESSMENT — PAIN DESCRIPTION - DESCRIPTORS
DESCRIPTORS: ACHING
DESCRIPTORS: ACHING
DESCRIPTORS: DISCOMFORT
DESCRIPTORS: ACHING
DESCRIPTORS: ACHING;DISCOMFORT

## 2022-11-15 ASSESSMENT — ENCOUNTER SYMPTOMS
COUGH: 0
CHEST TIGHTNESS: 0
SHORTNESS OF BREATH: 0
WHEEZING: 0

## 2022-11-15 ASSESSMENT — PAIN DESCRIPTION - PAIN TYPE
TYPE: CHRONIC PAIN

## 2022-11-15 ASSESSMENT — PAIN DESCRIPTION - ONSET
ONSET: ON-GOING

## 2022-11-15 NOTE — PROGRESS NOTES
Speech Language Pathology  Facility/Department: [address]   CLINICAL BEDSIDE SWALLOW EVALUATION  Treatment     NAME: Titus Gray  : 1960  MRN: [id number]    ADMISSION DATE: 11/10/2022  ADMITTING DIAGNOSIS: has NSTEMI (non-ST elevated myocardial infarction) ([address] Diabetes mellitus ([address] Hypertension; Peripheral neuropathy; Generalized weakness; Obesity due to excess calories; Heart failure ([address] Leg swelling; Mixed hyperlipidemia; Hyperglycemia; Sjogren's syndrome ([address] Aortic dilatation ([address] Acute on chronic congestive heart failure ([address] Shortness of breath; Paroxysmal atrial fibrillation ([address] Atypical atrial flutter ([address] and Nonischemic cardiomyopathy ([address] on their problem list.  ONSET DATE: 11/10/22    Recent Chest Xray 11/15/22     No acute disease. Previous [facility] [place]HAG[place]IST[place]:  10/7/2019 [facility]): Pt exhibiting mild pharyngeal phase dysphagia. Decreased tongue base strength and reduced laryngeal elevation resulted in mild residue in valleculae and mod residue in pyriform sinus cavity. Pt protected airway with all trials of puree and thin liquids via cup. However with use of straw, there was a single instance of deep penetration/probable aspiration with no cough reflex. This occurred after the swallow spilling over from the pyriform sinus cavities. Pt presented with additional trials via cup with no penetration or aspiration. Pt demonstrated effective mastication with solids, with no oral residue. Date of Eval: 11/15/2022  Evaluating Therapist: Jose Early SLP    Current Diet level:  Current Diet : Regular      Primary Complaint  Patient Complaint: pt c/o difficulty with swallowing- c/o coughing/choking when eating regardless of consistency.     Pain:  Denied pain     Reason for Referral  Titus Gray was referred for a bedside swallow evaluation to assess the efficiency of his swallow function, identify signs and symptoms of consuming 3oz of water continuously. MD wrote order for MBS followed by esophagram.  Will schedule for this date. Dysphagia Diagnosis: Concerns for esophageal stage dysphagia  Dysphagia Outcome Severity Scale: Level 4: Mild moderate dysphagia- Intermittent supervision/cueing. One - two diet consistencies restricted     Treatment Plan  Requires SLP Intervention: Yes     D/C Recommendations: To be determined       Recommended Diet and Intervention   Diet recommendation -TBD after MBS     Recommended Form of Meds: PO  Recommendations: Modified barium swallow study  Therapeutic Interventions: Patient/Family education    Compensatory Swallowing Strategies  Compensatory Swallowing Strategies : Upright as possible for all oral intake;Eat/Feed slowly; Alternate solids and liquids; Remain upright for 30-45 minutes after meals; External pacing;Small bites/sips (consume smaller, more frequent meals)    Treatment/Goals  1- The patient will tolerate instrumental swallowing procedure    2- The pt/caregiver will demonstrate understanding of swallowing recommendations and concerns. 11/15-The pt was educated to purpose of the visit, anatomy and physiology of the swallow, swallowing strategies, description of Modified Barium Swallow, and plan to complete this date, (with esophagram to follow) The pt stated comprehension. con't goal      General  Chart Reviewed: Yes  Behavior/Cognition: Alert; Cooperative;Pleasant mood  Respiratory Status: Room air  Communication Observation: Functional  Follows Directions: Complex  Dentition: Adequate  Patient Positioning: Upright in bed  Baseline Vocal Quality: Normal  Volitional Cough: Strong  Prior Dysphagia History: pt reported history of dysphagia x3 years. pt had MBS 10/7/19- no aspiration observed.        Vision/Hearing  Vision  Vision: Impaired  Vision Exceptions: Wears glasses for reading  Hearing  Hearing: Within functional limits      Prognosis  Individuals consulted  Consulted and agree with results and recommendations: Patient;RN  RN Name: Dragan Patiño  Patient Education: Role of SLP  Patient Education Response: Verbalizes understanding  Safety Devices in place: Yes  Type of devices: Call light within reach       Therapy Time  SLP Individual Minutes  Time In: 9720  Time Out: 0135  Minutes: 29        Pt's goal: to be able to eat/drink without choking      Plan:  Continue goals per POC 1-3x  Recommended diet: TBD after MBS  MBS and Esophagram scheduled for this date   Total treatment time:29  Pt's discharge plan:to home   Discharge Plan: To be determined closer to discharge  Discussed with RNDragan  Needs within reach.        Marisa LizamaOjai [facility]- [address] [facility]  Pg # [phone]  This document will serve as a discharge summary if pt discharge before next treatment   session

## 2022-11-15 NOTE — PLAN OF CARE
Problem: Pain  Goal: Verbalizes/displays adequate comfort level or baseline comfort level  Outcome: Progressing- patient complains of pain in shoulder, back and knee. Declined any pain medication. Will continue to monitor      Problem: Safety - Adult  Goal: Free from fall injury  Outcome: Progressing- patient calls out appropriately for assistance to and from bathroom. Bed alarm on, call light wihtin reach will continue to monitor. Problem: Respiratory - Adult  Goal: Achieves optimal ventilation and oxygenation  Outcome: Progressing- Oz sats remain above 92% on RA. Will continue to monitor.

## 2022-11-15 NOTE — PROGRESS NOTES
[address] [place] [address]  GI Progress Note          Hiram Fischer is a 57 y.o. male patient. 1. Acute on chronic congestive heart failure, unspecified heart failure type ([facility]    2.  Acute on chronic systolic congestive heart failure ([facility]        Admit Date: 11/10/2022    Subjective:       No changes, diuresis continues     Scheduled Meds:   magnesium oxide  200 mg Oral Once    pantoprazole  40 mg Oral QAM AC    sodium chloride flush  5-40 mL IntraVENous 2 times per day    aspirin  81 mg Oral Daily    atorvastatin  40 mg Oral Nightly    ferrous sulfate  325 mg Oral Daily with breakfast    folic acid  1 mg Oral Daily    insulin glargine  15 Units SubCUTAneous Nightly    metoprolol succinate  25 mg Oral Daily    therapeutic multivitamin-minerals  1 tablet Oral Daily    mycophenolate  500 mg Oral TID    potassium chloride  20 mEq Oral Daily    pregabalin  75 mg Oral BID    rivaroxaban  20 mg Oral Daily    spironolactone  25 mg Oral Daily    tiZANidine  4 mg Oral TID    tamsulosin  0.4 mg Oral Daily    insulin lispro  0-8 Units SubCUTAneous TID WC    insulin lispro  0-4 Units SubCUTAneous Nightly     Continuous Infusions:   furosemide (LASIX) 1mg/ml infusion 10 mg/hr (11/15/22 0646)    sodium chloride      dextrose       Objective:       Patient Vitals for the past 24 hrs:   BP Temp Temp src Pulse Resp SpO2 Weight   11/15/22 0727 -- -- -- -- -- -- (!) 305 lb 8.9 oz (138.6 kg)   11/15/22 0439 127/80 97.4 °F (36.3 °C) Oral 71 13 93 % --   11/15/22 0059 -- -- -- -- 12 -- --   11/15/22 0029 -- -- -- -- 16 -- --   11/14/22 2348 122/77 97.5 °F (36.4 °C) Oral 70 15 96 % --   11/14/22 2014 -- -- -- -- 18 -- --   11/14/22 1941 114/65 97.9 °F (36.6 °C) Oral 70 18 95 % --   11/14/22 1613 (!) 152/90 98 °F (36.7 °C) Oral 70 16 97 % --   11/14/22 1523 -- -- -- -- 16 -- --   11/14/22 1127 119/77 98 °F (36.7 °C) Oral 70 16 92 % --   11/14/22 0928 113/71 97.7 °F (36.5 °C) Oral 71 17 93 % --   11/14/22 0855 -- -- -- -- -- -- Bandar Richey 300 lb 11.3 oz (136.4 kg)       Exam:  VITALS:  /80   Pulse 71   Temp 97.4 °F (36.3 °C) (Oral)   Resp 13   Ht 5' 11\" (1.803 m)   Wt (!) 305 lb 8.9 oz (138.6 kg)   SpO2 93%   BMI 42.62 kg/m²   TEMPERATURE:  Current - Temp: 97.4 °F (36.3 °C); Max - Temp  Av.8 °F (36.6 °C)  Min: 97.4 °F (36.3 °C)  Max: 98 °F (36.7 °C)    NAD  General appearance: alert, appears stated age, cooperative and no distress  Abdomen: soft, non-tender; bowel sounds normal; no masses,  no organomegaly  AAOx3, No asterixis or encephalopathy      Recent Labs     22  0545 22  0501 11/15/22  0358   WBC 7.7 6.7 7.6   HGB 12.4* 13.6 14.3   HCT 38.9* 40.8 43.0   MCV 86.5 84.4 85.7    215 224     Recent Labs     22  0545 22  0501 11/15/22  0358    136 136   K 4.7 4.4 4.9   CL 98* 95* 92*   CO2 27 32 30   BUN 28* 28* 37*   CREATININE 1.2 1.1 1.7*       Assessment:       Principal Problem:    Heart failure (HCC)  Active Problems:    Acute on chronic congestive heart failure (HCC)  Resolved Problems:    * No resolved hospital problems.  *    Possible hypertensive LES    Recommendations:       PPI  Seng Rojas MD  11/15/2022  7:49 AM

## 2022-11-15 NOTE — PROGRESS NOTES
Progress Note    Admit Date: 11/10/2022  Day: 5  Diet: ADULT DIET; Regular; Low Fat/Low Chol/High Fiber/2 gm Na    CC: Dyspnea    Interval history: Patient was seen at bedside this morning with care team.  Patient denies shortness of breath, states his swelling has improved, and states he feels overall much better. Patient had a magnesium this morning of 1.9 and it has been replaced. Patient has new onset ALDO with creatinine increased from 1.1-1.7 this morning. Patient is down 10L output from admission. Patient was inquiring about his CT scan that he was supposed to get at [facility] and we encouraged him to follow-up with them outpatient. We are thinking about a tentative discharge date of tomorrow if all continues to go well and ALDO starts improving. HPI: 75-year-old male with past medical history HTN, HLD, CAD, CHF (EF 35% 8/2022), DM2, AF (Xarelto), DVT, Sjogren's, aortic aneurysm who presented to [facility], [facility]. with chief complaints of dyspnea and chest pressure. Patient reports 2 to 3 days of gradually worsening dyspnea exacerbated with exertion and accompanied by 10 pound weight gain and chest pressure. He describes chest pressure as midsternal, constant, waxing/waning, nonexertional and improved with certain positions when lying down. He also notes increased swelling to both hands and lower extremities. Symptoms are gradually worsened since onset and are similar to his past heart failure exacerbation. In ED patient HDS with BMP 2012 (last 1200 3/2022), troponin 0.25 > 0.21 (baseline 0.06-0.1). CBC, chest x-ray unremarkable. EKG without ischemic changes. On exam patient is sitting comfortably on the edge of the bed conversing without difficulty. He additionally endorses a \"prick\" sensation intermittently over the past several days, which he feels may be related to his pacemaker. He also endorses chronic cough attributed to Sjogren's; states this is stable.   Patient reports he did not take his medications 1 day early last week, but is otherwise compliant. States he does this roughly once per month due to frustration with medical issues and amount of pills. Of note, patient underwent cardiac catheterization on 8/26/202 with angiography and temporary transvenous pacing wire. No interventions at that time; underwent placement of left subclavian biventricular pacemaker/ICD on 8/31/2022.     Medications:     Scheduled Meds:   pantoprazole  40 mg Oral QAM AC    sodium chloride flush  5-40 mL IntraVENous 2 times per day    aspirin  81 mg Oral Daily    atorvastatin  40 mg Oral Nightly    ferrous sulfate  325 mg Oral Daily with breakfast    folic acid  1 mg Oral Daily    insulin glargine  15 Units SubCUTAneous Nightly    metoprolol succinate  25 mg Oral Daily    therapeutic multivitamin-minerals  1 tablet Oral Daily    mycophenolate  500 mg Oral TID    potassium chloride  20 mEq Oral Daily    pregabalin  75 mg Oral BID    rivaroxaban  20 mg Oral Daily    [Held by provider] spironolactone  25 mg Oral Daily    tiZANidine  4 mg Oral TID    tamsulosin  0.4 mg Oral Daily    insulin lispro  0-8 Units SubCUTAneous TID WC    insulin lispro  0-4 Units SubCUTAneous Nightly     Continuous Infusions:   sodium chloride      dextrose       PRN Meds:sodium chloride flush, sodium chloride, ondansetron **OR** ondansetron, polyethylene glycol, acetaminophen **OR** acetaminophen, glucose, dextrose bolus **OR** dextrose bolus, glucagon (rDNA), dextrose, oxyCODONE-acetaminophen    Objective:   Vitals:   T-max:  Patient Vitals for the past 8 hrs:   BP Temp Temp src Pulse Resp SpO2 Weight   11/15/22 0754 132/82 97.6 °F (36.4 °C) Oral 80 16 96 % --   11/15/22 0727 -- -- -- -- -- -- (!) 305 lb 8.9 oz (138.6 kg)   11/15/22 0439 127/80 97.4 °F (36.3 °C) Oral 71 13 93 % --         Intake/Output Summary (Last 24 hours) at 11/15/2022 1032  Last data filed at 11/15/2022 0918  Gross per 24 hour   Intake 901 ml   Output 2400 ml   Net -1499 ml         Review of Systems   Constitutional:  Negative for chills, fatigue and fever. Respiratory:  Negative for cough, chest tightness, shortness of breath and wheezing. Cardiovascular:  Negative for chest pain, palpitations and leg swelling. Neurological:  Negative for dizziness. Psychiatric/Behavioral:  Negative for confusion. Physical Exam  Constitutional:       General: He is not in acute distress. Appearance: Normal appearance. He is obese. He is not ill-appearing, toxic-appearing or diaphoretic. HENT:      Head: Normocephalic and atraumatic. Right Ear: External ear normal.      Left Ear: External ear normal.      Nose: Nose normal.      Mouth/Throat:      Mouth: Mucous membranes are dry. Eyes:      Extraocular Movements: Extraocular movements intact. Cardiovascular:      Rate and Rhythm: Normal rate and regular rhythm. Pulses: Normal pulses. Heart sounds: Normal heart sounds. Pulmonary:      Effort: No respiratory distress. Breath sounds: Normal breath sounds. No wheezing. Musculoskeletal:      Right lower leg: No edema. Left lower leg: No edema. Comments: Edema has greatly imroved   Neurological:      General: No focal deficit present. Mental Status: He is alert and oriented to person, place, and time.    Psychiatric:         Mood and Affect: Mood normal.         Behavior: Behavior normal.       LABS:    CBC:   Recent Labs     11/13/22  0545 11/14/22  0501 11/15/22  0358   WBC 7.7 6.7 7.6   HGB 12.4* 13.6 14.3   HCT 38.9* 40.8 43.0    215 224   MCV 86.5 84.4 85.7       Renal:    Recent Labs     11/13/22  0545 11/14/22  0501 11/15/22  0358    136 136   K 4.7 4.4 4.9   CL 98* 95* 92*   CO2 27 32 30   BUN 28* 28* 37*   CREATININE 1.2 1.1 1.7*   GLUCOSE 204* 134* 136*   CALCIUM 9.0 9.3 9.4   MG 1.90 1.70* 1.90   ANIONGAP 12 9 14       Hepatic: No results for input(s): AST, ALT, BILITOT, BILIDIR, PROT, LABALBU, ALKPHOS in the last 72 hours. Troponin:   No results for input(s): TROPONINI in the last 72 hours. BNP: No results for input(s): BNP in the last 72 hours. Lipids: No results for input(s): CHOL, HDL in the last 72 hours. Invalid input(s): LDLCALCU, TRIGLYCERIDE    ABGs:  No results for input(s): PHART, HIQ6ORL, PO2ART, SPW4SQV, BEART, THGBART, F9JRIYUB, LGL6MNE in the last 72 hours. INR: No results for input(s): INR in the last 72 hours. Lactate: No results for input(s): LACTATE in the last 72 hours. Cultures:  -----------------------------------------------------------------  RAD:   XR CHEST (2 VW)   Final Result      No acute disease. FL MODIFIED BARIUM SWALLOW W VIDEO    (Results Pending)   FL ESOPHAGRAM    (Results Pending)       Assessment/Plan:    68-year-old male with past medical history HTN, HLD, CAD, CHF (EF 35% 8/2022), DM2, AF (Xarelto), DVT, Sjogren's, aortic aneurysm who presented to [facility]. with chief complaints of dyspnea and chest pressure. Acute on chronic CHF exacerbation  P/w 3 days worsening dyspnea, BLE edema, chest pressure. BNP 2012 (1200 in 3/2022), Trp 0.25. CXR negative. EKG w/o ischemic changes. Hx HFrEF (35% 8/2022). S/p cath 8/2022 with BiV/ICD placement. - Cardiology increased Lasix drip from 5 mg/h to 10 mg/h and plans on transitioning to p.o. today. - Continue Telemetry  - Trend BNP q3 days (down to 800's from 2,000)  - Cardiology consulted and recs appreciated  - ECHO from 11/11/22: LVEF 55-60%  - Strict I/Os. Daily weights, HF diet, Optimize lytes  - Continue home Metoprolol, spironolactone  - Pt. Is Net Negative 10L this admission from dieresis. ALDO  Pt. Has baseline Cr of 0.9 with stable Cr of 1.1 this admission and increase to 1.7 on 11/15  - Pt. Is Net Negative 10L this admission from dieresis. - Fluid status managed per Cardiology     Troponinemia - discontinued  Trp 0.25 > 0.21 > 0.14 (baseline 0.6-1.0). EKG w/I ischemic changes. Likely 2/2 CHF exacerbation. - Troponin stabilized  - Telemetry      GERD:   - Consulted GI; appreciate recs  - Per GI recs, transitioned from famotidine to pantoprazole. - GI ordered barium swallow.  Will f/u      Chronic Issues  DM2: MDSS, POCT, hypoglycemia protocol  R knee OA: Home percocet  CAD: Home statin, ASA  Anemia: Home iron, Daily CBC     Code Status: Full Code  FEN: GD  PPX: Xarelto  DISPO: IP  ELOS:1  Barriers to Discharge: Continued diuresis    Sabiha Barr MD, PGY-1  11/15/22  10:32 AM    This patient has been staffed and discussed with Glenn Frey MD.

## 2022-11-15 NOTE — PROCEDURES
INSTRUMENTAL SWALLOW REPORT  MODIFIED BARIUM SWALLOW  discharge    NAME: Wolfgang Aquino   : 1960  MRN: [id number]       Date of Eval: 11/15/2022     Ordering Physician: Tita Jade  Radiologist: Sarmad Dawson     Referring Diagnosis(es): Referring Diagnosis: heart failure/dysphagia    Past Medical History:  has a past medical history of Arthritis, Depression, Diabetes mellitus ([address] Diastolic CHF ([address] Difficult intubation, Dyslipidemia, Herniated disc, cervical, Hypertension, Osteoporosis, and Peripheral neuropathy. Past Surgical History:  has a past surgical history that includes hernia repair; Breast surgery (Left, 2019); and Muscle biopsy (Left, 10/10/2019). Previous MBS- DYSPHAGIA HISTORY:  10/7/2019 [facility]): Pt exhibiting mild pharyngeal phase dysphagia. Decreased tongue base strength and reduced laryngeal elevation resulted in mild residue in valleculae and mod residue in pyriform sinus cavity. Pt protected airway with all trials of puree and thin liquids via cup. However with use of straw, there was a single instance of deep penetration/probable aspiration with no cough reflex. This occurred after the swallow spilling over from the pyriform sinus cavities. Pt presented with additional trials via cup with no penetration or aspiration. Pt demonstrated effective mastication with solids, with no oral residue. Date of Prior Study: 10/7/19  Type of Study: Repeat MBS  Results of Prior Study: one instance of probable aspiration with thin by straw     Recent Chest Xray 11/15/22      No acute disease. Patient Complaints/Reason for Referral:  Wolfgang Aquino was referred for a MBS to assess the efficiency of his/her swallow function, assess for aspiration, and to make recommendations regarding safe dietary consistencies, effective compensatory strategies, and safe eating environment.        Onset of problem:   Date of Onset: of dysphagia -3 years ago- pt admitted 11/10/22 Behavior/Cognition/Vision/Hearing:  Behavior/Cognition: Alert; Cooperative;Pleasant mood  Vision: Impaired  Vision Exceptions: Wears glasses for reading  Hearing: Within functional limits    Impressions:  Treatment Dx and ICD 10: 13.12   Patient Position: Lateral  Pt presents with mild oropharyngeal dysphagia   Oral- pt with no difficulty with mastication or A-P transport with any consistency. Pt with mildly impaired tongue base retraction which resulted in mild premature spillage over the base of the tongue with trials with thin by cup and straw  Pharyngeal- pt with mildly impaired tongue base retraction along with mildly impaired hyolaryngeal excursion which impacted laryngeal vestibule closure resulting in  piece meal premature spillage over the base of the tongue to the pyriform as well as shallow penetration of thin barium by cup and straw. This penetrated material was spontaneously cleared from the laryngeal vestibule. Mild backflow noted to the level of the cricopharyngeus. Pt also noted to have mildly impaired pharyngeal contraction which resulted in residue to remain in the valleculae and to a lesser degree in the pyriform with trials of puree and cracker. Pt was able to clear some of the residue with dry swallow and liquid wash. This study was followed by esophagram.   Consistencies Administered: Regular;Pureed; Thin cup; Thin straw  Dysphagia Outcome Severity Scale: Level 5: Mild dysphagia- Distant supervision. May need one diet consistency restricted  Penetration-Aspiration Scale (PAS): 2 - Material enters the airway, remains above the vocal folds, and is ejected from the airway    Recommended Diet:  Solid consistency: Regular  Liquid consistency: Thin  Liquid administration via: Cup;Straw    Medication administration: PO    Safe Swallow Protocol:     Compensatory Swallowing Strategies : Upright as possible for all oral intake;Eat/Feed slowly; Alternate solids and liquids; Remain upright for 30-45 minutes after meals; External pacing;Small bites/sips        Recommendations/Treatment  Requires SLP Intervention: No     D/C Recommendations: No follow up therapy recommended post discharge (follow up with GI)  Recommended Exercises:    Therapeutic Interventions: Patient/Family education    Referral To: GI    Education: Images and recommendations were not reviewed with pt following this exam as pt was being set up for esophagram but verbally educated pt to the results of the MBS. Patient Education: Results of MBS  Patient Education Response: Verbalizes understanding    Duration/Frequency of Treatment  Frequency of Treatment: TBD  Safety Devices  Safety Devices in place: Yes (staff present to monitor for safety)  Type of devices: Call light within reach  Restraints Initially in Place: No      Goals:    1- The patient will tolerate instrumental swallowing procedure  11/15- goal met      2- The pt/caregiver will demonstrate understanding of swallowing recommendations and concerns. 11/15-The pt was educated to purpose of the visit, anatomy and physiology of the swallow, swallowing strategies, description of Modified Barium Swallow, and plan to complete this date, (with esophagram to follow) The pt stated comprehension. con't goal  11/15- goal met- pt educated to the results of the MBS, swallowing strategies and diet recommendations. Pt stated comprehension    Esophageal Phase  Esophageal Screen: Impaired  Upper Esophageal Screen- Major Contributing Deficits  Major Contributing Deficits: Esophageal Backflow into the Upper Esophagus        Pain   Denied pain           Plan:  Pt discharged from Speech Therapy services. Pt met all goals for therapy. Recommended diet: Regular with thin liquids with above listed strategies  Con't follow up with GI   Total treatment time:20  Pt's discharge plan: to home   Discharge Plan: No further follow up needed unless s/s aspiration emerge, make pt NPO and re-refer.    Discussed with RN, Dulce Maria Yoon Needs within reach.        Samreen Goodwin, [address], [facility]- SLP  [phone]  Pg # [phone]  This document will serve as a discharge summary if pt discharge before next treatment   session    Therapy Time:   Individual Concurrent Group Co-treatment   Time In 1050         Time Out 1110         Minutes 20

## 2022-11-15 NOTE — PLAN OF CARE
Problem: Pain  Goal: Verbalizes/displays adequate comfort level or baseline comfort level  Outcome: Progressing  Pt complained of 7/10 chronic shoulder pain this shift. Pt given PRN percocet. Pt satisfied. Encouraged pt to call out if pain worsens/returns. Verbalized understanding. Will continue to monitor. Problem: Safety - Adult  Goal: Free from fall injury  Outcome: Progressing  Pt is a High fall risk. See Alvira Katos Fall Score and ABCDS Injury Risk assessments. Explained fall risk precautions to pt and rationale behind their use to keep the patient safe. Pt bed is in low position, side rails up, call light and belongings are in reach. Fall wristband applied and present on pts wrist.  Bed alarm on. Pt encouraged to call for assistance. Will continue with hourly rounds for PO intake, pain needs, toileting and repositioning as needed.    Problem: Respiratory - Adult  Goal: Achieves optimal ventilation and oxygenation  Outcome: Progressing    Problem: Cardiovascular - Adult  Goal: Maintains optimal cardiac output and hemodynamic stability  Outcome: Progressing

## 2022-11-15 NOTE — PROGRESS NOTES
Imaging results    Impression:       1. Marked esophageal dysmotility without evidence of focal stricture. .                     Impression:       1. Laryngeal penetration with thin liquids, but evidence of tracheal aspiration. 2.  Moderate post swallow residue in the vallecula.       Nothing acute,no need for urgent EGD  Please have him follow up with his regular GI physicians at [facility]  Will sign off, recall if needed

## 2022-11-15 NOTE — PROGRESS NOTES
EMR was reviewed. Patient was missed during rounds due to being out of the room for a study. Creatinine up at 1.7 from 1.1. I's and O's -3.1 L, -1 L. There were no overnight events. Home diuretics: Lasix 80 mg daily and spironolactone 25 mg daily. Assessment & Plan:      1. Acute on chronic HFrEF. Patient has new systolic heart failure as diagnosed by echo in 08/2022 which is nonischemic in etiology (most likely due to LBBB, cannot exclude contribution by myositis). He is now relatively euvolemic and hemodynamically stable. 2.  CAF. 3.  CAD. It is minimal per recent LHC. 4.  Myositis. Patient is supposed to be on mycophenolate 500 mg p.o. 3 times daily. 5.  Elevated troponin. It is most likely due to myositis and/or AHF (demand ischemia). I doubt ACS in view of recent unremarkable LHC. Patient does not have any concerning symptoms. 6.  ALDO on CKD. Most likely due to diuretic use. -Continue with baby aspirin, Xarelto  - Strict I's and O's every shift and standing weights if possible, low-salt diet and daily BMP with reflex to Mg, wean supplemental oxygen to off for sats greater than 94%. -We will stop Lasix drip and hold spironolactone. - Continue with Toprol 25 mg daily  - Patient is allergic to ACEI (swelling, ? Angioedema). - No hydralazine or imdur due to low BP  - Reassess Cr tomorrow to decide about po diuretics.

## 2022-11-16 ENCOUNTER — APPOINTMENT (OUTPATIENT)
Dept: CARDIAC REHAB | Age: 62
End: 2022-11-16
Payer: COMMERCIAL

## 2022-11-16 VITALS
TEMPERATURE: 98.2 F | DIASTOLIC BLOOD PRESSURE: 64 MMHG | BODY MASS INDEX: 44.1 KG/M2 | OXYGEN SATURATION: 91 % | RESPIRATION RATE: 18 BRPM | WEIGHT: 315 LBS | HEIGHT: 71 IN | SYSTOLIC BLOOD PRESSURE: 114 MMHG | HEART RATE: 73 BPM

## 2022-11-16 LAB
ANION GAP SERPL CALCULATED.3IONS-SCNC: 12 MMOL/L (ref 3–16)
BASOPHILS ABSOLUTE: 0.1 K/UL (ref 0–0.2)
BASOPHILS RELATIVE PERCENT: 0.9 %
BUN BLDV-MCNC: 41 MG/DL (ref 7–20)
CALCIUM SERPL-MCNC: 9.8 MG/DL (ref 8.3–10.6)
CHLORIDE BLD-SCNC: 89 MMOL/L (ref 99–110)
CO2: 30 MMOL/L (ref 21–32)
CREAT SERPL-MCNC: 1.3 MG/DL (ref 0.8–1.3)
EOSINOPHILS ABSOLUTE: 0.2 K/UL (ref 0–0.6)
EOSINOPHILS RELATIVE PERCENT: 2.9 %
GFR SERPL CREATININE-BSD FRML MDRD: >60 ML/MIN/{1.73_M2}
GLUCOSE BLD-MCNC: 119 MG/DL (ref 70–99)
GLUCOSE BLD-MCNC: 128 MG/DL (ref 70–99)
GLUCOSE BLD-MCNC: 179 MG/DL (ref 70–99)
HCT VFR BLD CALC: 47.9 % (ref 40.5–52.5)
HEMOGLOBIN: 15.5 G/DL (ref 13.5–17.5)
LYMPHOCYTES ABSOLUTE: 1.1 K/UL (ref 1–5.1)
LYMPHOCYTES RELATIVE PERCENT: 14.9 %
MAGNESIUM: 2 MG/DL (ref 1.8–2.4)
MCH RBC QN AUTO: 27.8 PG (ref 26–34)
MCHC RBC AUTO-ENTMCNC: 32.4 G/DL (ref 31–36)
MCV RBC AUTO: 85.8 FL (ref 80–100)
MONOCYTES ABSOLUTE: 0.6 K/UL (ref 0–1.3)
MONOCYTES RELATIVE PERCENT: 8.7 %
NEUTROPHILS ABSOLUTE: 5.3 K/UL (ref 1.7–7.7)
NEUTROPHILS RELATIVE PERCENT: 72.6 %
PDW BLD-RTO: 15.5 % (ref 12.4–15.4)
PERFORMED ON: ABNORMAL
PERFORMED ON: ABNORMAL
PLATELET # BLD: 224 K/UL (ref 135–450)
PMV BLD AUTO: 7.3 FL (ref 5–10.5)
POTASSIUM SERPL-SCNC: 5 MMOL/L (ref 3.5–5.1)
PRO-BNP: 309 PG/ML (ref 0–124)
RBC # BLD: 5.58 M/UL (ref 4.2–5.9)
SODIUM BLD-SCNC: 131 MMOL/L (ref 136–145)
WBC # BLD: 7.3 K/UL (ref 4–11)

## 2022-11-16 PROCEDURE — 2580000003 HC RX 258

## 2022-11-16 PROCEDURE — 6370000000 HC RX 637 (ALT 250 FOR IP)

## 2022-11-16 PROCEDURE — 83880 ASSAY OF NATRIURETIC PEPTIDE: CPT

## 2022-11-16 PROCEDURE — 99452 NTRPROF PH1/NTRNET/EHR RFRL: CPT | Performed by: INTERNAL MEDICINE

## 2022-11-16 PROCEDURE — 36415 COLL VENOUS BLD VENIPUNCTURE: CPT

## 2022-11-16 PROCEDURE — 99239 HOSP IP/OBS DSCHRG MGMT >30: CPT | Performed by: HOSPITALIST

## 2022-11-16 PROCEDURE — 80048 BASIC METABOLIC PNL TOTAL CA: CPT

## 2022-11-16 PROCEDURE — 6360000002 HC RX W HCPCS

## 2022-11-16 PROCEDURE — 83735 ASSAY OF MAGNESIUM: CPT

## 2022-11-16 PROCEDURE — 85025 COMPLETE CBC W/AUTO DIFF WBC: CPT

## 2022-11-16 PROCEDURE — 6370000000 HC RX 637 (ALT 250 FOR IP): Performed by: INTERNAL MEDICINE

## 2022-11-16 RX ORDER — FUROSEMIDE 40 MG/1
80 TABLET ORAL DAILY
Status: DISCONTINUED | OUTPATIENT
Start: 2022-11-16 | End: 2022-11-16 | Stop reason: HOSPADM

## 2022-11-16 RX ORDER — PANTOPRAZOLE SODIUM 40 MG/1
40 TABLET, DELAYED RELEASE ORAL
Qty: 30 TABLET | Refills: 3 | Status: SHIPPED | OUTPATIENT
Start: 2022-11-17

## 2022-11-16 RX ADMIN — MYCOPHENOLATE MOFETIL 500 MG: 500 TABLET, FILM COATED ORAL at 07:42

## 2022-11-16 RX ADMIN — TAMSULOSIN HYDROCHLORIDE 0.4 MG: 0.4 CAPSULE ORAL at 07:43

## 2022-11-16 RX ADMIN — RIVAROXABAN 20 MG: 20 TABLET, FILM COATED ORAL at 07:43

## 2022-11-16 RX ADMIN — SPIRONOLACTONE 25 MG: 25 TABLET, FILM COATED ORAL at 09:27

## 2022-11-16 RX ADMIN — OXYCODONE AND ACETAMINOPHEN 1 TABLET: 5; 325 TABLET ORAL at 04:45

## 2022-11-16 RX ADMIN — METOPROLOL SUCCINATE 25 MG: 25 TABLET, EXTENDED RELEASE ORAL at 07:43

## 2022-11-16 RX ADMIN — PREGABALIN 75 MG: 75 CAPSULE ORAL at 07:43

## 2022-11-16 RX ADMIN — OXYCODONE AND ACETAMINOPHEN 1 TABLET: 5; 325 TABLET ORAL at 11:57

## 2022-11-16 RX ADMIN — TIZANIDINE 4 MG: 4 TABLET ORAL at 07:43

## 2022-11-16 RX ADMIN — PANTOPRAZOLE SODIUM 40 MG: 40 TABLET, DELAYED RELEASE ORAL at 07:43

## 2022-11-16 RX ADMIN — POTASSIUM CHLORIDE 20 MEQ: 1500 TABLET, EXTENDED RELEASE ORAL at 07:43

## 2022-11-16 RX ADMIN — ASPIRIN 81 MG: 81 TABLET, COATED ORAL at 07:43

## 2022-11-16 RX ADMIN — SODIUM CHLORIDE, PRESERVATIVE FREE 10 ML: 5 INJECTION INTRAVENOUS at 07:44

## 2022-11-16 RX ADMIN — ONDANSETRON 4 MG: 4 TABLET, ORALLY DISINTEGRATING ORAL at 07:42

## 2022-11-16 RX ADMIN — FOLIC ACID 1 MG: 1 TABLET ORAL at 07:43

## 2022-11-16 RX ADMIN — Medication 1 TABLET: at 07:43

## 2022-11-16 RX ADMIN — FUROSEMIDE 80 MG: 40 TABLET ORAL at 09:26

## 2022-11-16 RX ADMIN — OXYCODONE AND ACETAMINOPHEN 1 TABLET: 5; 325 TABLET ORAL at 00:38

## 2022-11-16 ASSESSMENT — PAIN SCALES - GENERAL
PAINLEVEL_OUTOF10: 8
PAINLEVEL_OUTOF10: 8
PAINLEVEL_OUTOF10: 4
PAINLEVEL_OUTOF10: 6
PAINLEVEL_OUTOF10: 7
PAINLEVEL_OUTOF10: 6
PAINLEVEL_OUTOF10: 6
PAINLEVEL_OUTOF10: 0

## 2022-11-16 ASSESSMENT — PAIN DESCRIPTION - ORIENTATION
ORIENTATION: OTHER (COMMENT)
ORIENTATION: OTHER (COMMENT)

## 2022-11-16 ASSESSMENT — PAIN DESCRIPTION - DESCRIPTORS
DESCRIPTORS: SORE
DESCRIPTORS: DISCOMFORT

## 2022-11-16 ASSESSMENT — PAIN DESCRIPTION - LOCATION
LOCATION: GENERALIZED
LOCATION: GENERALIZED

## 2022-11-16 ASSESSMENT — PAIN DESCRIPTION - FREQUENCY: FREQUENCY: INTERMITTENT

## 2022-11-16 ASSESSMENT — PAIN DESCRIPTION - PAIN TYPE: TYPE: CHRONIC PAIN

## 2022-11-16 ASSESSMENT — PAIN DESCRIPTION - ONSET: ONSET: PROGRESSIVE

## 2022-11-16 NOTE — PROGRESS NOTES
AVS reviewed with patient. New medication - protonix - reviewed with patient and given to patient from outpatient pharmacy. All belongings with patient. Peripheral IV removed without complications. All questions answered. Patient wheeled out to meet . Niece will be at house to let patient back into house. Detail Level: Detailed Detail Level: Zone Detail Level: Simple

## 2022-11-16 NOTE — DISCHARGE SUMMARY
INTERNAL MEDICINE DEPARTMENT AT [address] [place] [address]  DISCHARGE SUMMARY    Patient ID: Lauren Weldon                                             Discharge Date: 11/16/2022   Patient's PCP: Kristy Ulloa MD                                          Discharge Physician: Jaime Narayan MD MD  Admit Date: 11/10/2022   Admitting Physician: Justina Donovan MD    PROBLEMS DURING HOSPITALIZATION:  Present on Admission:   Heart failure ([facility] [address]   Acute on chronic congestive heart failure ([facility] [address]      DISCHARGE DIAGNOSES:  Acute on chronic CHF Exacerbation  Troponinemia  DMT2  GERD  CAD  Anemia      HPI:  64-year-old male with past medical history HTN, HLD, CAD, CHF (EF 35% 8/2022), DM2, AF (Xarelto), DVT, Sjogren's, aortic aneurysm who presented to [facility], [facility]. with chief complaints of dyspnea and chest pressure. Patient reports 2 to 3 days of gradually worsening dyspnea exacerbated with exertion and accompanied by 10 pound weight gain and chest pressure. He describes chest pressure as midsternal, constant, waxing/waning, nonexertional and improved with certain positions when lying down. He also notes increased swelling to both hands and lower extremities. Symptoms are gradually worsened since onset and are similar to his past heart failure exacerbation. In ED patient HDS with BMP 2012 (last 1200 3/2022), troponin 0.25 > 0.21 (baseline 0.06-0.1). CBC, chest x-ray unremarkable. EKG without ischemic changes. On exam patient is sitting comfortably on the edge of the bed conversing without difficulty. He additionally endorses a \"prick\" sensation intermittently over the past several days, which he feels may be related to his pacemaker. He also endorses chronic cough attributed to Sjogren's; states this is stable. Patient reports he did not take his medications 1 day early last week, but is otherwise compliant. States he does this roughly once per month due to frustration with medical issues and amount of pills. Of note, patient underwent cardiac catheterization on 8/26/202 with angiography and temporary transvenous pacing wire. No interventions at that time; underwent placement of left subclavian biventricular pacemaker/ICD on 8/31/2022. The following issues were addressed during hospitalization:  Acute on chronic CHF Exacerbation  Patient was admitted for acute on chronic heart failure with reduced ejection fraction. Patient is a frequent visitor of the hospital and sees cardiology at [facility].  3 months ago patient received an echocardiogram showing 35% ejection fraction and received a biventricular pacemaker. On presentation patient received an EKG that did not show any ischemia, patient states he is a 10 pound weight gain currently. Patient tested negative for COVID and flu. Patient had elevated BNP from 1200 at baseline to 2000. On presentation patient is troponins were elevated most likely due to myositis and/or demand ischemia from heart failure. Patient's troponins went up to 0.25 and then down to 0.14 with a baseline of around 0.1. It is thought that the cause of this heart pathology is due to myositis or demand ischemia. The cardiologist who saw him doubts that ACS is the cause due to recent unremarkable left heart catheterization. .  Cardiology was consulted and recommended for patient to receive an echo, continue with baby aspirin and Xarelto for A. fib, continue with Lasix and spironolactone daily as well as Toprol. Patient does state that he has a very unhealthy diet and once a month decides not to take his medications. Patient complained of symptoms of GERD during this visit and we consulted GI. They performed a barium swallow and found no focal stricture but did find esophageal dysmotility.   They recommended he stop famotidine and start Protonix and follow-up with the patient's GI doctor at [facility].          Physical Exam:  Physical Exam  Constitutional: Appearance: Normal appearance. He is obese. He is not ill-appearing or toxic-appearing. HENT:      Head: Normocephalic and atraumatic. Right Ear: External ear normal.      Left Ear: External ear normal.      Nose: Nose normal.      Mouth/Throat:      Mouth: Mucous membranes are moist.   Cardiovascular:      Rate and Rhythm: Normal rate and regular rhythm. Pulses: Normal pulses. Pulmonary:      Effort: Pulmonary effort is normal.   Neurological:      General: No focal deficit present. Mental Status: He is alert and oriented to person, place, and time.    Psychiatric:         Mood and Affect: Mood normal.         Behavior: Behavior normal.        Consults: cardiology  Significant Diagnostic Studies:   ECHO of heart  Treatments: cardiac meds: furosemide  Disposition: home  Discharged Condition: Stable  Follow Up: Primary Care Physician in one week    DISCHARGE MEDICATION:       Medication List        CONTINUE taking these medications      aspirin 81 MG tablet     atorvastatin 40 MG tablet  Commonly known as: LIPITOR  Take 1 tablet by mouth nightly     cyanocobalamin 1000 MCG tablet     famotidine 20 MG tablet  Commonly known as: PEPCID  Take 1 tablet by mouth 2 times daily     Farxiga 10 MG tablet  Generic drug: dapagliflozin     ferrous sulfate 325 (65 Fe) MG tablet  Commonly known as: IRON 329     folic acid 1 MG tablet  Commonly known as: FOLVITE     furosemide 80 MG tablet  Commonly known as: LASIX     ibuprofen 800 MG tablet  Commonly known as: ADVIL;MOTRIN     insulin glargine 100 UNIT/ML injection vial  Commonly known as: LANTUS     metOLazone 5 MG tablet  Commonly known as: ZAROXOLYN     metoprolol succinate 25 MG extended release tablet  Commonly known as: TOPROL XL     mycophenolate 500 MG tablet  Commonly known as: CELLCEPT     oxyCODONE-acetaminophen 5-325 MG per tablet  Commonly known as: PERCOCET     potassium chloride 20 MEQ extended release tablet  Commonly known as: Josue Mancia pregabalin 75 MG capsule  Commonly known as: LYRICA     ProFe 391.3 (180 Fe) MG Caps  Generic drug: Polysaccharide Iron Complex     rivaroxaban 20 MG Tabs tablet  Commonly known as: XARELTO  Take 1 tablet by mouth daily     spironolactone 25 MG tablet  Commonly known as: ALDACTONE     tadalafil 5 MG tablet  Commonly known as: CIALIS     tamsulosin 0.4 MG capsule  Commonly known as: FLOMAX     therapeutic multivitamin-minerals tablet     tiZANidine 4 MG tablet  Commonly known as: ZANAFLEX               Activity: activity as tolerated  Diet: cardiac diet  Wound Care: none needed    Time Spent on discharge is more than 30 minutes    Signed:  Lyle Field MD,  PGY-1  11/16/2022

## 2022-11-16 NOTE — PROGRESS NOTES
EMR was reviewed. Creatinine improving at 1.3 off diuretics. Home diuretics: Lasix 80 mg daily and spironolactone 25 mg daily. Assessment & Plan:      1. Acute on chronic HFrEF. Patient has new systolic heart failure as diagnosed by echo in 08/2022 which is nonischemic in etiology (most likely due to LBBB, cannot exclude contribution by myositis). He is now relatively euvolemic and hemodynamically stable. 2.  CAF. 3.  CAD. It is minimal per recent LHC. 4.  Myositis. Patient is supposed to be on mycophenolate 500 mg p.o. 3 times daily. 5.  Elevated troponin. It is most likely due to myositis and/or AHF (demand ischemia). I doubt ACS in view of recent unremarkable LHC. Patient does not have any concerning symptoms. 6.  ALDO on CKD. Most likely due to diuretic use. -Continue with baby aspirin, Xarelto  - Strict I's and O's every shift and standing weights if possible, low-salt diet and daily BMP with reflex to Mg, wean supplemental oxygen to off for sats greater than 94%.  -: Home diuretics Lasix 80 mg p.o. daily and spironolactone 25 mg daily. - Continue with Toprol 25 mg daily  - Patient is allergic to ACEI (swelling, ? Angioedema). - No hydralazine or imdur due to low BP      Patient may be discharged to home or facility (if ok with primary team) today on the above meds and follow up with me in 1 week with a BMP prior to visit. Please call me with any questions. I would like to thank you for providing me the opportunity to participate in the care of your patient. If you have any questions, please do not hesitate to contact me.      Starr Ross MD, [facility], [address]  The [address]  [address] [address]  Ph: [phone]  Fax: [phone]

## 2022-11-16 NOTE — PLAN OF CARE
Problem: Pain  Goal: Verbalizes/displays adequate comfort level or baseline comfort level  Outcome: Progressing  Flowsheets (Taken 11/16/2022 0917)  Verbalizes/displays adequate comfort level or baseline comfort level:   Encourage patient to monitor pain and request assistance   Assess pain using appropriate pain scale   Administer analgesics based on type and severity of pain and evaluate response  Note: Patient with intermittent generalized pain using PRN percocet consistently. Patient does not want any medication at this time. Will continue to monitor. Problem: Chronic Conditions and Co-morbidities  Goal: Patient's chronic conditions and co-morbidity symptoms are monitored and maintained or improved  Outcome: Progressing  Flowsheets  Taken 11/16/2022 0917 by Reg Fleming 34 - Patient's Chronic Conditions and Co-Morbidity Symptoms are Monitored and Maintained or Improved:   Monitor and assess patient's chronic conditions and comorbid symptoms for stability, deterioration, or improvement   Collaborate with multidisciplinary team to address chronic and comorbid conditions and prevent exacerbation or deterioration  Note: Patient to get up to the chair at some point today. Problem: Cardiovascular - Adult  Goal: Maintains optimal cardiac output and hemodynamic stability  Outcome: Progressing  Flowsheets (Taken 11/16/2022 0917)  Maintains optimal cardiac output and hemodynamic stability:   Monitor blood pressure and heart rate   Monitor urine output and notify Licensed Independent Practitioner for values outside of normal range   Assess for signs of decreased cardiac output  Note: Patient being restarted on PO lasix and aldactone today. Will monitor urine output and intake closely.      Problem: Safety - Adult  Goal: Free from fall injury  Outcome: Progressing  Flowsheets  Taken 11/16/2022 0917 by Jude August RN  Free From Fall Injury: Instruct family/caregiver on patient safety  Note: Pt does not meet criteria for orthostasis. Pt is a High fall risk. See Geroge Or Fall Score and ABCDS Injury Risk assessments.   + Screening for Orthostasis and/or + High Fall Risk per PATHAK/ABCDS: Explained fall risk precautions to pt and family and rationale behind their use to keep the patient safe. Pt bed is in low position, side rails up, call light and belongings are in reach. Fall wristband applied and present on pts wrist.  Bed alarm on. Pt encouraged to call for assistance. Will continue with hourly rounds for PO intake, pain needs, toileting and repositioning as needed. Problem: Respiratory - Adult  Goal: Achieves optimal ventilation and oxygenation  Outcome: Progressing  Flowsheets (Taken 11/16/2022 0917)  Achieves optimal ventilation and oxygenation:   Assess for changes in respiratory status   Assess for changes in mentation and behavior  Note: Patient with intermittent cough. No complaints.

## 2022-11-16 NOTE — CARE COORDINATION
2:02 PM  Patient to dc with resumed home care with [facility]. Merlene corral provided.

## 2022-11-16 NOTE — PROGRESS NOTES
22 1043   Encounter Summary   Encounter Overview/Reason  Initial Encounter   Service Provided For: Patient   Referral/Consult From: [facility] Other (Comment)  (he doesn't have a lot of support. says that he supports himself. He talked a lot about friends, though.)   Last Encounter    (es )   Complexity of Encounter Moderate   Begin Time 0940   End Time  1010   Total Time Calculated 30 min   Assessment/Intervention/Outcome   Assessment Coping; Other (Comment)  (He talked about his grief for a daughter who  and his concerns about friends who are going through hard times)   Intervention Active listening;Discussed belief system/Mormon practices/anneliese;Discussed meaning/purpose;Discussed relationship with God;Explored/Affirmed feelings, thoughts, concerns;Explored Coping Skills/Resources;Prayer (assurance of)/[place]   Outcome Engaged in conversation;Expressed feelings, needs, and concerns;Receptive;Venting emotion   Plan and Referrals   Plan/Referrals Other (Comment)  (as needed)

## 2022-11-16 NOTE — CARE COORDINATION
CTN contacted intake at [facility] [phone], faxed STEPHAN orders to [phone] for [address] by 11/18  Electronically signed by Alexey Nur LPN on 54/17/0024 at 2:14 PM

## 2022-11-18 ENCOUNTER — APPOINTMENT (OUTPATIENT)
Dept: CARDIAC REHAB | Age: 62
End: 2022-11-18
Payer: COMMERCIAL

## 2022-11-21 ENCOUNTER — APPOINTMENT (OUTPATIENT)
Dept: CARDIAC REHAB | Age: 62
End: 2022-11-21
Payer: COMMERCIAL

## 2022-11-23 ENCOUNTER — APPOINTMENT (OUTPATIENT)
Dept: CARDIAC REHAB | Age: 62
End: 2022-11-23
Payer: COMMERCIAL

## 2022-11-23 RX ORDER — ASPIRIN 81 MG/1
81 TABLET ORAL DAILY
Qty: 90 TABLET | Refills: 1 | Status: SHIPPED | OUTPATIENT
Start: 2022-11-23

## 2022-11-23 NOTE — TELEPHONE ENCOUNTER
Patient called in requesting refill for the following medication:      aspirin 81 MG tablet      [facility], [address] [place] [address] [address] [facility] -  [phone]      Pls advise.

## 2022-11-25 ENCOUNTER — APPOINTMENT (OUTPATIENT)
Dept: CARDIAC REHAB | Age: 62
End: 2022-11-25
Payer: COMMERCIAL

## 2022-11-29 DIAGNOSIS — G62.9 PERIPHERAL POLYNEUROPATHY: Primary | ICD-10-CM

## 2022-11-29 RX ORDER — FUROSEMIDE 80 MG
80 TABLET ORAL DAILY
Qty: 90 TABLET | Refills: 1 | Status: SHIPPED | OUTPATIENT
Start: 2022-11-29

## 2022-11-29 RX ORDER — PREGABALIN 75 MG/1
75 CAPSULE ORAL 2 TIMES DAILY
Qty: 60 CAPSULE | Refills: 2 | Status: SHIPPED | OUTPATIENT
Start: 2022-11-29 | End: 2022-12-29

## 2022-11-29 NOTE — TELEPHONE ENCOUNTER
Pt states he talked to his pharmacy yesterday and he needs all of his medication filled by Dr. Anupama Valdez since he is his new PCP. He is out of about 5 different medications.  Pt states he went over his medication with Dr. Anupama Valdez when he was here but Dr. Anupama Valdez never sent anything to the pharmacy       pregabalin (LYRICA) 75 MG capsule    furosemide (LASIX) 80 MG tablet    Does not know the other medications, Going to have the pharmacy call or call us with the list of medications he needs     [address] [place] [address] [address] [facility] -  [phone]      Please advise

## 2022-11-30 ENCOUNTER — HOSPITAL ENCOUNTER (OUTPATIENT)
Dept: CARDIAC REHAB | Age: 62
Setting detail: THERAPIES SERIES
Discharge: HOME | End: 2022-11-30
Payer: COMMERCIAL

## 2022-11-30 PROCEDURE — 93798 PHYS/QHP OP CAR RHAB W/ECG: CPT

## 2022-11-30 RX ORDER — DAPAGLIFLOZIN 10 MG/1
TABLET, FILM COATED ORAL
Qty: 30 TABLET | Refills: 2 | Status: SHIPPED | OUTPATIENT
Start: 2022-11-30

## 2022-11-30 RX ORDER — ATORVASTATIN CALCIUM 40 MG/1
40 TABLET, FILM COATED ORAL DAILY
Qty: 30 TABLET | Refills: 3 | Status: SHIPPED | OUTPATIENT
Start: 2022-11-30

## 2022-11-30 RX ORDER — METOPROLOL SUCCINATE 25 MG/1
TABLET, EXTENDED RELEASE ORAL
Qty: 30 TABLET | Refills: 3 | Status: SHIPPED | OUTPATIENT
Start: 2022-11-30

## 2022-11-30 RX ORDER — METOLAZONE 5 MG/1
5 TABLET ORAL PRN
Qty: 15 TABLET | Refills: 3 | Status: SHIPPED | OUTPATIENT
Start: 2022-11-30

## 2022-12-02 ENCOUNTER — HOSPITAL ENCOUNTER (OUTPATIENT)
Dept: CARDIAC REHAB | Age: 62
Setting detail: THERAPIES SERIES
Discharge: HOME | End: 2022-12-02
Payer: COMMERCIAL

## 2022-12-02 PROCEDURE — 93798 PHYS/QHP OP CAR RHAB W/ECG: CPT

## 2022-12-05 ENCOUNTER — APPOINTMENT (OUTPATIENT)
Dept: CARDIAC REHAB | Age: 62
End: 2022-12-05
Payer: COMMERCIAL

## 2022-12-07 ENCOUNTER — HOSPITAL ENCOUNTER (OUTPATIENT)
Dept: CARDIAC REHAB | Age: 62
Setting detail: THERAPIES SERIES
Discharge: HOME | End: 2022-12-07
Payer: COMMERCIAL

## 2022-12-07 PROCEDURE — 93798 PHYS/QHP OP CAR RHAB W/ECG: CPT

## 2022-12-08 ENCOUNTER — OFFICE VISIT (OUTPATIENT)
Dept: INTERNAL MEDICINE CLINIC | Age: 62
End: 2022-12-08
Payer: COMMERCIAL

## 2022-12-08 VITALS
HEART RATE: 72 BPM | OXYGEN SATURATION: 96 % | HEIGHT: 71 IN | TEMPERATURE: 97.8 F | SYSTOLIC BLOOD PRESSURE: 139 MMHG | BODY MASS INDEX: 43.12 KG/M2 | DIASTOLIC BLOOD PRESSURE: 80 MMHG | WEIGHT: 308 LBS

## 2022-12-08 DIAGNOSIS — M25.561 CHRONIC PAIN OF RIGHT KNEE: Primary | ICD-10-CM

## 2022-12-08 DIAGNOSIS — G89.29 CHRONIC PAIN OF RIGHT KNEE: Primary | ICD-10-CM

## 2022-12-08 DIAGNOSIS — I50.43 ACUTE ON CHRONIC COMBINED SYSTOLIC AND DIASTOLIC CONGESTIVE HEART FAILURE (HCC): ICD-10-CM

## 2022-12-08 PROCEDURE — G8417 CALC BMI ABV UP PARAM F/U: HCPCS | Performed by: HOSPITALIST

## 2022-12-08 PROCEDURE — 99214 OFFICE O/P EST MOD 30 MIN: CPT | Performed by: HOSPITALIST

## 2022-12-08 PROCEDURE — 3078F DIAST BP <80 MM HG: CPT | Performed by: HOSPITALIST

## 2022-12-08 PROCEDURE — 1111F DSCHRG MED/CURRENT MED MERGE: CPT | Performed by: HOSPITALIST

## 2022-12-08 PROCEDURE — 3074F SYST BP LT 130 MM HG: CPT | Performed by: HOSPITALIST

## 2022-12-08 PROCEDURE — G8482 FLU IMMUNIZE ORDER/ADMIN: HCPCS | Performed by: HOSPITALIST

## 2022-12-08 PROCEDURE — 1036F TOBACCO NON-USER: CPT | Performed by: HOSPITALIST

## 2022-12-08 PROCEDURE — G8427 DOCREV CUR MEDS BY ELIG CLIN: HCPCS | Performed by: HOSPITALIST

## 2022-12-08 PROCEDURE — 3017F COLORECTAL CA SCREEN DOC REV: CPT | Performed by: HOSPITALIST

## 2022-12-08 SDOH — ECONOMIC STABILITY: FOOD INSECURITY: WITHIN THE PAST 12 MONTHS, YOU WORRIED THAT YOUR FOOD WOULD RUN OUT BEFORE YOU GOT MONEY TO BUY MORE.: NEVER TRUE

## 2022-12-08 SDOH — ECONOMIC STABILITY: FOOD INSECURITY: WITHIN THE PAST 12 MONTHS, THE FOOD YOU BOUGHT JUST DIDN'T LAST AND YOU DIDN'T HAVE MONEY TO GET MORE.: NEVER TRUE

## 2022-12-08 ASSESSMENT — ENCOUNTER SYMPTOMS
SHORTNESS OF BREATH: 1
DIARRHEA: 0
SORE THROAT: 0
ABDOMINAL PAIN: 0
ABDOMINAL DISTENTION: 0
CHEST TIGHTNESS: 0
TROUBLE SWALLOWING: 0
BLOOD IN STOOL: 0
CONSTIPATION: 0
SINUS PRESSURE: 0
WHEEZING: 0
BACK PAIN: 0
VOMITING: 0
COUGH: 0
SINUS PAIN: 0
NAUSEA: 0
VOICE CHANGE: 0

## 2022-12-08 ASSESSMENT — PATIENT HEALTH QUESTIONNAIRE - PHQ9
1. LITTLE INTEREST OR PLEASURE IN DOING THINGS: 1
SUM OF ALL RESPONSES TO PHQ QUESTIONS 1-9: 1
SUM OF ALL RESPONSES TO PHQ QUESTIONS 1-9: 1
2. FEELING DOWN, DEPRESSED OR HOPELESS: 0
SUM OF ALL RESPONSES TO PHQ QUESTIONS 1-9: 1
SUM OF ALL RESPONSES TO PHQ9 QUESTIONS 1 & 2: 1
SUM OF ALL RESPONSES TO PHQ QUESTIONS 1-9: 1

## 2022-12-08 ASSESSMENT — SOCIAL DETERMINANTS OF HEALTH (SDOH): HOW HARD IS IT FOR YOU TO PAY FOR THE VERY BASICS LIKE FOOD, HOUSING, MEDICAL CARE, AND HEATING?: NOT HARD AT ALL

## 2022-12-08 NOTE — PROGRESS NOTES
[facility] Internal Medicine  Follow-up visit   2022    Lidia Chase (:  1960) is a 57 y.o. male, here for follow-up:    Chief Complaint   Patient presents with    Medication Refill     Other issues regarding meds. HPI  57year old gentleman with with a PMHx of CHF, DMT2, Atrial fibrillation, aortic aneurysm, HTN, HLD, CAD, poliomyelitis, Sjogren's and recent R knee replacement (2022) with subsequent DVT of of the R soleal vein. -Dry eyes and chronic cough. -He does see Dr. Sigrid Adam at [place] [address] and is to address with her.    -He has chronic cough, this is worse at night with sputum production. Patient states that if he drinks cold water at night, this refluxes back up and causes cough. He was tried on a proton pump inhibitor before but this gave him a bad taste in his mouth. He is currently on Pepcid 20 mg to take twice daily.  -Congestive heart failure: The patient has nonischemic cardiomyopathy. Nonobstructive coronary artery disease by coronary angiography in 2022. His ejection fraction was approximately 35% at that time. He had a limited echo 11/10/2022 with improvement in ejection fraction to 55 to 60%. This was during a hospitalization in early November. He presented with fluid overload and a brain natretic peptide of . After several days of intravenous diuresis, his brain natruretic peptide improved to 309. Per review of cardiology note from Yamilet Anguiano NP (with [facility]) unable to take an ACE inhibitor, ARB, or Arrnii secondary to anaphylaxis. Has associated left bundle branch block and is status post BiV AICD placement at [facility] by Dr. Juliocesar Kennedy 2022. He has had a recent device check. He has slow atrial fibrillation and is Xarelto anticoagulated. He is only in cardiac rehab on  and Fri. The patient has a cardio mems device. He is contacted when they wish to have him use a  metolazone.   -Type 2 tablet 2    atorvastatin (LIPITOR) 40 MG tablet Take 1 tablet by mouth daily 30 tablet 3    metOLazone (ZAROXOLYN) 5 MG tablet Take 1 tablet by mouth as needed (Take 1 tablet by mouth daily as needed) Take 1 tablet by mouth daily as needed 15 tablet 3    pregabalin (LYRICA) 75 MG capsule Take 1 capsule by mouth 2 times daily for 30 days. 60 capsule 2    furosemide (LASIX) 80 MG tablet Take 1 tablet by mouth daily Take 80 mg by mouth daily 90 tablet 1    aspirin EC 81 MG EC tablet Take 1 tablet by mouth daily 90 tablet 1    pantoprazole (PROTONIX) 40 MG tablet Take 1 tablet by mouth every morning (before breakfast) 30 tablet 3    tadalafil (CIALIS) 5 MG tablet TAKE 1 TABLET BY MOUTH ONCE DAILY      tiZANidine (ZANAFLEX) 4 MG tablet TAKE 1 TABLET BY MOUTH THREE TIMES DAILY AS NEEDED FOR PAIN      potassium chloride (KLOR-CON M) 20 MEQ extended release tablet Take 20 mEq by mouth daily      PROFE 391.3 (180 Fe) MG CAPS TAKE 1 CAPSULE BY MOUTH EVERY OTHER DAY      ibuprofen (ADVIL;MOTRIN) 800 MG tablet TAKE 1 TABLET BY MOUTH THREE TIMES DAILY AS NEEDED FOR PAIN      rivaroxaban (XARELTO) 20 MG TABS tablet Take 1 tablet by mouth daily 90 tablet 2    insulin glargine (LANTUS) 100 UNIT/ML injection vial Inject 15 Units into the skin nightly      cyanocobalamin 1000 MCG tablet Take 1,000 mcg by mouth daily      ferrous sulfate (IRON 325) 325 (65 Fe) MG tablet Take 325 mg by mouth daily (with breakfast)      folic acid (FOLVITE) 1 MG tablet Take 1 mg by mouth daily      Multiple Vitamins-Minerals (THERAPEUTIC MULTIVITAMIN-MINERALS) tablet Take 1 tablet by mouth daily      mycophenolate (CELLCEPT) 500 MG tablet Take by mouth in the morning, at noon, and at bedtime      spironolactone (ALDACTONE) 25 MG tablet Take 25 mg by mouth daily      tamsulosin (FLOMAX) 0.4 MG capsule Take 0.4 mg by mouth daily      oxyCODONE-acetaminophen (PERCOCET) 5-325 MG per tablet Take 1 tablet by mouth every 4 hours as needed for Pain. atorvastatin (LIPITOR) 40 MG tablet Take 1 tablet by mouth nightly 30 tablet 3    aspirin 81 MG tablet Take 81 mg by mouth daily       No current facility-administered medications on file prior to visit.       Allergies   Allergen Reactions    Lisinopril Swelling    Bee Venom Swelling    Other Itching     Itch like crazy EKG TAPE     Past Medical History:   Diagnosis Date    Arthritis     Depression     Diabetes mellitus (HCC)     Diastolic CHF ([facility] [address]     Difficult intubation     throat swelled    Dyslipidemia     Herniated disc, cervical     Hypertension     Osteoporosis     Peripheral neuropathy      Patient Active Problem List   Diagnosis    NSTEMI (non-ST elevated myocardial infarction) ([facility] [address]    Diabetes mellitus ([facility] [address]    Hypertension    Peripheral neuropathy    Generalized weakness    Obesity due to excess calories    Heart failure (HCC)    Leg swelling    Mixed hyperlipidemia    Hyperglycemia    Sjogren's syndrome (HCC)    Aortic dilatation (HCC)    Acute on chronic congestive heart failure (HCC)    Shortness of breath    Paroxysmal atrial fibrillation (HCC)    Atypical atrial flutter (HCC)    Nonischemic cardiomyopathy ([facility] [address]     Past Surgical History:   Procedure Laterality Date    BREAST SURGERY Left 8/7/2019    LEFT BREAST MASS EXCISION; LEFT AXILLIARY LYMPH NODE EXCISION performed by Yuliana Underwood MD at [place] [address] Left 10/10/2019    MUSCLE BIOPSY LOWER EXTREMITY LEFT performed by Yuliana Underwood MD at [address] [place] [address] History     Socioeconomic History    Marital status: Single     Spouse name: Not on file    Number of children: Not on file    Years of education: Not on file    Highest education level: Not on file   Occupational History    Not on file   Tobacco Use    Smoking status: Never    Smokeless tobacco: Never   Vaping Use    Vaping Use: Never used   Substance and Sexual Activity    Alcohol use: Yes     Comment: occ    Drug use: Not Currently     Types: Cocaine     Comment: 50 years ago- no longer    Sexual activity: Yes     Partners: Female     Comment:    Other Topics Concern    Not on file   Social History Narrative    Not on file     Social Determinants of Health     Financial Resource Strain: Low Risk     Difficulty of Paying Living Expenses: Not hard at all   Food Insecurity: No Food Insecurity    Worried About Running Out of Food in the Last Year: Never true    Ran Out of Food in the Last Year: Never true   Transportation Needs: Not on file   Physical Activity: Not on file   Stress: Not on file   Social Connections: Not on file   Intimate Partner Violence: Not on file   Housing Stability: Not on file      Family History   Problem Relation Age of Onset    Heart Disease Mother     High Blood Pressure Mother     High Blood Pressure Father     High Blood Pressure Sister     Breast Cancer Brother     Heart Disease Brother     High Blood Pressure Brother        PE  Vitals:    12/08/22 1221 12/08/22 1224   BP: (!) 140/78 139/80   Pulse: 72    Temp: 97.8 °F (36.6 °C)    SpO2: 96%    Weight: (!) 308 lb (139.7 kg)    Height: 5' 11\" (1.803 m)      Estimated body mass index is 42.96 kg/m² as calculated from the following:    Height as of this encounter: 5' 11\" (1.803 m). Weight as of this encounter: 308 lb (139.7 kg). Physical Exam  Vitals reviewed. Constitutional:       General: He is not in acute distress. Appearance: Normal appearance. He is obese. HENT:      Head: Normocephalic and atraumatic. Mouth/Throat:      Pharynx: Oropharynx is clear. Eyes:      Conjunctiva/sclera: Conjunctivae normal.      Pupils: Pupils are equal, round, and reactive to light. Cardiovascular:      Rate and Rhythm: Normal rate and regular rhythm. Pulses: Normal pulses. Heart sounds: Normal heart sounds. Pulmonary:      Effort: Pulmonary effort is normal. No respiratory distress. Breath sounds: Normal breath sounds.  No wheezing or rales. Abdominal:      Palpations: Abdomen is soft. Tenderness: There is no abdominal tenderness. There is no rebound. Musculoskeletal:         General: No signs of injury. Normal range of motion. Cervical back: Normal range of motion and neck supple. Skin:     General: Skin is warm and dry. Coloration: Skin is not jaundiced. Neurological:      General: No focal deficit present. Mental Status: He is alert and oriented to person, place, and time. Psychiatric:         Behavior: Behavior normal.         Thought Content: Thought content normal.         Judgment: Judgment normal.       ASSESSMENT/ PLAN:  1. Chronic pain of right knee  - [facility] (Hip; Knee; Shoulder), Georgia Liz MD, Pain Management, [facility]     2. Systolic and diastolic CHF  -The patient appears to be well compensated. I am checking blood work today including a basic metabolic panel and brain natruretic peptide. 3.  Diabetes mellitus type 2:  -Continue with [place]. Orders Placed This Encounter   Procedures    [address] [place] [address], Lorenza Pantoja MD, Orthopedic Surgery (Hip; Knee; Shoulder), Georgia Liz MD, Pain Management, [facility]      No orders of the defined types were placed in this encounter. There are no discontinued medications. Return in about 1 month (around 1/8/2023). Dann Millard MD    This dictation was generated by voice recognition computer software. Although all attempts are made to edit the dictation for accuracy, there may be errors in the transcription that are not intended.

## 2022-12-09 ENCOUNTER — HOSPITAL ENCOUNTER (OUTPATIENT)
Dept: CARDIAC REHAB | Age: 62
Setting detail: THERAPIES SERIES
Discharge: HOME | End: 2022-12-09
Payer: COMMERCIAL

## 2022-12-09 PROCEDURE — 93798 PHYS/QHP OP CAR RHAB W/ECG: CPT

## 2022-12-12 ENCOUNTER — HOSPITAL ENCOUNTER (OUTPATIENT)
Dept: CARDIAC REHAB | Age: 62
Setting detail: THERAPIES SERIES
End: 2022-12-12
Payer: COMMERCIAL

## 2022-12-15 ENCOUNTER — OFFICE VISIT (OUTPATIENT)
Dept: ORTHOPEDIC SURGERY | Age: 62
End: 2022-12-15

## 2022-12-15 VITALS — BODY MASS INDEX: 42.84 KG/M2 | HEIGHT: 71 IN | WEIGHT: 306 LBS

## 2022-12-15 DIAGNOSIS — Z96.651 STATUS POST RIGHT KNEE REPLACEMENT: ICD-10-CM

## 2022-12-15 DIAGNOSIS — M25.561 PAIN IN BOTH KNEES, UNSPECIFIED CHRONICITY: ICD-10-CM

## 2022-12-15 DIAGNOSIS — M17.12 PRIMARY OSTEOARTHRITIS OF LEFT KNEE: Primary | ICD-10-CM

## 2022-12-15 DIAGNOSIS — M25.361 KNEE INSTABILITY, RIGHT: ICD-10-CM

## 2022-12-15 DIAGNOSIS — M25.562 PAIN IN BOTH KNEES, UNSPECIFIED CHRONICITY: ICD-10-CM

## 2022-12-15 SDOH — HEALTH STABILITY: PHYSICAL HEALTH: ON AVERAGE, HOW MANY MINUTES DO YOU ENGAGE IN EXERCISE AT THIS LEVEL?: 60 MIN

## 2022-12-15 SDOH — HEALTH STABILITY: PHYSICAL HEALTH: ON AVERAGE, HOW MANY DAYS PER WEEK DO YOU ENGAGE IN MODERATE TO STRENUOUS EXERCISE (LIKE A BRISK WALK)?: 0 DAYS

## 2022-12-15 ASSESSMENT — SOCIAL DETERMINANTS OF HEALTH (SDOH)
WITHIN THE LAST YEAR, HAVE TO BEEN RAPED OR FORCED TO HAVE ANY KIND OF SEXUAL ACTIVITY BY YOUR PARTNER OR EX-PARTNER?: NO
WITHIN THE LAST YEAR, HAVE YOU BEEN HUMILIATED OR EMOTIONALLY ABUSED IN OTHER WAYS BY YOUR PARTNER OR EX-PARTNER?: NO
WITHIN THE LAST YEAR, HAVE YOU BEEN KICKED, HIT, SLAPPED, OR OTHERWISE PHYSICALLY HURT BY YOUR PARTNER OR EX-PARTNER?: NO
WITHIN THE LAST YEAR, HAVE YOU BEEN AFRAID OF YOUR PARTNER OR EX-PARTNER?: NO

## 2022-12-15 NOTE — PROGRESS NOTES
Dr Cr Arteaga      Date /Time 12/15/2022       8:30 AM EST  Name Joanne Pugh             1960   Location  [address] [facility]  MRN [id number]                Chief Complaint   Patient presents with    Knee Pain     Right TKA 02/24/2022  Left Knee OA         History of Present Illness  Joanne Pugh is a 57 y.o. male who presents with  bilateral knee pain, . Sent in consultation by Morgan Wood MD, . Injury Mechanism:  none. Worker's Comp. & legal issues:   none. Previous Treatments: Ice, Heat, and NSAIDs    Patient presents to the office today for a new problem. Patient had a right total knee arthroplasty done on 2/24/2022 by Dr. Preeti Sims. Patient continues to have pain clicking and a popping sensation involving his right knee. No injury or trauma postoperatively. No immediate postoperative complications. He would also like to be seen for his left knee. He has been diagnosed with advanced osteoarthritis left knee in the past.  He has a multiple cortisone injections in the past with a do raise his blood glucose level. He cannot take NSAIDs because he takes Xarelto because of DVT history and atrial fibrillation.   He has had multiple sessions of physical therapy with continued pain    Past History  Past Medical History:   Diagnosis Date    Arthritis     Depression     Diabetes mellitus ([address]     Diastolic CHF ([address]     Difficult intubation     throat swelled    Dyslipidemia     Herniated disc, cervical     Hypertension     Osteoporosis     Peripheral neuropathy      Past Surgical History:   Procedure Laterality Date    BREAST SURGERY Left 8/7/2019    LEFT BREAST MASS EXCISION; LEFT AXILLIARY LYMPH NODE EXCISION performed by Forrest Brooks MD at [place] [address] Left 10/10/2019    MUSCLE BIOPSY LOWER EXTREMITY LEFT performed by Forrest Brooks MD at [address] [place] [address] History     Tobacco Use    Smoking status: Never    Smokeless tobacco: Never   Substance Use Topics    Alcohol use: Yes     Comment: occ      Current Outpatient Medications on File Prior to Visit   Medication Sig Dispense Refill    metoprolol succinate (TOPROL XL) 25 MG extended release tablet TAKE 1 TABLET BY MOUTH ONCE DAILY 30 tablet 3    FARXIGA 10 MG tablet TAKE 1 TABLET BY MOUTH ONCE DAILY IN THE MORNING 30 tablet 2    atorvastatin (LIPITOR) 40 MG tablet Take 1 tablet by mouth daily 30 tablet 3    metOLazone (ZAROXOLYN) 5 MG tablet Take 1 tablet by mouth as needed (Take 1 tablet by mouth daily as needed) Take 1 tablet by mouth daily as needed 15 tablet 3    pregabalin (LYRICA) 75 MG capsule Take 1 capsule by mouth 2 times daily for 30 days.  60 capsule 2    furosemide (LASIX) 80 MG tablet Take 1 tablet by mouth daily Take 80 mg by mouth daily 90 tablet 1    aspirin EC 81 MG EC tablet Take 1 tablet by mouth daily 90 tablet 1    pantoprazole (PROTONIX) 40 MG tablet Take 1 tablet by mouth every morning (before breakfast) 30 tablet 3    tadalafil (CIALIS) 5 MG tablet TAKE 1 TABLET BY MOUTH ONCE DAILY      tiZANidine (ZANAFLEX) 4 MG tablet TAKE 1 TABLET BY MOUTH THREE TIMES DAILY AS NEEDED FOR PAIN      potassium chloride (KLOR-CON M) 20 MEQ extended release tablet Take 20 mEq by mouth daily      PROFE 391.3 (180 Fe) MG CAPS TAKE 1 CAPSULE BY MOUTH EVERY OTHER DAY      ibuprofen (ADVIL;MOTRIN) 800 MG tablet TAKE 1 TABLET BY MOUTH THREE TIMES DAILY AS NEEDED FOR PAIN      rivaroxaban (XARELTO) 20 MG TABS tablet Take 1 tablet by mouth daily 90 tablet 2    insulin glargine (LANTUS) 100 UNIT/ML injection vial Inject 15 Units into the skin nightly      cyanocobalamin 1000 MCG tablet Take 1,000 mcg by mouth daily      ferrous sulfate (IRON 325) 325 (65 Fe) MG tablet Take 325 mg by mouth daily (with breakfast)      folic acid (FOLVITE) 1 MG tablet Take 1 mg by mouth daily      Multiple Vitamins-Minerals (THERAPEUTIC MULTIVITAMIN-MINERALS) tablet Take 1 tablet by mouth daily mycophenolate (CELLCEPT) 500 MG tablet Take by mouth in the morning, at noon, and at bedtime      spironolactone (ALDACTONE) 25 MG tablet Take 25 mg by mouth daily      tamsulosin (FLOMAX) 0.4 MG capsule Take 0.4 mg by mouth daily      oxyCODONE-acetaminophen (PERCOCET) 5-325 MG per tablet Take 1 tablet by mouth every 4 hours as needed for Pain. atorvastatin (LIPITOR) 40 MG tablet Take 1 tablet by mouth nightly 30 tablet 3    aspirin 81 MG tablet Take 81 mg by mouth daily       No current facility-administered medications on file prior to visit. ASCVD 10-YEAR RISK SCORE  The ASCVD Risk score (Nura DK, et al., 2019) failed to calculate for the following reasons: The patient has a prior MI or stroke diagnosis     Review of Systems  10-point ROS is negative other than HPI. Physical Exam  Based off 1997 Exam Criteria  Ht 5' 11\" (1.803 m)   Wt (!) 306 lb (138.8 kg)   BMI 42.68 kg/m²      Constitutional:       General: He is not in acute distress. Appearance: Normal appearance. Cardiovascular:      Rate and Rhythm: Normal rate and regular rhythm. Pulses: Normal pulses. Pulmonary:      Effort: Pulmonary effort is normal. No respiratory distress. Neurological:      Mental Status: He is alert and oriented to person, place, and time. Mental status is at baseline. Skin: Mean, dry, intact. No open sores  Lymphatics: No palpable lymph nodes    Musculoskeletal:  Gait:  altered  Lumbar spine: There is no swelling, warmth, or erythema. Range of motion is within normal limits. There is no paraspinal or spinous process tenderness. . The distal neurovascular exam is grossly intact and symmetric. Quintin Hip: Examination of the right and left hip reveals intact skin. The patient demonstrates full painless range of motion with regards to flexion, abduction, internal and external rotation. There is no tenderness about the greater trochanter.     R Knee: Physical exam of the right knee demonstrates mild knee joint effusion. Tenderness over the Pez anserine bursa. No instability and full extension. Mild to moderate instability to varus and valgus stress test in flexion. Minimal tenderness over the distal IT band. Range of motion 3-100. L Knee: Physical exam of the knee demonstrates painful range of motion 5-110. Tenderness over the lateral greater than medial joint line. No gross instability to either varus or valgus stress test.      Imaging  Bilateral Knee: [address] [facility]  Radiographs: X-rays were ordered today and reviewed of the right and left knee. 4 views. Standing AP, standing AP flexed, lateral, and skyline views. They demonstrate a right total knee arthroplasty in place. No evidence of loosening or periprosthetic fracture. Left knee films demonstrate advanced osteoarthritis lateral greater than medial.      Procedure:  Orders Placed This Encounter   Procedures    XR KNEE LEFT (MIN 4 VIEWS)     Standing Status:   Future     Number of Occurrences:   1     Standing Expiration Date:   12/13/2023    XR KNEE RIGHT (3 VIEWS)     Standing Status:   Future     Number of Occurrences:   1     Standing Expiration Date:   12/13/2023    Ambulatory referral to Physical Therapy     Referral Priority:   Routine     Referral Type:   Eval and Treat     Referral Reason:   Specialty Services Required     Requested Specialty:   Physical Therapist     Number of Visits Requested:   1    EUFLEXXA INJECTION (For Auth/Precert)     Standing Status:   Future     Standing Expiration Date:   12/15/2023    Breg Short Runner WrapAround Knee Brace     Patient was prescribed a Breg Shortrunner. The RIGHT knee will require stabilization / immobilization from this semi-rigid / rigid orthosis to improve their function. The orthosis will assist in protecting the affected area, provide functional support and facilitate healing.     The patient was educated and fit by a healthcare professional with expert knowledge and specialization in brace application while under the direct supervision of the physician. Verbal and written instructions for the use of and application of this item were provided. They were instructed to contact the office immediately should the brace result in increased pain, decreased sensation, increased swelling or worsening of the condition. Assessment and Plan  Marry Culver was seen today for knee pain. Diagnoses and all orders for this visit:    Primary osteoarthritis of left knee  -     EUFLEXXA INJECTION (For Auth/Precert); Future    Pain in both knees, unspecified chronicity  -     XR KNEE LEFT (MIN 4 VIEWS); Future  -     XR KNEE RIGHT (3 VIEWS); Future    Status post right knee replacement  -     Ambulatory referral to Physical Therapy    Knee instability, right  -     Ambulatory referral to Physical Therapy      Patient's right total knee arthroplasty has instability in flexion. He does not wish to consider any surgery at this time. We will place him into a economy hinged knee brace and physical therapy. Therapy for quad and hamstring strengthening to increase stability. In terms of patient's left knee he really cannot take NSAIDs due to taking Xarelto. He has tried cortisone injections and I do elevate his blood glucose and thus not recommended in a diabetic. We will request viscosupplementation injections for the patient. He will follow-up in office to begin the injections once approved. He has tried physical therapy without improvement. I discussed with Emeka Bland that his history, symptoms, signs, and imaging are most consistent with knee arthritis and previous TKA replacement. We reviewed the natural history of these conditions and treatment options ranging from conservative measures (rest, icing, activity modification, physical therapy, pain meds, cortisone injection) to surgical options.      In terms of treatment, I recommended continuing with rest, icing, avoidance of painful activities, NSAIDs or pain meds as tolerated, and physical therapy. We discussed surgical options as well, should conservative measures fail. Electronically signed by Lobo Alcantar MD on 12/15/2022 at 8:30 AM  This dictation was generated by voice recognition computer software. Although all attempts are made to edit the dictation for accuracy, there may be errors in the transcription that are not intended.

## 2022-12-16 ENCOUNTER — HOSPITAL ENCOUNTER (OUTPATIENT)
Dept: CARDIAC REHAB | Age: 62
Setting detail: THERAPIES SERIES
Discharge: HOME | End: 2022-12-16
Payer: COMMERCIAL

## 2022-12-16 PROCEDURE — 93798 PHYS/QHP OP CAR RHAB W/ECG: CPT

## 2022-12-21 ENCOUNTER — HOSPITAL ENCOUNTER (OUTPATIENT)
Dept: CARDIAC REHAB | Age: 62
Setting detail: THERAPIES SERIES
Discharge: HOME | End: 2022-12-21
Payer: COMMERCIAL

## 2022-12-21 PROCEDURE — 93798 PHYS/QHP OP CAR RHAB W/ECG: CPT

## 2022-12-23 ENCOUNTER — APPOINTMENT (OUTPATIENT)
Dept: CARDIAC REHAB | Age: 62
End: 2022-12-23
Payer: COMMERCIAL

## 2022-12-28 ENCOUNTER — HOSPITAL ENCOUNTER (OUTPATIENT)
Dept: CARDIAC REHAB | Age: 62
Setting detail: THERAPIES SERIES
Discharge: HOME | End: 2022-12-28
Payer: COMMERCIAL

## 2022-12-28 PROCEDURE — 93798 PHYS/QHP OP CAR RHAB W/ECG: CPT

## 2022-12-28 RX ORDER — INSULIN GLARGINE 100 [IU]/ML
15 INJECTION, SOLUTION SUBCUTANEOUS NIGHTLY
Qty: 10 ML | Refills: 4 | Status: SHIPPED | OUTPATIENT
Start: 2022-12-28 | End: 2023-11-26

## 2023-01-02 ENCOUNTER — APPOINTMENT (OUTPATIENT)
Dept: CARDIAC REHAB | Age: 63
End: 2023-01-02
Payer: COMMERCIAL

## 2023-01-03 ENCOUNTER — HOSPITAL ENCOUNTER (OUTPATIENT)
Dept: PHYSICAL THERAPY | Age: 63
Setting detail: THERAPIES SERIES
End: 2023-01-03
Payer: COMMERCIAL

## 2023-01-04 ENCOUNTER — HOSPITAL ENCOUNTER (OUTPATIENT)
Dept: CARDIAC REHAB | Age: 63
Setting detail: THERAPIES SERIES
Discharge: HOME | End: 2023-01-04
Payer: COMMERCIAL

## 2023-01-04 PROCEDURE — 93798 PHYS/QHP OP CAR RHAB W/ECG: CPT

## 2023-01-04 NOTE — TELEPHONE ENCOUNTER
Patient called in stating that when he takes his Lantus he uses the pens and not the one with the needles. [address], [address] [facility] -  [phone]        Pls call and advise.

## 2023-01-05 ENCOUNTER — HOSPITAL ENCOUNTER (OUTPATIENT)
Dept: PHYSICAL THERAPY | Age: 63
Setting detail: THERAPIES SERIES
Discharge: HOME | End: 2023-01-05
Payer: COMMERCIAL

## 2023-01-05 ENCOUNTER — NURSE ONLY (OUTPATIENT)
Dept: ORTHOPEDIC SURGERY | Age: 63
End: 2023-01-05
Payer: COMMERCIAL

## 2023-01-05 VITALS — HEIGHT: 71 IN | WEIGHT: 306 LBS | BODY MASS INDEX: 42.84 KG/M2

## 2023-01-05 DIAGNOSIS — M25.561 CHRONIC PAIN OF RIGHT KNEE: Primary | ICD-10-CM

## 2023-01-05 DIAGNOSIS — M17.12 PRIMARY OSTEOARTHRITIS OF LEFT KNEE: Primary | ICD-10-CM

## 2023-01-05 DIAGNOSIS — G89.29 CHRONIC PAIN OF RIGHT KNEE: Primary | ICD-10-CM

## 2023-01-05 PROCEDURE — 99999 PR OFFICE/OUTPT VISIT,PROCEDURE ONLY: CPT | Performed by: PHYSICIAN ASSISTANT

## 2023-01-05 PROCEDURE — 97162 PT EVAL MOD COMPLEX 30 MIN: CPT | Performed by: PHYSICAL THERAPIST

## 2023-01-05 PROCEDURE — 97140 MANUAL THERAPY 1/> REGIONS: CPT | Performed by: PHYSICAL THERAPIST

## 2023-01-05 PROCEDURE — 20611 DRAIN/INJ JOINT/BURSA W/US: CPT | Performed by: PHYSICIAN ASSISTANT

## 2023-01-05 RX ORDER — INSULIN GLARGINE 100 [IU]/ML
15 INJECTION, SOLUTION SUBCUTANEOUS NIGHTLY
Qty: 10 ADJUSTABLE DOSE PRE-FILLED PEN SYRINGE | Refills: 3 | Status: SHIPPED | OUTPATIENT
Start: 2023-01-05

## 2023-01-05 RX ORDER — HYALURONATE SODIUM 10 MG/ML
20 SYRINGE (ML) INTRAARTICULAR ONCE
Status: COMPLETED | OUTPATIENT
Start: 2023-01-05 | End: 2023-01-05

## 2023-01-05 RX ADMIN — Medication 20 MG: at 10:47

## 2023-01-05 NOTE — FLOWSHEET NOTE
[facility], [facility],  Toshia Lopez, [address]  Phone: [phone]  Fax [phone]     Physical Therapy: Daily Note   Patient: Jagjit Pappas (13 y.o. male)   Treatment Date: 2023   :  1960 MRN: [id number]   Visit #: 1   Auth needed? []  Yes    [x]  No   Amount authorized: NA  Visit Limit: 30 Insurance: Payor: [facility] [employer]  [address] / Plan: [address] [place] [address]  [address] / Product Type: *No Product type* /   Insurance ID: [id number] - (Medicaid Managed)  Secondary Insurance (if applicable):    Treatment Diagnosis:    No diagnosis found. Medical Diagnosis: Status post right knee replacement [Z96.651]  Knee instability, right [M25.361]     Referring Physician: Katie Dukes MD    PCP: Fernanda López MD   Plan of Care signed? []  Yes [x]  No  Latex Allergy? []  Yes [x]  No   Pace Maker? [x]  Yes []  No    Preferred Language for Healthcare:   [x] English       [] other:       RESTRICTIONS/PRECAUTIONS: Pacemaker    Functional Outcome Measure/Scale:    Date Assessed (Eval)     LEFS (%) 75%         SUBJECTIVE EXAMINATION   Pain level:  6 /10    Patient Report/Comments:       OBJECTIVE EXAMINATION   Observation:     Test measurements:     TREATMENT     Exercise / Equipment / Intervention Sets / Yaya Lard / Resistance Comments / Chancellor Hipolito sets 3\" x 10                                                                                           Manual Treatment: G1-2 PA Fem/Tib jt.  Mobs x 10'      Modalities:    [x] None  [] Electrical Stimulation: for pain modulation and symptom control  [] Other:     ASSESSMENT     Treatment/Activity Tolerance:  [x] Patient tolerated treatment well [] Patient limited by fatigue  [] Patient limited by pain  [] Patient limited by other medical complications  [] Other:     Overall Progression Towards Functional goals/ Treatment Progress Update:  [] Patient is progressing as expected towards functional goals listed. [] Progression is slowed due to complexities/Impairments listed. [] Progression has been slowed due to co-morbidities. [x] Plan just implemented, too soon (<30days) to assess goals progression   [] Goals require adjustment due to lack of progress  [] Patient is not progressing as expected and requires additional follow up with physician  [] Other:     Prognosis for POC:   [x] Good     [] Fair     [] Poor      Patient requires continued skilled intervention: [x] Yes       [] No    GOALS        Patient stated goal: Gain strength to have surgery and go home following. [x] Progressing: [] Met: [] Not Met: [] Adjusted     Therapist goals for Patient:   Short Term Goals: To be achieved in: 2 weeks  Independent in HEP and progression per patient tolerance, in order to progress toward full function and prevent re-injury. [x] Progressing: [] Met: [] Not Met: [] Adjusted  Patient will have a decrease in pain to facilitate improvement in movement, function, and ADLs as indicated by functional deficits. [x] Progressing: [] Met: [] Not Met: [] Adjusted     Long Term Goals: To be achieved in: 12 weeks  Disability index score of 45% or less as measure by LEFS to assist with reaching prior level of function. [x] Progressing: [] Met: [] Not Met: [] Adjusted  Patient will demonstrate increased AROM to WNL, good Lumbar Spine mobility, good hip and knee ROM to allow for proper joint functioning as indicated by patients Functional Deficits. [x] Progressing: [] Met: [] Not Met: [] Adjusted  Patient will demonstrate an increase in proximal hip strength and core activation to allow for proper functional mobility as indicated by patients Functional Deficits  [x] Progressing: [] Met: [] Not Met: [] Adjusted  Patient will return to Walking, Stairs, and Light home activities without increased symptoms or restriction.    [x] Progressing: [] Met: [] Not Met: [] Adjusted    TREATMENT PLAN     [] Continue per plan of care [] Alter current plan  [x] Plan of care initiated [] Hold pending MD visit  [] Discharge    BILLING     Timed Code Treatment Minutes: 10   Total Treatment Minutes: 45     If Medicare: NA  If C: NA    CPT Code # CPT Code #     [] Eval Low (83390)    [] Gait (12902)     [x] Eval Medium (57719) 1  [] VASO (93614)     [] Eval High (73921)   [] Estim Unattended (96789)     [] Re-Eval (97684)   [] [facility] (21353)     [] Therex (25491)    [] Dry Needle 1-2 muscle (35465)     [] Neuromusc. Re-ed (29539)   [] Dry Needle 3+ muscle (56750)     [x] Manual (38808) 1  [] Group Therapy     [] Ther.  Act (91713)   []       Electronically Signed by Jadon Leahy PT  Date: 01/05/2023

## 2023-01-05 NOTE — PROGRESS NOTES
Diagnosis: Left knee osteoarthritis    Procedure: Left knee viscosupplementation #1    The patient is symptomatic from osteoarthritis of the left knee joint with documented radiological signs of arthritis. The patient has also failed 3 months of conservative treatment including home exercise, education, Tylenol and/or NSAIDs use. The patient was offered a Visco supplementation today. Risks, benefits, and alternatives to the injections were discussed in detail with the patient. The risks discussed included but are not limited to infection, skin reactions, hot swollen joints, and anaphylaxis. The patient gave verbal informed consent for the injection. The patient's skin was prepped with  3 sterile gauze  pads soaked with alcohol solution and the knee joint was injected with 2 mL of Euflexxa intra-articularly under sterile conditions. Technique: Under sterile conditions a [employer] ultrasound unit with a variable frequency (6.0-15.0 MHz) linear transducer was used to localize the placement of a 22-gauge needle into the knee joint. Findings: Successful needle placement for intra-articular Visco supplementation injection. Final images were taken and saved for permanent record. The patient tolerated the injection reasonably well. The patient was given instructions to ice the kne and avoid strenuous activities for 24-48 hours. The patient was instructed to call the office immediately if there is increased pain, redness, warmth, fever, or chills. We will see the patient back in one week for their second injection.

## 2023-01-05 NOTE — THERAPY EVALUATION
[facility], [facility], [facility] [address]  Phone: [phone]  Fax [phone]    Dear Nani Hannah MD  ,    We had the pleasure of evaluating the following patient for physical therapy services at [address] [place] [address]. A summary of our findings can be found in the initial assessment below. This includes our plan of care. If you have any questions or concerns regarding these findings, please do not hesitate to contact me at the office phone number listed above. Thank you for the referral.     Physician Signature:_______________________________Date:__________________  By signing above (or electronic signature), therapists plan is approved by physician       Physical Therapy: Initial Evaluation   Patient: Manuela Lee (36 y.o. male)   Examination Date: 2023   :  1960 MRN: [id number]   Visit #: 1   Auth needed? []  Yes    [x]  No   Amount authorized:   Visit Limit: 30 Insurance: Payor: [address] [place] [address]  [address] / Plan: [facility] [address] / Product Type: *No Product type* /   Insurance ID: [id number] - (Medicaid Managed)  Secondary Insurance (if applicable):    Treatment Diagnosis:      ICD-10-CM    1. Chronic pain of right knee  M25.561     G89.29          Medical Diagnosis: Status post right knee replacement [Z96.651]  Knee instability, right [M25.361]     Referring Physician: Nani Hannah MD    PCP: Areli Pastrana MD   Plan of Care signed? []  Yes [x]  No  Latex Allergy? []  Yes [x]  No   Pace Maker?        [x]  Yes []  No    Preferred Language for Healthcare:   [x] English       [] other:     C-SSRS Triggered by Intake questionnaire (Past 2 wk assessment):   [x] No, Questionnaire did not trigger screening.   [] Yes, Patient intake triggered further evaluation      [] C-SSRS Screening completed  [] PCP notified via Plan of Care  [] Emergency services notified SUBJECTIVE EXAMINATION     Patient stated complaint: Had R TKR 2/24/22. \"Post care sucked\" after surgery. The nursing home care was not sufficient in his mind. History of falling 4+ falls over the last year. Been using 4-wheel walker for past 6 months. Relevant Medical History: R TSR in 2021, Pacemaker 3 months ago. Doing Cardiac Rehab 3 days a week across [place]. Pain:  Pt. reports 5 /10 at present and 9 /10 at its worst  Pt. describes pain to be Not specified  Pain increases with: Activity and Movement  Pain decreases with: Heat  Pt. reports pain with coughing, sneezing and laughing?:  NA  Pt. reports saddle paresthesia? NA    Red Flags:  None    Precautions/Contraindications:    None    Functional Outcome Measure/Scale:    Date Assessed 1/5/2023   Measure Used LEFS   Disability Score (%) 75     Living Status/PLOF: Live alone in apartment, 8 steps into apartment. Has elevator access, but its a long walk to access. Occupation/School:   Work/School Status: Retired  Job Duties/Demands: NA    Sport/recreational activities: Musician    OBJECTIVE EXAMINATION     Palpation:   Unremarkable    Posture:    WNL    Bandages/Dressings/Incisions:  Not Applicable    Lower Quarter ROM, Strength and Myotomes:   (Blank cells denote NT)(* denotes pain)  (* denotes pain) MMT AROM (o) PROM (o) Comments   Lumbar Flexion       Lumbar Extension        L R L R L R    Trunk Sidebending          Lumbar Rotation       [] Seated  [] Standing   Hip Flexion (L1-2)          Hip Extension          Hip Adduction (L3)          Hip Abduction (L5)          Hip IR          Hip ER          Knee Extension (L3,4)      -9    Knee Flexion (L5,S1)      110    Ankle Dorsiflex (L4)          Ankle Plantarflex (S1,2)          Ankle Inversion          Ankle Eversion          Great Toe Ext (L5)          Quad Tone Fair  - Poor            Specific Joint Mobility Testing/Accessory Motions:   Knee/patella: hypomobile on R    Gait:  [] WNL     [] Not Observed     [x] Dysfunction noted  Comment:   Using 4-wheel walker. Balance:  [] WNL      [x] NT       [] Dysfunction noted  Comment: History of multiple falls over past year. Falls Risk Assessment (30 days): Falls Risk assessed and patient requires intervention due to being higher risk   Time Up and Go (TUG): Not Assessed     Review Of Systems (ROS):  [x] Patient history, allergies, meds reviewed. [x] Performed Review of systems (Integumentary, CardioPulmonary, Neurological) by intake and observation. Intake form has been scanned into medical record. Patient has been instructed to contact their primary care physician regarding ROS issues if not already being addressed at this time. Co-morbidities/Complexities (which will affect the course of rehabilitation):  []  None         Arthritic conditions   [] Rheumatoid arthritis (M05.9)  [x] Osteoarthritis (M19.91)   Cardiovascular conditions   [x] Hypertension (I10)  [] Hyperlipidemia (E78.5)  [] Angina pectoris (I20)  [] Atherosclerosis (I70)   Musculoskeletal conditions   [] Disc pathology   [] Congenital spine pathologies   [] Prior surgical intervention  [] Osteoporosis (M81.8)  [] Osteopenia (M85.8)   Endocrine conditions   [] Hypothyroid (E03.9)  [] Hyperthyroid Gastrointestinal conditions   [] Constipation (K14.00)   Metabolic conditions   [] Morbid obesity (E66.01)  [x] Diabetes type 1 (E10.65)  [] Diabetes type 2 (E11.65)  [] Neuropathy (G60.9)   Pulmonary conditions   [] Asthma (J45)  [] Coughing   [] COPD (J44.9) Psychological Disorders  [x] Anxiety (F41.9)  [x] Depression (F32.9)    [x] COVID-19   [] Other:      Barriers to/and or personal factors that will affect rehab potential:   Co-morbidities  past PT/medical experience    ASSESSMENT   Assessment:  Pt. is a 57 y.o. y.o. male presenting today to Outpatient PT with Status post right knee replacement [Z96.651]  Knee instability, right [M25.361]  .   Pt. presents with the functional impairments and activity limitations listed below and would benefit from continued Outpatient PT to address the below impairments as well as improve pain, and restore function. Functional Impairments:    Noted lumbar/proximal hip/LE joint hypomobility  Decreased LE functional ROM  Decreased core/proximal hip strength and neuromuscular control  Decreased LE functional strength   Reduced balance/proprioceptive control     Functional Activity Limitations:  Reduced ability or difficulty with changes of positions or transfers between positions  Reduced ability to squat  Reduced ability or tolerance with ambulating prolonged functional periods/distances/surfaces  Reduced ability or ascend/descend stairs    Participation Restrictions:   Reduced participation in self care  Reduced participation in home management     Classification :   Signs/symptoms consistent with post-surgical status including decreased ROM, strength and function. Prognosis/Rehab Potential:  [x] Excellent     [] Good     [] Fair     [] Poor         Tolerance of evaluation/treatment:   [x] Excellent     [] Good     [] Fair     [] Poor    Physical Therapy Evaluation Complexity Justification:  [x] A history of present problem and 3 or more personal factors and/or co-morbidities that impact the plan of care  [x] A total of 3 elements found upon examination of body systems using standardized tests and measures addressing any of the following: body structures, functions (impairments), activity limitations, and/or participation restrictions  [x] A clinical presentation with evolving clinical presentation with changing characteristics  [x] Clinical decision making of MODERATE (94234) complexity using standardized patient assessment instrument and/or measurable assessment of functional outcome. GOALS     Patient stated goal: Gain strength to have surgery and go home following.   [x] Progressing: [] Met: [] Not Met: [] Adjusted    Therapist goals for Patient:   Short Term Goals: To be achieved in: 2 weeks  Independent in HEP and progression per patient tolerance, in order to progress toward full function and prevent re-injury. [x] Progressing: [] Met: [] Not Met: [] Adjusted  Patient will have a decrease in pain to facilitate improvement in movement, function, and ADLs as indicated by functional deficits. [x] Progressing: [] Met: [] Not Met: [] Adjusted    Long Term Goals: To be achieved in: 12 weeks  Disability index score of 45% or less as measure by LEFS to assist with reaching prior level of function. [x] Progressing: [] Met: [] Not Met: [] Adjusted  Patient will demonstrate increased AROM to WNL, good Lumbar Spine mobility, good hip and knee ROM to allow for proper joint functioning as indicated by patients Functional Deficits. [x] Progressing: [] Met: [] Not Met: [] Adjusted  Patient will demonstrate an increase in proximal hip strength and core activation to allow for proper functional mobility as indicated by patients Functional Deficits  [x] Progressing: [] Met: [] Not Met: [] Adjusted  Patient will return to Walking, Stairs, and Light home activities without increased symptoms or restriction. [x] Progressing: [] Met: [] Not Met: [] Adjusted    TREATMENT PLAN     Frequency/Duration: 2x/week for 12 weeks for the treatment interventions listed below. Interventions:  [x]  Therapeutic exercise including: strength training, ROM, for LE, Glutes and core   [x]  NMR activation and proprioception for LE, Glutes and Core   [x]  Manual therapy as indicated for Hip and Knee to include:  PROM, Gr I-IV mobilizations, and STM   [x]  Modalities as needed that may include: Electrical Stimulation and Thermal Agents  [x]  Patient education on joint protection, postural re-education, activity modification, progression of HEP. HEP: Not enough time for HEP instruction this visit. Plan to address at follow up.     Electronically Signed by Duglas Last PT Date: 01/05/2023

## 2023-01-06 ENCOUNTER — HOSPITAL ENCOUNTER (OUTPATIENT)
Dept: CARDIAC REHAB | Age: 63
Setting detail: THERAPIES SERIES
Discharge: HOME | End: 2023-01-06
Payer: COMMERCIAL

## 2023-01-06 PROCEDURE — 93798 PHYS/QHP OP CAR RHAB W/ECG: CPT

## 2023-01-09 ENCOUNTER — HOSPITAL ENCOUNTER (OUTPATIENT)
Dept: CARDIAC REHAB | Age: 63
Setting detail: THERAPIES SERIES
Discharge: HOME | End: 2023-01-09
Payer: COMMERCIAL

## 2023-01-09 PROCEDURE — 93798 PHYS/QHP OP CAR RHAB W/ECG: CPT

## 2023-01-10 ENCOUNTER — HOSPITAL ENCOUNTER (OUTPATIENT)
Dept: PHYSICAL THERAPY | Age: 63
Setting detail: THERAPIES SERIES
Discharge: HOME | End: 2023-01-10
Payer: COMMERCIAL

## 2023-01-10 PROCEDURE — 97112 NEUROMUSCULAR REEDUCATION: CPT | Performed by: PHYSICAL THERAPIST

## 2023-01-10 PROCEDURE — 97110 THERAPEUTIC EXERCISES: CPT | Performed by: PHYSICAL THERAPIST

## 2023-01-10 PROCEDURE — 97140 MANUAL THERAPY 1/> REGIONS: CPT | Performed by: PHYSICAL THERAPIST

## 2023-01-10 NOTE — FLOWSHEET NOTE
[facility] and Therapy, [facility], [address], [address]   Phone: [phone]  Fax [phone]     Physical Therapy: Daily Note   Patient: Juma Moss (21 y.o. male)   Treatment Date: 01/10/2023   :  1960 MRN: [id number]   Visit #: 2   Auth needed? []  Yes    [x]  No   Amount authorized: NA  Visit Limit: 30 Insurance: Payor: [employer]  [address] / Plan: [employer]  [address] / Product Type: *No Product type* /   Insurance ID: [id number] - (Medicaid Managed)  Secondary Insurance (if applicable):    Treatment Diagnosis:    No diagnosis found. Medical Diagnosis: Status post right knee replacement [Z96.651]  Knee instability, right [M25.361]     Referring Physician: Kimi Francois MD    PCP: Lola Thomas MD   Plan of Care signed? []  Yes [x]  No  Latex Allergy? []  Yes [x]  No   Pace Maker? [x]  Yes []  No    Preferred Language for Healthcare:   [x] English       [] other:       RESTRICTIONS/PRECAUTIONS: Pacemaker    Functional Outcome Measure/Scale:    Date Assessed (Eval)     LEFS (%) 75%         SUBJECTIVE EXAMINATION   Pain level:  3 /10, up to a 6/10 with therex. But resolves back to 3/10 after finished. Patient Report/Comments: Having a good day physically. OBJECTIVE EXAMINATION   Observation:     Test measurements: 1/10/10 R knee ROM: -10 extension, 110 Flextion, L Knee ROM: -3 ext, 101 Flexion    TREATMENT     Exercise / Equipment / Intervention Sets / Nikki Naas / Resistance Comments / Cueing HEP         Quad sets 5\" x 15     Glut sets 5\" x 15     SAQ 2x5 B     Heel slides x10           Minisquats in // x15                                   Nu-step  x5'                         Manual Treatment: G1-2 PA Fem/Tib jt.  Mobs x 10'      Modalities:    [x] None  [] Electrical Stimulation: for pain modulation and symptom control  [] Other:     Access Code: [email or web address]  URL: [email or web address]  Date: 01/10/2023  Prepared by: Umberto Valdez Clamp    Exercises  Supine Quadricep Sets - 1 x daily - 7 x weekly - 15 reps - 5\" hold  Supine Gluteal Sets - 1 x daily - 7 x weekly - 15 reps - 5\" hold  Supine Heel Slide - 1 x daily - 7 x weekly - 15 reps - 5\" hold      ASSESSMENT     Treatment/Activity Tolerance:  [x] Patient tolerated treatment well [] Patient limited by fatigue  [] Patient limited by pain  [] Patient limited by other medical complications  [] Other:     Overall Progression Towards Functional goals/ Treatment Progress Update:  [] Patient is progressing as expected towards functional goals listed. [] Progression is slowed due to complexities/Impairments listed. [] Progression has been slowed due to co-morbidities. [x] Plan just implemented, too soon (<30days) to assess goals progression   [] Goals require adjustment due to lack of progress  [] Patient is not progressing as expected and requires additional follow up with physician  [] Other:     Prognosis for POC:   [x] Good     [] Fair     [] Poor      Patient requires continued skilled intervention: [x] Yes       [] No    GOALS        Patient stated goal: Gain strength to have surgery and go home following. [x] Progressing: [] Met: [] Not Met: [] Adjusted     Therapist goals for Patient:   Short Term Goals: To be achieved in: 2 weeks  Independent in HEP and progression per patient tolerance, in order to progress toward full function and prevent re-injury. [x] Progressing: [] Met: [] Not Met: [] Adjusted  Patient will have a decrease in pain to facilitate improvement in movement, function, and ADLs as indicated by functional deficits. [x] Progressing: [] Met: [] Not Met: [] Adjusted     Long Term Goals: To be achieved in: 12 weeks  Disability index score of 45% or less as measure by LEFS to assist with reaching prior level of function.   [x] Progressing: [] Met: [] Not Met: [] Adjusted  Patient will demonstrate increased AROM to WNL, good Lumbar Spine mobility, good hip and knee ROM to allow for proper joint functioning as indicated by patients Functional Deficits. [x] Progressing: [] Met: [] Not Met: [] Adjusted  Patient will demonstrate an increase in proximal hip strength and core activation to allow for proper functional mobility as indicated by patients Functional Deficits  [x] Progressing: [] Met: [] Not Met: [] Adjusted  Patient will return to Walking, Stairs, and Light home activities without increased symptoms or restriction. [x] Progressing: [] Met: [] Not Met: [] Adjusted    TREATMENT PLAN     [] Continue per plan of care [] Alter current plan  [x] Plan of care initiated [] Hold pending MD visit  [] Discharge    BILLING     Timed Code Treatment Minutes: 55   Total Treatment Minutes: 55     If Medicare: NA  If [facility]: NA    CPT Code # CPT Code #     [] Eval Low (29703)    [] Gait (81165)     [] Eval Medium (24678)   [] VASO (12707)     [] Eval High (94831)   [] Estim Unattended (38711)     [] Re-Eval (53163)   [] [facility]. Traction (65138)     [x] Therex (08751)  2  [] Dry Needle 1-2 muscle (24212)     [x] Neuromusc. Re-ed (75702) 1  [] Dry Needle 3+ muscle (75660)     [x] Manual (61701) 1  [] Group Therapy     [] Ther.  Act (51179)   []       Electronically Signed by Albania Olea PT  Date: 01/10/2023

## 2023-01-11 ENCOUNTER — HOSPITAL ENCOUNTER (OUTPATIENT)
Dept: CARDIAC REHAB | Age: 63
Setting detail: THERAPIES SERIES
Discharge: HOME | End: 2023-01-11
Payer: COMMERCIAL

## 2023-01-11 PROCEDURE — 93798 PHYS/QHP OP CAR RHAB W/ECG: CPT

## 2023-01-12 ENCOUNTER — HOSPITAL ENCOUNTER (OUTPATIENT)
Dept: PHYSICAL THERAPY | Age: 63
Setting detail: THERAPIES SERIES
Discharge: HOME | End: 2023-01-12
Payer: COMMERCIAL

## 2023-01-12 ENCOUNTER — NURSE ONLY (OUTPATIENT)
Dept: ORTHOPEDIC SURGERY | Age: 63
End: 2023-01-12

## 2023-01-12 VITALS — BODY MASS INDEX: 42.84 KG/M2 | HEIGHT: 71 IN | WEIGHT: 306 LBS

## 2023-01-12 DIAGNOSIS — M17.12 PRIMARY OSTEOARTHRITIS OF LEFT KNEE: Primary | ICD-10-CM

## 2023-01-12 PROCEDURE — 97112 NEUROMUSCULAR REEDUCATION: CPT | Performed by: PHYSICAL THERAPIST

## 2023-01-12 PROCEDURE — 97140 MANUAL THERAPY 1/> REGIONS: CPT | Performed by: PHYSICAL THERAPIST

## 2023-01-12 PROCEDURE — 97110 THERAPEUTIC EXERCISES: CPT | Performed by: PHYSICAL THERAPIST

## 2023-01-12 RX ORDER — ACETAMINOPHEN 500 MG
500 TABLET ORAL 4 TIMES DAILY PRN
Qty: 120 TABLET | Refills: 0 | Status: SHIPPED | OUTPATIENT
Start: 2023-01-12

## 2023-01-12 RX ORDER — HYALURONATE SODIUM 10 MG/ML
20 SYRINGE (ML) INTRAARTICULAR ONCE
Status: COMPLETED | OUTPATIENT
Start: 2023-01-12 | End: 2023-01-12

## 2023-01-12 RX ADMIN — Medication 20 MG: at 10:36

## 2023-01-12 NOTE — FLOWSHEET NOTE
[facility] and Therapy, [facility], [address]  [place], [address]  Phone: [phone]  Fax [phone]     Physical Therapy: Daily Note   Patient: Trisha Dockery (33 y.o. male)   Treatment Date: 2023   :  1960 MRN: [id number]   Visit #: 3   Auth needed? []  Yes    [x]  No   Amount authorized: NA  Visit Limit: 30 Insurance: Payor: [facility] [employer]  [address] / Plan: [facility] [employer]  [address] / Product Type: *No Product type* /   Insurance ID: [id number] - (Medicaid Managed)  Secondary Insurance (if applicable):    Treatment Diagnosis:    No diagnosis found. Medical Diagnosis: Status post right knee replacement [Z96.651]  Knee instability, right [M25.361]     Referring Physician: Jose De Jesus Childs MD    PCP: Amalia Woodward MD   Plan of Care signed? []  Yes [x]  No  Latex Allergy? []  Yes [x]  No   Pace Maker? [x]  Yes []  No    Preferred Language for Healthcare:   [x] English       [] other:       RESTRICTIONS/PRECAUTIONS: Pacemaker    Functional Outcome Measure/Scale:    Date Assessed (Eval)     LEFS (%) 75%         SUBJECTIVE EXAMINATION   Pain level:  3 /10, up to a 6/10 with therex. But resolves back to 3/10 after finished. Patient Report/Comments: Woke up with a lot on energy but since then the day has gone down. L knee has more symptoms than the R recently. Seeing Ortho today for L knee injection.       OBJECTIVE EXAMINATION   Observation:     Test measurements: 1/10/10 R knee ROM: -10 extension, 110 Flextion, L Knee ROM: -3 ext, 101 Flexion    TREATMENT     Exercise / Equipment / Intervention Sets / Alhaji Peter / Resistance Comments / Cueing HEP         Quad sets 5\" x 15     Glut sets In sitting tableside    SAQ     LAQ X10 B     Heel slides x10     Marching in // NV     Minisquats in // x15     Lateral stepping in // 5 laps      Cone taps X5 B                       Nu-step  x5'                         Manual Treatment: G1-2 PA Fem/Tib jt. Mobs x 10'      Modalities:    [x] None  [] Electrical Stimulation: for pain modulation and symptom control  [] Other:     Access Code: [email or web address]  URL: [email or web address]  Date: 01/10/2023  Prepared by: Umberto Prado    Exercises  Supine Quadricep Sets - 1 x daily - 7 x weekly - 15 reps - 5\" hold  Supine Gluteal Sets - 1 x daily - 7 x weekly - 15 reps - 5\" hold  Supine Heel Slide - 1 x daily - 7 x weekly - 15 reps - 5\" hold      ASSESSMENT     Treatment/Activity Tolerance:  [x] Patient tolerated treatment well [] Patient limited by fatigue  [] Patient limited by pain  [] Patient limited by other medical complications  [] Other:     Overall Progression Towards Functional goals/ Treatment Progress Update:  [x] Patient is progressing as expected towards functional goals listed. [] Progression is slowed due to complexities/Impairments listed. [] Progression has been slowed due to co-morbidities. [] Plan just implemented, too soon (<30days) to assess goals progression   [] Goals require adjustment due to lack of progress  [] Patient is not progressing as expected and requires additional follow up with physician  [] Other:     Prognosis for POC:   [x] Good     [] Fair     [] Poor      Patient requires continued skilled intervention: [x] Yes       [] No    GOALS        Patient stated goal: Gain strength to have surgery and go home following. [x] Progressing: [] Met: [] Not Met: [] Adjusted     Therapist goals for Patient:   Short Term Goals: To be achieved in: 2 weeks  Independent in HEP and progression per patient tolerance, in order to progress toward full function and prevent re-injury. [x] Progressing: [] Met: [] Not Met: [] Adjusted  Patient will have a decrease in pain to facilitate improvement in movement, function, and ADLs as indicated by functional deficits. [x] Progressing: [] Met: [] Not Met: [] Adjusted     Long Term Goals:  To be achieved in: 12 weeks  Disability index score of 45% or less as measure by LEFS to assist with reaching prior level of function. [x] Progressing: [] Met: [] Not Met: [] Adjusted  Patient will demonstrate increased AROM to WNL, good Lumbar Spine mobility, good hip and knee ROM to allow for proper joint functioning as indicated by patients Functional Deficits. [x] Progressing: [] Met: [] Not Met: [] Adjusted  Patient will demonstrate an increase in proximal hip strength and core activation to allow for proper functional mobility as indicated by patients Functional Deficits  [x] Progressing: [] Met: [] Not Met: [] Adjusted  Patient will return to Walking, Stairs, and Light home activities without increased symptoms or restriction. [x] Progressing: [] Met: [] Not Met: [] Adjusted    TREATMENT PLAN     [x] Continue per plan of care [] Alter current plan  [] Plan of care initiated [] Hold pending MD visit  [] Discharge    BILLING     Timed Code Treatment Minutes: 43   Total Treatment Minutes: 43     If Medicare: NA  If [facility]: NA    CPT Code # CPT Code #     [] Eval Low (12283)    [] Gait (94812)     [] Eval Medium (42050)   [] VASO (10259)     [] Eval High (50838)   [] Estim Unattended (73740)     [] Re-Eval (67517)   [] [facility]. Traction (52827)     [x] Therex (77015)  1  [] Dry Needle 1-2 muscle (68324)     [x] Neuromusc. Re-ed (14818) 1  [] Dry Needle 3+ muscle (29047)     [x] Manual (59546) 1  [] Group Therapy     [] Ther.  Act (17543)   []       Electronically Signed by Rita Bhatia, PT  Date: 01/12/2023

## 2023-01-12 NOTE — PROGRESS NOTES
Diagnosis: Left knee osteoarthritis    Procedure: Left knee viscosupplementation #2    The patient returns today for their second Euflexxa injection in the left knee. The risks, benefits, and complications of the injections were again discussed in detail with the patient. The risks discussed included but are not limited to infection, skin reactions, hot swollen joints, and anaphylaxis. The patient gave verbal informed consent for the injection. The patient skin was prepped with 3 sterile gauze pads soaked with alcohol solution and the knee joint was injected with 2 mL of Euflexxa intra-articularly under sterile conditions . Technique: Under sterile conditions a [employer] ultrasound unit with a variable frequency (6.0-15.0 MHz) linear transducer was used to localize the placement of a 22-gauge needle into the maryana joint. Findings: Successful needle placement for intra-articular Visco supplementation injection. Final images were taken and saved for permanent record. The patient tolerated the injection reasonably well. The patient was given instructions to ice the the knee and avoid strenuous activities for 24-48 hours. The patient was instructed to call the office immediately if there is increased pain, redness, warmth, fever, or chills. We will see the patient back in one week for the third injection.

## 2023-01-13 ENCOUNTER — HOSPITAL ENCOUNTER (OUTPATIENT)
Dept: CARDIAC REHAB | Age: 63
Setting detail: THERAPIES SERIES
Discharge: HOME | End: 2023-01-13
Payer: COMMERCIAL

## 2023-01-13 PROCEDURE — 93798 PHYS/QHP OP CAR RHAB W/ECG: CPT

## 2023-01-16 ENCOUNTER — HOSPITAL ENCOUNTER (OUTPATIENT)
Dept: CARDIAC REHAB | Age: 63
Setting detail: THERAPIES SERIES
Discharge: HOME | End: 2023-01-16
Payer: COMMERCIAL

## 2023-01-16 PROCEDURE — 93798 PHYS/QHP OP CAR RHAB W/ECG: CPT

## 2023-01-17 ENCOUNTER — HOSPITAL ENCOUNTER (OUTPATIENT)
Dept: PHYSICAL THERAPY | Age: 63
Setting detail: THERAPIES SERIES
Discharge: HOME | End: 2023-01-17
Payer: COMMERCIAL

## 2023-01-17 NOTE — PROGRESS NOTES
[facility], [facility], [address] [place] [address]  Phone: [phone]  Fax [phone]     Physical Therapy  Cancellation/No-show Note  Patient Name:  Sharon Rey  :  1960   Date:  2023  Cancelled visits to date: 1  No-shows to date: 0    For today's appointment patient:  [x]  Cancelled  []  Rescheduled appointment  []  No-show     Reason given by patient:  []  Patient ill  []  Conflicting appointment  [x]  No transportation    []  Conflict with work  []  No reason given  []  Other:     Comments:     Electronically signed by:   Dominique Carrillo PT

## 2023-01-18 ENCOUNTER — HOSPITAL ENCOUNTER (OUTPATIENT)
Dept: CARDIAC REHAB | Age: 63
Setting detail: THERAPIES SERIES
Discharge: HOME | End: 2023-01-18
Payer: COMMERCIAL

## 2023-01-18 PROCEDURE — 93798 PHYS/QHP OP CAR RHAB W/ECG: CPT

## 2023-01-19 ENCOUNTER — NURSE ONLY (OUTPATIENT)
Dept: ORTHOPEDIC SURGERY | Age: 63
End: 2023-01-19
Payer: COMMERCIAL

## 2023-01-19 ENCOUNTER — HOSPITAL ENCOUNTER (OUTPATIENT)
Dept: PHYSICAL THERAPY | Age: 63
Setting detail: THERAPIES SERIES
Discharge: HOME | End: 2023-01-19
Payer: COMMERCIAL

## 2023-01-19 VITALS — WEIGHT: 306 LBS | HEIGHT: 71 IN | BODY MASS INDEX: 42.84 KG/M2

## 2023-01-19 DIAGNOSIS — M17.12 PRIMARY OSTEOARTHRITIS OF LEFT KNEE: Primary | ICD-10-CM

## 2023-01-19 PROCEDURE — 99999 PR OFFICE/OUTPT VISIT,PROCEDURE ONLY: CPT | Performed by: PHYSICIAN ASSISTANT

## 2023-01-19 PROCEDURE — 97112 NEUROMUSCULAR REEDUCATION: CPT

## 2023-01-19 PROCEDURE — 20611 DRAIN/INJ JOINT/BURSA W/US: CPT | Performed by: PHYSICIAN ASSISTANT

## 2023-01-19 PROCEDURE — 97110 THERAPEUTIC EXERCISES: CPT

## 2023-01-19 RX ORDER — HYALURONATE SODIUM 10 MG/ML
20 SYRINGE (ML) INTRAARTICULAR ONCE
Status: COMPLETED | OUTPATIENT
Start: 2023-01-19 | End: 2023-01-19

## 2023-01-19 RX ADMIN — Medication 20 MG: at 10:35

## 2023-01-19 NOTE — FLOWSHEET NOTE
[facility], [address], [address]  [place]  [address]  Phone: [phone]  Fax [phone]     Physical Therapy: Daily Note   Patient: Richard Macdonald (62 y.o. male)   Treatment Date: 2023   :  1960 MRN: [id number]   Visit #: 4   Auth needed?    []  Yes    [x]  No   Amount authorized: NA  Visit Limit: 30 Insurance: Payor: [employer] / Plan: [employer] / Product Type: *No Product type* /   Insurance ID: [id number] - (Medicaid Managed)  Secondary Insurance (if applicable):    Treatment Diagnosis:    No diagnosis found.   Medical Diagnosis: Status post right knee replacement [Z96.651]  Knee instability, right [M25.361]     Referring Physician: Gus Gan MD    PCP: CHINMAY NASH MD   Plan of Care signed?    []  Yes [x]  No  Latex Allergy?    []  Yes [x]  No   Pace Maker?       [x]  Yes []  No    Preferred Language for Healthcare:   [x] English       [] other:       RESTRICTIONS/PRECAUTIONS: Pacemaker    Functional Outcome Measure/Scale:    Date Assessed (Eval)     LEFS (%) 75%         SUBJECTIVE EXAMINATION   Pain level:  3 /10, up to a 6/10 with therex.  But resolves back to 3/10 after finished.    Patient Report/Comments:  Pt notes fatigue again this morning.  \"I already worked out this morning'.      OBJECTIVE EXAMINATION   Observation:     Test measurements: 1/10/10 R knee ROM: -10 extension, 110 Flextion, L Knee ROM: -3 ext, 101 Flexion    TREATMENT     Exercise / Equipment / Intervention Sets / Reps / Resistance Comments / Cueing HEP         Quad sets 5\" x 15 B     Glut sets In sitting tableside    Ball compression 6x10 secs      LAQ x10 B     Heel slides with TB  x12     Marching in // NV     Minisquats in // x15     Lateral stepping in // 5 laps      Cone taps X5 B     SAQ 10x5 secs B                 Nu-step  x5'                         Manual Treatment: G1-2 PA Fem/Tib jt. Mobs  x 5'    Access Code: [email or web address]  URL: [email or web address]  Date: 01/10/2023  Prepared by: Umberto Pope    Exercises  Supine Quadricep Sets - 1 x daily - 7 x weekly - 15 reps - 5\" hold  Supine Gluteal Sets - 1 x daily - 7 x weekly - 15 reps - 5\" hold  Supine Heel Slide - 1 x daily - 7 x weekly - 15 reps - 5\" hold      ASSESSMENT     Treatment/Activity Tolerance:  [x] Patient tolerated treatment well [] Patient limited by fatigue  [] Patient limited by pain  [] Patient limited by other medical complications  [] Other:     Overall Progression Towards Functional goals/ Treatment Progress Update:  [x] Patient is progressing as expected towards functional goals listed. [] Progression is slowed due to complexities/Impairments listed. [] Progression has been slowed due to co-morbidities. [] Plan just implemented, too soon (<30days) to assess goals progression   [] Goals require adjustment due to lack of progress  [] Patient is not progressing as expected and requires additional follow up with physician  [] Other:     Prognosis for POC:   [x] Good     [] Fair     [] Poor      Patient requires continued skilled intervention: [x] Yes       [] No    GOALS        Patient stated goal: Gain strength to have surgery and go home following. [x] Progressing: [] Met: [] Not Met: [] Adjusted     Therapist goals for Patient:   Short Term Goals: To be achieved in: 2 weeks  Independent in HEP and progression per patient tolerance, in order to progress toward full function and prevent re-injury. [x] Progressing: [] Met: [] Not Met: [] Adjusted  Patient will have a decrease in pain to facilitate improvement in movement, function, and ADLs as indicated by functional deficits. [x] Progressing: [] Met: [] Not Met: [] Adjusted     Long Term Goals: To be achieved in: 12 weeks  Disability index score of 45% or less as measure by LEFS to assist with reaching prior level of function.   [x] Progressing: [] Met: [] Not Met: [] Adjusted  Patient will demonstrate increased AROM to WNL, good Lumbar Spine mobility, good hip and knee ROM to allow for proper joint functioning as indicated by patients Functional Deficits. [x] Progressing: [] Met: [] Not Met: [] Adjusted  Patient will demonstrate an increase in proximal hip strength and core activation to allow for proper functional mobility as indicated by patients Functional Deficits  [x] Progressing: [] Met: [] Not Met: [] Adjusted  Patient will return to Walking, Stairs, and Light home activities without increased symptoms or restriction. [x] Progressing: [] Met: [] Not Met: [] Adjusted    TREATMENT PLAN     [x] Continue per plan of care [] Alter current plan  [] Plan of care initiated [] Hold pending MD visit  [] Discharge    BILLING     Timed Code Treatment Minutes: 38   Total Treatment Minutes: 38     If Medicare: NA  If [facility]: NA    CPT Code # CPT Code #     [] Eval Low (68238)    [] Gait (24616)     [] Eval Medium (87525)   [] VASO (56920)     [] Eval High (40344)   [] Estim Unattended (06130)     [] Re-Eval (68955)   [] [facility]. Traction (11497)     [x] Therex (80669)  2  [] Dry Needle 1-2 muscle (60801)     [x] Neuromusc. Re-ed (90318) 1  [] Dry Needle 3+ muscle (83354)     [] Manual (29849)   [] Group Therapy     [] Ther.  Act (88485)   []       Electronically Signed by Annalisa Savage PT  Date: 01/19/2023

## 2023-01-19 NOTE — PROGRESS NOTES
Diagnosis: Left knee osteoarthritis    Procedure: Left knee viscosupplementation #3    The patient returns today for their third and final Euflexxa injection in the left knee. The risks, benefits, and complications of the injections were again discussed in detail with the patient. The risks discussed include but are not limited to infection, skin reactions, hot swollen joints, and anaphylaxis. The patient gave verbal informed consent for the injection. The patient's skin was prepped with 3 sterile gauze pads soaked with alcohol solution and the knee joint was injected with 2 mL of Euflexxa intra-articularly under sterile conditions. Technique: Under sterile conditions a SonAudiosocket ultrasound unit with a variable frequency (6.0-15.0 MHz) linear transducer was used to localize the placement of a 22-gauge needle into the knee joint. Findings: Successful needle placement for intra-articular Visco supplementation injection. Final images were taken and saved for permanent record. The patient tolerated the injection reasonably well. The patient was given instructions to ice of the knee and avoid strenuous activity for 24-48 hours. The patient was instructed to call the office immediately if there is increased pain, redness, warmth, fever, or chills. We will see the patient back on an as-needed basis  from this point.

## 2023-01-20 ENCOUNTER — HOSPITAL ENCOUNTER (OUTPATIENT)
Dept: CARDIAC REHAB | Age: 63
Setting detail: THERAPIES SERIES
Discharge: HOME | End: 2023-01-20
Payer: COMMERCIAL

## 2023-01-20 PROCEDURE — 93798 PHYS/QHP OP CAR RHAB W/ECG: CPT

## 2023-01-24 ENCOUNTER — HOSPITAL ENCOUNTER (OUTPATIENT)
Dept: PHYSICAL THERAPY | Age: 63
Setting detail: THERAPIES SERIES
Discharge: HOME | End: 2023-01-24
Payer: COMMERCIAL

## 2023-01-24 ENCOUNTER — TELEPHONE (OUTPATIENT)
Dept: INTERNAL MEDICINE CLINIC | Age: 63
End: 2023-01-24

## 2023-01-24 DIAGNOSIS — M79.644 PAIN OF RIGHT THUMB: Primary | ICD-10-CM

## 2023-01-24 PROCEDURE — 97110 THERAPEUTIC EXERCISES: CPT | Performed by: PHYSICAL THERAPIST

## 2023-01-24 PROCEDURE — 97112 NEUROMUSCULAR REEDUCATION: CPT | Performed by: PHYSICAL THERAPIST

## 2023-01-24 NOTE — TELEPHONE ENCOUNTER
----- Message from Lizzeth Santillan sent at 1/24/2023 10:44 AM EST -----  Subject: Referral Request    Reason for referral request? Patient would like to be referred to a hand   specialist other than orthopedist that he goes to now. Provider patient wants to be referred to(if known):     Provider Phone Number(if known):     Additional Information for Provider?   ---------------------------------------------------------------------------  --------------  [address] [facility]    [phone]; OK to leave message on voicemail  ---------------------------------------------------------------------------  --------------

## 2023-01-24 NOTE — FLOWSHEET NOTE
[address] [place] [address] and Therapy[address]  Phone: [phone]  Fax [phone]     Physical Therapy: Daily Note   Patient: Rajesh Grider (65 y.o. male)   Treatment Date: 2023   :  1960 MRN: [id number]   Visit #: 5   Auth needed? []  Yes    [x]  No   Amount authorized: NA  Visit Limit: 30 Insurance: Payor: [facility] [employer] [place]  [address] / Plan: [facility] [employer] ([email or web address] [facility] [address] / Product Type: *No Product type* /   Insurance ID: [id number] - (Medicaid Managed)  Secondary Insurance (if applicable):    Treatment Diagnosis:    No diagnosis found. Medical Diagnosis: Status post right knee replacement [Z96.651]  Knee instability, right [M25.361]     Referring Physician: Rojas Ang MD    PCP: Rojas Wyatt MD   Plan of Care signed? []  Yes [x]  No  Latex Allergy? []  Yes [x]  No   Pace Maker? [x]  Yes []  No    Preferred Language for Healthcare:   [x] English       [] other:       RESTRICTIONS/PRECAUTIONS: Pacemaker    Functional Outcome Measure/Scale:    Date Assessed (Eval)     LEFS (%) 75%         SUBJECTIVE EXAMINATION   Pain level:  3 /10, up to a 6/10 with therex. But resolves back to 3/10 after finished. Patient Report/Comments:  Pt notes that L hip and L knee are limiting factors with ADLs. Reports doing Chair squats at home for HEP.     OBJECTIVE EXAMINATION   Observation:     Test measurements: 1/10/10 R knee ROM: -10 extension, 110 Flextion, L Knee ROM: -3 ext, 101 Flexion    TREATMENT     Exercise / Equipment / Intervention Sets / [place] / Resistance Comments / Cueing HEP         Quad sets 5\" x 10 B     Glut sets In sitting tableside    [place] Ball compression (Iso bridge) 3\"x15 secs      LAQ x20 B 1#     Heel slides with TB  x15     Marching in // 2x10     Minisquats in // x15     Lateral stepping in // 5 laps      Cone taps     SAQ 12x5 secs B                 Nu-step  x5' Manual Treatment: G1-2 PA Fem/Tib jt. Mobs x 5'    Access Code: [email or web address]  URL: [email or web address]  Date: 01/10/2023  Prepared by: Umberto Kirkpatrick    Exercises  Supine Quadricep Sets - 1 x daily - 7 x weekly - 15 reps - 5\" hold  Supine Gluteal Sets - 1 x daily - 7 x weekly - 15 reps - 5\" hold  Supine Heel Slide - 1 x daily - 7 x weekly - 15 reps - 5\" hold      ASSESSMENT     Treatment/Activity Tolerance:  [x] Patient tolerated treatment well [] Patient limited by fatigue  [] Patient limited by pain  [] Patient limited by other medical complications  [] Other:     Overall Progression Towards Functional goals/ Treatment Progress Update:  [x] Patient is progressing as expected towards functional goals listed. [] Progression is slowed due to complexities/Impairments listed. [] Progression has been slowed due to co-morbidities. [] Plan just implemented, too soon (<30days) to assess goals progression   [] Goals require adjustment due to lack of progress  [] Patient is not progressing as expected and requires additional follow up with physician  [] Other:     Prognosis for POC:   [x] Good     [] Fair     [] Poor      Patient requires continued skilled intervention: [x] Yes       [] No    GOALS        Patient stated goal: Gain strength to have surgery and go home following. [x] Progressing: [] Met: [] Not Met: [] Adjusted     Therapist goals for Patient:   Short Term Goals: To be achieved in: 2 weeks  Independent in HEP and progression per patient tolerance, in order to progress toward full function and prevent re-injury. [x] Progressing: [] Met: [] Not Met: [] Adjusted  Patient will have a decrease in pain to facilitate improvement in movement, function, and ADLs as indicated by functional deficits. [x] Progressing: [] Met: [] Not Met: [] Adjusted     Long Term Goals:  To be achieved in: 12 weeks  Disability index score of 45% or less as measure by LEFS to assist with reaching prior level of function. [x] Progressing: [] Met: [] Not Met: [] Adjusted  Patient will demonstrate increased AROM to WNL, good Lumbar Spine mobility, good hip and knee ROM to allow for proper joint functioning as indicated by patients Functional Deficits. [x] Progressing: [] Met: [] Not Met: [] Adjusted  Patient will demonstrate an increase in proximal hip strength and core activation to allow for proper functional mobility as indicated by patients Functional Deficits  [x] Progressing: [] Met: [] Not Met: [] Adjusted  Patient will return to Walking, Stairs, and Light home activities without increased symptoms or restriction. [x] Progressing: [] Met: [] Not Met: [] Adjusted    TREATMENT PLAN     [x] Continue per plan of care [] Alter current plan  [] Plan of care initiated [] Hold pending MD visit  [] Discharge    BILLING     Timed Code Treatment Minutes: 44   Total Treatment Minutes: 44     If Medicare: NA  If [facility]: NA    CPT Code # CPT Code #     [] Eval Low (88568)    [] Gait (73531)     [] Eval Medium (34581)   [] VASO (78648)     [] Eval High (89950)   [] Estim Unattended (01105)     [] Re-Eval (14505)   [] [facility]. Traction (10515)     [x] Therex (96415)  2  [] Dry Needle 1-2 muscle (53159)     [x] Neuromusc. Re-ed (78785) 1  [] Dry Needle 3+ muscle (68920)     [] Manual (65382)   [] Group Therapy     [] Ther.  Act (49545)   []       Electronically Signed by Alisa Torres, PT  Date: 01/24/2023

## 2023-01-25 ENCOUNTER — APPOINTMENT (OUTPATIENT)
Dept: CARDIAC REHAB | Age: 63
End: 2023-01-25
Payer: COMMERCIAL

## 2023-01-26 ENCOUNTER — HOSPITAL ENCOUNTER (OUTPATIENT)
Dept: PHYSICAL THERAPY | Age: 63
Setting detail: THERAPIES SERIES
Discharge: HOME | End: 2023-01-26
Payer: COMMERCIAL

## 2023-01-26 ENCOUNTER — OFFICE VISIT (OUTPATIENT)
Dept: ORTHOPEDIC SURGERY | Age: 63
End: 2023-01-26

## 2023-01-26 VITALS — WEIGHT: 306 LBS | BODY MASS INDEX: 42.84 KG/M2 | HEIGHT: 71 IN

## 2023-01-26 DIAGNOSIS — M16.11 PRIMARY OSTEOARTHRITIS OF RIGHT HIP: ICD-10-CM

## 2023-01-26 DIAGNOSIS — M25.551 RIGHT HIP PAIN: Primary | ICD-10-CM

## 2023-01-26 PROCEDURE — 97110 THERAPEUTIC EXERCISES: CPT | Performed by: PHYSICAL THERAPIST

## 2023-01-26 PROCEDURE — 97112 NEUROMUSCULAR REEDUCATION: CPT | Performed by: PHYSICAL THERAPIST

## 2023-01-26 NOTE — PROGRESS NOTES
Dr Yasemin Dave      Date /Time 1/26/2023       10:36 AM EST  Name Laith Ayers             1960   Location  [facility]  MRN [id number]                Chief Complaint   Patient presents with    Hip Pain     Right Hip         History of Present Illness    Laith Ayers is a 57 y.o. male who presents with  right hip pain. Sent in consultation by Yvette Smith MD, . Injury Mechanism:  none. Worker's Comp. & legal issues:   none. Previous Treatments: Ice, Heat, and NSAIDs    Patient presents to the office today for a new problem. Patient here with a chief complaint of right hip pain. Patient's right hip has been painful for an extended period of time. No specific injury or trauma. Pain mostly concentrated over the anterior aspect of the hip. He did have increased pain with activities and improvement with rest.  He does use a walker full-time.     Past History  Past Medical History:   Diagnosis Date    Arthritis     Depression     Diabetes mellitus ([address]     Diastolic CHF ([address]     Difficult intubation     throat swelled    Dyslipidemia     Herniated disc, cervical     Hypertension     Osteoporosis     Peripheral neuropathy      Past Surgical History:   Procedure Laterality Date    BREAST SURGERY Left 8/7/2019    LEFT BREAST MASS EXCISION; LEFT AXILLIARY LYMPH NODE EXCISION performed by Cuong Ho MD at [place] [address] Left 10/10/2019    MUSCLE BIOPSY LOWER EXTREMITY LEFT performed by Cuong Ho MD at [facility] OR     Family History   Problem Relation Age of Onset    Heart Disease Mother     High Blood Pressure Mother     High Blood Pressure Father     High Blood Pressure Sister     Breast Cancer Brother     Heart Disease Brother     High Blood Pressure Brother      Social History     Tobacco Use    Smoking status: Never    Smokeless tobacco: Never   Substance Use Topics    Alcohol use: Yes     Comment: occ      Current Outpatient Medications on File Prior to Visit   Medication Sig Dispense Refill    acetaminophen (TYLENOL) 500 MG tablet Take 1 tablet by mouth 4 times daily as needed for Pain 120 tablet 0    insulin glargine (LANTUS SOLOSTAR) 100 UNIT/ML injection pen Inject 15 Units into the skin nightly 10 Adjustable Dose Pre-filled Pen Syringe 3    metoprolol succinate (TOPROL XL) 25 MG extended release tablet TAKE 1 TABLET BY MOUTH ONCE DAILY 30 tablet 3    FARXIGA 10 MG tablet TAKE 1 TABLET BY MOUTH ONCE DAILY IN THE MORNING 30 tablet 2    atorvastatin (LIPITOR) 40 MG tablet Take 1 tablet by mouth daily 30 tablet 3    metOLazone (ZAROXOLYN) 5 MG tablet Take 1 tablet by mouth as needed (Take 1 tablet by mouth daily as needed) Take 1 tablet by mouth daily as needed 15 tablet 3    pregabalin (LYRICA) 75 MG capsule Take 1 capsule by mouth 2 times daily for 30 days. 60 capsule 2    furosemide (LASIX) 80 MG tablet Take 1 tablet by mouth daily Take 80 mg by mouth daily 90 tablet 1    aspirin EC 81 MG EC tablet Take 1 tablet by mouth daily 90 tablet 1    pantoprazole (PROTONIX) 40 MG tablet Take 1 tablet by mouth every morning (before breakfast) 30 tablet 3    tadalafil (CIALIS) 5 MG tablet TAKE 1 TABLET BY MOUTH ONCE DAILY      tiZANidine (ZANAFLEX) 4 MG tablet TAKE 1 TABLET BY MOUTH THREE TIMES DAILY AS NEEDED FOR PAIN      potassium chloride (KLOR-CON M) 20 MEQ extended release tablet Take 20 mEq by mouth daily      PROFE 391.3 (180 Fe) MG CAPS TAKE 1 CAPSULE BY MOUTH EVERY OTHER DAY      ibuprofen (ADVIL;MOTRIN) 800 MG tablet TAKE 1 TABLET BY MOUTH THREE TIMES DAILY AS NEEDED FOR PAIN      rivaroxaban (XARELTO) 20 MG TABS tablet Take 1 tablet by mouth daily 90 tablet 2    cyanocobalamin 1000 MCG tablet Take 1,000 mcg by mouth daily      ferrous sulfate (IRON 325) 325 (65 Fe) MG tablet Take 325 mg by mouth daily (with breakfast)      folic acid (FOLVITE) 1 MG tablet Take 1 mg by mouth daily      Multiple Vitamins-Minerals  (THERAPEUTIC MULTIVITAMIN-MINERALS) tablet Take 1 tablet by mouth daily      mycophenolate (CELLCEPT) 500 MG tablet Take by mouth in the morning, at noon, and at bedtime      spironolactone (ALDACTONE) 25 MG tablet Take 25 mg by mouth daily      tamsulosin (FLOMAX) 0.4 MG capsule Take 0.4 mg by mouth daily      oxyCODONE-acetaminophen (PERCOCET) 5-325 MG per tablet Take 1 tablet by mouth every 4 hours as needed for Pain. atorvastatin (LIPITOR) 40 MG tablet Take 1 tablet by mouth nightly 30 tablet 3    aspirin 81 MG tablet Take 81 mg by mouth daily       No current facility-administered medications on file prior to visit. ASCVD 10-YEAR RISK SCORE  The ASCVD Risk score (Nura DK, et al., 2019) failed to calculate for the following reasons: The patient has a prior MI or stroke diagnosis   . Review of Systems  10-point ROS is negative other than HPI. Physical Exam  Based off 1997 Exam Criteria  Ht 5' 11\" (1.803 m)   Wt (!) 306 lb (138.8 kg)   BMI 42.68 kg/m²      Constitutional:       General: He is not in acute distress. Appearance: Normal appearance. Cardiovascular:      Rate and Rhythm: Normal rate and regular rhythm. Pulses: Normal pulses. Pulmonary:      Effort: Pulmonary effort is normal. No respiratory distress. Neurological:      Mental Status: He is alert and oriented to person, place, and time. Mental status is at baseline. Musculoskeletal:  Gait:  altered    Skin: Clean and intact.   No open sores or wounds    Lymphatics: No palpable lymph nodes    Spine / Hip Exam:      RIGHT  LEFT    Lumbar Spine Exam  [x] All Neg    [x] All Neg     Straight leg raise  []  []Not tested   []  []Not tested    Clonus  []  []Not tested   []  []Not tested    Pain with motion  []  []Not tested   []  []Not tested    Radiculopathy  []  []Not tested   []  []Not tested    Paraspinal muscle tenderness  [] Paraspinal  []Midline   [] Paraspinal  []Midline   Sensation RIGHT  LEFT    L3  [x] Normal []Decreased    [x] Normal []Decreased   L4  [x] Normal  []Decreased   [x] Normal []Decreased   L5  [x] Normal []Decreased   [x] Normal []Decreased   S1  [x] Normal  []Decreased   [x] Normal []Decreased   Pelvis       Scoliosis  [x] Nml  [] Present     Leg-length discrepency  [x] Equal  [] Right longer   [] Left longer   Range of Motion Active Passive Active Passive   Hip Flexion 100  120    Abduction 30  50    External Rotation @ 90 flex 35  65    Internal Rotation @ 90 flex -10  20           Hip Impingement / Dysplasia  [] All Neg  [] Not tested   [x] All Neg  [] Not tested    Hip impingement test  [x]  []Not tested   []  []Not tested    C-sign  [x]  []Not tested   []  []Not tested    Anterior instability apprehension  []  []Not tested   []  []Not tested    Posterior instability apprehension  []  []Not tested   []  []Not tested    Uncontained Internal rotation  []  []Not tested  []  []Not tested          Abductors  [x] All Neg  [] Not tested   [x] All Neg  [] Not tested    Medius strength  []  []Not tested   []  []Not tested    Minimum strength  []  []Not tested   []  []Not tested    IT band tendonitis  []  []Not tested   []  []Not tested    Trochanteric tenderness  []  []Not tested  []  []Not tested   Sciatic neuropathic pain  []  []Not tested   []  []Not tested           Post-arthroplasty  [] All Neg  [] Not tested   [] All Neg  [] Not tested    Rectus tendonitis  []  []Not tested   []  []Not tested    Iliopsoas tendonitis       Start-up pain  []  []Not tested   []  []Not tested          Imaging    Right Hip: [facility]  Radiographs: X-rays were ordered today and reviewed of the right hip.  3 views. AP pelvis, lateral, and false profile. They demonstrate no evidence of fractures or dislocations.   There is a significant cam lesion present and advanced osteoarthritis        Procedure:  Orders Placed This Encounter   Procedures    XR HIP RIGHT (2-3 VIEWS)     Standing Status:   Future     Number of Occurrences:   1     Standing Expiration Date:   1/22/2024       Assessment and Plan  Rigo Munoz was seen today for hip pain. Diagnoses and all orders for this visit:    Right hip pain  -     XR HIP RIGHT (2-3 VIEWS); Future    Primary osteoarthritis of right hip      Patient is suffering with advanced right hip osteoarthritis. He will need a total hip arthroplasty at some point in the future anterior approach. He is a diabetic with his last A1c of 6.4. He will need to stay below 7.5 before proceeding with surgery. In addition his current BMI is 42.68. He will need his BMI less than 40 before proceeding with surgery. Patient will participate in weight loss. He will follow-up in 3 months or sooner if problems arise. At next visit please get an accurate height and weight with his shoes off    I discussed with Alethea Pastranaens that his history, symptoms, signs, and imaging are most consistent with hip arthritis    We reviewed the natural history of these conditions and treatment options ranging from conservative measures (rest, icing, activity modification, physical therapy, pain meds, cortisone injection) to surgical options. In terms of treatment, I recommended continuing with rest, icing, avoidance of painful activities, NSAIDs or pain meds as tolerated, and physical therapy. If these are not effective, cortisone injection can be considered. We discussed surgical options as well, should conservative measures fail. Electronically signed by Tamara Moore MD on 1/26/2023 at 10:36 AM  This dictation was generated by voice recognition computer software. Although all attempts are made to edit the dictation for accuracy, there may be errors in the transcription that are not intended.

## 2023-01-26 NOTE — FLOWSHEET NOTE
[facility]. and Therapy, [address]  Phone: [phone]  Fax [phone]     Physical Therapy: Daily Note   Patient: Gena Gaitan (26 y.o. male)   Treatment Date: 2023   :  1960 MRN: [id number]   Visit #: 6   Auth needed? []  Yes    [x]  No   Amount authorized: NA  Visit Limit: 30 Insurance: Payor: [address] [place] [address]  [address] / Plan: [address] [place] [address] [address] / Product Type: *No Product type* /   Insurance ID: [id number] - (Medicaid Managed)  Secondary Insurance (if applicable):    Treatment Diagnosis:    No diagnosis found. Medical Diagnosis: Status post right knee replacement [Z96.651]  Knee instability, right [M25.361]     Referring Physician: Viet Maddox MD    PCP: Day Hodge MD   Plan of Care signed? []  Yes [x]  No  Latex Allergy? []  Yes [x]  No   Pace Maker? [x]  Yes []  No    Preferred Language for Healthcare:   [x] English       [] other:       RESTRICTIONS/PRECAUTIONS: Pacemaker    Functional Outcome Measure/Scale:    Date Assessed (Eval)     LEFS (%) 75%         SUBJECTIVE EXAMINATION   Pain level:  3 /10    Patient Report/Comments:  No new complaints today    OBJECTIVE EXAMINATION   Observation:     Test measurements: 1/10/10 R knee ROM: -10 extension, 110 Flextion, L Knee ROM: -3 ext, 101 Flexion    TREATMENT     Exercise / Equipment / Intervention Sets / Frances Luis Armando / Resistance Comments / Cueing HEP         Quad sets 5\" x 10 B     Glut sets In sitting tableside    English Ball compression (Iso bridge)     LAQ x20 B 1#     Heel slides with TB      Marching in // 15     Minisquats in // x15     Lateral stepping in // 4 laps      Cone taps     SAQ 15x5 secs B     SLR x20           Nu-step  x5'                         Manual Treatment:   Access Code: [email or web address]  URL: [email or web address]  Date: 01/10/2023  Prepared by:  Jb Espinoza    Exercises  Supine Quadricep Sets - 1 x daily - 7 x weekly - 15 reps - 5\" hold  Supine Gluteal Sets - 1 x daily - 7 x weekly - 15 reps - 5\" hold  Supine Heel Slide - 1 x daily - 7 x weekly - 15 reps - 5\" hold      ASSESSMENT     Treatment/Activity Tolerance:  [x] Patient tolerated treatment well [] Patient limited by fatigue  [] Patient limited by pain  [] Patient limited by other medical complications  [] Other:     Overall Progression Towards Functional goals/ Treatment Progress Update:  [x] Patient is progressing as expected towards functional goals listed. [] Progression is slowed due to complexities/Impairments listed. [] Progression has been slowed due to co-morbidities. [] Plan just implemented, too soon (<30days) to assess goals progression   [] Goals require adjustment due to lack of progress  [] Patient is not progressing as expected and requires additional follow up with physician  [] Other:     Prognosis for POC:   [x] Good     [] Fair     [] Poor      Patient requires continued skilled intervention: [x] Yes       [] No    GOALS        Patient stated goal: Gain strength to have surgery and go home following. [x] Progressing: [] Met: [] Not Met: [] Adjusted     Therapist goals for Patient:   Short Term Goals: To be achieved in: 2 weeks  Independent in HEP and progression per patient tolerance, in order to progress toward full function and prevent re-injury. [x] Progressing: [] Met: [] Not Met: [] Adjusted  Patient will have a decrease in pain to facilitate improvement in movement, function, and ADLs as indicated by functional deficits. [x] Progressing: [] Met: [] Not Met: [] Adjusted     Long Term Goals: To be achieved in: 12 weeks  Disability index score of 45% or less as measure by LEFS to assist with reaching prior level of function.   [x] Progressing: [] Met: [] Not Met: [] Adjusted  Patient will demonstrate increased AROM to WNL, good Lumbar Spine mobility, good hip and knee ROM to allow for proper joint functioning as indicated by patients Functional Deficits. [x] Progressing: [] Met: [] Not Met: [] Adjusted  Patient will demonstrate an increase in proximal hip strength and core activation to allow for proper functional mobility as indicated by patients Functional Deficits  [x] Progressing: [] Met: [] Not Met: [] Adjusted  Patient will return to Walking, Stairs, and Light home activities without increased symptoms or restriction. [x] Progressing: [] Met: [] Not Met: [] Adjusted    TREATMENT PLAN     [x] Continue per plan of care [] Alter current plan  [] Plan of care initiated [] Hold pending MD visit  [] Discharge    BILLING     Timed Code Treatment Minutes: 44   Total Treatment Minutes: 44     If Medicare: NA  If [facility]: NA    CPT Code # CPT Code #     [] Eval Low (01400)    [] Gait (38371)     [] Eval Medium (17229)   [] VASO (64814)     [] Eval High (43733)   [] Estim Unattended (92216)     [] Re-Eval (16322)   [] [facility]. Traction (41016)     [x] Therex (33505)  2  [] Dry Needle 1-2 muscle (13152)     [x] Neuromusc. Re-ed (57428) 1  [] Dry Needle 3+ muscle (58584)     [] Manual (56665)   [] Group Therapy     [] Ther.  Act (83174)   []       Electronically Signed by Massimo Jarrell PT  Date: 01/26/2023

## 2023-01-27 ENCOUNTER — HOSPITAL ENCOUNTER (OUTPATIENT)
Dept: CARDIAC REHAB | Age: 63
Setting detail: THERAPIES SERIES
Discharge: HOME | End: 2023-01-27
Payer: COMMERCIAL

## 2023-01-27 PROCEDURE — 93798 PHYS/QHP OP CAR RHAB W/ECG: CPT

## 2023-01-30 ENCOUNTER — HOSPITAL ENCOUNTER (OUTPATIENT)
Dept: CARDIAC REHAB | Age: 63
Setting detail: THERAPIES SERIES
Discharge: HOME | End: 2023-01-30
Payer: COMMERCIAL

## 2023-01-30 PROCEDURE — 93798 PHYS/QHP OP CAR RHAB W/ECG: CPT

## 2023-01-31 ENCOUNTER — HOSPITAL ENCOUNTER (OUTPATIENT)
Dept: PHYSICAL THERAPY | Age: 63
Setting detail: THERAPIES SERIES
Discharge: HOME | End: 2023-01-31
Payer: COMMERCIAL

## 2023-01-31 ENCOUNTER — OFFICE VISIT (OUTPATIENT)
Dept: ORTHOPEDIC SURGERY | Age: 63
End: 2023-01-31
Payer: COMMERCIAL

## 2023-01-31 VITALS — WEIGHT: 306 LBS | BODY MASS INDEX: 42.84 KG/M2 | HEIGHT: 71 IN | RESPIRATION RATE: 18 BRPM

## 2023-01-31 DIAGNOSIS — M65.311 TRIGGER FINGER OF RIGHT THUMB: Primary | ICD-10-CM

## 2023-01-31 PROCEDURE — G8417 CALC BMI ABV UP PARAM F/U: HCPCS | Performed by: NURSE PRACTITIONER

## 2023-01-31 PROCEDURE — 97110 THERAPEUTIC EXERCISES: CPT | Performed by: PHYSICAL THERAPIST

## 2023-01-31 PROCEDURE — 99243 OFF/OP CNSLTJ NEW/EST LOW 30: CPT | Performed by: NURSE PRACTITIONER

## 2023-01-31 PROCEDURE — 97112 NEUROMUSCULAR REEDUCATION: CPT | Performed by: PHYSICAL THERAPIST

## 2023-01-31 PROCEDURE — G8427 DOCREV CUR MEDS BY ELIG CLIN: HCPCS | Performed by: NURSE PRACTITIONER

## 2023-01-31 PROCEDURE — G8482 FLU IMMUNIZE ORDER/ADMIN: HCPCS | Performed by: NURSE PRACTITIONER

## 2023-01-31 NOTE — PROGRESS NOTES
Mr. Titus Gray is a 57 y.o. right handed man who is seen today in Hand Surgical Consultation at the request of Dann Millard MD.    He presents today regarding right symptoms which have been present for approximately 1 months. A history of antecedent trauma or injury is Present with feeling a pop in his thumb when pushing himself up from the bed . He reports symptoms to include severe pain and stiffness with frequent popping, catching or locking of the right Thumb. Finger symptoms are not worse in the morning or overnight. He reports severe pain located at the base of the symptomatic finger(s). Symptoms are worsening over time. The patient went to the ED at [facility] where he was placed in a thumb spica brace, which he has been wearing. Previous treatment has included conservative measures, activity modification, avoidance of bothersome tasks, and splinting of the right wrist.  He does not claim relation of his symptoms to his required work activities. He has not undergone any form of testing. I have today reviewed with Titus Gray the clinically relevant, past medical history, medications, allergies,  family history, social history, and Review Of Systems & I have documented any details relevant to today's presenting complaints in my history above. Mr. Jw Macdonald's self-reported past medical history, medications, allergies,  family history, social history, and Review Of Systems have been scanned into the chart under the \"Media\" tab. Physical Exam:  Mr. Jw Macdonald's most recent vitals:  Vitals  Resp: 18  Height: 5' 11\" (180.3 cm)  Weight: (!) 306 lb (138.8 kg)    He is well nourished, oriented to person, place & time. He demonstrates appropriate mood and affect as well as normal gait and station. Skin: Normal in appearance, Normal Color, and Free of Lesions Bilaterally   Digital range of motion is equal bilaterally. Inducible triggering is noted upon flexion in the right Thumb.   There is not an associated flexion contracture of the PIP joint. No other digit demonstrates evidence for stenosing tenosynovitis bilaterally. Wrist range of motion is equal bilaterally . Sensation is normal and present in the Whole Hand bilaterally  Vascular examination reveals good capillary refill and good color bilaterally  Swelling is moderate in the symptomatic digit, absent elsewhere bilaterally  There is no evidence of gross joint instability bilaterally. Muscular strength is clinically appropriate bilaterally. Examination for Stenosing Tenosynovitis demonstrates moderate tenderness, thickening & nodularity at the A-1 pulley(s) of the Right Thumb. There is a palpable Nota's Node. There is Inducible triggering on active flexion with pain. No other digits demonstrate evidence of Stenosing Tenosynovitis. It is noted that the patient has significant stiffness from brace and this exam was difficult today due to patient's stiffness and pain level. Impression:  Mr. Emeka Bland has developed Stenosing Tenosynovitis (Trigger Finger), which is currently exacerbated, and presents requesting further treatment. Plan:  I have had a thorough discussion with Mr. Emeka Bland regarding the treatment options available for his initially presenting right  Thumb stenosing tenosynovitis, which is causing him significant symptoms and difficulty. I have outlined for Mr. Emeka Bland the risk, benefits and consequences of the various treatment modalities, including a reasonable expectation for the long term success of each. We have discussed the likelihood that further, more aggressive treatment may be required for his current presenting condition.   Based upon our current discussion and a reasonable understating of the options available to him, Mr. Emeka Bland has selected to proceed with a conservative plan of treatment consisting of: activity modification, and the judicious use of over-the-counter anti-inflammatory medications if allowed by his primary care physician. Instructions were given regarding conservative treatments as well as suggestions for use of the other modalities were discussed. I have clearly explained to him that the above outlined treatment plan should not be expected to 'cure' his  stenosing tenosynovitis, but we are rather treating the symptoms with which he presents. He has understood that in order to achieve more durable relief of his symptoms and to prevent future worsening or further damage, that definitive surgical treatment would be required. Mr. Juma Moss  voiced an appropriate understanding of our discussion, the options available to him, and of the expectations of his selected  treatment. I have also discussed with Mr. Juma Moss  the other treatment options available to him  for this condition. We have today selected to proceed with conservative management. He and I have agreed that if our current course of conservative treatment does not prove to be effective over the short term future, that he will schedule a follow-up appointment to discuss and select an alternate course of therapy including possibly injection or surgical treatment. Mr. Juma Moss has been given a full verbal list of instructions and precautions related to his present condition. I have asked him to followup with Dr. Nupur Hargrove or Genesis Bal PA-C for a repeat exam given the difficulty of today's related to pain and stiffness in the office at the prescribed time. He is also specifically requested to call or return to the office sooner if his symptoms change or worsen prior to the next scheduled appointment. He was also instructed to work on range of motion of the thumb.

## 2023-01-31 NOTE — FLOWSHEET NOTE
[facility], [facility], [address], [address]  Phone: [phone]  Fax [phone]     Physical Therapy: Daily Note   Patient: Alexander Reveles (28 y.o. male)   Treatment Date: 2023   :  1960 MRN: [id number]   Visit #: 7   Auth needed? []  Yes    [x]  No   Amount authorized: NA  Visit Limit: 30 Insurance: Payor: [facility]  [address] / Plan: [address] [place] [address]  [address] / Product Type: *No Product type* /   Insurance ID: [id number] - (Medicaid Managed)  Secondary Insurance (if applicable):    Treatment Diagnosis:    No diagnosis found. Medical Diagnosis: Status post right knee replacement [Z96.651]  Knee instability, right [M25.361]     Referring Physician: Dwight Almodovar MD    PCP: Yovany Putnam MD   Plan of Care signed? []  Yes [x]  No  Latex Allergy? []  Yes [x]  No   Pace Maker? [x]  Yes []  No    Preferred Language for Healthcare:   [x] English       [] other:       RESTRICTIONS/PRECAUTIONS: Pacemaker    Functional Outcome Measure/Scale:    Date Assessed (Eval)     LEFS (%) 75%         SUBJECTIVE EXAMINATION   Pain level:  3 /10    Patient Report/Comments:  Pt reported pain in L knee after therapy from Minisquats. OBJECTIVE EXAMINATION   Observation:     Test measurements: 1/10/10 R knee ROM: -10 extension, 110 Flextion, L Knee ROM: -3 ext, 101 Flexion    TREATMENT     Exercise / Equipment / Intervention Sets / [place] [employer] / Resistance Comments / Cueing HEP         Quad sets 5\" x 15 B     Glut sets In sitting tableside    Nepalese Ball compression (Iso bridge)     LAQ x15 B 2#     Heel slides with TB      Marching in // 20     Hold secondary to knee pain    Lateral stepping in // 4 laps, 3 laps RTB     Cone taps     DKTC w/ swiss x15     SAQ 15x5 secs B 2#     SLR           Nu-step  x5'                         Manual Treatment:   Access Code: [email or web address]  URL: [email or web address]  Date: 01/10/2023  Prepared by: Umberto Zayas    Exercises  Supine Quadricep Sets - 1 x daily - 7 x weekly - 15 reps - 5\" hold  Supine Gluteal Sets - 1 x daily - 7 x weekly - 15 reps - 5\" hold  Supine Heel Slide - 1 x daily - 7 x weekly - 15 reps - 5\" hold      ASSESSMENT     Treatment/Activity Tolerance:  [x] Patient tolerated treatment well [] Patient limited by fatigue  [] Patient limited by pain  [] Patient limited by other medical complications  [] Other:     Overall Progression Towards Functional goals/ Treatment Progress Update:  [x] Patient is progressing as expected towards functional goals listed. [] Progression is slowed due to complexities/Impairments listed. [] Progression has been slowed due to co-morbidities. [] Plan just implemented, too soon (<30days) to assess goals progression   [] Goals require adjustment due to lack of progress  [] Patient is not progressing as expected and requires additional follow up with physician  [] Other:     Prognosis for POC:   [x] Good     [] Fair     [] Poor      Patient requires continued skilled intervention: [x] Yes       [] No    GOALS        Patient stated goal: Gain strength to have surgery and go home following. [x] Progressing: [] Met: [] Not Met: [] Adjusted     Therapist goals for Patient:   Short Term Goals: To be achieved in: 2 weeks  Independent in HEP and progression per patient tolerance, in order to progress toward full function and prevent re-injury. [x] Progressing: [] Met: [] Not Met: [] Adjusted  Patient will have a decrease in pain to facilitate improvement in movement, function, and ADLs as indicated by functional deficits. [x] Progressing: [] Met: [] Not Met: [] Adjusted     Long Term Goals: To be achieved in: 12 weeks  Disability index score of 45% or less as measure by LEFS to assist with reaching prior level of function.   [x] Progressing: [] Met: [] Not Met: [] Adjusted  Patient will demonstrate increased AROM to WNL, good Lumbar Spine mobility, good hip and knee ROM to allow for proper joint functioning as indicated by patients Functional Deficits. [x] Progressing: [] Met: [] Not Met: [] Adjusted  Patient will demonstrate an increase in proximal hip strength and core activation to allow for proper functional mobility as indicated by patients Functional Deficits  [x] Progressing: [] Met: [] Not Met: [] Adjusted  Patient will return to Walking, Stairs, and Light home activities without increased symptoms or restriction. [x] Progressing: [] Met: [] Not Met: [] Adjusted    TREATMENT PLAN     [x] Continue per plan of care [] Alter current plan  [] Plan of care initiated [] Hold pending MD visit  [] Discharge    BILLING     Timed Code Treatment Minutes: 48   Total Treatment Minutes: 48     If Medicare: NA  If [facility]: NA    CPT Code # CPT Code #     [] Eval Low (49808)    [] Gait (15261)     [] Eval Medium (93494)   [] VASO (13728)     [] Eval High (18567)   [] Estim Unattended (83445)     [] Re-Eval (28201)   [] [facility]. Traction (51205)     [x] Therex (23060)  2  [] Dry Needle 1-2 muscle (18691)     [x] Neuromusc. Re-ed (19568) 1  [] Dry Needle 3+ muscle (08826)     [] Manual (95549)   [] Group Therapy     [] Ther.  Act (19425)   []       Electronically Signed by Rebecca Nguyen, PT  Date: 01/31/2023

## 2023-02-01 ENCOUNTER — HOSPITAL ENCOUNTER (OUTPATIENT)
Dept: CARDIAC REHAB | Age: 63
Setting detail: THERAPIES SERIES
Discharge: HOME | End: 2023-02-01
Payer: COMMERCIAL

## 2023-02-01 PROCEDURE — 93798 PHYS/QHP OP CAR RHAB W/ECG: CPT

## 2023-02-02 ENCOUNTER — HOSPITAL ENCOUNTER (OUTPATIENT)
Dept: PHYSICAL THERAPY | Age: 63
Setting detail: THERAPIES SERIES
Discharge: HOME | End: 2023-02-02

## 2023-02-02 NOTE — PROGRESS NOTES
[facility]. and Therapy, [facility], [address] [place] [address]  Phone: [phone]  Fax [phone]     Physical Therapy  Cancellation/No-show Note  Patient Name:  Hiram Fischer  :  1960   Date:  2023  Cancelled visits to date: 2  No-shows to date: 0    For today's appointment patient:  [x]  Cancelled  []  Rescheduled appointment  []  No-show     Reason given by patient:  []  Patient ill  []  Conflicting appointment  []  No transportation    []  Conflict with work  []  No reason given  []  Other:     Comments:     Electronically signed by:   Cristela Cosme PT

## 2023-02-03 ENCOUNTER — HOSPITAL ENCOUNTER (OUTPATIENT)
Dept: CARDIAC REHAB | Age: 63
Setting detail: THERAPIES SERIES
Discharge: HOME | End: 2023-02-03
Payer: COMMERCIAL

## 2023-02-03 PROCEDURE — 93798 PHYS/QHP OP CAR RHAB W/ECG: CPT

## 2023-02-06 ENCOUNTER — HOSPITAL ENCOUNTER (OUTPATIENT)
Dept: CARDIAC REHAB | Age: 63
Setting detail: THERAPIES SERIES
Discharge: HOME | End: 2023-02-06
Payer: COMMERCIAL

## 2023-02-06 PROCEDURE — 93798 PHYS/QHP OP CAR RHAB W/ECG: CPT

## 2023-02-07 ENCOUNTER — HOSPITAL ENCOUNTER (OUTPATIENT)
Dept: PHYSICAL THERAPY | Age: 63
Setting detail: THERAPIES SERIES
Discharge: HOME | End: 2023-02-07
Payer: COMMERCIAL

## 2023-02-07 PROCEDURE — 97112 NEUROMUSCULAR REEDUCATION: CPT

## 2023-02-07 PROCEDURE — 97110 THERAPEUTIC EXERCISES: CPT

## 2023-02-07 NOTE — FLOWSHEET NOTE
[facility] and Therapy, [facility], [facility] [address], [address]   Phone: [phone]  Fax [phone]     Physical Therapy: Daily Note   Patient: Chary Chawla (18 y.o. male)   Treatment Date: 2023   :  1960 MRN: [id number]   Visit #: 8   Auth needed? []  Yes    [x]  No   Amount authorized: NA  Visit Limit: 30 Insurance: Payor: [facility] [employer] [place]  [address] / Plan: [facility] [employer] [facility] [address] / Product Type: *No Product type* /   Insurance ID: [id number] - (Medicaid Managed)  Secondary Insurance (if applicable):    Treatment Diagnosis:  knee pain  No diagnosis found. Medical Diagnosis: Status post right knee replacement [Z96.651]  Knee instability, right [M25.361]     Referring Physician: Herman Sung MD    PCP: Henri Epstein MD   Plan of Care signed? []  Yes [x]  No  Latex Allergy? []  Yes [x]  No   Pace Maker? [x]  Yes []  No    Preferred Language for Healthcare:   [x] English       [] other:       RESTRICTIONS/PRECAUTIONS: Pacemaker    Functional Outcome Measure/Scale:    Date Assessed (Eval)     LEFS (%) 75%         SUBJECTIVE EXAMINATION   Pain level:  8/10    Patient Report/Comments:  Patient reports \"I am having a hard time today, it is the weather, I did good yesterday though\".       OBJECTIVE EXAMINATION   Observation:     Test measurements: 1/10/10 R knee ROM: -10 extension, 110 Flextion, L Knee ROM: -3 ext, 101 Flexion    TREATMENT     Exercise / Equipment / Intervention Sets / Verlena Memory / Resistance Comments / Cueing HEP         Quad sets 5\" x 15 B W/ swiss ball    Glut sets In sitting tableside    Honduran Ball compression (Iso bridge)     LAQ x15 B 2#     Heel slides with TB      Marching in // 20     Hold secondary to knee pain    Lateral stepping in // 4 laps, 3 laps RTB     Cone taps     DKTC w/ swiss x15     SAQ 15x5 secs B 2#     SLR Unable 2* pain    Bridge x10 5\"     Nu-step  x5' Level 5 Manual Treatment:   Access Code: [email or web address]  URL: [email or web address]  Date: 01/10/2023  Prepared by: Umberto Marquez    Exercises  Supine Quadricep Sets - 1 x daily - 7 x weekly - 15 reps - 5\" hold  Supine Gluteal Sets - 1 x daily - 7 x weekly - 15 reps - 5\" hold  Supine Heel Slide - 1 x daily - 7 x weekly - 15 reps - 5\" hold      ASSESSMENT   Patient responded well to above session. Some limitations due to flare up in pain. Patient demonstrated good motivation. No increased pain reported at end of session. Treatment/Activity Tolerance:  [x] Patient tolerated treatment well [] Patient limited by fatigue  [x] Patient limited by pain  [] Patient limited by other medical complications  [] Other:     Overall Progression Towards Functional goals/ Treatment Progress Update:  [x] Patient is progressing as expected towards functional goals listed. [] Progression is slowed due to complexities/Impairments listed. [] Progression has been slowed due to co-morbidities. [] Plan just implemented, too soon (<30days) to assess goals progression   [] Goals require adjustment due to lack of progress  [] Patient is not progressing as expected and requires additional follow up with physician  [] Other:     Prognosis for POC:   [x] Good     [] Fair     [] Poor      Patient requires continued skilled intervention: [x] Yes       [] No    GOALS        Patient stated goal: Gain strength to have surgery and go home following. [x] Progressing: [] Met: [] Not Met: [] Adjusted     Therapist goals for Patient:   Short Term Goals: To be achieved in: 2 weeks  Independent in HEP and progression per patient tolerance, in order to progress toward full function and prevent re-injury. [x] Progressing: [] Met: [] Not Met: [] Adjusted  Patient will have a decrease in pain to facilitate improvement in movement, function, and ADLs as indicated by functional deficits.               [x] Progressing: [] Met: [] Not Met: [] Adjusted     Long Term Goals: To be achieved in: 12 weeks  Disability index score of 45% or less as measure by LEFS to assist with reaching prior level of function. [x] Progressing: [] Met: [] Not Met: [] Adjusted  Patient will demonstrate increased AROM to WNL, good Lumbar Spine mobility, good hip and knee ROM to allow for proper joint functioning as indicated by patients Functional Deficits. [x] Progressing: [] Met: [] Not Met: [] Adjusted  Patient will demonstrate an increase in proximal hip strength and core activation to allow for proper functional mobility as indicated by patients Functional Deficits  [x] Progressing: [] Met: [] Not Met: [] Adjusted  Patient will return to Walking, Stairs, and Light home activities without increased symptoms or restriction. [x] Progressing: [] Met: [] Not Met: [] Adjusted    TREATMENT PLAN     [x] Continue per plan of care [] Alter current plan  [] Plan of care initiated [] Hold pending MD visit  [] Discharge    BILLING   Time In: 8:00am  Time Out: 8:46am    Timed Code Treatment Minutes: 46   Total Treatment Minutes: 46     If Medicare: NA  If [facility]: NA    CPT Code # CPT Code #     [] Eval Low (89077)    [] Gait (20091)     [] Eval Medium (41318)   [] VASO (37769)     [] Eval High (32107)   [] Estim Unattended (39362)     [] Re-Eval (32946)   [] [facility]. Traction (08040)     [x] Therex (26445)  2  [] Dry Needle 1-2 muscle (07006)     [x] Neuromusc. Re-ed (42958) 1  [] Dry Needle 3+ muscle (44419)     [] Manual (30024)   [] Group Therapy     [] Ther.  Act (51182)   []       Electronically Signed by Je Alexis PT, DPT  Date: 02/07/2023

## 2023-02-08 ENCOUNTER — HOSPITAL ENCOUNTER (OUTPATIENT)
Dept: CARDIAC REHAB | Age: 63
Setting detail: THERAPIES SERIES
Discharge: HOME | End: 2023-02-08
Payer: COMMERCIAL

## 2023-02-08 PROCEDURE — 93798 PHYS/QHP OP CAR RHAB W/ECG: CPT

## 2023-02-09 ENCOUNTER — HOSPITAL ENCOUNTER (OUTPATIENT)
Dept: PHYSICAL THERAPY | Age: 63
Setting detail: THERAPIES SERIES
Discharge: HOME | End: 2023-02-09
Payer: COMMERCIAL

## 2023-02-09 PROCEDURE — 97110 THERAPEUTIC EXERCISES: CPT | Performed by: PHYSICAL THERAPIST

## 2023-02-09 PROCEDURE — 97112 NEUROMUSCULAR REEDUCATION: CPT | Performed by: PHYSICAL THERAPIST

## 2023-02-09 NOTE — FLOWSHEET NOTE
[facility], [facility], [address] [place] [address]  Phone: [phone]  Fax [phone]     Physical Therapy: Daily Note   Patient: Luisana Bey (85 y.o. male)   Treatment Date: 2023   :  1960 MRN: [id number]   Visit #: 9   Auth needed? []  Yes    [x]  No   Amount authorized: NA  Visit Limit: 30 Insurance: Payor: [facility] [employer]  [address] / Plan: [facility] [employer]  [address] / Product Type: *No Product type* /   Insurance ID: [id number] - (Medicaid Managed)  Secondary Insurance (if applicable):    Treatment Diagnosis:  knee pain  No diagnosis found. Medical Diagnosis: Status post right knee replacement [Z96.651]  Knee instability, right [M25.361]     Referring Physician: Deysi Montero MD    PCP: Adam Galvan MD   Plan of Care signed? []  Yes [x]  No  Latex Allergy? []  Yes [x]  No   Pace Maker? [x]  Yes []  No    Preferred Language for Healthcare:   [x] English       [] other:       RESTRICTIONS/PRECAUTIONS: Pacemaker    Functional Outcome Measure/Scale:    Date Assessed (Eval)     LEFS (%) 75%         SUBJECTIVE EXAMINATION   Pain level:  8/10    Patient Report/Comments:  Patient reports \"Still having this flare up\". But feels stronger, \"I know I am stronger\". Walking about the house more without walker.     OBJECTIVE EXAMINATION   Observation:     Test measurements: 1/10/10 R knee ROM: -10 extension, 110 Flextion, L Knee ROM: -3 ext, 101 Flexion    TREATMENT     Exercise / Equipment / Intervention Sets / [place] / Resistance Comments / Cueing HEP         Quad sets 5\" x 15 B W/ swiss ball    Glut sets In sitting tableside    Bruneian Ball compression (Iso bridge)     LAQ         Marching in // 20     Hold secondary to knee pain    Lateral stepping in // 4 laps RTB     Cone taps     HR      Stepups X10 B     DKTC w/ swiss x15     SAQ     SLR Unable 2* pain    Bridge x15 5\"     Nu-step  x5' Level 6 Gait X 3 laps in clinic SBA w/ SPC            Manual Treatment:   Access Code: [email or web address]  URL: [email or web address] com/  Date: 01/10/2023  Prepared by: Umberto Gannon Lighter    Exercises  Supine Quadricep Sets - 1 x daily - 7 x weekly - 15 reps - 5\" hold  Supine Gluteal Sets - 1 x daily - 7 x weekly - 15 reps - 5\" hold  Supine Heel Slide - 1 x daily - 7 x weekly - 15 reps - 5\" hold      ASSESSMENT   Patient responded well to above session. Some limitations due to flare up in pain. Patient demonstrated good motivation. No increased pain reported at end of session. Treatment/Activity Tolerance:  [x] Patient tolerated treatment well [] Patient limited by fatigue  [x] Patient limited by pain  [] Patient limited by other medical complications  [] Other:     Overall Progression Towards Functional goals/ Treatment Progress Update:  [x] Patient is progressing as expected towards functional goals listed. [] Progression is slowed due to complexities/Impairments listed. [] Progression has been slowed due to co-morbidities. [] Plan just implemented, too soon (<30days) to assess goals progression   [] Goals require adjustment due to lack of progress  [] Patient is not progressing as expected and requires additional follow up with physician  [] Other:     Prognosis for POC:   [x] Good     [] Fair     [] Poor      Patient requires continued skilled intervention: [x] Yes       [] No    GOALS        Patient stated goal: Gain strength to have surgery and go home following. [x] Progressing: [] Met: [] Not Met: [] Adjusted     Therapist goals for Patient:   Short Term Goals: To be achieved in: 2 weeks  Independent in HEP and progression per patient tolerance, in order to progress toward full function and prevent re-injury. [x] Progressing: [] Met: [] Not Met: [] Adjusted  Patient will have a decrease in pain to facilitate improvement in movement, function, and ADLs as indicated by functional deficits.               [x] Progressing: [] Met: [] Not Met: [] Adjusted     Long Term Goals: To be achieved in: 12 weeks  Disability index score of 45% or less as measure by LEFS to assist with reaching prior level of function. [x] Progressing: [] Met: [] Not Met: [] Adjusted  Patient will demonstrate increased AROM to WNL, good Lumbar Spine mobility, good hip and knee ROM to allow for proper joint functioning as indicated by patients Functional Deficits. [x] Progressing: [] Met: [] Not Met: [] Adjusted  Patient will demonstrate an increase in proximal hip strength and core activation to allow for proper functional mobility as indicated by patients Functional Deficits  [x] Progressing: [] Met: [] Not Met: [] Adjusted  Patient will return to Walking, Stairs, and Light home activities without increased symptoms or restriction. [x] Progressing: [] Met: [] Not Met: [] Adjusted    TREATMENT PLAN     [x] Continue per plan of care [] Alter current plan  [] Plan of care initiated [] Hold pending MD visit  [] Discharge    BILLING     Timed Code Treatment Minutes: 48   Total Treatment Minutes: 48     If Medicare: NA  If [facility]: NA    CPT Code # CPT Code #     [] Eval Low (06190)    [] Gait (07081)     [] Eval Medium (80610)   [] VASO (15074)     [] Eval High (98440)   [] Estim Unattended (17888)     [] Re-Eval (82599)   [] [facility]. Traction (99064)     [x] Therex (63501)  2  [] Dry Needle 1-2 muscle (52281)     [x] Neuromusc. Re-ed (08169) 1  [] Dry Needle 3+ muscle (32563)     [] Manual (35653)   [] Group Therapy     [] Ther.  Act (81885)   []       Electronically Signed by Anne-Marie Mckeon, PT, DPT  Date: 02/09/2023

## 2023-02-13 ENCOUNTER — HOSPITAL ENCOUNTER (OUTPATIENT)
Dept: CARDIAC REHAB | Age: 63
Setting detail: THERAPIES SERIES
Discharge: HOME | End: 2023-02-13
Payer: COMMERCIAL

## 2023-02-13 DIAGNOSIS — M17.12 PRIMARY OSTEOARTHRITIS OF LEFT KNEE: ICD-10-CM

## 2023-02-13 PROCEDURE — 93798 PHYS/QHP OP CAR RHAB W/ECG: CPT

## 2023-02-13 RX ORDER — ACETAMINOPHEN 500 MG
500 TABLET ORAL 4 TIMES DAILY PRN
Qty: 120 TABLET | Refills: 0 | OUTPATIENT
Start: 2023-02-13

## 2023-02-14 ENCOUNTER — HOSPITAL ENCOUNTER (OUTPATIENT)
Dept: PHYSICAL THERAPY | Age: 63
Setting detail: THERAPIES SERIES
Discharge: HOME | End: 2023-02-14
Payer: COMMERCIAL

## 2023-02-14 PROCEDURE — 97110 THERAPEUTIC EXERCISES: CPT | Performed by: PHYSICAL THERAPIST

## 2023-02-14 PROCEDURE — 97112 NEUROMUSCULAR REEDUCATION: CPT | Performed by: PHYSICAL THERAPIST

## 2023-02-14 NOTE — FLOWSHEET NOTE
[facility], [address], [address] [place] [address]  Phone: [phone]  Fax [phone]     Physical Therapy: Daily Note   Patient: Jagjit Pappas (56 y.o. male)   Treatment Date: 2023   :  1960 MRN: [id number]   Visit #: 73   KAWV needed? []  Yes    [x]  No   Amount authorized: NA  Visit Limit: 30 Insurance: Payor: [facility] [employer] [place]  [address] / Plan: [facility] [email or web address] [place]  [address] / Product Type: *No Product type* /   Insurance ID: [id number] - (Medicaid Managed)  Secondary Insurance (if applicable):    Treatment Diagnosis:  knee pain  No diagnosis found. Medical Diagnosis: Status post right knee replacement [Z96.651]  Knee instability, right [M25.361]     Referring Physician: Katie Dukes MD    PCP: Fernanda López MD   Plan of Care signed? []  Yes [x]  No  Latex Allergy? []  Yes [x]  No   Pace Maker? [x]  Yes []  No    Preferred Language for Healthcare:   [x] English       [] other:       RESTRICTIONS/PRECAUTIONS: Pacemaker    Functional Outcome Measure/Scale:    Date Assessed (Eval)     LEFS (%) 75%         SUBJECTIVE EXAMINATION   Pain level:  8/10    Patient Report/Comments:  Patient showed up using SPC cane today. Reports that getting on/off commode is easier now. Rhematologist has placed him on prednisone now and he feels it is helping.     OBJECTIVE EXAMINATION   Observation:     Test measurements: 1/10/10 R knee ROM: -10 extension, 110 Flextion, L Knee ROM: -3 ext, 101 Flexion    TREATMENT     Exercise / Equipment / Intervention Sets / [place] / Resistance Comments / Cueing HEP         Quad sets 5\" x 15 B W/ swiss ball    Glut sets In sitting tableside    Mongolian Ball compression (Iso bridge)     LAQ         Marching in // 20     Hold secondary to knee pain    Lateral stepping in // 5 laps GTB     Cone taps     HR 2x10     Slantboard stretch 2x30\"     Stepups     DKTC w/ swiss     SAQ 15x5 secs B 2#     SLR x5 B    Bridge x15 5\"     Nu-step  x5' Level 6    Foam balance  tandem X1'  X1' ea           Gait X 3 laps in clinic [facility]            Manual Treatment:   Access Code: [email or web address]  URL: [email or web address]/  Date: 01/10/2023  Prepared by: Umberto Chanel    Exercises  Supine Quadricep Sets - 1 x daily - 7 x weekly - 15 reps - 5\" hold  Supine Gluteal Sets - 1 x daily - 7 x weekly - 15 reps - 5\" hold  Supine Heel Slide - 1 x daily - 7 x weekly - 15 reps - 5\" hold      ASSESSMENT   Patient responded well to above session. Some limitations due to flare up in pain. Patient demonstrated good motivation. No increased pain reported at end of session. Treatment/Activity Tolerance:  [x] Patient tolerated treatment well [] Patient limited by fatigue  [] Patient limited by pain  [] Patient limited by other medical complications  [] Other:     Overall Progression Towards Functional goals/ Treatment Progress Update:  [x] Patient is progressing as expected towards functional goals listed. [] Progression is slowed due to complexities/Impairments listed. [] Progression has been slowed due to co-morbidities. [] Plan just implemented, too soon (<30days) to assess goals progression   [] Goals require adjustment due to lack of progress  [] Patient is not progressing as expected and requires additional follow up with physician  [] Other:     Prognosis for POC:   [x] Good     [] Fair     [] Poor      Patient requires continued skilled intervention: [x] Yes       [] No    GOALS        Patient stated goal: Gain strength to have surgery and go home following. [x] Progressing: [] Met: [] Not Met: [] Adjusted     Therapist goals for Patient:   Short Term Goals: To be achieved in: 2 weeks  Independent in HEP and progression per patient tolerance, in order to progress toward full function and prevent re-injury.    [x] Progressing: [] Met: [] Not Met: [] Adjusted  Patient will have a decrease in pain to facilitate improvement in movement, function, and ADLs as indicated by functional deficits. [x] Progressing: [] Met: [] Not Met: [] Adjusted     Long Term Goals: To be achieved in: 12 weeks  Disability index score of 45% or less as measure by LEFS to assist with reaching prior level of function. [x] Progressing: [] Met: [] Not Met: [] Adjusted  Patient will demonstrate increased AROM to WNL, good Lumbar Spine mobility, good hip and knee ROM to allow for proper joint functioning as indicated by patients Functional Deficits. [x] Progressing: [] Met: [] Not Met: [] Adjusted  Patient will demonstrate an increase in proximal hip strength and core activation to allow for proper functional mobility as indicated by patients Functional Deficits  [x] Progressing: [] Met: [] Not Met: [] Adjusted  Patient will return to Walking, Stairs, and Light home activities without increased symptoms or restriction. [x] Progressing: [] Met: [] Not Met: [] Adjusted    TREATMENT PLAN     [x] Continue per plan of care [] Alter current plan  [] Plan of care initiated [] Hold pending MD visit  [] Discharge    BILLING     Timed Code Treatment Minutes: 53   Total Treatment Minutes: 53     If Medicare: NA  If [facility]: NA    CPT Code # CPT Code #     [] Eval Low (72106)    [] Gait (77638)     [] Eval Medium (86903)   [] VASO (59454)     [] Eval High (68825)   [] Estim Unattended (46254)     [] Re-Eval (20194)   [] [facility]. Traction (26341)     [x] Therex (49354)  3  [] Dry Needle 1-2 muscle (28092)     [x] Neuromusc. Re-ed (31712) 1  [] Dry Needle 3+ muscle (20268)     [] Manual (20980)   [] Group Therapy     [] Ther.  Act (48858)   []       Electronically Signed by Walter Horton PT, DPT  Date: 02/14/2023

## 2023-02-15 ENCOUNTER — HOSPITAL ENCOUNTER (OUTPATIENT)
Dept: CARDIAC REHAB | Age: 63
Setting detail: THERAPIES SERIES
Discharge: HOME | End: 2023-02-15
Payer: COMMERCIAL

## 2023-02-15 PROCEDURE — 93798 PHYS/QHP OP CAR RHAB W/ECG: CPT

## 2023-02-16 ENCOUNTER — HOSPITAL ENCOUNTER (OUTPATIENT)
Dept: PHYSICAL THERAPY | Age: 63
Setting detail: THERAPIES SERIES
Discharge: HOME | End: 2023-02-16
Payer: COMMERCIAL

## 2023-02-16 PROCEDURE — 97112 NEUROMUSCULAR REEDUCATION: CPT | Performed by: PHYSICAL THERAPIST

## 2023-02-16 PROCEDURE — 97110 THERAPEUTIC EXERCISES: CPT | Performed by: PHYSICAL THERAPIST

## 2023-02-16 NOTE — FLOWSHEET NOTE
[facility], [facility], [address] [place] [address]  Phone: [phone]  Fax [phone]     Physical Therapy: Daily Note   Patient: Savanna Berman (89 y.o. male)   Treatment Date: 2023   :  1960 MRN: [id number]   Visit #: 58   RIPT needed? []  Yes    [x]  No   Amount authorized: NA  Visit Limit: 30 Insurance: [facility]  [address] / Plan: [address] [place] [address]  [address] / Product Type: *No Product type* /   Insurance ID: [id number] - (Medicaid Managed)  Secondary Insurance (if applicable):    Treatment Diagnosis:  knee pain  No diagnosis found. Medical Diagnosis: Status post right knee replacement [Z96.651]  Knee instability, right [M25.361]     Referring Physician: Alysia Sousa MD    PCP: Arnulfo Tate MD   Plan of Care signed? []  Yes [x]  No  Latex Allergy? []  Yes [x]  No   Pace Maker? [x]  Yes []  No    Preferred Language for Healthcare:   [x] English       [] other:       RESTRICTIONS/PRECAUTIONS: Pacemaker    Functional Outcome Measure/Scale:    Date Assessed (Eval)     LEFS (%) 75%         SUBJECTIVE EXAMINATION   Pain level:  8/10    Patient Report/Comments:  Patient continues to do well.     OBJECTIVE EXAMINATION   Observation:     Test measurements: 1/10/10 R knee ROM: -10 extension, 110 Flextion, L Knee ROM: -3 ext, 101 Flexion    TREATMENT     Exercise / Equipment / Intervention Sets / Ely Boozer / Resistance Comments / Cueing HEP         Quad sets 5\" x 15 B W/ swiss ball    Glut sets In sitting tableside    Dutch Ball compression (Iso bridge)     LAQ         Marching in // x30     Hold secondary to knee pain    Lateral stepping in // 5 laps GTB     Cone taps     HR 2x10     Slantboard stretch 2x30\"     Stepups  Stepdowns X15 B 6\" box  X10 6\" box     DKTC w/ swiss     SAQ     SLR    Bridge     Nu-step  x5' Level 6    Foam balance  tandem X1'  X1' ea     Stacking 2x30\"     Gait SBA w/ SPC            Manual Treatment:   Access Code: [email or web address]  URL: [email or web address]  Date: 01/10/2023  Prepared by: Adam Laney Najjar    Exercises  Supine Quadricep Sets - 1 x daily - 7 x weekly - 15 reps - 5\" hold  Supine Gluteal Sets - 1 x daily - 7 x weekly - 15 reps - 5\" hold  Supine Heel Slide - 1 x daily - 7 x weekly - 15 reps - 5\" hold      ASSESSMENT       Treatment/Activity Tolerance:  [x] Patient tolerated treatment well [] Patient limited by fatigue  [] Patient limited by pain  [] Patient limited by other medical complications  [] Other:     Overall Progression Towards Functional goals/ Treatment Progress Update:  [x] Patient is progressing as expected towards functional goals listed. [] Progression is slowed due to complexities/Impairments listed. [] Progression has been slowed due to co-morbidities. [] Plan just implemented, too soon (<30days) to assess goals progression   [] Goals require adjustment due to lack of progress  [] Patient is not progressing as expected and requires additional follow up with physician  [] Other:     Prognosis for POC:   [x] Good     [] Fair     [] Poor      Patient requires continued skilled intervention: [x] Yes       [] No    GOALS        Patient stated goal: Gain strength to have surgery and go home following. [x] Progressing: [] Met: [] Not Met: [] Adjusted     Therapist goals for Patient:   Short Term Goals: To be achieved in: 2 weeks  Independent in HEP and progression per patient tolerance, in order to progress toward full function and prevent re-injury. [x] Progressing: [] Met: [] Not Met: [] Adjusted  Patient will have a decrease in pain to facilitate improvement in movement, function, and ADLs as indicated by functional deficits. [x] Progressing: [] Met: [] Not Met: [] Adjusted     Long Term Goals: To be achieved in: 12 weeks  Disability index score of 45% or less as measure by LEFS to assist with reaching prior level of function.   [x] Progressing: [] Met: [] Not Met: [] Adjusted  Patient will demonstrate increased AROM to WNL, good Lumbar Spine mobility, good hip and knee ROM to allow for proper joint functioning as indicated by patients Functional Deficits. [x] Progressing: [] Met: [] Not Met: [] Adjusted  Patient will demonstrate an increase in proximal hip strength and core activation to allow for proper functional mobility as indicated by patients Functional Deficits  [x] Progressing: [] Met: [] Not Met: [] Adjusted  Patient will return to Walking, Stairs, and Light home activities without increased symptoms or restriction. [x] Progressing: [] Met: [] Not Met: [] Adjusted    TREATMENT PLAN     [x] Continue per plan of care [] Alter current plan  [] Plan of care initiated [] Hold pending MD visit  [] Discharge    BILLING     Timed Code Treatment Minutes: 48   Total Treatment Minutes: 48     If Medicare: NA  If [facility]: NA    CPT Code # CPT Code #     [] Eval Low (53807)    [] Gait (16989)     [] Eval Medium (29291)   [] VASO (86988)     [] Eval High (37642)   [] Estim Unattended (42150)     [] Re-Eval (79571)   [] [facility]. Traction (81282)     [x] Therex (34878)  2  [] Dry Needle 1-2 muscle (59534)     [x] Neuromusc. Re-ed (79794) 1  [] Dry Needle 3+ muscle (27305)     [] Manual (25672)   [] Group Therapy     [] Ther.  Act (73076)   []       Electronically Signed by Nadia Teixeira PT, DPT  Date: 02/16/2023

## 2023-02-17 ENCOUNTER — HOSPITAL ENCOUNTER (OUTPATIENT)
Dept: CARDIAC REHAB | Age: 63
Setting detail: THERAPIES SERIES
Discharge: HOME | End: 2023-02-17
Payer: COMMERCIAL

## 2023-02-17 PROCEDURE — 93798 PHYS/QHP OP CAR RHAB W/ECG: CPT

## 2023-02-20 ENCOUNTER — OFFICE VISIT (OUTPATIENT)
Dept: ORTHOPEDIC SURGERY | Age: 63
End: 2023-02-20
Payer: COMMERCIAL

## 2023-02-20 ENCOUNTER — APPOINTMENT (OUTPATIENT)
Dept: CARDIAC REHAB | Age: 63
End: 2023-02-20
Payer: COMMERCIAL

## 2023-02-20 ENCOUNTER — TELEPHONE (OUTPATIENT)
Dept: ORTHOPEDIC SURGERY | Age: 63
End: 2023-02-20

## 2023-02-20 ENCOUNTER — TELEPHONE (OUTPATIENT)
Dept: INTERNAL MEDICINE CLINIC | Age: 63
End: 2023-02-20

## 2023-02-20 VITALS — BODY MASS INDEX: 42.84 KG/M2 | WEIGHT: 306 LBS | HEIGHT: 71 IN | RESPIRATION RATE: 16 BRPM

## 2023-02-20 DIAGNOSIS — M65.311 TRIGGER FINGER OF RIGHT THUMB: Primary | ICD-10-CM

## 2023-02-20 DIAGNOSIS — M17.12 PRIMARY OSTEOARTHRITIS OF LEFT KNEE: ICD-10-CM

## 2023-02-20 DIAGNOSIS — E11.9 TYPE 2 DIABETES MELLITUS WITHOUT COMPLICATION, WITH LONG-TERM CURRENT USE OF INSULIN (HCC): ICD-10-CM

## 2023-02-20 DIAGNOSIS — Z79.4 TYPE 2 DIABETES MELLITUS WITHOUT COMPLICATION, WITH LONG-TERM CURRENT USE OF INSULIN (HCC): ICD-10-CM

## 2023-02-20 LAB
A/G RATIO: 1.4 (ref 1.1–2.2)
ALBUMIN SERPL-MCNC: 4.4 G/DL (ref 3.4–5)
ALP BLD-CCNC: 141 U/L (ref 40–129)
ALT SERPL-CCNC: 36 U/L (ref 10–40)
ANION GAP SERPL CALCULATED.3IONS-SCNC: 12 MMOL/L (ref 3–16)
AST SERPL-CCNC: 25 U/L (ref 15–37)
BILIRUB SERPL-MCNC: 0.4 MG/DL (ref 0–1)
BUN BLDV-MCNC: 33 MG/DL (ref 7–20)
CALCIUM SERPL-MCNC: 9.9 MG/DL (ref 8.3–10.6)
CHLORIDE BLD-SCNC: 97 MMOL/L (ref 99–110)
CO2: 28 MMOL/L (ref 21–32)
CREAT SERPL-MCNC: 1.2 MG/DL (ref 0.8–1.3)
GFR SERPL CREATININE-BSD FRML MDRD: >60 ML/MIN/{1.73_M2}
GLUCOSE BLD-MCNC: 221 MG/DL (ref 70–99)
POTASSIUM SERPL-SCNC: 5 MMOL/L (ref 3.5–5.1)
SODIUM BLD-SCNC: 137 MMOL/L (ref 136–145)
TOTAL PROTEIN: 7.6 G/DL (ref 6.4–8.2)

## 2023-02-20 PROCEDURE — G8427 DOCREV CUR MEDS BY ELIG CLIN: HCPCS | Performed by: PHYSICIAN ASSISTANT

## 2023-02-20 PROCEDURE — 3017F COLORECTAL CA SCREEN DOC REV: CPT | Performed by: PHYSICIAN ASSISTANT

## 2023-02-20 PROCEDURE — 1036F TOBACCO NON-USER: CPT | Performed by: PHYSICIAN ASSISTANT

## 2023-02-20 PROCEDURE — G8482 FLU IMMUNIZE ORDER/ADMIN: HCPCS | Performed by: PHYSICIAN ASSISTANT

## 2023-02-20 PROCEDURE — 99204 OFFICE O/P NEW MOD 45 MIN: CPT | Performed by: PHYSICIAN ASSISTANT

## 2023-02-20 PROCEDURE — G8417 CALC BMI ABV UP PARAM F/U: HCPCS | Performed by: PHYSICIAN ASSISTANT

## 2023-02-20 RX ORDER — ACETAMINOPHEN 500 MG
500 TABLET ORAL 4 TIMES DAILY PRN
Qty: 120 TABLET | Refills: 0 | Status: CANCELLED | OUTPATIENT
Start: 2023-02-20

## 2023-02-20 NOTE — LETTER
CONSENT TO OPERATION  AND/OR OTHER PROCEDURE(S)          PATIENT : Alexander Reveles   YOB: 1960      DATE : 2/20/23          1. I request and consent that Dr. Pratima Case. Yaz Jones,  and/or his associates or assistants perform an operation and/or procedures on the above patient at  [place] [address], to treat the condition(s) which appear indicated by the diagnostic studies already performed. I have been told that in general terms the nature, purpose and reasonable expectations of the operation and/or procedure(s) are:          right Thumb Trigger Finger Release      2. It has been explained to me by the informing physician that during the course of the operation and/or procedure(s) unforeseen conditions may be revealed that necessitate an extension of the original operation and/or procedure(s) or different operation and/or procedures than those set forth in Paragraph 1. I therefore authorize and request that my physician and/or his associates or assistants perform such operations and/or procedures as are necessary and desirable in the exercise of professional judgment. The authority granted under this Paragraph 2 shall extend to all conditions that require treatment and are known to my physician at the time the operation is commenced. 3. I have been made aware by the informing physician of certain risks and consequences that are associated with the operation and/or procedure(s) described in Paragraph 1, the reasonable alternative methods or treatment, the possible consequences, the possibility that the operation and/or procedure(s) may be unsuccessful and the possibility of complications.   I understand the reasonably known risks to be:      - Bleeding  - Infection  - Poor Healing  - Possible Damage to Nerve, Vessel, Tendon/Muscle or Bone  - Need for further Treatment/Surgery  - Stiffness  - Pain  - Residual or Recurrent Symptoms  - Anesthetic and/or Medical Risks  - We have discussed the specific limitations and risks of hospital and/or office based treatment at this time due to the COVID-19 pandemic                I have been counseled about the risks of jesusita Covid-19 in the elmo-operative and post-operative periods related to this procedure.  I have been made aware that jesusita Covid-19 around the time of a surgical procedure may worsen my prognosis for recovering from the virus and lend to a higher morbidity and or mortality risk.  With this knowledge, I have requested to proceed with the procedure as scheduled.    4. I have also been informed by the informing physician that there are other risks from both known and unknown causes that are attendant to the performance of any surgical procedure.  I am aware that the practice of medicine and surgery is not an exact science, and that no guarantees have been made to me concerning the results of the operation and/or procedure(s).    5. I   CONSENT / REFUSE CONSENT  (strike the phrase that does not apply) to the taking of photographs before, during and/or after the operation or procedure for scientific/educational purposes.    6. I consent to the administration of anesthesia and to the use of such anesthetics as may be deemed advisable by the anesthesiologist who has been engaged by me or my physician.    7. I certify that I have read and understand the above consent to operation and/or other procedure(s); that the explanations therein referred to were made to me by the informing physician in advance of my signing this consent; that all blanks or statements requiring insertion or completion were filled in and inapplicable paragraphs, if any, were stricken before I signed; and that all questions asked by me about the operation and/or procedure(s) which I have consented to have been fully answered in a satisfactory manner.                                 _______________________           2/20/23                             Witness     Signature Of Patient         Date        Wandalee Dancer. Willis                                                 Informing Physician                                           Signature of Informing Physician                              If patient is unable to sign or is a minor, complete one of the following:    (A)  Patient is a minor   years of age. (B)  Patient is unable to sign because: The undersigned represents that he or she is duly authorized to execute this consent for and on behalf of the above named patient. Witness               o  Parent  o  Guardian   o  Spouse       o  Other (specify)                                           Patient Name: Jeb Villasenor  Patient YOB: 1960  Dr. Bg Antunez' Return To Work Policy  Regarding your ability to return to work after surgery or injury, Dr. Bg Antunez will not state that any patient is off of work or cannot work at all. He will place you on restrictions after your surgical procedure or injury. Depending on the details of your particular situation, Dr. Bg Antunez may state that you will have either light use or no use of your hand for a specific number of weeks. It is your obligation to communicate with your employer regarding your restrictions. It is your employer's decision as to whether they will accommodate your restrictions (i.e. allow you to come to work in your restricted capacity) or to not allow you to return to work under your restrictions. Dr. Bg Antunez does not participate in making this decision and cannot influence your employer regarding their decision. If you do not communicate your restrictions to your employer, or if you do not present to work as you are scheduled to, Dr. Bg Antunez will not provide an 'excuse' to explain your absence. A doctors note, or official forms (BWC, FMLA, etc.) will be filled out, upon request, to indicate your date of surgery and your restrictions as stated above.   Dr. Bg Antunez' Narcotic Policy  Patients will only be prescribed narcotics after surgical procedures or significant injury. Not all procedures cause pain great enough to require Narcotics and thus, not all patients will receive prescriptions after surgical procedures or injuries. Narcotics are never prescribed for chronic conditions. Narcotics are never prescribed for use longer than one week at a time. Refills are only granted in unusual circumstances and only at Dr. Deidre Simon discretion. Patients who are receiving narcotic medication from another physician or who are under pain management contracts will not be given a prescription for narcotics for any reason. Surgery Arrival Time:  You have been advised of the START TIME for your surgery as well as the ARRIVAL TIME at which you need to arrive at the surgery facility. Please understand that there is a certain amount of preparation which takes place at the surgery facility prior to the start of your surgery. If you arrive later than your scheduled ARRIVAL TIME, it may be necessary to cancel your surgery for that day and reschedule your procedure due to lack of adequate time for pre-surgery preparations. Thank you for being on time for your arrival.    I have read the above policies and understand that by agreeing to proceed with treatment by Dr. Bk Le and his team, that I am agreeing to abide by these policies.   Patient Name:  Bryant Mancuso    Patient Signature:  _____________________________    Lauren Diaz Date:   2/20/23

## 2023-02-20 NOTE — TELEPHONE ENCOUNTER
Patient called to say that he is having sx on the 28th with Dr. Altagracia Ruiz for an operation on R thumb. He needs a pre op visit before that.   Please call and advise

## 2023-02-20 NOTE — LETTER
[facility]  Surgery Scheduling Form:  DEMOGRAPHICS:                                                                                                              .    Patient Name:  Gerhard De La Torre  Patient :  1960   Patient SS#:  [id number]    Patient Phone:  [phone] (home)  Alt. Patient Phone:    Patient Address:  [address] [address]    PCP:  Eder Jacob MD  Insurance:    Payer/Plan Subscr  Sex Relation Sub. Ins. ID Effective Group Num   1. MYKE LOZANO* FIDEL JAY 1960 Male Self [id number] 1/1/15                                    [address]     DIAGNOSIS & PROCEDURE:                                                                                            .    Diagnosis:  Right Thumb Trigger Finger M65.30  Operation:  right Thumb Trigger Finger Release  [CPT: 82360]  Location:  [facility]   Surgeon:  Netta Eng    SCHEDULING INFORMATION:                                                                                         .    Surgeon's Scheduling Instruction:  elective    RN Post-op Appt:  [x] Yes   [] No  Preferred Thursday:   [] Yes   [] No    Requested Date:  23 OR Time:  8:20am Patient Arrival Time:    OR Time Required:  10  Minutes  Anesthesia:  MAC/TIVA  Equipment:  None  Mini C-Arm:  No   Standard C-Arm:  No  Status:  outpatient  PAT Required:  Yes  Comments:                      Dennise Bending. Basilia Erickson MD  23 1:25 PM    BILLING INFORMATION:                                                                                                    .    Procedure:       CPT Code Modifier  right Thumb Trigger Finger Release      .                        Pre Operative Physician Prophylaxis Orders - SCIP Protocols      Pre-Operative Antibiotic Order:    Allergies   Allergen Reactions    Lisinopril Swelling    Bee Venom Swelling    Other Itching     Itch like crazy EKG TAPE      [x]  ----  No Antibiotic Ordered       [] ----  Give the following Antibiotic within 1 hour prior to start time:         Ancef 1 gram IV if patient is less than 200 pounds    or       Ancef 2 grams IV if patient is greater than 200 pounds    or      Vancomycin 1 gram IV (over 1 hour) if patient is allergic to           PENICILLINS or CEPHALOSPORINS     Procedure: right Thumb Trigger Finger Release     Patient: Joanne Pugh  :    1960    Physician Signature:     Date: 23  Time: 1:25 PM

## 2023-02-20 NOTE — PROGRESS NOTES
Mr. Jason Joya is a 57 y.o. right handed woman  who is seen today in Hand Surgical Consultation at the request of Dennis Kirkpatrick NP    He presents today regarding right symptoms which have been present for approximately 4 weeks. A history of antecedent trauma or injury is Absent. He reports symptoms to include moderate pain and stiffness with frequent popping, catching or locking of the right Thumb. Finger symptoms are Daily worse in the morning or overnight. He reports moderate pain located at the base of the symptomatic finger(s). Symptoms are worsening over time. Previous treatment has included conservative measures. He does not claim relation of his symptoms to his required work activities. He has not undergone any form of testing. I have today reviewed with Jason Joya the clinically relevant, past medical history, medications, allergies,  family history, social history, and Review Of Systems & I have documented any details relevant to today's presenting complaints in my history above. Mr. Josh Humphreys self-reported past medical history, medications, allergies,  family history, social history, and Review Of Systems have been scanned into the chart under the \"Media\" tab. Physical Exam:  Mr. Josh Macdonald's most recent vitals:  Vitals  Resp: 16  Height: 5' 11\" (180.3 cm)  Weight: (!) 306 lb (138.8 kg)    He is well nourished, oriented to person, place & time. He demonstrates appropriate mood and affect as well as normal gait and station. Skin: Normal in appearance, Normal Color, and Free of Lesions Bilaterally   Digital range of motion is limited by pain and effort on the Right, normal on the Left. Inducible triggering is noted upon flexion in the right Thumb. There is an associated flexion contracture of the IP joint. No other digit demonstrates evidence for stenosing tenosynovitis bilaterally. Wrist range of motion is without significant limitation bilaterally.   Sensation is normal in the Whole Hand bilaterally  Vascular examination reveals normal, good capillary refill, and good color bilaterally  Swelling is moderate in the symptomatic digit, absent elsewhere bilaterally  There is no evidence of gross joint instability bilaterally. Muscular strength is clinically appropriate bilaterally. Examination for Stenosing Tenosynovitis demonstrates moderate tenderness, thickening & nodularity at the A-1 pulley(s) of the Right Thumb. There is a palpable Nota's Node. There is Inducible triggering on active flexion with pain. No other digits demonstrate evidence of Stenosing Tenosynovitis. Examination of the first Carpo-Metacarpal Joints of the wrist demonstrates no radial subluxation of the Thumb Metacarpal base upon the Trapezium. There is no significant pain with palpation or crepitance at the [facility] joint line. Impression:  Mr. Hiram Fischer has developed Stenosing Tenosynovitis (Trigger Finger), which is currently exacerbated, and presents requesting further treatment. Plan:  I have had a thorough discussion with Mr. Hiram Fischer regarding the treatment options available for his initially presenting Thumb stenosing tenosynovitis, which is causing him significant  limitations. I have outlined for Mr. Hiram Ficsher the benefits and consequences of the various treatment modalities, including the fact that surgical treatment is the only modality which is reasonably expected to provide long lasting or permanent resolution of his symptoms. Based upon our current discussion and a reasonable understating of the options available to him, Mr. Hiram Fischer has selected to proceed with surgical Thumb Trigger Finger Release. I have discussed the details of the surgical procedure, the pre, elmo and postoperative concerns and the appropriate expectations after surgery with Mr. Hiram Fischer today.  He was given the opportunity to ask questions, voiced an understanding of the procedure, and he did wish to proceed with Right Thumb Trigger Finger Release (A1 Pulley Release). I had an extensive discussion with Mr. Christiano Menchaca (and any family members present with him today) regarding the natural history, etiology, and long term consequences of this problem. We have mutually selected a surgical treatment plan  and, in my opinion, surgical intervention is indicated at this time. I have discussed with him the potential complications, limitations, expectations, alternatives, and risks of Trigger Finger Release. He has had full opportunity to ask his questions. I have answered them all to his satisfaction. I feel that Mr. Christiano Menchaca (and any family members present with him today) do understand our discussion today and he has provided informed consent for Right Thumb Trigger Finger Release (A1 Pulley Release). I have also discussed with Mr. Christiano Menchaca the other treatment options available to him for this condition. We have today selected to proceed with Surgical treatment. He has voiced and  understanding that there are other less aggressive treatment options which are available in this situation, albeit possibly less efficacious or durable, and he is comfortable with the plan that he has chosen. I have explained to Mr. Christiano Menchaca that despite successful treatment (surgical or otherwise) of his current presenting condition, that due to his coexistent conditions (both diagnosed and undiagnosed), that he is likely to have some permanent residual symptoms related to these conditions that do not improve long term. I have also explained that maximal recovery of function & symptom improvement may take a full year or longer to realize. He voiced a clear understanding of this. Mr. Christiano Menchaca has been given a full verbal list of instructions and precautions related to his present condition. I have asked him to followup with me in the office at the prescribed time.  He is also specifically requested to call or return to the office sooner if his symptoms change or worsen prior to the next scheduled appointment.

## 2023-02-20 NOTE — TELEPHONE ENCOUNTER
Surgery and/or Procedure Scheduling     Contact Name: Lashaunlaura Locke Request: Right Thumb  Patient Contact Number: [phone]       Patient ready to schedule Sx, inquired why this wasn't done at the time of office visit 02.20.23.

## 2023-02-21 ENCOUNTER — TELEPHONE (OUTPATIENT)
Dept: ORTHOPEDIC SURGERY | Age: 63
End: 2023-02-21

## 2023-02-21 ENCOUNTER — HOSPITAL ENCOUNTER (OUTPATIENT)
Dept: PHYSICAL THERAPY | Age: 63
Setting detail: THERAPIES SERIES
Discharge: HOME | End: 2023-02-21
Payer: COMMERCIAL

## 2023-02-21 DIAGNOSIS — M25.562 CHRONIC PAIN OF BOTH KNEES: Primary | ICD-10-CM

## 2023-02-21 DIAGNOSIS — M25.561 CHRONIC PAIN OF BOTH KNEES: Primary | ICD-10-CM

## 2023-02-21 DIAGNOSIS — G89.29 CHRONIC PAIN OF BOTH KNEES: Primary | ICD-10-CM

## 2023-02-21 LAB
ESTIMATED AVERAGE GLUCOSE: 154.2 MG/DL
HBA1C MFR BLD: 7 %

## 2023-02-21 PROCEDURE — 97110 THERAPEUTIC EXERCISES: CPT | Performed by: PHYSICAL THERAPIST

## 2023-02-21 PROCEDURE — 97112 NEUROMUSCULAR REEDUCATION: CPT | Performed by: PHYSICAL THERAPIST

## 2023-02-21 RX ORDER — ACETAMINOPHEN 500 MG
500 TABLET ORAL 4 TIMES DAILY PRN
Qty: 120 TABLET | Refills: 0 | OUTPATIENT
Start: 2023-02-21

## 2023-02-21 NOTE — FLOWSHEET NOTE
[facility]  Phone: [phone]  Fax [phone]     Physical Therapy: Daily Note   Patient: Shayy Kwok (30 y.o. male)   Treatment Date: 2023   :  1960 MRN: [id number]   Visit #: 12   Auth needed? []  Yes    [x]  No   Amount authorized: NA  Visit Limit: 30 Insurance: Payor: [address] [place] [address]  [address] / Plan: [address] [place] [address] [address] / Product Type: *No Product type* /   Insurance ID: [id number] - (Medicaid Managed)  Secondary Insurance (if applicable):    Treatment Diagnosis:  knee pain    ICD-10-CM    1. Chronic pain of both knees  M25.561     M25.562     G89.29          Medical Diagnosis: Status post right knee replacement [Z96.651]  Knee instability, right [M25.361]     Referring Physician: Jaimie Palafox MD    PCP: Janette York MD   Plan of Care signed? []  Yes [x]  No  Latex Allergy? []  Yes [x]  No   Pace Maker? [x]  Yes []  No    Preferred Language for Healthcare:   [x] English       [] other:       RESTRICTIONS/PRECAUTIONS: Pacemaker    Functional Outcome Measure/Scale:    Date Assessed (Eval) 23    LEFS (%) 75% 56%        SUBJECTIVE EXAMINATION   Pain level:  /10    Patient Report/Comments:  Patient continues to do well.     OBJECTIVE EXAMINATION   Observation:     Test measurements:     TREATMENT     Exercise / Equipment / Intervention Sets / Antonio Angry / Resistance Comments / Cueing HEP         Quad sets W/ swiss ball    Glut sets In sitting tableside    [place] Ball compression (Iso bridge)     LAQ         Marching in // X25 B     Hold secondary to knee pain    Lateral stepping in // 5 laps GTB     Cone taps     HR 3x10     Slantboard stretch 3x30\"     Stepups  Stepdowns X15 B 6\" box  X10 6\" box     DKTC w/ swiss     SAQ     SLR    Bridge     Nu-step  x5' Level 6    Foam balance  tandem X1'  X1' ea     Stacking     Gait SBA w/ SPC            Manual Treatment:   Access Code: [email or web address]  URL: [email or web address]  Date: 01/10/2023  Prepared by: Umberto Pedersen    Exercises  Supine Quadricep Sets - 1 x daily - 7 x weekly - 15 reps - 5\" hold  Supine Gluteal Sets - 1 x daily - 7 x weekly - 15 reps - 5\" hold  Supine Heel Slide - 1 x daily - 7 x weekly - 15 reps - 5\" hold      ASSESSMENT       Treatment/Activity Tolerance:  [x] Patient tolerated treatment well [] Patient limited by fatigue  [] Patient limited by pain  [] Patient limited by other medical complications  [] Other:     Overall Progression Towards Functional goals/ Treatment Progress Update:  [x] Patient is progressing as expected towards functional goals listed. [] Progression is slowed due to complexities/Impairments listed. [] Progression has been slowed due to co-morbidities. [] Plan just implemented, too soon (<30days) to assess goals progression   [] Goals require adjustment due to lack of progress  [] Patient is not progressing as expected and requires additional follow up with physician  [] Other:     Prognosis for POC:   [x] Good     [] Fair     [] Poor      Patient requires continued skilled intervention: [x] Yes       [] No    GOALS        Patient stated goal: Gain strength to have surgery and go home following. [] Progressing: [x] Met: [] Not Met: [] Adjusted     Therapist goals for Patient:   Short Term Goals: To be achieved in: 2 weeks  Independent in HEP and progression per patient tolerance, in order to progress toward full function and prevent re-injury. [] Progressing: [x] Met: [] Not Met: [] Adjusted  Patient will have a decrease in pain to facilitate improvement in movement, function, and ADLs as indicated by functional deficits. [] Progressing: [x] Met: [] Not Met: [] Adjusted     Long Term Goals: To be achieved in: 12 weeks  Disability index score of 45% or less as measure by LEFS to assist with reaching prior level of function.   [] Progressing: [] Met: [x] Not Met: [] Adjusted  Patient will demonstrate increased AROM to WNL, good Lumbar Spine mobility, good hip and knee ROM to allow for proper joint functioning as indicated by patients Functional Deficits. [] Progressing: [x] Met: [] Not Met: [] Adjusted  Patient will demonstrate an increase in proximal hip strength and core activation to allow for proper functional mobility as indicated by patients Functional Deficits  [] Progressing: [x] Met: [] Not Met: [] Adjusted  Patient will return to Walking, Stairs, and Light home activities without increased symptoms or restriction. [] Progressing: [x] Met: [] Not Met: [] Adjusted    TREATMENT PLAN     [] Continue per plan of care [] Alter current plan  [] Plan of care initiated [] Hold pending MD visit  [x] Discharge (see note)    BILLING     Timed Code Treatment Minutes: 45   Total Treatment Minutes: 45     If Medicare: NA  If BW: NA    CPT Code # CPT Code #     [] Eval Low (66707)    [] Gait (35250)     [] Eval Medium (00867)   [] VASO (26760)     [] Eval High (45259)   [] Estim Unattended (53907)     [] Re-Eval (19312)   [] [facility]. Traction (59464)     [x] Therex (24928)  2  [] Dry Needle 1-2 muscle (06043)     [x] Neuromusc. Re-ed (89124) 1  [] Dry Needle 3+ muscle (26364)     [] Manual (69029)   [] Group Therapy     [] Ther.  Act (84728)   []       Electronically Signed by Jc Fragoso PT, DPT  Date: 02/21/2023

## 2023-02-21 NOTE — TELEPHONE ENCOUNTER
Dr. Samantha Arreaga suggested to hold the  anticoagulation  81 mg Asprin and zelontel for a week, but  prefer the pt to hold that just for 5 days.  Please contact him with any questions

## 2023-02-21 NOTE — TELEPHONE ENCOUNTER
Auth: [employer]  Date: 02/28/23  Reference # None  Spoke with: Online  Type of SX: Outpatient  Location: [facility]  CPT: 07191   DX: M65.30  SX area: Rt hand  Insurance: [employer]

## 2023-02-21 NOTE — TELEPHONE ENCOUNTER
I made Mr. Diane Dupree an appointment on 2/23 @ 9am with Dr. Nemo Diaz. I left him a voice mail regarding this appointment. If he can't keep it I asked that he calls to cancel it.

## 2023-02-21 NOTE — THERAPY DISCHARGE
[facility], [facility], [address] [place] [address], [address] [place] [address] Dr  Phone: [phone]  Fax [phone]        Physical Therapy: Discharge Note   Patient: Terence Garcia (29 y.o. male)   Date: 2023   :  1960 MRN: [id number]   Visit #: 12    Insurance: Payor: [facility]  [id number] / Plan: [facility]  [id number] / Product Type: *No Product type* /   Insurance ID: [id number] - (Medicaid Managed)  Secondary Insurance (if applicable):    Treatment Diagnosis:      ICD-10-CM    1. Chronic pain of both knees  M25.561     M25.562     G89.29          Medical Diagnosis: Status post right knee replacement [Z96.651]  Knee instability, right [M25.361]     Referring Physician: Latha Maya MD    PCP: Haven Israel MD     Surgical status:  Conservative  Number of Comorbidities: 3+  Duration:   Reporting Period: Beginning Date: 23 End Date: 2023  Visits: 12  Weeks: 7    Discharge Demographics:  Specialty/SubSpecialty: MSK: LE: Knee  Certs/Equipment/Programs: Adult Reconstruction    Objective:   Test used Initial score Discharge Score   Pain Summary 0-10 scale 9 4-5   Functional questionnaire LEFS 75% 56%   Functional Testing            ROM            Strength  Quad tone Right Fair -  Good    Quad tone Left Poor Good           Treatment to date:  Interventions Modalities    [x] Therapeutic Exercise  [] Electrical Stimulation    [] Therapeutic Activity  [] VASO    [x] Neuromusc. Re-ed  [] Estim Unattended    [x] Manual Therapy  [] Mechanical Traction    [x] Gait Training  [] Ultrasound    [] Dry Needling  [] Iontophoresis    [x] HEP Instruction  []          Assessment:  [] All Goals were achieved. [] The following goals were achieved (#'s):  [x] The following goals were not achieved for the following reasons:/assessment of improvement as it relates to each goal: Goal 1 was 9% disability off. R hip OA and L knee OA.     Plan of Care: Discharge from Physical Therapy    Reason for Discharge: All Goal achieved except OA limitations. Patient will be undergoing hand surgery and future L TKR. Electronically signed by:   Kg Hayes PT

## 2023-02-21 NOTE — PROGRESS NOTES
[facility] PRE-OPERATIVE INSTRUCTIONS    Arrival time______0650______        Surgery time____0820________    Take the following medications with a sip of water:  Follow your MD/Surgeons pre-procedure instructions regarding your medications     Do not eat or drink anything after 12:00 midnight prior to your surgery.  This includes water chewing gum, mints and ice chips.   You may brush your teeth and gargle the morning of your surgery, but do not swallow the water     Please see your family doctor/pediatrician for a history and physical and/or concerning medications.   Bring any test results/reports from your physicians office.   If you are under the care of a heart doctor or specialist doctor, please be aware that you may be asked to them for clearance    You may be asked to stop blood thinners such as Coumadin, Plavix, Fragmin, Lovenox, etc., or any anti-inflammatories such as:  Aspirin, Ibuprofen, Advil, Naproxen prior to your surgery.    We also ask that you stop any OTC medications such as fish oil, vitamin E, glucosamine, garlic, Multivitamins, COQ 10, etc.    We ask that you do not smoke 24 hours prior to surgery  We ask that you do not  drink any alcoholic beverages 24 hours prior to surgery     You must make arrangements for a responsible adult to take you home after your surgery.    For your safety you will not be allowed to leave alone or drive yourself home.  Your surgery will be cancelled if you do not have a ride home.     Also for your safety, it is strongly suggested that someone stay with you the first 24 hours after your surgery.     A parent or legal guardian must accompany a child scheduled for surgery and plan to stay at the hospital until the child is discharged.    Please do not bring other children with you.    For your comfort, please wear simple loose fitting clothing to the hospital.  Please do not bring valuables.    Do not wear any make-up or nail polish on your fingers or  toes      For your safety, please do not wear any jewelry or body piercing's on the day of surgery. All jewelry must be removed. If you have dentures, they will be removed before going to operating room. For your convenience, we will provide you with a container. If you wear contact lenses or glasses, they will be removed, please bring a case for them. If you have a living will and a durable power of  for healthcare, please bring in a copy. As part of our patient safety program to minimize surgical site infections, we ask you to do the following:    Please notify your surgeon if you develop any illness between         now and the  day of your surgery. This includes a cough, cold, fever, sore throat, nausea,         or vomiting, and diarrhea, etc.   Please notify your surgeon if you experience dizziness, shortness         of breath or blurred vision between now and the time of your surgery. Do not shave your operative site 96 hours prior to surgery. For face and neck surgery, men may use an electric razor 48 hours   prior to surgery. You may shower the night before surgery or the morning of   your surgery with an antibacterial soap. You will need to bring a photo ID and insurance card    [facility] has an onsite pharmacy, would you like to utilize our pharmacy     If you will be staying overnight and use a C-pap machine, please bring   your C-pap to hospital     Our goal is to provide you with excellent care, therefore, visitors will be limited to two(2) in the room at a time so that we may focus on providing this care for you. Please contact pre-admission testing if you have any further questions. [facility] phone number:  [address] PAT fax number:  [phone]  Please note these are generalized instructions for all surgical cases, you may be provided with more specific instructions according to your surgery.     C-Difficile admission screening and protocol:       * Admitted with diarrhea? [] YES    [x]  NO     *Prior history of C-Diff. In last 3 months? [] YES    [x]  NO     *Antibiotic use in the past 6-8 weeks? []  NO    []  YES                 If yes, which ANTIBIOTIC AND REASON__unknown____     *Prior hospitalization or nursing home in the last month? []  YES    [x]  NO        SAFETY FIRST. .call before you fall

## 2023-02-22 ENCOUNTER — APPOINTMENT (OUTPATIENT)
Dept: CARDIAC REHAB | Age: 63
End: 2023-02-22
Payer: COMMERCIAL

## 2023-02-22 DIAGNOSIS — E11.9 TYPE 2 DIABETES MELLITUS WITHOUT COMPLICATION, WITH LONG-TERM CURRENT USE OF INSULIN (HCC): Primary | ICD-10-CM

## 2023-02-22 DIAGNOSIS — Z79.4 TYPE 2 DIABETES MELLITUS WITHOUT COMPLICATION, WITH LONG-TERM CURRENT USE OF INSULIN (HCC): Primary | ICD-10-CM

## 2023-02-22 RX ORDER — SEMAGLUTIDE 1.34 MG/ML
INJECTION, SOLUTION SUBCUTANEOUS
Qty: 8 ADJUSTABLE DOSE PRE-FILLED PEN SYRINGE | Refills: 0 | Status: SHIPPED | OUTPATIENT
Start: 2023-02-22 | End: 2023-02-03

## 2023-02-23 ENCOUNTER — OFFICE VISIT (OUTPATIENT)
Dept: INTERNAL MEDICINE CLINIC | Age: 63
End: 2023-02-23

## 2023-02-23 ENCOUNTER — APPOINTMENT (OUTPATIENT)
Dept: PHYSICAL THERAPY | Age: 63
End: 2023-02-23
Payer: COMMERCIAL

## 2023-02-23 ENCOUNTER — TELEPHONE (OUTPATIENT)
Dept: INTERNAL MEDICINE CLINIC | Age: 63
End: 2023-02-23

## 2023-02-23 VITALS
WEIGHT: 304 LBS | TEMPERATURE: 97.3 F | SYSTOLIC BLOOD PRESSURE: 138 MMHG | HEIGHT: 71 IN | BODY MASS INDEX: 42.56 KG/M2 | DIASTOLIC BLOOD PRESSURE: 82 MMHG

## 2023-02-23 DIAGNOSIS — E11.9 TYPE 2 DIABETES MELLITUS WITHOUT COMPLICATION, WITH LONG-TERM CURRENT USE OF INSULIN (HCC): ICD-10-CM

## 2023-02-23 DIAGNOSIS — I10 PRIMARY HYPERTENSION: ICD-10-CM

## 2023-02-23 DIAGNOSIS — Z01.818 PRE-OP EXAM: Primary | ICD-10-CM

## 2023-02-23 DIAGNOSIS — M65.311 TRIGGER THUMB OF RIGHT HAND: ICD-10-CM

## 2023-02-23 DIAGNOSIS — I42.8 NONISCHEMIC CARDIOMYOPATHY (HCC): ICD-10-CM

## 2023-02-23 DIAGNOSIS — Z79.4 TYPE 2 DIABETES MELLITUS WITHOUT COMPLICATION, WITH LONG-TERM CURRENT USE OF INSULIN (HCC): ICD-10-CM

## 2023-02-23 SDOH — ECONOMIC STABILITY: FOOD INSECURITY: WITHIN THE PAST 12 MONTHS, YOU WORRIED THAT YOUR FOOD WOULD RUN OUT BEFORE YOU GOT MONEY TO BUY MORE.: SOMETIMES TRUE

## 2023-02-23 SDOH — ECONOMIC STABILITY: FOOD INSECURITY: WITHIN THE PAST 12 MONTHS, THE FOOD YOU BOUGHT JUST DIDN'T LAST AND YOU DIDN'T HAVE MONEY TO GET MORE.: SOMETIMES TRUE

## 2023-02-23 SDOH — ECONOMIC STABILITY: HOUSING INSECURITY
IN THE LAST 12 MONTHS, WAS THERE A TIME WHEN YOU DID NOT HAVE A STEADY PLACE TO SLEEP OR SLEPT IN A SHELTER (INCLUDING NOW)?: NO

## 2023-02-23 SDOH — ECONOMIC STABILITY: INCOME INSECURITY: HOW HARD IS IT FOR YOU TO PAY FOR THE VERY BASICS LIKE FOOD, HOUSING, MEDICAL CARE, AND HEATING?: SOMEWHAT HARD

## 2023-02-23 ASSESSMENT — PATIENT HEALTH QUESTIONNAIRE - PHQ9
2. FEELING DOWN, DEPRESSED OR HOPELESS: 0
SUM OF ALL RESPONSES TO PHQ9 QUESTIONS 1 & 2: 1
1. LITTLE INTEREST OR PLEASURE IN DOING THINGS: 1
SUM OF ALL RESPONSES TO PHQ QUESTIONS 1-9: 1

## 2023-02-23 NOTE — TELEPHONE ENCOUNTER
The patient needs a PA on:  Semaglutide,0.25 or 0.5MG/DOS, (OZEMPIC, 0.25 OR 0.5 MG/DOSE,) 2 MG/1.5ML SOPN  Sig - Route: Inject 0.25 mg into the skin once a week for 28 days, THEN 0.5 mg once a week for 28 days, THEN 1 mg once a week for 28 days, THEN 2 mg once a week for 28 days. -    Mr. Vanessa Appiah diagnosis is Diabetes Mellitus E11.9 with an A1C of 7.0  Ht: 5'11' Wt: 304lbs

## 2023-02-23 NOTE — PROGRESS NOTES
Chief Complaint   Patient presents with    Pre-op Exam     Surgery right thumb 2/28/23        Fax: [phone]     Alverto Rodriguez is a 57 y.o. male who presents for a preoperative physical examination. He is scheduled to have right thumb trigger release done by Dr. Ana M Cooper at [facility] on 2/28/23. History of Present Illness:      Richy has been having right thumb pain and stiffness for the last 3 months.      Past Medical History:   Diagnosis Date    Arthritis     Depression     Diabetes mellitus ([facility]     Diastolic CHF ([facility]     Difficult intubation     throat swelled    Dyslipidemia     Herniated disc, cervical     Hypertension     Osteoporosis     Peripheral neuropathy         Review of patient's past surgical history indicates:     Past Surgical History:   Procedure Laterality Date    BREAST SURGERY Left 08/07/2019    LEFT BREAST MASS EXCISION; LEFT AXILLIARY LYMPH NODE EXCISION performed by Baldo Rodriguez MD at [place] [address] Right     MUSCLE BIOPSY Left 10/10/2019    MUSCLE BIOPSY LOWER EXTREMITY LEFT performed by Baldo Rodriguez MD at [facility]                                                    Current Outpatient Medications   Medication Sig Dispense Refill    acetaminophen (TYLENOL) 500 MG tablet Take 1 tablet by mouth 4 times daily as needed for Pain 120 tablet 0    insulin glargine (LANTUS SOLOSTAR) 100 UNIT/ML injection pen Inject 15 Units into the skin nightly 10 Adjustable Dose Pre-filled Pen Syringe 3    metoprolol succinate (TOPROL XL) 25 MG extended release tablet TAKE 1 TABLET BY MOUTH ONCE DAILY 30 tablet 3    FARXIGA 10 MG tablet TAKE 1 TABLET BY MOUTH ONCE DAILY IN THE MORNING 30 tablet 2    metOLazone (ZAROXOLYN) 5 MG tablet Take 1 tablet by mouth as needed (Take 1 tablet by mouth daily as needed) Take 1 tablet by mouth daily as needed 15 tablet 3    pregabalin (LYRICA) 75 MG capsule Take 1 capsule by mouth 2 times daily for 30 days. 60 capsule 2    furosemide (LASIX) 80 MG tablet Take 1 tablet by mouth daily Take 80 mg by mouth daily 90 tablet 1    pantoprazole (PROTONIX) 40 MG tablet Take 1 tablet by mouth every morning (before breakfast) 30 tablet 3    tadalafil (CIALIS) 5 MG tablet TAKE 1 TABLET BY MOUTH ONCE DAILY      potassium chloride (KLOR-CON M) 20 MEQ extended release tablet Take 20 mEq by mouth daily      PROFE 391.3 (180 Fe) MG CAPS TAKE 1 CAPSULE BY MOUTH EVERY OTHER DAY      ibuprofen (ADVIL;MOTRIN) 800 MG tablet TAKE 1 TABLET BY MOUTH THREE TIMES DAILY AS NEEDED FOR PAIN      rivaroxaban (XARELTO) 20 MG TABS tablet Take 1 tablet by mouth daily 90 tablet 2    cyanocobalamin 1000 MCG tablet Take 1,000 mcg by mouth daily      ferrous sulfate (IRON 325) 325 (65 Fe) MG tablet Take 325 mg by mouth daily (with breakfast)      folic acid (FOLVITE) 1 MG tablet Take 1 mg by mouth daily      Multiple Vitamins-Minerals (THERAPEUTIC MULTIVITAMIN-MINERALS) tablet Take 1 tablet by mouth daily      mycophenolate (CELLCEPT) 500 MG tablet Take by mouth in the morning, at noon, and at bedtime      spironolactone (ALDACTONE) 25 MG tablet Take 25 mg by mouth daily      tamsulosin (FLOMAX) 0.4 MG capsule Take 0.4 mg by mouth daily      aspirin 81 MG tablet Take 81 mg by mouth daily      atorvastatin (LIPITOR) 40 MG tablet Take 1 tablet by mouth daily 30 tablet 3     No current facility-administered medications for this visit.        Allergies   Allergen Reactions    Lisinopril Swelling    Bee Venom Swelling    Other Itching     Itch like crazy EKG TAPE       Social History     Tobacco Use    Smoking status: Never    Smokeless tobacco: Never   Substance Use Topics    Alcohol use: Yes     Comment: occ        Family History   Problem Relation Age of Onset    Heart Disease Mother     High Blood Pressure Mother     High Blood Pressure Father     High Blood Pressure Sister     Breast Cancer Brother     Heart Disease Brother     High Blood Pressure Brother         Review Of Systems    Skin: no abnormal pigmentation, rash, scaling, itching, masses, hair or nail changes  Eyes: negative  Ears/Nose/Throat: negative  Respiratory: negative  Cardiovascular: negative  Gastrointestinal: negative  Genitourinary: negative  Musculoskeletal: right thumb pain and stiffness   Neurologic: diabetic peripheral neuropathy   Psychiatric: negative  Hematologic/Lymphatic/Immunologic: negative  Endocrine: negative    PHYSICAL EXAMINATION:  /82   Temp 97.3 °F (36.3 °C)   Ht 5' 11\" (1.803 m)   Wt (!) 304 lb (137.9 kg)   BMI 42.40 kg/m²   General appearance - healthy, alert, no distress  Skin - Skin color, texture, turgor normal. No rashes or lesions. Head - Normocephalic. No masses, lesions, tenderness or abnormalities  Eyes - conjunctivae/corneas clear. EOM's intact. Ears - External ears normal.   Neck - Neck supple. No adenopathy. Thyroid symmetric, normal size,  Back - Back symmetric, no curvature. ROM normal. No CVA tenderness. Lungs - Percussion normal. Good diaphragmatic excursion. Lungs clear  Heart - Regular rate and rhythm, with no rub, murmur or gallop noted. Abdomen - Abdomen soft, non-tender. BS normal. No masses, organomegaly  Extremities - Extremities normal. No deformities, edema, or skin discoloration  Musculoskeletal - right trigger thumb   Neuro - Gait normal. Decreased foot sensation. No focal weakness      ASSESSMENT and PLAN:    1. Pre-op exam  I was asked to see Richy for preoperative evaluation by Dr. Aleksandra Yang. Medication adjustment: Richy will take his metoprolol Lyrica and pantoprazole when he first gets up in the day of surgery. He will hold off taking his other medications until after surgery. Cardioprotection: Alexey is taking metoprolol. 2. Trigger thumb of right hand  Unimproved trigger thumb of the right hand.     3. Type 2 diabetes mellitus without complication, with long-term current use of insulin (HCC)  Stable diabetes mellitus. 4. Primary hypertension  Stable primary hypertension. 5. Nonischemic cardiomyopathy (HCC)  Stable cardiomyopathy. He is medically cleared for surgery and anesthesia.     Copy to Dr. Flores Mess

## 2023-02-23 NOTE — PATIENT INSTRUCTIONS
Take the metoprolol, Lyrica and pantoprozole when you first get up on the day of surgery. You can take your other regular daily medicine when you get home after surgery. The [address], [address] [place] [address]    [address], [address] [place] [address]       [facility], [address] [facility].     [address], [facility]    [phone] (Work)

## 2023-02-23 NOTE — TELEPHONE ENCOUNTER
Submitted PA for Ozempic (0.25 or 0.5 MG/DOSE) 2MG/1.5ML pen-injectors  Via CMM Key: [id number] STATUS: PENDING

## 2023-02-24 ENCOUNTER — APPOINTMENT (OUTPATIENT)
Dept: CARDIAC REHAB | Age: 63
End: 2023-02-24
Payer: COMMERCIAL

## 2023-02-27 ENCOUNTER — ANESTHESIA EVENT (OUTPATIENT)
Dept: OPERATING ROOM | Age: 63
End: 2023-02-27
Payer: COMMERCIAL

## 2023-02-28 ENCOUNTER — HOSPITAL ENCOUNTER (OUTPATIENT)
Age: 63
Setting detail: OUTPATIENT SURGERY
Discharge: HOME | End: 2023-02-28
Attending: ORTHOPAEDIC SURGERY | Admitting: ORTHOPAEDIC SURGERY
Payer: COMMERCIAL

## 2023-02-28 ENCOUNTER — ANESTHESIA (OUTPATIENT)
Dept: OPERATING ROOM | Age: 63
End: 2023-02-28
Payer: COMMERCIAL

## 2023-02-28 ENCOUNTER — APPOINTMENT (OUTPATIENT)
Dept: PHYSICAL THERAPY | Age: 63
End: 2023-02-28
Payer: COMMERCIAL

## 2023-02-28 VITALS
TEMPERATURE: 97 F | DIASTOLIC BLOOD PRESSURE: 86 MMHG | SYSTOLIC BLOOD PRESSURE: 140 MMHG | HEIGHT: 71 IN | RESPIRATION RATE: 20 BRPM | WEIGHT: 300 LBS | BODY MASS INDEX: 42 KG/M2 | OXYGEN SATURATION: 98 % | HEART RATE: 70 BPM

## 2023-02-28 LAB
GLUCOSE BLD-MCNC: 108 MG/DL (ref 70–99)
GLUCOSE BLD-MCNC: 128 MG/DL (ref 70–99)
PERFORMED ON: ABNORMAL
PERFORMED ON: ABNORMAL

## 2023-02-28 PROCEDURE — 2500000003 HC RX 250 WO HCPCS: Performed by: ORTHOPAEDIC SURGERY

## 2023-02-28 PROCEDURE — 7100000010 HC PHASE II RECOVERY - FIRST 15 MIN: Performed by: ORTHOPAEDIC SURGERY

## 2023-02-28 PROCEDURE — 6360000002 HC RX W HCPCS: Performed by: NURSE ANESTHETIST, CERTIFIED REGISTERED

## 2023-02-28 PROCEDURE — 3600000005 HC SURGERY LEVEL 5 BASE: Performed by: ORTHOPAEDIC SURGERY

## 2023-02-28 PROCEDURE — 7100000000 HC PACU RECOVERY - FIRST 15 MIN: Performed by: ORTHOPAEDIC SURGERY

## 2023-02-28 PROCEDURE — 2580000003 HC RX 258: Performed by: ORTHOPAEDIC SURGERY

## 2023-02-28 PROCEDURE — 2709999900 HC NON-CHARGEABLE SUPPLY: Performed by: ORTHOPAEDIC SURGERY

## 2023-02-28 PROCEDURE — 2580000003 HC RX 258: Performed by: NURSE ANESTHETIST, CERTIFIED REGISTERED

## 2023-02-28 PROCEDURE — A4217 STERILE WATER/SALINE, 500 ML: HCPCS | Performed by: ORTHOPAEDIC SURGERY

## 2023-02-28 PROCEDURE — 3600000015 HC SURGERY LEVEL 5 ADDTL 15MIN: Performed by: ORTHOPAEDIC SURGERY

## 2023-02-28 PROCEDURE — 6360000002 HC RX W HCPCS: Performed by: ANESTHESIOLOGY

## 2023-02-28 PROCEDURE — 2500000003 HC RX 250 WO HCPCS: Performed by: NURSE ANESTHETIST, CERTIFIED REGISTERED

## 2023-02-28 PROCEDURE — 3700000000 HC ANESTHESIA ATTENDED CARE: Performed by: ORTHOPAEDIC SURGERY

## 2023-02-28 PROCEDURE — 7100000011 HC PHASE II RECOVERY - ADDTL 15 MIN: Performed by: ORTHOPAEDIC SURGERY

## 2023-02-28 PROCEDURE — 7100000001 HC PACU RECOVERY - ADDTL 15 MIN: Performed by: ORTHOPAEDIC SURGERY

## 2023-02-28 PROCEDURE — 3700000001 HC ADD 15 MINUTES (ANESTHESIA): Performed by: ORTHOPAEDIC SURGERY

## 2023-02-28 RX ORDER — SODIUM CHLORIDE 0.9 % (FLUSH) 0.9 %
5-40 SYRINGE (ML) INJECTION EVERY 12 HOURS SCHEDULED
Status: DISCONTINUED | OUTPATIENT
Start: 2023-02-28 | End: 2023-02-28 | Stop reason: HOSPADM

## 2023-02-28 RX ORDER — PROPOFOL 10 MG/ML
INJECTION, EMULSION INTRAVENOUS PRN
Status: DISCONTINUED | OUTPATIENT
Start: 2023-02-28 | End: 2023-02-28 | Stop reason: SDUPTHER

## 2023-02-28 RX ORDER — SODIUM CHLORIDE 9 MG/ML
INJECTION, SOLUTION INTRAVENOUS PRN
Status: DISCONTINUED | OUTPATIENT
Start: 2023-02-28 | End: 2023-02-28 | Stop reason: HOSPADM

## 2023-02-28 RX ORDER — SODIUM CHLORIDE 0.9 % (FLUSH) 0.9 %
5-40 SYRINGE (ML) INJECTION PRN
Status: DISCONTINUED | OUTPATIENT
Start: 2023-02-28 | End: 2023-02-28 | Stop reason: HOSPADM

## 2023-02-28 RX ORDER — MAGNESIUM HYDROXIDE 1200 MG/15ML
LIQUID ORAL CONTINUOUS PRN
Status: COMPLETED | OUTPATIENT
Start: 2023-02-28 | End: 2023-02-28

## 2023-02-28 RX ORDER — FENTANYL CITRATE 50 UG/ML
25 INJECTION, SOLUTION INTRAMUSCULAR; INTRAVENOUS EVERY 5 MIN PRN
Status: DISCONTINUED | OUTPATIENT
Start: 2023-02-28 | End: 2023-02-28 | Stop reason: HOSPADM

## 2023-02-28 RX ORDER — PROPOFOL 10 MG/ML
INJECTION, EMULSION INTRAVENOUS CONTINUOUS PRN
Status: DISCONTINUED | OUTPATIENT
Start: 2023-02-28 | End: 2023-02-28 | Stop reason: SDUPTHER

## 2023-02-28 RX ORDER — ONDANSETRON 2 MG/ML
4 INJECTION INTRAMUSCULAR; INTRAVENOUS
Status: DISCONTINUED | OUTPATIENT
Start: 2023-02-28 | End: 2023-02-28 | Stop reason: HOSPADM

## 2023-02-28 RX ORDER — LABETALOL HYDROCHLORIDE 5 MG/ML
5 INJECTION, SOLUTION INTRAVENOUS
Status: DISCONTINUED | OUTPATIENT
Start: 2023-02-28 | End: 2023-02-28 | Stop reason: HOSPADM

## 2023-02-28 RX ORDER — MEPERIDINE HYDROCHLORIDE 25 MG/ML
12.5 INJECTION INTRAMUSCULAR; INTRAVENOUS; SUBCUTANEOUS EVERY 5 MIN PRN
Status: DISCONTINUED | OUTPATIENT
Start: 2023-02-28 | End: 2023-02-28 | Stop reason: HOSPADM

## 2023-02-28 RX ORDER — LIDOCAINE HYDROCHLORIDE 20 MG/ML
INJECTION, SOLUTION EPIDURAL; INFILTRATION; INTRACAUDAL; PERINEURAL PRN
Status: DISCONTINUED | OUTPATIENT
Start: 2023-02-28 | End: 2023-02-28 | Stop reason: SDUPTHER

## 2023-02-28 RX ORDER — DIPHENHYDRAMINE HYDROCHLORIDE 50 MG/ML
12.5 INJECTION INTRAMUSCULAR; INTRAVENOUS
Status: DISCONTINUED | OUTPATIENT
Start: 2023-02-28 | End: 2023-02-28 | Stop reason: HOSPADM

## 2023-02-28 RX ORDER — SODIUM CHLORIDE 9 MG/ML
INJECTION, SOLUTION INTRAVENOUS CONTINUOUS PRN
Status: DISCONTINUED | OUTPATIENT
Start: 2023-02-28 | End: 2023-02-28 | Stop reason: SDUPTHER

## 2023-02-28 RX ADMIN — SODIUM CHLORIDE: 9 INJECTION, SOLUTION INTRAVENOUS at 08:00

## 2023-02-28 RX ADMIN — LIDOCAINE HYDROCHLORIDE 40 MG: 20 INJECTION, SOLUTION EPIDURAL; INFILTRATION; INTRACAUDAL; PERINEURAL at 08:09

## 2023-02-28 RX ADMIN — FENTANYL CITRATE 25 MCG: 50 INJECTION, SOLUTION INTRAMUSCULAR; INTRAVENOUS at 08:45

## 2023-02-28 RX ADMIN — HYDROMORPHONE HYDROCHLORIDE 0.5 MG: 1 INJECTION, SOLUTION INTRAMUSCULAR; INTRAVENOUS; SUBCUTANEOUS at 08:37

## 2023-02-28 RX ADMIN — PROPOFOL 100 MG: 10 INJECTION, EMULSION INTRAVENOUS at 08:09

## 2023-02-28 RX ADMIN — PROPOFOL 100 MCG/KG/MIN: 10 INJECTION, EMULSION INTRAVENOUS at 08:09

## 2023-02-28 ASSESSMENT — PAIN DESCRIPTION - ORIENTATION
ORIENTATION: RIGHT

## 2023-02-28 ASSESSMENT — PAIN SCALES - GENERAL
PAINLEVEL_OUTOF10: 5
PAINLEVEL_OUTOF10: 5
PAINLEVEL_OUTOF10: 0
PAINLEVEL_OUTOF10: 8
PAINLEVEL_OUTOF10: 6

## 2023-02-28 ASSESSMENT — PAIN DESCRIPTION - LOCATION
LOCATION: HAND
LOCATION: WRIST

## 2023-02-28 ASSESSMENT — PAIN DESCRIPTION - DESCRIPTORS
DESCRIPTORS: NUMBNESS;TINGLING
DESCRIPTORS: BURNING

## 2023-02-28 ASSESSMENT — LIFESTYLE VARIABLES: SMOKING_STATUS: 0

## 2023-02-28 ASSESSMENT — PAIN - FUNCTIONAL ASSESSMENT
PAIN_FUNCTIONAL_ASSESSMENT: PREVENTS OR INTERFERES SOME ACTIVE ACTIVITIES AND ADLS
PAIN_FUNCTIONAL_ASSESSMENT: 0-10

## 2023-02-28 ASSESSMENT — ENCOUNTER SYMPTOMS: SHORTNESS OF BREATH: 0

## 2023-02-28 NOTE — DISCHARGE INSTRUCTIONS
Post-Operative Instructions    Trigger Finger Release:    Keep hand elevated with fingers above eye-level to control swelling. Keep hand and bandage clean and dry. Do not change or unwrap bandage. Please leave bandage in place until your follow-up appointment. Work hard on finger motion starting immediately. Several times each hour, fully straighten and fully bend fingers and thumb completely. You may use your other hand to help as needed. This may cause some discomfort, but will not damage your surgery. You may use your operated hand for lightweight tasks (e.g. writing, eating, dressing, etc.). NO LIFTING, CARRYING OR HEAVY USE. Most Patients do not have \"Serious Pain\" after this procedure and thus most patients do not require prescription pain medication. You may take over the counter medication (Tylenol, Advil, Aleve, etc.) as needed. If you are unable to tolerate the discomfort after your surgery and the OTC medications do not provide some relief, you may contact our office to discuss other options. .    Please call the office at [phone]  in 24 - 48 hours to schedule a follow up appointment for approximately 7 - 10 days after surgery. Please call the office at [phone]  if you have any questions or problems. Vania Onofre MD

## 2023-02-28 NOTE — ANESTHESIA POSTPROCEDURE EVALUATION
Department of Anesthesiology  Postprocedure Note    Patient: Modesta Hagen  MRN: [id number]  YOB: 1960  Date of evaluation: 2/28/2023      Procedure Summary     Date: 02/28/23 Room / Location: [address]    Anesthesia Start: 7285 Anesthesia Stop: 2006    Procedure: RIGHT THUMB TRIGGER FINGER RELEASE (Right: Fingers) Diagnosis:       Trigger finger, unspecified finger, unspecified laterality      (RIGHT THUMB TRIGGER FINGER)    Surgeons: Mohamud Kellogg MD Responsible Provider: Megan Wallace MD    Anesthesia Type: MAC ASA Status: 4          Anesthesia Type: No value filed.     Neva Phase I: Neva Score: 10    Neva Phase II: Neva Score: 10      Anesthesia Post Evaluation    Patient location during evaluation: PACU  Patient participation: complete - patient participated  Level of consciousness: awake  Airway patency: patent  Nausea & Vomiting: no nausea  Complications: no  Cardiovascular status: hemodynamically stable  Respiratory status: acceptable  Hydration status: euvolemic  Multimodal analgesia pain management approach

## 2023-02-28 NOTE — PROGRESS NOTES
Pt discharged in stable condition. Circ cks remain positive. Dressing dry and intact. Iv dcd with tip intact. Pt had soda and crackers and tolerated well. Pain 4/10 and tolerable -using elevation. No c/o nausea. Pt voided 300cc clear yellow urine. Pt assisted to get dressed. Pt going home per [facility] transportation. Attempted to call niece with discharge instructions , but unable to reach. Pt stated niece will be with him and told him to share instructions with her.

## 2023-02-28 NOTE — PROGRESS NOTES
Pt resting comfortably in bed, states pain 5/10 at this time after meds. Dr Harry All at bedside. VSS, states ready to head to DC area. No signs of distress noted at this time.

## 2023-02-28 NOTE — H&P
Pre-operative Update of H&P:    I  have seen & examined Mr. Josué Suarez related solely to his hand and upper extremity conditions, prior to the scheduled procedure on the date of his surgery. The indications for the planned surgical procedure & and his upper-extremity condition are unchanged.

## 2023-02-28 NOTE — OP NOTE
OPERATIVE REPORT          Patient:  Ronny Barnes    YOB: 1960  Date of Service:  2/28/2023   Location:  [address] [facility] OR        Preoperative Diagnosis:  Right Thumb trigger finger. Postoperative Diagnosis: Same. Procedure: Right Thumb trigger finger release (A1 pulley release). Surgeon:    Zana Thomas. Ciro Turner MD    Surgical Assistant:    Alisha Rehman Assistant    Anesthesia:  Local with sedation. Blood Loss:  Minimal.     Complications:  None. Tourniquet Time:  2 minutes. Indications:  Mr. Ronny Barnes  is a 57y.o.  year old male with a Right Thumb trigger finger. I have discussed preoperatively with him the complications, limitations, expectations, alternatives and risks of the planned surgical care. He has voiced an understanding of our discussion. All of his questions have been fully answered to his satisfaction, and he has provided written informed consent to proceed. Procedure:  After written consent was obtained and the proper operative site was identified and marked, Mr. Ronny Barnes  was brought to the operating room, placed in the supine position on the operating room table with the Right arm extended upon a hand table. Under an appropriate level of sedation, local anesthetic (1% Lidocaine and 1/2% Marcaine both without Epinephrine) was instilled in the planned surgical field. His Right upper extremity was prepped and draped in the usual sterile fashion. After Eshmarch exsanguination the pneumo-tourniquet was inflated to 250 milimeters of mercury. A 1 centimeter oblique incision was fashioned over the base of the flexor tendon sheath of the Right Thumb. Dissection was carried carefully through the subcutaneous tissues, taking great care to identify and protect the neurovascular structures. The flexor tendon sheath was carefully identified and cleared of surrounding soft tissue.  The A1 pulley was identified and incised longitudinally along its entire length under direct visualization. The flexor tendons were gently withdrawn from the sheath and inspected.  They were found to be in fair condition.  The tendons were returned to their appropriate location and the finger was placed through a full range of motion. There was no evidence of residual stenosis or triggering.      The wound was irrigated copiously with sterile saline for irrigation and the pneumo-tourniquet was deflated after a period of 2 minutes elevation. Hemostasis was easily obtained with direct pressure and electrocautery.  The wound was closed with interrupted sutures in the skin. The wound was dressed with adaptic, dry sterile gauze, and a bulky, hand based dressing was applied. Mr. Richard Macdonald  was awakened from light sedation, having tolerated the procedure without apparent complication, and was returned to the recovery room in stable condition.    At the conclusion of the procedure all needle, instrument, and sponge counts were correct.               Santiago Baum MD   2/28/2023, 8:07 AM

## 2023-02-28 NOTE — ANESTHESIA PRE PROCEDURE
Department of Anesthesiology  Preprocedure Note       Name:  Pepe Trejo   Age:  57 y.o.  :  1960                                          MRN:  [id number]         Date:  2023      Surgeon: Vladislav Pineda):  Tisha Ross MD    Procedure: Procedure(s):  RIGHT THUMB TRIGGER FINGER RELEASE    Medications prior to admission:   Prior to Admission medications    Medication Sig Start Date End Date Taking? Authorizing Provider   acetaminophen (TYLENOL) 500 MG tablet Take 1 tablet by mouth 4 times daily as needed for Pain 23   Kalyan Couch PA-C   insulin glargine (LANTUS SOLOSTAR) 100 UNIT/ML injection pen Inject 15 Units into the skin nightly 23   Santiago Nolan MD   metoprolol succinate (TOPROL XL) 25 MG extended release tablet TAKE 1 TABLET BY MOUTH ONCE DAILY 22   Santiago Nolan MD   FARXIGA 10 MG tablet TAKE 1 TABLET BY MOUTH ONCE DAILY IN THE MORNING 22   Santiago Nolan MD   atorvastatin (LIPITOR) 40 MG tablet Take 1 tablet by mouth daily 22   Santiago Nolan MD   metOLazone (ZAROXOLYN) 5 MG tablet Take 1 tablet by mouth as needed (Take 1 tablet by mouth daily as needed) Take 1 tablet by mouth daily as needed 22   Santiago Nolan MD   pregabalin (LYRICA) 75 MG capsule Take 1 capsule by mouth 2 times daily for 30 days.  22  Santiago Nolan MD   furosemide (LASIX) 80 MG tablet Take 1 tablet by mouth daily Take 80 mg by mouth daily 22   Santiago Nolan MD   pantoprazole (PROTONIX) 40 MG tablet Take 1 tablet by mouth every morning (before breakfast) 22   Maru Jimenez MD   tadalafil (CIALIS) 5 MG tablet TAKE 1 TABLET BY MOUTH ONCE DAILY 10/25/22   Historical Provider, MD   potassium chloride (KLOR-CON M) 20 MEQ extended release tablet Take 20 mEq by mouth daily 22   Historical Provider, MD [place] 391.3 (180 Fe) MG CAPS TAKE 1 CAPSULE BY MOUTH EVERY OTHER DAY 10/10/22   Historical Provider, MD   ibuprofen (ADVIL;MOTRIN) 800 MG tablet TAKE 1 TABLET BY MOUTH THREE TIMES DAILY AS NEEDED FOR PAIN 10/17/22   Historical Provider, MD   rivaroxaban [place]) 20 MG TABS tablet Take 1 tablet by mouth daily 3/30/22   Gabriele Howard MD   cyanocobalamin 1000 MCG tablet Take 1,000 mcg by mouth daily    Historical Provider, MD   ferrous sulfate (IRON 325) 325 (65 Fe) MG tablet Take 325 mg by mouth daily (with breakfast)    Historical Provider, MD   folic acid (FOLVITE) 1 MG tablet Take 1 mg by mouth daily    Historical Provider, MD   Multiple Vitamins-Minerals (THERAPEUTIC MULTIVITAMIN-MINERALS) tablet Take 1 tablet by mouth daily    Historical Provider, MD   mycophenolate (CELLCEPT) 500 MG tablet Take by mouth in the morning, at noon, and at bedtime    Historical Provider, MD   spironolactone (ALDACTONE) 25 MG tablet Take 25 mg by mouth daily    Historical Provider, MD   tamsulosin (FLOMAX) 0.4 MG capsule Take 0.4 mg by mouth daily    Historical Provider, MD   aspirin 81 MG tablet Take 81 mg by mouth daily    Historical Provider, MD       Current medications:    Current Facility-Administered Medications   Medication Dose Route Frequency Provider Last Rate Last Admin    sodium chloride flush 0.9 % injection 5-40 mL  5-40 mL IntraVENous 2 times per day Bryon Corok MD        sodium chloride flush 0.9 % injection 5-40 mL  5-40 mL IntraVENous PRN Byron Crook MD        0.9 % sodium chloride infusion   IntraVENous PRN Bryon Crook MD        sod chloride IRR soln 0.9 % irrigation    Continuous PRN Silver Brown MD   250 mL at 02/28/23 0740       Allergies:     Allergies   Allergen Reactions    Lisinopril Swelling    Bee Venom Swelling    Other Itching     Itch like crazy EKG TAPE       Problem List:    Patient Active Problem List   Diagnosis Code    NSTEMI (non-ST elevated myocardial infarction) ([facility] [address] I21.4    Diabetes mellitus ([facility] [address] E11.9    Hypertension I10    Peripheral neuropathy G62.9    Generalized weakness R53.1    Obesity due to excess calories E66.09  Heart failure (HCC) I50.9    Leg swelling M79.89    Mixed hyperlipidemia E78.2    Hyperglycemia R73.9    Sjogren's syndrome (HCC) M35.00    Aortic dilatation (HCC) I77.819    Acute on chronic congestive heart failure (HCC) I50.9    Shortness of breath R06.02    Paroxysmal atrial fibrillation (HCC) I48.0    Atypical atrial flutter (HCC) I48.4    Nonischemic cardiomyopathy (HCC) I42.8       Past Medical History:        Diagnosis Date    Arthritis     Depression     Diabetes mellitus ([facility] [address]     Diastolic CHF ([facility] [address]     Difficult intubation     throat swelled    Dyslipidemia     Herniated disc, cervical     Hypertension     Osteoporosis     Peripheral neuropathy        Past Surgical History:        Procedure Laterality Date    BREAST SURGERY Left 08/07/2019    LEFT BREAST MASS EXCISION; LEFT AXILLIARY LYMPH NODE EXCISION performed by Haris Garza MD at [facility] Right     MUSCLE BIOPSY Left 10/10/2019    MUSCLE BIOPSY LOWER EXTREMITY LEFT performed by Haris Garza MD at [facility] Right        Social History:    Social History     Tobacco Use    Smoking status: Never    Smokeless tobacco: Never   Substance Use Topics    Alcohol use: Yes     Comment: occ                                Counseling given: Not Answered      Vital Signs (Current):   Vitals:    02/21/23 1314 02/28/23 0638 02/28/23 0711   BP:   126/82   Pulse:   73   Resp:   14   Temp:   97 °F (36.1 °C)   TempSrc:   Temporal   SpO2:   94%   Weight: 300 lb (136.1 kg) 300 lb (136.1 kg)    Height: 5' 11\" (1.803 m)                                                BP Readings from Last 3 Encounters:   02/28/23 126/82   02/23/23 138/82   12/08/22 139/80       NPO Status: Time of last liquid consumption: 2100                        Time of last solid consumption: 2100                        Date of last liquid consumption: 02/27/23 Date of last solid food consumption: 02/27/23    BMI:   Wt Readings from Last 3 Encounters:   02/28/23 300 lb (136.1 kg)   02/23/23 (!) 304 lb (137.9 kg)   02/20/23 (!) 306 lb (138.8 kg)     Body mass index is 41.84 kg/m².     CBC:   Lab Results   Component Value Date/Time    WBC 7.3 11/16/2022 04:34 AM    RBC 5.58 11/16/2022 04:34 AM    HGB 15.5 11/16/2022 04:34 AM    HCT 47.9 11/16/2022 04:34 AM    MCV 85.8 11/16/2022 04:34 AM    RDW 15.5 11/16/2022 04:34 AM     11/16/2022 04:34 AM       CMP:   Lab Results   Component Value Date/Time     02/20/2023 12:35 PM    K 5.0 02/20/2023 12:35 PM    K 3.7 11/10/2022 06:33 PM    CL 97 02/20/2023 12:35 PM    CO2 28 02/20/2023 12:35 PM    BUN 33 02/20/2023 12:35 PM    CREATININE 1.2 02/20/2023 12:35 PM    GFRAA >60 03/22/2022 05:33 AM    AGRATIO 1.4 02/20/2023 12:35 PM    LABGLOM >60 02/20/2023 12:35 PM    GLUCOSE 221 02/20/2023 12:35 PM    PROT 7.6 02/20/2023 12:35 PM    CALCIUM 9.9 02/20/2023 12:35 PM    BILITOT 0.4 02/20/2023 12:35 PM    ALKPHOS 141 02/20/2023 12:35 PM    AST 25 02/20/2023 12:35 PM    ALT 36 02/20/2023 12:35 PM       POC Tests:   Recent Labs     02/28/23  0718   POCGLU 128*       Coags:   Lab Results   Component Value Date/Time    PROTIME 24.3 03/19/2022 02:07 PM    INR 2.09 03/19/2022 02:07 PM    APTT 46.2 01/20/2020 01:12 PM       HCG (If Applicable): No results found for: PREGTESTUR, PREGSERUM, HCG, HCGQUANT     ABGs: No results found for: PHART, PO2ART, BVV8WQC, XXL6CWL, BEART, P5LZOLWS     Type & Screen (If Applicable):  No results found for: LABABO, LABRH    Drug/Infectious Status (If Applicable):  No results found for: HIV, HEPCAB    COVID-19 Screening (If Applicable):   Lab Results   Component Value Date/Time    COVID19 NOT DETECTED 11/10/2022 06:23 PM           Anesthesia Evaluation  Patient summary reviewed   history of anesthetic complications (pt had sore thoroat after hernia surgery and states he was admitted for couple of days for sore throat and low O2 sats - does not recall being told he was a diff intubation): history of difficult intubation. Airway: Mallampati: III  TM distance: <3 FB   Neck ROM: limited  Mouth opening: > = 3 FB   Dental: normal exam         Pulmonary:normal exam  breath sounds clear to auscultation  (+) sleep apnea:      (-) shortness of breath and not a current smoker                           Cardiovascular:  Exercise tolerance: poor (<4 METS),   (+) hypertension:, pacemaker:, CAD:, dysrhythmias: atrial fibrillation, CHF:,         Rhythm: regular  Rate: normal                    Neuro/Psych:   (+) depression/anxiety             GI/Hepatic/Renal:   (+) morbid obesity          Endo/Other:    (+) Diabetes, : arthritis:., .                  ROS comment: Sjogren's, dysphagia Abdominal:             Vascular: negative vascular ROS. Other Findings:           Anesthesia Plan      MAC     ASA 4       Induction: intravenous. Anesthetic plan and risks discussed with patient. Plan discussed with CRNA.     Attending anesthesiologist reviewed and agrees with Toro Gallegos MD   2/28/2023

## 2023-02-28 NOTE — PROGRESS NOTES
Pt to pacu from or. Pt asleep but arousable to voice at arrival. Pt placed on monitor, VSS, on 2L via NC per CRNA. Report obtained. Dressing to right hand clean dry and intact, pt denies pain at this time. Given warm blanket.

## 2023-03-03 PROBLEM — M65.30 TRIGGER FINGER, ACQUIRED: Status: ACTIVE | Noted: 2023-03-03

## 2023-03-06 ENCOUNTER — OFFICE VISIT (OUTPATIENT)
Dept: ORTHOPEDIC SURGERY | Age: 63
End: 2023-03-06

## 2023-03-06 VITALS — RESPIRATION RATE: 16 BRPM | HEIGHT: 71 IN | BODY MASS INDEX: 42 KG/M2 | WEIGHT: 300 LBS

## 2023-03-06 DIAGNOSIS — M65.30 TRIGGER FINGER, ACQUIRED: Primary | ICD-10-CM

## 2023-03-06 PROCEDURE — 99024 POSTOP FOLLOW-UP VISIT: CPT | Performed by: ORTHOPAEDIC SURGERY

## 2023-03-06 RX ORDER — DAPAGLIFLOZIN 10 MG/1
TABLET, FILM COATED ORAL
Qty: 30 TABLET | Refills: 2 | Status: SHIPPED | OUTPATIENT
Start: 2023-03-06

## 2023-03-06 NOTE — TELEPHONE ENCOUNTER
----- Message from Blair sent at 3/6/2023  1:59 PM EST -----  Subject: Refill Request    QUESTIONS  Name of Medication? FARXIGA 10 MG tablet  Patient-reported dosage and instructions? 10 MG, once daily   How many days do you have left? 0  [facility]   Pharmacy phone number (if available)? [phone]  Additional Information for Provider? Pt is calling to see if he can get a   refill on this medication if he's supposed to still take this medication. Please call pt back. ---------------------------------------------------------------------------  --------------  Daryle Haver INFO  What is the best way for the office to contact you? OK to leave message on   voicemail  Preferred Call Back Phone Number? [phone]  ---------------------------------------------------------------------------  --------------  SCRIPT ANSWERS  Relationship to Patient?  Self

## 2023-03-06 NOTE — PROGRESS NOTES
Mr. Fifi Cr returns today in follow-up of his recent right Thumb A1 Pulley (Trigger Finger) Release done approximately 1 week ago. He has done well noting mild discomfort and no other reported complications. He notes pre-operative symptoms to be completely resolved at this time. Physical Exam:  Bandage intact and well cared for   Skin incision is healing well, no significant drainage, no dehiscence. Digital range of motion is full in the Thumb, normal in all other digits. Wrist range of motion is full. Sensation is normal in the Thumb. Vascular examination reveals normal, good capillary refill, and good color. Swelling is minimal.  His preoperative triggering is completely resolved. Impression:  Mr. Fifi Cr is doing well after recent right Thumb Trigger Finger Release. Plan:  Mr. Fifi Cr is instructed in work on Active & Passive range of motion of the digits, wrist, & elbow. These modalities were specifically demonstrated to him today. We discussed the appropriateness of gradual resumption of use of the operated hand and the return to normal use as comfort allows. He is given instructions regarding management of the fresh surgical incision and progressive use of desensitization and tissue massage techniques. We discussed the appropriate expectations and timeline for symptom improvement. He is provided a written patient instruction sheet titled: Postoperative Instructions After Trigger Finger Release. I have asked Mr. Fifi Cr to follow-up with me or contact me by telephone over the next 2-4 weeks if his symptoms have not fully resolved or if he has not regained full & painless return of function. He is also specifically instructed to return to the office or call for an appointment sooner if his symptoms are changing or worsening prior to that time.

## 2023-03-06 NOTE — PATIENT INSTRUCTIONS
Postoperative Instructions After Trigger Finger Release    Dr. Mabelene Curling. [place]          After bandages are removed one week from surgery, you may chose to wear a small bandage over the incision if you wish, though you do not need to. Keep incision dry until sutures have fully dissolved  or it has been 14 days since your surgery. Thereafter, you may wash with mild soap and water and shower normally. Once your stiches have fully disappeared & skin appears normal, you should begin gently massaging the incision with Vitamin E (may use Vitamin E lotion or contents of Vitamin E capsule). Work hard on motion of the fingers and wrist, straightening each finger fully and bending each finger fully, bending wrist forward and bending wrist backwards. Do not be concerned if you experience discomfort. This will not damage the surgery. You may begin using the hand as it feels comfortable beginning 12-14 days from the day of surgery. You may not feel entirely comfortable gripping or lifting heavy objects for several weeks. You may expect to see some skin peel off around the incision. You may be left with a small area of pink baby skin. This is quite normal.    Thank you for choosing [facility] Physicians for your Hand and Upper Extremity needs. If we can be of any further assistance to you, please do not hesitate to contact us.     Office Phone Number:  [phone]  or  [phone]

## 2023-03-11 ENCOUNTER — HOSPITAL ENCOUNTER (EMERGENCY)
Age: 63
Discharge: HOME | End: 2023-03-11
Attending: EMERGENCY MEDICINE
Payer: COMMERCIAL

## 2023-03-11 ENCOUNTER — APPOINTMENT (OUTPATIENT)
Dept: CT IMAGING | Age: 63
End: 2023-03-11
Payer: COMMERCIAL

## 2023-03-11 ENCOUNTER — APPOINTMENT (OUTPATIENT)
Dept: GENERAL RADIOLOGY | Age: 63
End: 2023-03-11
Payer: COMMERCIAL

## 2023-03-11 VITALS
OXYGEN SATURATION: 100 % | HEIGHT: 71 IN | WEIGHT: 315 LBS | DIASTOLIC BLOOD PRESSURE: 87 MMHG | SYSTOLIC BLOOD PRESSURE: 145 MMHG | BODY MASS INDEX: 44.1 KG/M2 | RESPIRATION RATE: 18 BRPM | TEMPERATURE: 98.3 F | HEART RATE: 68 BPM

## 2023-03-11 DIAGNOSIS — S39.012A STRAIN OF LUMBAR REGION, INITIAL ENCOUNTER: Primary | ICD-10-CM

## 2023-03-11 LAB
A/G RATIO: 1.3 (ref 1.1–2.2)
ALBUMIN SERPL-MCNC: 3.7 G/DL (ref 3.4–5)
ALP BLD-CCNC: 131 U/L (ref 40–129)
ALT SERPL-CCNC: 54 U/L (ref 10–40)
ANION GAP SERPL CALCULATED.3IONS-SCNC: 11 MMOL/L (ref 3–16)
AST SERPL-CCNC: 43 U/L (ref 15–37)
BASOPHILS ABSOLUTE: 0.1 K/UL (ref 0–0.2)
BASOPHILS RELATIVE PERCENT: 0.7 %
BILIRUB SERPL-MCNC: 0.3 MG/DL (ref 0–1)
BILIRUBIN URINE: NEGATIVE
BLOOD, URINE: NEGATIVE
BUN BLDV-MCNC: 24 MG/DL (ref 7–20)
CALCIUM SERPL-MCNC: 9 MG/DL (ref 8.3–10.6)
CHLORIDE BLD-SCNC: 101 MMOL/L (ref 99–110)
CLARITY: CLEAR
CO2: 26 MMOL/L (ref 21–32)
COLOR: YELLOW
CREAT SERPL-MCNC: 0.9 MG/DL (ref 0.8–1.3)
EKG ATRIAL RATE: 70 BPM
EKG DIAGNOSIS: NORMAL
EKG P-R INTERVAL: 128 MS
EKG Q-T INTERVAL: 444 MS
EKG QRS DURATION: 174 MS
EKG QTC CALCULATION (BAZETT): 479 MS
EKG R AXIS: 260 DEGREES
EKG T AXIS: 70 DEGREES
EKG VENTRICULAR RATE: 70 BPM
EOSINOPHILS ABSOLUTE: 0 K/UL (ref 0–0.6)
EOSINOPHILS RELATIVE PERCENT: 0.3 %
GFR SERPL CREATININE-BSD FRML MDRD: >60 ML/MIN/{1.73_M2}
GLUCOSE BLD-MCNC: 205 MG/DL (ref 70–99)
GLUCOSE URINE: NEGATIVE MG/DL
HCT VFR BLD CALC: 40 % (ref 40.5–52.5)
HEMOGLOBIN: 12.9 G/DL (ref 13.5–17.5)
KETONES, URINE: NEGATIVE MG/DL
LEUKOCYTE ESTERASE, URINE: NEGATIVE
LIPASE: 29 U/L (ref 13–60)
LYMPHOCYTES ABSOLUTE: 1.8 K/UL (ref 1–5.1)
LYMPHOCYTES RELATIVE PERCENT: 18.2 %
MCH RBC QN AUTO: 28.2 PG (ref 26–34)
MCHC RBC AUTO-ENTMCNC: 32.3 G/DL (ref 31–36)
MCV RBC AUTO: 87.5 FL (ref 80–100)
MICROSCOPIC EXAMINATION: NORMAL
MONOCYTES ABSOLUTE: 0.8 K/UL (ref 0–1.3)
MONOCYTES RELATIVE PERCENT: 8 %
NEUTROPHILS ABSOLUTE: 7.1 K/UL (ref 1.7–7.7)
NEUTROPHILS RELATIVE PERCENT: 72.8 %
NITRITE, URINE: NEGATIVE
PDW BLD-RTO: 16.9 % (ref 12.4–15.4)
PH UA: 6 (ref 5–8)
PLATELET # BLD: 188 K/UL (ref 135–450)
PMV BLD AUTO: 7.5 FL (ref 5–10.5)
POTASSIUM REFLEX MAGNESIUM: 4.5 MMOL/L (ref 3.5–5.1)
PRO-BNP: 573 PG/ML (ref 0–124)
PROTEIN UA: NEGATIVE MG/DL
RBC # BLD: 4.58 M/UL (ref 4.2–5.9)
SODIUM BLD-SCNC: 138 MMOL/L (ref 136–145)
SPECIFIC GRAVITY UA: 1.02 (ref 1–1.03)
TOTAL PROTEIN: 6.6 G/DL (ref 6.4–8.2)
TROPONIN: 0.08 NG/ML
TROPONIN: 0.08 NG/ML
URINE REFLEX TO CULTURE: NORMAL
URINE TYPE: NORMAL
UROBILINOGEN, URINE: 0.2 E.U./DL
WBC # BLD: 9.7 K/UL (ref 4–11)

## 2023-03-11 PROCEDURE — 6360000002 HC RX W HCPCS: Performed by: EMERGENCY MEDICINE

## 2023-03-11 PROCEDURE — 84484 ASSAY OF TROPONIN QUANT: CPT

## 2023-03-11 PROCEDURE — 96374 THER/PROPH/DIAG INJ IV PUSH: CPT

## 2023-03-11 PROCEDURE — 6370000000 HC RX 637 (ALT 250 FOR IP): Performed by: EMERGENCY MEDICINE

## 2023-03-11 PROCEDURE — 74176 CT ABD & PELVIS W/O CONTRAST: CPT

## 2023-03-11 PROCEDURE — 93005 ELECTROCARDIOGRAM TRACING: CPT | Performed by: EMERGENCY MEDICINE

## 2023-03-11 PROCEDURE — 85025 COMPLETE CBC W/AUTO DIFF WBC: CPT

## 2023-03-11 PROCEDURE — 81003 URINALYSIS AUTO W/O SCOPE: CPT

## 2023-03-11 PROCEDURE — 71046 X-RAY EXAM CHEST 2 VIEWS: CPT

## 2023-03-11 PROCEDURE — 99285 EMERGENCY DEPT VISIT HI MDM: CPT

## 2023-03-11 PROCEDURE — 83690 ASSAY OF LIPASE: CPT

## 2023-03-11 PROCEDURE — 80053 COMPREHEN METABOLIC PANEL: CPT

## 2023-03-11 PROCEDURE — 36415 COLL VENOUS BLD VENIPUNCTURE: CPT

## 2023-03-11 PROCEDURE — 83880 ASSAY OF NATRIURETIC PEPTIDE: CPT

## 2023-03-11 RX ORDER — ACETAMINOPHEN 500 MG
500 TABLET ORAL 4 TIMES DAILY PRN
Qty: 120 TABLET | Refills: 0 | Status: SHIPPED | OUTPATIENT
Start: 2023-03-11

## 2023-03-11 RX ORDER — OXYCODONE HYDROCHLORIDE 5 MG/1
5 TABLET ORAL EVERY 6 HOURS PRN
Qty: 8 TABLET | Refills: 0 | Status: SHIPPED | OUTPATIENT
Start: 2023-03-11 | End: 2023-03-12

## 2023-03-11 RX ORDER — LIDOCAINE 50 MG/G
1 PATCH TOPICAL DAILY
Qty: 10 PATCH | Refills: 0 | Status: SHIPPED | OUTPATIENT
Start: 2023-03-11

## 2023-03-11 RX ORDER — LIDOCAINE 4 G/G
1 PATCH TOPICAL DAILY
Status: DISCONTINUED | OUTPATIENT
Start: 2023-03-11 | End: 2023-03-11 | Stop reason: HOSPADM

## 2023-03-11 RX ORDER — METHOCARBAMOL 750 MG/1
750 TABLET, FILM COATED ORAL 4 TIMES DAILY
Qty: 40 TABLET | Refills: 0 | Status: SHIPPED | OUTPATIENT
Start: 2023-03-11 | End: 2023-03-21

## 2023-03-11 RX ORDER — MORPHINE SULFATE 4 MG/ML
4 INJECTION, SOLUTION INTRAMUSCULAR; INTRAVENOUS ONCE
Status: COMPLETED | OUTPATIENT
Start: 2023-03-11 | End: 2023-03-11

## 2023-03-11 RX ADMIN — MORPHINE SULFATE 4 MG: 4 INJECTION INTRAVENOUS at 02:05

## 2023-03-11 ASSESSMENT — PAIN DESCRIPTION - ORIENTATION: ORIENTATION: LEFT

## 2023-03-11 ASSESSMENT — LIFESTYLE VARIABLES
HOW MANY STANDARD DRINKS CONTAINING ALCOHOL DO YOU HAVE ON A TYPICAL DAY: PATIENT DOES NOT DRINK
HOW OFTEN DO YOU HAVE A DRINK CONTAINING ALCOHOL: NEVER

## 2023-03-11 ASSESSMENT — PAIN DESCRIPTION - PAIN TYPE: TYPE: ACUTE PAIN

## 2023-03-11 ASSESSMENT — PAIN SCALES - GENERAL
PAINLEVEL_OUTOF10: 4
PAINLEVEL_OUTOF10: 7
PAINLEVEL_OUTOF10: 3

## 2023-03-11 ASSESSMENT — PAIN DESCRIPTION - LOCATION: LOCATION: FLANK

## 2023-03-11 ASSESSMENT — PAIN DESCRIPTION - FREQUENCY: FREQUENCY: CONTINUOUS

## 2023-03-11 ASSESSMENT — PAIN DESCRIPTION - DESCRIPTORS: DESCRIPTORS: DULL;STABBING

## 2023-03-11 ASSESSMENT — PAIN - FUNCTIONAL ASSESSMENT: PAIN_FUNCTIONAL_ASSESSMENT: 0-10

## 2023-03-11 NOTE — DISCHARGE INSTRUCTIONS
Return to emergency department for worsening symptoms or other concerns. Take 1-2 acetaminophen as needed for pain. Take Robaxin as needed for spasm and tightness. Apply Lidoderm patches for 12 hours at a time. If pain is severe, you can take one of the oxycodone, but use very sparingly and only if pain is severe and not help with all the other medications. Otherwise try to maintain gentle exercises to help limit tightness such as walking; being immobile will probably make things stiffer, so take the medicine as needed but try to stay up and mobile as possible.

## 2023-03-11 NOTE — ED PROVIDER NOTES
[address]          ATTENDING PHYSICIAN NOTE       Date of evaluation: 3/11/2023    Chief Complaint     Back Pain (L lower back pain x3 days, wraps around into abdomen. )      History of Present Illness     Alethea Hernandez is a 57 y.o. male who presents with complaint of pain in the left lower back that radiates around through the abdomen to the front. He says been present for a 72 hours. It started when he woke up in the morning, felt like maybe had the blanket under him funny, it says become more sore over the last couple days. It is worse with movement. He took some ibuprofen for it at home as he says he is out of his acetaminophen, not take any other medications. No change in bowel or bladder habits. Reports his urine seems darker than normal but without blood or pain, and he is able to go to the bathroom. Reports he also had some cough this evening and there was a little bit of blood mixed in with the sputum that he coughed up. Reports that recently he feels like he is maybe a little more short of breath, but had not been evaluated for it nor was it severe enough to bring him to the emergency department. No fevers or chills. Denies any trauma. ASSESSMENT / PLAN  (MEDICAL DECISION MAKING)     INITIAL VITALS: BP: (!) 152/80, Temp: 98.3 °F (36.8 °C), Heart Rate: 80, Resp: 20, SpO2: 96 %      Alethea Hernandez is a 57 y.o. male who presents with left lower back pain that appears likely musculoskeletal in nature. Is not associated with neurological deficit, no red flag symptoms otherwise. We did obtain CT of the abdomen pelvis given his report of radiation through the abdomen to evaluate for renal stone, intra-abdominal source which diverticulitis, and any sign of urinary tract infection, all of which are unremarkable.   Given his report of some ongoing mild shortness of breath, history of cardiac disease, his report of cough, we did obtain chest x-ray as well as cardiac labs. His troponin is mildly elevated but stable and unchanging. EKG shows a paced rhythm without obvious ischemic changes. He is not having chest pain, no oxygen requirement. No pulmonary edema is noted on his imaging studies. His BNP is similarly elevated but more or less stable. No sign of infection, pneumonia, or any other untoward process. In the end this appears to be likely musculoskeletal back pain which we will treat with Robaxin, acetaminophen, Lidoderm patches primarily, give a very short course of oxycodone for severe pain, and encouraged him to stay upright is much as possible with gentle exercises to limit stiffness. Is this patient to be included in the SEP-1 core measure due to severe sepsis or septic shock? No Exclusion criteria - the patient is NOT to be included for SEP-1 Core Measure due to: Infection is not suspected    Medical Decision Making  Amount and/or Complexity of Data Reviewed  Labs: ordered. Radiology: ordered. ECG/medicine tests: ordered. Risk  OTC drugs. Prescription drug management. Clinical Impression     1. Strain of lumbar region, initial encounter        Disposition     PATIENT REFERRED TO:  Waqar Foy MD  [address] [place] [address]  [phone]    In 3 days        DISCHARGE MEDICATIONS:  New Prescriptions    ACETAMINOPHEN (TYLENOL) 500 MG TABLET    Take 1 tablet by mouth 4 times daily as needed for Pain    LIDOCAINE (LIDODERM) 5 %    Place 1 patch onto the skin daily 12 hours on, 12 hours off. METHOCARBAMOL (ROBAXIN-750) 750 MG TABLET    Take 1 tablet by mouth 4 times daily for 10 days    OXYCODONE (ROXICODONE) 5 MG IMMEDIATE RELEASE TABLET    Take 1 tablet by mouth every 6 hours as needed for Pain for up to 1 day. WARNING:  May cause drowsiness. May impair ability to operate vehicles or machinery. Do not use in combination with alcohol.  Max Daily Amount: 20 mg       DISPOSITION Decision To Discharge 03/11/2023 04:24:20 AM        Diagnostic Results and Other Data       RADIOLOGY:  CT ABDOMEN PELVIS WO CONTRAST Additional Contrast? None   Final Result     No acute findings in the abdomen or pelvis.           XR CHEST (2 VW)   Final Result     No acute findings in the chest.              LABS:   Results for orders placed or performed during the hospital encounter of 03/11/23   CBC with Auto Differential   Result Value Ref Range    WBC 9.7 4.0 - 11.0 K/uL    RBC 4.58 4.20 - 5.90 M/uL    Hemoglobin 12.9 (L) 13.5 - 17.5 g/dL    Hematocrit 40.0 (L) 40.5 - 52.5 %    MCV 87.5 80.0 - 100.0 fL    MCH 28.2 26.0 - 34.0 pg    MCHC 32.3 31.0 - 36.0 g/dL    RDW 16.9 (H) 12.4 - 15.4 %    Platelets 081 512 - 555 K/uL    MPV 7.5 5.0 - 10.5 fL    Neutrophils % 72.8 %    Lymphocytes % 18.2 %    Monocytes % 8.0 %    Eosinophils % 0.3 %    Basophils % 0.7 %    Neutrophils Absolute 7.1 1.7 - 7.7 K/uL    Lymphocytes Absolute 1.8 1.0 - 5.1 K/uL    Monocytes Absolute 0.8 0.0 - 1.3 K/uL    Eosinophils Absolute 0.0 0.0 - 0.6 K/uL    Basophils Absolute 0.1 0.0 - 0.2 K/uL   CMP w/ Reflex to MG   Result Value Ref Range    Sodium 138 136 - 145 mmol/L    Potassium reflex Magnesium 4.5 3.5 - 5.1 mmol/L    Chloride 101 99 - 110 mmol/L    CO2 26 21 - 32 mmol/L    Anion Gap 11 3 - 16    Glucose 205 (H) 70 - 99 mg/dL    BUN 24 (H) 7 - 20 mg/dL    Creatinine 0.9 0.8 - 1.3 mg/dL    Est, Glom Filt Rate >60 >60    Calcium 9.0 8.3 - 10.6 mg/dL    Total Protein 6.6 6.4 - 8.2 g/dL    Albumin 3.7 3.4 - 5.0 g/dL    Albumin/Globulin Ratio 1.3 1.1 - 2.2    Total Bilirubin 0.3 0.0 - 1.0 mg/dL    Alkaline Phosphatase 131 (H) 40 - 129 U/L    ALT 54 (H) 10 - 40 U/L    AST 43 (H) 15 - 37 U/L   Lipase   Result Value Ref Range    Lipase 29.0 13.0 - 60.0 U/L   BNP   Result Value Ref Range    Pro- (H) 0 - 124 pg/mL   Troponin   Result Value Ref Range    Troponin 0.08 (H) <0.01 ng/mL   Urinalysis with Reflex to Culture    Specimen: Urine   Result Value Ref Range    Color, UA Yellow Straw/Yellow    Clarity, UA Clear Clear    Glucose, Ur Negative Negative mg/dL    Bilirubin Urine Negative Negative    Ketones, Urine Negative Negative mg/dL    Specific Gravity, UA 1.025 1.005 - 1.030    Blood, Urine Negative Negative    pH, UA 6.0 5.0 - 8.0    Protein, UA Negative Negative mg/dL    Urobilinogen, Urine 0.2 <2.0 E.U./dL    Nitrite, Urine Negative Negative    Leukocyte Esterase, Urine Negative Negative    Microscopic Examination Not Indicated     Urine Type Voided     Urine Reflex to Culture Not Indicated    Troponin   Result Value Ref Range    Troponin 0.08 (H) <0.01 ng/mL     EKG   AV dual paced rhythm, rate 70 bpm,  ms, QTc 479. No concordant ST changes are appreciated, appropriate discordance noted did throughout. 100% paced rhythm with a normal rate. ED BEDSIDE ULTRASOUND:  No results found. MOST RECENT VITALS:  BP: (!) 152/80,Temp: 98.3 °F (36.8 °C), Heart Rate: 80, Resp: 20, SpO2: 96 %     Procedures         ED Course     Nursing Notes, Past Medical Hx, Past Surgical Hx, Social Hx,Allergies, and Family Hx were reviewed. The patient was given the following medications:  Orders Placed This Encounter   Medications    lidocaine 4 % external patch 1 patch    morphine injection 4 mg    methocarbamol (ROBAXIN-750) 750 MG tablet     Sig: Take 1 tablet by mouth 4 times daily for 10 days     Dispense:  40 tablet     Refill:  0    lidocaine (LIDODERM) 5 %     Sig: Place 1 patch onto the skin daily 12 hours on, 12 hours off. Dispense:  10 patch     Refill:  0    acetaminophen (TYLENOL) 500 MG tablet     Sig: Take 1 tablet by mouth 4 times daily as needed for Pain     Dispense:  120 tablet     Refill:  0    oxyCODONE (ROXICODONE) 5 MG immediate release tablet     Sig: Take 1 tablet by mouth every 6 hours as needed for Pain for up to 1 day. WARNING:  May cause drowsiness. May impair ability to operate vehicles or machinery. Do not use in combination with alcohol.  Max Daily Amount: 20 mg     Dispense:  8 tablet     Refill:  0       CONSULTS:  None    Review of Systems     Review of Systems  Pertinent positives and negatives are listed in the HPI; otherwise all systems are reviewed and were negative. Past Medical, Surgical, Family, and Social History     He has a past medical history of Arthritis, Depression, Diabetes mellitus ([facility] [address] Diastolic CHF ([facility] [address] Difficult intubation, Dyslipidemia, Herniated disc, cervical, Hypertension, Osteoporosis, and Peripheral neuropathy. He has a past surgical history that includes hernia repair; Breast surgery (Left, 08/07/2019); Muscle biopsy (Left, 10/10/2019); Reverse total shoulder arthroplasty (Right); joint replacement (Right); and Finger trigger release (Right, 2/28/2023). His family history includes Breast Cancer in his brother; Heart Disease in his brother and mother; High Blood Pressure in his brother, father, mother, and sister. He reports that he has never smoked. He has never used smokeless tobacco. He reports current alcohol use. He reports that he does not currently use drugs after having used the following drugs: Cocaine.     Medications     Previous Medications    ASPIRIN 81 MG TABLET    Take 81 mg by mouth daily    ATORVASTATIN (LIPITOR) 40 MG TABLET    Take 1 tablet by mouth daily    CYANOCOBALAMIN 1000 MCG TABLET    Take 1,000 mcg by mouth daily    FARXIGA 10 MG TABLET    TAKE 1 TABLET BY MOUTH ONCE DAILY IN THE MORNING    FERROUS SULFATE (IRON 325) 325 (65 FE) MG TABLET    Take 325 mg by mouth daily (with breakfast)    FOLIC ACID (FOLVITE) 1 MG TABLET    Take 1 mg by mouth daily    FUROSEMIDE (LASIX) 80 MG TABLET    Take 1 tablet by mouth daily Take 80 mg by mouth daily    IBUPROFEN (ADVIL;MOTRIN) 800 MG TABLET    TAKE 1 TABLET BY MOUTH THREE TIMES DAILY AS NEEDED FOR PAIN    INSULIN GLARGINE (LANTUS SOLOSTAR) 100 UNIT/ML INJECTION PEN    Inject 15 Units into the skin nightly    METOLAZONE (ZAROXOLYN) 5 MG TABLET Take 1 tablet by mouth as needed (Take 1 tablet by mouth daily as needed) Take 1 tablet by mouth daily as needed    METOPROLOL SUCCINATE (TOPROL XL) 25 MG EXTENDED RELEASE TABLET    TAKE 1 TABLET BY MOUTH ONCE DAILY    MULTIPLE VITAMINS-MINERALS (THERAPEUTIC MULTIVITAMIN-MINERALS) TABLET    Take 1 tablet by mouth daily    MYCOPHENOLATE (CELLCEPT) 500 MG TABLET    Take by mouth in the morning, at noon, and at bedtime    PANTOPRAZOLE (PROTONIX) 40 MG TABLET    Take 1 tablet by mouth every morning (before breakfast)    POTASSIUM CHLORIDE (KLOR-CON M) 20 MEQ EXTENDED RELEASE TABLET    Take 20 mEq by mouth daily    PREGABALIN (LYRICA) 75 MG CAPSULE    Take 1 capsule by mouth 2 times daily for 30 days. PROFE 391.3 (180 FE) MG CAPS    TAKE 1 CAPSULE BY MOUTH EVERY OTHER DAY    RIVAROXABAN (XARELTO) 20 MG TABS TABLET    Take 1 tablet by mouth daily    SPIRONOLACTONE (ALDACTONE) 25 MG TABLET    Take 25 mg by mouth daily    TADALAFIL (CIALIS) 5 MG TABLET    TAKE 1 TABLET BY MOUTH ONCE DAILY    TAMSULOSIN (FLOMAX) 0.4 MG CAPSULE    Take 0.4 mg by mouth daily       Allergies     He is allergic to lisinopril, bee venom, and other. Physical Exam     INITIAL VITALS: BP: (!) 152/80, Temp: 98.3 °F (36.8 °C), Heart Rate: 80, Resp: 20, SpO2: 96 %   Physical Exam  Constitutional:       Appearance: Normal appearance. HENT:      Head: Normocephalic and atraumatic. Right Ear: External ear normal.      Left Ear: External ear normal.      Nose: Nose normal.      Mouth/Throat:      Mouth: Mucous membranes are moist.      Pharynx: Oropharynx is clear. Eyes:      Conjunctiva/sclera: Conjunctivae normal.   Cardiovascular:      Rate and Rhythm: Normal rate and regular rhythm. Pulmonary:      Effort: Pulmonary effort is normal.   Abdominal:      Palpations: Abdomen is soft. Tenderness: There is no abdominal tenderness. There is no right CVA tenderness or left CVA tenderness.    Musculoskeletal:         General: No swelling or tenderness. Cervical back: Normal range of motion. Right lower leg: No edema. Left lower leg: No edema. Comments: NO midline back pain/ tenderness; mild TTP left lower paraspinal muscles to the SI joint   Neurological:      General: No focal deficit present. Mental Status: He is alert and oriented to person, place, and time. Motor: No weakness.       Gait: Gait normal.                  Rosa Ulloa MD  03/11/23 1186

## 2023-03-20 ENCOUNTER — TELEPHONE (OUTPATIENT)
Dept: INTERNAL MEDICINE CLINIC | Age: 63
End: 2023-03-20

## 2023-03-24 ENCOUNTER — OFFICE VISIT (OUTPATIENT)
Dept: INTERNAL MEDICINE CLINIC | Age: 63
End: 2023-03-24
Payer: COMMERCIAL

## 2023-03-24 VITALS
WEIGHT: 303 LBS | OXYGEN SATURATION: 93 % | HEIGHT: 71 IN | TEMPERATURE: 97.6 F | SYSTOLIC BLOOD PRESSURE: 116 MMHG | BODY MASS INDEX: 42.42 KG/M2 | DIASTOLIC BLOOD PRESSURE: 70 MMHG | HEART RATE: 67 BPM

## 2023-03-24 DIAGNOSIS — M25.461 EFFUSION OF RIGHT KNEE: ICD-10-CM

## 2023-03-24 DIAGNOSIS — I50.43 ACUTE ON CHRONIC COMBINED SYSTOLIC AND DIASTOLIC CONGESTIVE HEART FAILURE (HCC): ICD-10-CM

## 2023-03-24 DIAGNOSIS — Z01.818 PREOP EXAMINATION: ICD-10-CM

## 2023-03-24 DIAGNOSIS — Z98.890 S/P RIGHT KNEE SURGERY: ICD-10-CM

## 2023-03-24 DIAGNOSIS — Z01.818 PREOP EXAMINATION: Primary | ICD-10-CM

## 2023-03-24 PROBLEM — M25.561 ACUTE PAIN OF RIGHT KNEE: Status: ACTIVE | Noted: 2023-03-24

## 2023-03-24 LAB
ANION GAP SERPL CALCULATED.3IONS-SCNC: 18 MMOL/L (ref 3–16)
ANION GAP SERPL CALCULATED.3IONS-SCNC: 18 MMOL/L (ref 3–16)
APTT BLD: 38.6 SEC (ref 23–34.3)
BASOPHILS # BLD: 0 K/UL (ref 0–0.2)
BASOPHILS NFR BLD: 0.4 %
BUN SERPL-MCNC: 20 MG/DL (ref 7–20)
BUN SERPL-MCNC: 20 MG/DL (ref 7–20)
CALCIUM SERPL-MCNC: 10.3 MG/DL (ref 8.3–10.6)
CALCIUM SERPL-MCNC: 10.5 MG/DL (ref 8.3–10.6)
CHLORIDE SERPL-SCNC: 96 MMOL/L (ref 99–110)
CHLORIDE SERPL-SCNC: 98 MMOL/L (ref 99–110)
CO2 SERPL-SCNC: 25 MMOL/L (ref 21–32)
CO2 SERPL-SCNC: 25 MMOL/L (ref 21–32)
CREAT SERPL-MCNC: 1.1 MG/DL (ref 0.8–1.3)
CREAT SERPL-MCNC: 1.1 MG/DL (ref 0.8–1.3)
DEPRECATED RDW RBC AUTO: 16.3 % (ref 12.4–15.4)
EOSINOPHIL # BLD: 0 K/UL (ref 0–0.6)
EOSINOPHIL NFR BLD: 0.2 %
GFR SERPLBLD CREATININE-BSD FMLA CKD-EPI: >60 ML/MIN/{1.73_M2}
GFR SERPLBLD CREATININE-BSD FMLA CKD-EPI: >60 ML/MIN/{1.73_M2}
GLUCOSE SERPL-MCNC: 147 MG/DL (ref 70–99)
GLUCOSE SERPL-MCNC: 153 MG/DL (ref 70–99)
HCT VFR BLD AUTO: 45.3 % (ref 40.5–52.5)
HGB BLD-MCNC: 14.6 G/DL (ref 13.5–17.5)
INR PPP: 1.24 (ref 0.87–1.14)
LYMPHOCYTES # BLD: 1.1 K/UL (ref 1–5.1)
LYMPHOCYTES NFR BLD: 9.4 %
MAGNESIUM SERPL-MCNC: 2.2 MG/DL (ref 1.8–2.4)
MCH RBC QN AUTO: 28.1 PG (ref 26–34)
MCHC RBC AUTO-ENTMCNC: 32.2 G/DL (ref 31–36)
MCV RBC AUTO: 87.2 FL (ref 80–100)
MONOCYTES # BLD: 0.8 K/UL (ref 0–1.3)
MONOCYTES NFR BLD: 6.7 %
NEUTROPHILS # BLD: 9.5 K/UL (ref 1.7–7.7)
NEUTROPHILS NFR BLD: 83.3 %
NT-PROBNP SERPL-MCNC: 250 PG/ML (ref 0–124)
PLATELET # BLD AUTO: 237 K/UL (ref 135–450)
PMV BLD AUTO: 7.8 FL (ref 5–10.5)
POTASSIUM SERPL-SCNC: 5.4 MMOL/L (ref 3.5–5.1)
POTASSIUM SERPL-SCNC: 5.6 MMOL/L (ref 3.5–5.1)
PROTHROMBIN TIME: 15.5 SEC (ref 11.7–14.5)
RBC # BLD AUTO: 5.2 M/UL (ref 4.2–5.9)
SODIUM SERPL-SCNC: 139 MMOL/L (ref 136–145)
SODIUM SERPL-SCNC: 141 MMOL/L (ref 136–145)
WBC # BLD AUTO: 11.4 K/UL (ref 4–11)

## 2023-03-24 PROCEDURE — G8482 FLU IMMUNIZE ORDER/ADMIN: HCPCS | Performed by: STUDENT IN AN ORGANIZED HEALTH CARE EDUCATION/TRAINING PROGRAM

## 2023-03-24 PROCEDURE — G8427 DOCREV CUR MEDS BY ELIG CLIN: HCPCS | Performed by: STUDENT IN AN ORGANIZED HEALTH CARE EDUCATION/TRAINING PROGRAM

## 2023-03-24 PROCEDURE — G8417 CALC BMI ABV UP PARAM F/U: HCPCS | Performed by: STUDENT IN AN ORGANIZED HEALTH CARE EDUCATION/TRAINING PROGRAM

## 2023-03-24 PROCEDURE — 99213 OFFICE O/P EST LOW 20 MIN: CPT | Performed by: STUDENT IN AN ORGANIZED HEALTH CARE EDUCATION/TRAINING PROGRAM

## 2023-03-24 PROCEDURE — 1036F TOBACCO NON-USER: CPT | Performed by: STUDENT IN AN ORGANIZED HEALTH CARE EDUCATION/TRAINING PROGRAM

## 2023-03-24 PROCEDURE — 3074F SYST BP LT 130 MM HG: CPT | Performed by: STUDENT IN AN ORGANIZED HEALTH CARE EDUCATION/TRAINING PROGRAM

## 2023-03-24 PROCEDURE — 3017F COLORECTAL CA SCREEN DOC REV: CPT | Performed by: STUDENT IN AN ORGANIZED HEALTH CARE EDUCATION/TRAINING PROGRAM

## 2023-03-24 PROCEDURE — 3078F DIAST BP <80 MM HG: CPT | Performed by: STUDENT IN AN ORGANIZED HEALTH CARE EDUCATION/TRAINING PROGRAM

## 2023-03-24 ASSESSMENT — ENCOUNTER SYMPTOMS
NAUSEA: 0
ABDOMINAL DISTENTION: 0
DIARRHEA: 0
WHEEZING: 0
PHOTOPHOBIA: 0
STRIDOR: 0
VOMITING: 0
CHOKING: 0
COUGH: 0
VOICE CHANGE: 0
ABDOMINAL PAIN: 0
TROUBLE SWALLOWING: 0
APNEA: 0
SHORTNESS OF BREATH: 0
CHEST TIGHTNESS: 0
CONSTIPATION: 0

## 2023-03-24 NOTE — ASSESSMENT & PLAN NOTE
Right knee swelling and pain after knee surgery in 2022  -Status post aspiration, culture grew C acnes  -Is going to have right knee I&D on 4/6/2023  -Patient agrees with surgery

## 2023-03-24 NOTE — ASSESSMENT & PLAN NOTE
- No contraindication, patient can proceed with planned surgery with acceptable risks.   - He has an appointment to see his cardiologist on 3/30/2023 for preop cardiac clearance  -Preop labs ordered  -Recent EKG reviewed

## 2023-03-24 NOTE — PROGRESS NOTES
Radiology: None   EKG:      /Plan:   Preop examination  - No contraindication, patient can proceed with planned surgery with acceptable risks. - He has an appointment to see his cardiologist on 3/30/2023 for preop cardiac clearance  -Preop labs ordered  -Recent EKG reviewed  -Medication recommendations discussed with patient, written instruction provided    Effusion of right knee  Right knee swelling and pain after knee surgery in 2022  -Status post aspiration, culture grew C acnes  -Is going to have right knee I&D on 4/6/2023  -Patient agrees with surgery    S/P right knee surgery   Had total knee arthroplasty on 4/1483, complicated by DVT  -Endorses effusion. Aspiration with culture grew C acnes  -Right knee I&D planned for 4/6/2023          Known risk factors for perioperative complications: History of CHF, diabetes on insulin, prior history of DVT    Pre-Operative Risk assessment using 2014 ACC/AHA guidelines     Emergent procedure No  Active Cardiac Condition No (decompensated HF, Arrhythmia, MI <3 weeks, severe valve disease)  Risk Level of Procedure Intermediate Risk (intraperitoneal, intrathoracic, HENT, orthopedic, or carotid endarterectomy, etc.)  Revised Cardiac Risk Index Risk factors: History of heart failure  Diabetic treated with insulin  Measurement of Exercise Tolerance before Surgery >4 Yes    According to the 2014 ACC/AHA pre-operative risk assessment guidelines Desmond Villareal is a low risk for major cardiac complications during a intermediate risk procedure and may continue as planned. Specific medication recommendations are listed below. Medications recommended to continue should be taken with a sip of water even when NPO.      Further recommendations from consultants: Cardiology    Medication Recommendations:  Continue Metoprolol   Hold ASA for 7 days   Hold Xarelto for 3 days   Hold Lasix,  Spironolacton on the morning surgery   Hold Farxiga for 3 days   Take 10 units of Lantus in the

## 2023-03-24 NOTE — ASSESSMENT & PLAN NOTE
Had total knee arthroplasty on 2/8251, complicated by DVT  -Endorses effusion.   Aspiration with culture grew C acnes

## 2023-03-24 NOTE — PATIENT INSTRUCTIONS
Continue Metoprolol   Hold ASA for 7 days   Hold Xarelto for 3 days   Hold Lasix,  Spironolacton on the morning surgery   Hold Farxiga for 3 days   Take 10 units of Lantus in the night of the day before your surgery

## 2023-03-26 LAB — MRSA SPEC QL CULT: NORMAL

## 2023-03-27 DIAGNOSIS — E87.5 HYPERKALEMIA: Primary | ICD-10-CM

## 2023-03-27 LAB — MRSA SPEC QL CULT: NORMAL

## 2023-04-05 ENCOUNTER — TELEPHONE (OUTPATIENT)
Dept: INTERNAL MEDICINE CLINIC | Age: 63
End: 2023-04-05

## 2023-04-05 NOTE — TELEPHONE ENCOUNTER
Pt states that in the past his insurance would not cover ozempic. His insurance now covers ozempic and the pt would like Dr. Liv Riggins to re-write the prescription. Please advise.

## 2023-04-06 DIAGNOSIS — E11.9 TYPE 2 DIABETES MELLITUS WITHOUT COMPLICATION, WITH LONG-TERM CURRENT USE OF INSULIN (HCC): Primary | ICD-10-CM

## 2023-04-06 DIAGNOSIS — Z79.4 TYPE 2 DIABETES MELLITUS WITHOUT COMPLICATION, WITH LONG-TERM CURRENT USE OF INSULIN (HCC): Primary | ICD-10-CM

## 2023-04-06 RX ORDER — SEMAGLUTIDE 1.34 MG/ML
INJECTION, SOLUTION SUBCUTANEOUS
Qty: 2 ADJUSTABLE DOSE PRE-FILLED PEN SYRINGE | Refills: 0 | Status: SHIPPED | OUTPATIENT
Start: 2023-04-06 | End: 2023-06-01

## 2023-04-06 NOTE — TELEPHONE ENCOUNTER
Ordered two months supply. Educate him about ramping up. He should use 0.25 mg weekly x 4 weeks followed by 0.5 mg weekly x 4 weeks. He will need the 1 mg dose after this.

## 2023-04-19 ENCOUNTER — TELEPHONE (OUTPATIENT)
Dept: INTERNAL MEDICINE CLINIC | Age: 63
End: 2023-04-19

## 2023-04-21 DIAGNOSIS — G62.9 PERIPHERAL POLYNEUROPATHY: ICD-10-CM

## 2023-04-23 PROBLEM — Z01.818 PREOP EXAMINATION: Status: RESOLVED | Noted: 2023-03-24 | Resolved: 2023-04-23

## 2023-04-25 RX ORDER — PREGABALIN 75 MG/1
CAPSULE ORAL
Qty: 60 CAPSULE | Refills: 2 | Status: SHIPPED | OUTPATIENT
Start: 2023-04-25 | End: 2023-07-24

## 2023-05-08 ENCOUNTER — TELEPHONE (OUTPATIENT)
Dept: INTERNAL MEDICINE CLINIC | Age: 63
End: 2023-05-08

## 2023-05-08 RX ORDER — BLOOD-GLUCOSE METER
EACH MISCELLANEOUS
Qty: 1 KIT | Refills: 0 | Status: SHIPPED | OUTPATIENT
Start: 2023-05-08

## 2023-05-08 RX ORDER — LANCETS 30 GAUGE
EACH MISCELLANEOUS
Qty: 100 EACH | Refills: 5 | Status: SHIPPED | OUTPATIENT
Start: 2023-05-08

## 2023-05-08 NOTE — TELEPHONE ENCOUNTER
Patient called, his Freestyle glucose monitor went out the other day. He cannot use this anymore because he has a pacemaker.     Would Dr. Sylvain Shabazz please refill his order for:    One Touch Ultra 2 glucose monitor

## 2023-05-08 NOTE — TELEPHONE ENCOUNTER
Pt called in and would like to know if Dr. Kelly Guillen wants him to continue the magnesium. If so please call in for him.

## 2023-05-24 ENCOUNTER — TELEPHONE (OUTPATIENT)
Dept: PHARMACY | Facility: CLINIC | Age: 63
End: 2023-05-24

## 2023-05-24 NOTE — TELEPHONE ENCOUNTER
[facility] CLINICAL PHARMACY: ADHERENCE REVIEW  Identified care gap per [place]: fills at [facility]: Statin adherence    Patient also appears to be prescribed: Statin    ASSESSMENT  [address] Records claims through 23 (Prior Year [place] = not reported; YTD Koffi Flores = FIRST FILL; Potential Fail Date: 23):   ATORVASTATIN 40 MG TABLET last filled on 23 for 12 day supply. Next refill due: 23    Prescribed si tablet/capsule daily    Per Reconcile Dispense History: last filled on 23 for 12 day supply. Per [facility]: last picked up on 23 for 30 day supply. will get 90 day supply ready to  since past due. 0.00 copay will be ready for  today     Lab Results   Component Value Date    CHOL 115 2022    TRIG 134 2022    HDL 33 (L) 2022    LDLCALC 55 2022     ALT   Date Value Ref Range Status   2023 54 (H) 10 - 40 U/L Final     AST   Date Value Ref Range Status   2023 43 (H) 15 - 37 U/L Final     The ASCVD Risk score (Tryon DK, et al., 2019) failed to calculate for the following reasons: The patient has a prior MI or stroke diagnosis     PLAN  The following are interventions that have been identified:   Patient overdue refilling ATORVASTATIN 40 MG TABLET  and active on home medication list.     Attempting to reach patient to review. Left message asking for return call.   Called patient to let him know Atorvastatin 40mg will be ready for  today at [address] [facility] [address]     Recent Visits  Date Type Provider Dept   23 Office Visit Catalina Swanson [facility] [address]   23 Office Visit Ros Lu [facility]   22 Office Visit Ghada Whiting [facility]   22 Office Visit Ghada Whiting MD [facility] [place] [address]   Showing recent visits within past 540 days with a meds authorizing provider and meeting all other requirements  Future Appointments  No visits

## 2023-06-01 ENCOUNTER — OFFICE VISIT (OUTPATIENT)
Dept: PAIN MANAGEMENT | Age: 63
End: 2023-06-01
Payer: COMMERCIAL

## 2023-06-01 VITALS
DIASTOLIC BLOOD PRESSURE: 71 MMHG | SYSTOLIC BLOOD PRESSURE: 124 MMHG | HEART RATE: 74 BPM | HEIGHT: 71 IN | WEIGHT: 311 LBS | BODY MASS INDEX: 43.54 KG/M2

## 2023-06-01 DIAGNOSIS — M79.18 MYOFASCIAL PAIN: ICD-10-CM

## 2023-06-01 DIAGNOSIS — M17.0 PRIMARY OSTEOARTHRITIS OF BOTH KNEES: ICD-10-CM

## 2023-06-01 DIAGNOSIS — G89.4 CHRONIC PAIN SYNDROME: ICD-10-CM

## 2023-06-01 DIAGNOSIS — F51.01 PRIMARY INSOMNIA: ICD-10-CM

## 2023-06-01 DIAGNOSIS — T84.84XS PAIN IN PROSTHETIC JOINT, SEQUELA: ICD-10-CM

## 2023-06-01 DIAGNOSIS — F19.10 SUBSTANCE ABUSE (HCC): ICD-10-CM

## 2023-06-01 PROCEDURE — 1123F ACP DISCUSS/DSCN MKR DOCD: CPT | Performed by: INTERNAL MEDICINE

## 2023-06-01 PROCEDURE — 99204 OFFICE O/P NEW MOD 45 MIN: CPT | Performed by: INTERNAL MEDICINE

## 2023-06-01 PROCEDURE — 3074F SYST BP LT 130 MM HG: CPT | Performed by: INTERNAL MEDICINE

## 2023-06-01 PROCEDURE — 1036F TOBACCO NON-USER: CPT | Performed by: INTERNAL MEDICINE

## 2023-06-01 PROCEDURE — G8428 CUR MEDS NOT DOCUMENT: HCPCS | Performed by: INTERNAL MEDICINE

## 2023-06-01 PROCEDURE — G8417 CALC BMI ABV UP PARAM F/U: HCPCS | Performed by: INTERNAL MEDICINE

## 2023-06-01 PROCEDURE — 3078F DIAST BP <80 MM HG: CPT | Performed by: INTERNAL MEDICINE

## 2023-06-01 PROCEDURE — 3017F COLORECTAL CA SCREEN DOC REV: CPT | Performed by: INTERNAL MEDICINE

## 2023-06-01 RX ORDER — DULOXETIN HYDROCHLORIDE 30 MG/1
30 CAPSULE, DELAYED RELEASE ORAL DAILY
Qty: 30 CAPSULE | Refills: 0 | Status: SHIPPED | OUTPATIENT
Start: 2023-06-01

## 2023-06-01 RX ORDER — PREGABALIN 75 MG/1
CAPSULE ORAL
Qty: 90 CAPSULE | Refills: 0 | Status: SHIPPED | OUTPATIENT
Start: 2023-06-01 | End: 2023-07-01

## 2023-06-01 RX ORDER — BUPRENORPHINE 10 UG/H
1 PATCH TRANSDERMAL WEEKLY
Qty: 4 PATCH | Refills: 0 | Status: SHIPPED | OUTPATIENT
Start: 2023-06-01 | End: 2023-06-29

## 2023-06-01 RX ORDER — AMOXICILLIN 500 MG/1
500 CAPSULE ORAL 2 TIMES DAILY
COMMUNITY
Start: 2023-05-11

## 2023-06-01 RX ORDER — BISACODYL 10 MG
10 SUPPOSITORY, RECTAL RECTAL DAILY PRN
COMMUNITY
Start: 2023-04-10

## 2023-06-01 RX ORDER — AMITRIPTYLINE HYDROCHLORIDE 25 MG/1
25-50 TABLET, FILM COATED ORAL NIGHTLY
Qty: 60 TABLET | Refills: 0 | Status: SHIPPED | OUTPATIENT
Start: 2023-06-01

## 2023-06-12 ENCOUNTER — TELEPHONE (OUTPATIENT)
Dept: PAIN MANAGEMENT | Age: 63
End: 2023-06-12

## 2023-06-12 NOTE — TELEPHONE ENCOUNTER
Patient called and stated the Buprenorphine medication that [facility] prescribed him is causing him to feel woozy and unsteady. Patient stated that the medication doesn't help the pain much. Patient stated he put the patch on Sunday 06/4 and started to feel this way on Tuesday 06/06. People were asking him if he is okay or if he is on drugs due to the side effects of the medication. Patient stated that he has been having a medicine taste in his mouth constantly, his speech has been impaired and slurring, and   feels like he is sitting out in space     Patient stated he took patch off Friday afternoon 06/09 and by Saturday night he started feeling like himself again. Patient stated by today he feels normal again . Kisha Warner normal is full of pain. Patient is requesting a callback.

## 2023-06-12 NOTE — TELEPHONE ENCOUNTER
Per [facility] stated that he can stop taking the butrans patches but has giving the option of taking the belbuka patches instead of the butrans. Called the patient and he wants to take the belbuka patches 5mg and he will be bringing back the butrans today. Please be advised.

## 2023-06-27 RX ORDER — METOPROLOL SUCCINATE 25 MG/1
TABLET, EXTENDED RELEASE ORAL
Qty: 90 TABLET | Refills: 0 | Status: SHIPPED | OUTPATIENT
Start: 2023-06-27

## 2023-06-30 ENCOUNTER — HOSPITAL ENCOUNTER (EMERGENCY)
Age: 63
Discharge: HOME | End: 2023-06-30
Payer: MEDICARE

## 2023-06-30 ENCOUNTER — APPOINTMENT (OUTPATIENT)
Dept: GENERAL RADIOLOGY | Age: 63
End: 2023-06-30
Payer: MEDICARE

## 2023-06-30 VITALS
BODY MASS INDEX: 42 KG/M2 | SYSTOLIC BLOOD PRESSURE: 125 MMHG | DIASTOLIC BLOOD PRESSURE: 82 MMHG | HEART RATE: 86 BPM | WEIGHT: 300 LBS | RESPIRATION RATE: 16 BRPM | TEMPERATURE: 98.8 F | OXYGEN SATURATION: 97 % | HEIGHT: 71 IN
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
## 2023-06-30 PROCEDURE — 6370000000 HC RX 637 (ALT 250 FOR IP): Performed by: PHYSICIAN ASSISTANT

## 2023-06-30 PROCEDURE — 73030 X-RAY EXAM OF SHOULDER: CPT

## 2023-06-30 PROCEDURE — 73560 X-RAY EXAM OF KNEE 1 OR 2: CPT

## 2023-06-30 PROCEDURE — 99283 EMERGENCY DEPT VISIT LOW MDM: CPT

## 2023-06-30 RX ORDER — METHOCARBAMOL 500 MG/1
500 TABLET, FILM COATED ORAL 3 TIMES DAILY PRN
Qty: 20 TABLET | Refills: 0 | Status: SHIPPED | OUTPATIENT
Start: 2023-06-30

## 2023-06-30 RX ORDER — LIDOCAINE 50 MG/G
1 PATCH TOPICAL DAILY
Qty: 10 PATCH | Refills: 0 | Status: SHIPPED | OUTPATIENT
Start: 2023-06-30 | End: 2023-07-10

## 2023-06-30 RX ORDER — LIDOCAINE 4 G/G
2 PATCH TOPICAL DAILY
Status: DISCONTINUED | OUTPATIENT
Start: 2023-06-30 | End: 2023-06-30 | Stop reason: HOSPADM

## 2023-06-30 RX ORDER — ACETAMINOPHEN 325 MG/1
650 TABLET ORAL ONCE
Status: COMPLETED | OUTPATIENT
Start: 2023-06-30 | End: 2023-06-30

## 2023-06-30 RX ORDER — CYCLOBENZAPRINE HCL 10 MG
10 TABLET ORAL ONCE
Status: COMPLETED | OUTPATIENT
Start: 2023-06-30 | End: 2023-06-30

## 2023-06-30 RX ADMIN — ACETAMINOPHEN 650 MG: 325 TABLET ORAL at 11:36

## 2023-06-30 RX ADMIN — CYCLOBENZAPRINE 10 MG: 10 TABLET, FILM COATED ORAL at 11:36

## 2023-06-30 ASSESSMENT — ENCOUNTER SYMPTOMS
SORE THROAT: 0
DIARRHEA: 0
SHORTNESS OF BREATH: 0
NAUSEA: 0
VOMITING: 0
ABDOMINAL PAIN: 0
CONSTIPATION: 0
BACK PAIN: 0

## 2023-07-01 DIAGNOSIS — Z79.4 TYPE 2 DIABETES MELLITUS WITHOUT COMPLICATION, WITH LONG-TERM CURRENT USE OF INSULIN (HCC): ICD-10-CM

## 2023-07-01 DIAGNOSIS — E11.9 TYPE 2 DIABETES MELLITUS WITHOUT COMPLICATION, WITH LONG-TERM CURRENT USE OF INSULIN (HCC): ICD-10-CM

## 2023-07-03 RX ORDER — SEMAGLUTIDE 0.68 MG/ML
INJECTION, SOLUTION SUBCUTANEOUS
Qty: 3 ML | Refills: 0 | Status: SHIPPED | OUTPATIENT
Start: 2023-07-03

## 2023-07-08 DIAGNOSIS — G89.4 CHRONIC PAIN SYNDROME: ICD-10-CM

## 2023-07-08 DIAGNOSIS — F19.10 SUBSTANCE ABUSE (HCC): ICD-10-CM

## 2023-07-08 DIAGNOSIS — M79.18 MYOFASCIAL PAIN: ICD-10-CM

## 2023-07-10 RX ORDER — DAPAGLIFLOZIN 10 MG/1
TABLET, FILM COATED ORAL
Qty: 30 TABLET | Refills: 5 | Status: SHIPPED | OUTPATIENT
Start: 2023-07-10

## 2023-07-31 DIAGNOSIS — E11.9 TYPE 2 DIABETES MELLITUS WITHOUT COMPLICATION, WITH LONG-TERM CURRENT USE OF INSULIN (HCC): ICD-10-CM

## 2023-07-31 DIAGNOSIS — Z79.4 TYPE 2 DIABETES MELLITUS WITHOUT COMPLICATION, WITH LONG-TERM CURRENT USE OF INSULIN (HCC): ICD-10-CM

## 2023-07-31 RX ORDER — SEMAGLUTIDE 1.34 MG/ML
INJECTION, SOLUTION SUBCUTANEOUS
Qty: 6 ADJUSTABLE DOSE PRE-FILLED PEN SYRINGE | Refills: 0 | Status: SHIPPED | OUTPATIENT
Start: 2023-07-31 | End: 2023-09-07

## 2023-07-31 RX ORDER — SEMAGLUTIDE 0.68 MG/ML
INJECTION, SOLUTION SUBCUTANEOUS
Qty: 3 ML | Refills: 0 | OUTPATIENT
Start: 2023-07-31

## 2023-08-04 RX ORDER — DULOXETIN HYDROCHLORIDE 30 MG/1
CAPSULE, DELAYED RELEASE ORAL
Qty: 30 CAPSULE | Refills: 0 | OUTPATIENT
Start: 2023-08-04

## 2023-08-23 ENCOUNTER — TELEPHONE (OUTPATIENT)
Dept: INTERNAL MEDICINE CLINIC | Age: 63
End: 2023-08-23

## 2023-08-23 DIAGNOSIS — E11.69 TYPE 2 DIABETES MELLITUS WITH OTHER SPECIFIED COMPLICATION, WITHOUT LONG-TERM CURRENT USE OF INSULIN (HCC): ICD-10-CM

## 2023-08-23 NOTE — TELEPHONE ENCOUNTER
I spoke with  Mercedes Velasquez and let him know that we didn't get anything from his pharmacy. I did send the request to Dr Bert Shaw.

## 2023-08-23 NOTE — TELEPHONE ENCOUNTER
Patient states he went to the pharmacy to get his test strips and other medications and the pharmacy told him our office denied his one touch test strips and he would like to know why?     Please call and advise [phone]

## 2023-09-07 RX ORDER — SEMAGLUTIDE 1.34 MG/ML
2 INJECTION, SOLUTION SUBCUTANEOUS WEEKLY
Qty: 6 ML | Refills: 5 | Status: SHIPPED | OUTPATIENT
Start: 2023-09-07 | End: 2023-10-24 | Stop reason: SDUPTHER

## 2023-10-12 ENCOUNTER — TELEPHONE (OUTPATIENT)
Dept: INTERNAL MEDICINE CLINIC | Age: 63
End: 2023-10-12

## 2023-10-12 NOTE — TELEPHONE ENCOUNTER
----- Message from Lucia Klein sent at 10/12/2023  9:42 AM EDT -----  Subject: Medication Problem    Medication: Semaglutide, 1 MG/DOSE, (OZEMPIC, 1 MG/DOSE,) 4 MG/3ML SOPN  Dosage: Inject 2 mg into the skin once a week  Ordering Provider: John Solano    Question/Problem: Pt has 1 more wk left on this med and he has tried to   get it refilled. Pt has called his preferred pharmacy and also other   pharmacies and they are all out and it is on back order. Pt would like to   know is there any suggestions on what to do once he takes his last   injection?  Pt has tried [id number] [place] [address], and other places   all on back order      Pharmacy: [facility] - [address], [facility] [address] Wayne Akash [phone]    ---------------------------------------------------------------------------  --------------  Hollis ABBOTT  [phone]; OK to leave message on voicemail,Do not leave any message,   patient will call back for answer,OK to respond with electronic message   via [employer] portal (only for patients who have registered [employer] account)  ---------------------------------------------------------------------------  --------------    SCRIPT ANSWERS  Relationship to Patient: Self

## 2023-10-24 ENCOUNTER — TELEPHONE (OUTPATIENT)
Dept: INTERNAL MEDICINE CLINIC | Age: 63
End: 2023-10-24

## 2023-10-24 ENCOUNTER — OFFICE VISIT (OUTPATIENT)
Dept: INTERNAL MEDICINE CLINIC | Age: 63
End: 2023-10-24
Payer: MEDICARE

## 2023-10-24 VITALS
HEART RATE: 70 BPM | TEMPERATURE: 98.1 F | WEIGHT: 294 LBS | OXYGEN SATURATION: 98 % | SYSTOLIC BLOOD PRESSURE: 138 MMHG | DIASTOLIC BLOOD PRESSURE: 64 MMHG | BODY MASS INDEX: 41.16 KG/M2 | HEIGHT: 71 IN

## 2023-10-24 DIAGNOSIS — E66.01 OBESITY, CLASS III, BMI 40-49.9 (MORBID OBESITY) (HCC): ICD-10-CM

## 2023-10-24 DIAGNOSIS — M79.18 MYOFASCIAL PAIN: ICD-10-CM

## 2023-10-24 DIAGNOSIS — E78.2 MIXED HYPERLIPIDEMIA: ICD-10-CM

## 2023-10-24 DIAGNOSIS — E11.69 TYPE 2 DIABETES MELLITUS WITH OTHER SPECIFIED COMPLICATION, WITHOUT LONG-TERM CURRENT USE OF INSULIN (HCC): Chronic | ICD-10-CM

## 2023-10-24 DIAGNOSIS — F19.10 SUBSTANCE ABUSE (HCC): ICD-10-CM

## 2023-10-24 DIAGNOSIS — G89.4 CHRONIC PAIN SYNDROME: ICD-10-CM

## 2023-10-24 DIAGNOSIS — Z00.00 WELCOME TO MEDICARE PREVENTIVE VISIT: Primary | ICD-10-CM

## 2023-10-24 DIAGNOSIS — I48.20 CHRONIC ATRIAL FIBRILLATION, UNSPECIFIED (HCC): ICD-10-CM

## 2023-10-24 DIAGNOSIS — M35.01 SJOGREN'S SYNDROME WITH KERATOCONJUNCTIVITIS SICCA (HCC): ICD-10-CM

## 2023-10-24 DIAGNOSIS — I50.22 CHRONIC SYSTOLIC (CONGESTIVE) HEART FAILURE (HCC): ICD-10-CM

## 2023-10-24 DIAGNOSIS — Z00.00 PREVENTATIVE HEALTH CARE: ICD-10-CM

## 2023-10-24 DIAGNOSIS — I77.819 AORTIC DILATATION (HCC): ICD-10-CM

## 2023-10-24 PROCEDURE — 3075F SYST BP GE 130 - 139MM HG: CPT | Performed by: HOSPITALIST

## 2023-10-24 PROCEDURE — 1123F ACP DISCUSS/DSCN MKR DOCD: CPT | Performed by: HOSPITALIST

## 2023-10-24 PROCEDURE — 3078F DIAST BP <80 MM HG: CPT | Performed by: HOSPITALIST

## 2023-10-24 PROCEDURE — G0402 INITIAL PREVENTIVE EXAM: HCPCS | Performed by: HOSPITALIST

## 2023-10-24 PROCEDURE — 3051F HG A1C>EQUAL 7.0%<8.0%: CPT | Performed by: HOSPITALIST

## 2023-10-24 PROCEDURE — 99214 OFFICE O/P EST MOD 30 MIN: CPT | Performed by: HOSPITALIST

## 2023-10-24 RX ORDER — SEMAGLUTIDE 1.34 MG/ML
2 INJECTION, SOLUTION SUBCUTANEOUS WEEKLY
Qty: 6 ML | Refills: 5 | Status: SHIPPED | OUTPATIENT
Start: 2023-10-24

## 2023-10-24 RX ORDER — PREGABALIN 75 MG/1
75 CAPSULE ORAL 2 TIMES DAILY
Qty: 180 CAPSULE | Refills: 1 | Status: SHIPPED | OUTPATIENT
Start: 2023-10-24 | End: 2024-04-21

## 2023-10-24 RX ORDER — ATORVASTATIN CALCIUM 40 MG/1
40 TABLET, FILM COATED ORAL DAILY
Qty: 30 TABLET | Refills: 3 | Status: SHIPPED | OUTPATIENT
Start: 2023-10-24

## 2023-10-24 RX ORDER — METOPROLOL SUCCINATE 25 MG/1
TABLET, EXTENDED RELEASE ORAL
Qty: 90 TABLET | Refills: 1 | Status: SHIPPED | OUTPATIENT
Start: 2023-10-24

## 2023-10-24 RX ORDER — DAPAGLIFLOZIN 10 MG/1
10 TABLET, FILM COATED ORAL EVERY MORNING
Qty: 30 TABLET | Refills: 5 | Status: SHIPPED | OUTPATIENT
Start: 2023-10-24

## 2023-10-24 ASSESSMENT — PATIENT HEALTH QUESTIONNAIRE - PHQ9
SUM OF ALL RESPONSES TO PHQ QUESTIONS 1-9: 2
SUM OF ALL RESPONSES TO PHQ QUESTIONS 1-9: 2
1. LITTLE INTEREST OR PLEASURE IN DOING THINGS: 1
2. FEELING DOWN, DEPRESSED OR HOPELESS: 1
SUM OF ALL RESPONSES TO PHQ QUESTIONS 1-9: 2
SUM OF ALL RESPONSES TO PHQ QUESTIONS 1-9: 2
SUM OF ALL RESPONSES TO PHQ9 QUESTIONS 1 & 2: 2

## 2023-10-24 ASSESSMENT — LIFESTYLE VARIABLES
HOW OFTEN DO YOU HAVE A DRINK CONTAINING ALCOHOL: MONTHLY OR LESS
HOW MANY STANDARD DRINKS CONTAINING ALCOHOL DO YOU HAVE ON A TYPICAL DAY: PATIENT DOES NOT DRINK

## 2023-10-24 NOTE — PROGRESS NOTES
Medicare Annual Wellness Visit    Leatha Otto is here for Medicare AWV    Assessment & Plan   Type 2 diabetes mellitus with other specified complication, without long-term current use of insulin (HCC)  -     Semaglutide, 1 MG/DOSE, (OZEMPIC, 1 MG/DOSE,) 4 MG/3ML SOPN; Inject 2 mg into the skin once a week, Disp-6 mL, R-5Normal  -     FARXIGA 10 MG tablet; Take 1 tablet by mouth every morning, Disp-30 tablet, R-5, DAWNormal  -     Diabetic Foot Exam  -     Hemoglobin A1C; Future  -     Comprehensive Metabolic Panel; Future  -     MICROALBUMIN / CREATININE URINE RATIO; Future  Chronic pain syndrome  -     pregabalin (LYRICA) 75 MG capsule; Take 1 capsule by mouth 2 times daily for 180 days. One tab hs 1 week, 2 tabs hs 1 week, 1 tab am 2 tabs pm Max Daily Amount: 150 mg, Disp-180 capsule, R-1Normal  Substance abuse (HCC)  -     pregabalin (LYRICA) 75 MG capsule; Take 1 capsule by mouth 2 times daily for 180 days. One tab hs 1 week, 2 tabs hs 1 week, 1 tab am 2 tabs pm Max Daily Amount: 150 mg, Disp-180 capsule, R-1Normal  Myofascial pain  -     pregabalin (LYRICA) 75 MG capsule; Take 1 capsule by mouth 2 times daily for 180 days. One tab hs 1 week, 2 tabs hs 1 week, 1 tab am 2 tabs pm Max Daily Amount: 150 mg, Disp-180 capsule, R-1Normal  Obesity, Class III, BMI 40-49.9 (morbid obesity) ([facility])  Sjogren's syndrome with keratoconjunctivitis sicca (HCC)  Aortic dilatation (HCC)  Chronic atrial fibrillation, unspecified (HCC)  Chronic systolic (congestive) heart failure  -     metoprolol succinate (TOPROL XL) 25 MG extended release tablet; TAKE 1 TABLET BY MOUTH ONCE DAILY, Disp-90 tablet, R-1Normal  -     FARXIGA 10 MG tablet; Take 1 tablet by mouth every morning, Disp-30 tablet, R-5, DAWNormal  Mixed hyperlipidemia  -     atorvastatin (LIPITOR) 40 MG tablet; Take 1 tablet by mouth daily, Disp-30 tablet, R-3Normal  -     Lipid, Fasting; Future  -     Comprehensive Metabolic Panel;  Future  Preventative health care  -

## 2023-10-24 NOTE — TELEPHONE ENCOUNTER
Douglas Mathis from [address] [place] [address] is calling to get clarification on the medication directions listed below. Douglas Mathis [phone]      pregabalin (LYRICA) 75 MG capsule [[id number]]    Take 1 capsule by mouth 2 times daily for 180 days.  One tab hs 1 week, 2 tabs hs 1 week, 1 tab am 2 tabs pm Max Daily Amount: 150 mg

## 2023-12-13 ENCOUNTER — TELEPHONE (OUTPATIENT)
Dept: INTERNAL MEDICINE CLINIC | Age: 63
End: 2023-12-13

## 2023-12-13 NOTE — TELEPHONE ENCOUNTER
Patient was told to contact his PCP to request a letter from Dr. Reed that explains to [facility] why he needs the extra help he needs. States the ins is going to reduce his home health aide from 14hrs to 3hrs and he needs that help. Patient is asking Dr. Reed what he can do to fix this or help him.     Please call and advise [phone]

## 2023-12-15 NOTE — TELEPHONE ENCOUNTER
Patient is just following up on his request from 12/13/23.    He has since spoke with Scott and has additional information for us.    Please call patient back.  [phone]

## 2024-01-24 ENCOUNTER — TELEPHONE (OUTPATIENT)
Dept: INTERNAL MEDICINE CLINIC | Age: 64
End: 2024-01-24

## 2024-01-24 NOTE — TELEPHONE ENCOUNTER
Laine from [facility] is calling to let dr. Reed know that she is working with the pt.      Laine [phone]

## 2024-01-29 RX ORDER — TADALAFIL 5 MG/1
5 TABLET ORAL DAILY
Qty: 30 TABLET | Refills: 2 | Status: SHIPPED | OUTPATIENT
Start: 2024-01-29

## 2024-01-29 NOTE — TELEPHONE ENCOUNTER
REFILL:    Semaglutide, 1 MG/DOSE, (OZEMPIC, 1 MG/DOSE,) 4 MG/3ML SOPN     He said the pharmacy is out of this medication again and he is asking if we have any samples.      His urologist used to prescribe his Cialis.  He is no longer seeing her.  He is asking if Dr. Reed would prescribe this for him??    tadalafil (CIALIS) 5 MG tablet     Please call and advise    [facility] [address] [place] [address] RD - P [phone] - F [phone]

## 2024-02-01 RX ORDER — INSULIN GLARGINE 100 [IU]/ML
INJECTION, SOLUTION SUBCUTANEOUS
Qty: 15 ML | Refills: 0 | Status: SHIPPED | OUTPATIENT
Start: 2024-02-01

## 2024-02-02 DIAGNOSIS — E78.2 MIXED HYPERLIPIDEMIA: ICD-10-CM

## 2024-02-03 RX ORDER — ATORVASTATIN CALCIUM 40 MG/1
40 TABLET, FILM COATED ORAL DAILY
Qty: 90 TABLET | Refills: 1 | Status: SHIPPED | OUTPATIENT
Start: 2024-02-03

## 2024-03-22 ENCOUNTER — TELEPHONE (OUTPATIENT)
Dept: INTERNAL MEDICINE CLINIC | Age: 64
End: 2024-03-22

## 2024-03-22 NOTE — TELEPHONE ENCOUNTER
Leola from [facility] is requesting to speak with MA about the pts medication list. Pt is telling them that his medications are not the same what the home care people have and she needs clarifications to the correct medications.      Please call and advise [phone]

## 2024-04-16 LAB
ALBUMIN SERPL-MCNC: 3.7 G/DL (ref 3.5–5)
ESTIMATED AVERAGE GLUCOSE: 120 MG/DL (ref 68–114)
HBA1C MFR BLD: 5.8 % (ref 4–5.6)

## 2024-04-19 ENCOUNTER — OFFICE VISIT (OUTPATIENT)
Dept: INTERNAL MEDICINE CLINIC | Age: 64
End: 2024-04-19

## 2024-04-19 VITALS
HEIGHT: 71 IN | WEIGHT: 294 LBS | BODY MASS INDEX: 41.16 KG/M2 | DIASTOLIC BLOOD PRESSURE: 80 MMHG | OXYGEN SATURATION: 95 % | HEART RATE: 76 BPM | TEMPERATURE: 98.1 F | SYSTOLIC BLOOD PRESSURE: 138 MMHG

## 2024-04-19 DIAGNOSIS — Z79.4 TYPE 2 DIABETES MELLITUS WITH OTHER SPECIFIED COMPLICATION, WITH LONG-TERM CURRENT USE OF INSULIN (HCC): ICD-10-CM

## 2024-04-19 DIAGNOSIS — M35.01 SJOGREN'S SYNDROME WITH KERATOCONJUNCTIVITIS SICCA (HCC): ICD-10-CM

## 2024-04-19 DIAGNOSIS — I48.0 PAROXYSMAL ATRIAL FIBRILLATION (HCC): ICD-10-CM

## 2024-04-19 DIAGNOSIS — E11.69 TYPE 2 DIABETES MELLITUS WITH OTHER SPECIFIED COMPLICATION, WITH LONG-TERM CURRENT USE OF INSULIN (HCC): ICD-10-CM

## 2024-04-19 DIAGNOSIS — I50.22 CHRONIC SYSTOLIC (CONGESTIVE) HEART FAILURE (HCC): ICD-10-CM

## 2024-04-19 DIAGNOSIS — Z01.818 PRE-OP EXAMINATION: Primary | ICD-10-CM

## 2024-04-19 DIAGNOSIS — Z86.718 HX OF DEEP VENOUS THROMBOSIS: ICD-10-CM

## 2024-04-19 SDOH — ECONOMIC STABILITY: FOOD INSECURITY: WITHIN THE PAST 12 MONTHS, YOU WORRIED THAT YOUR FOOD WOULD RUN OUT BEFORE YOU GOT MONEY TO BUY MORE.: NEVER TRUE

## 2024-04-19 SDOH — ECONOMIC STABILITY: FOOD INSECURITY: WITHIN THE PAST 12 MONTHS, THE FOOD YOU BOUGHT JUST DIDN'T LAST AND YOU DIDN'T HAVE MONEY TO GET MORE.: NEVER TRUE

## 2024-04-19 SDOH — ECONOMIC STABILITY: INCOME INSECURITY: HOW HARD IS IT FOR YOU TO PAY FOR THE VERY BASICS LIKE FOOD, HOUSING, MEDICAL CARE, AND HEATING?: NOT HARD AT ALL

## 2024-04-19 ASSESSMENT — ENCOUNTER SYMPTOMS
VOMITING: 0
SHORTNESS OF BREATH: 1
DIARRHEA: 0
SORE THROAT: 0
ABDOMINAL PAIN: 0
CONSTIPATION: 0
NAUSEA: 0

## 2024-04-19 ASSESSMENT — PATIENT HEALTH QUESTIONNAIRE - PHQ9
2. FEELING DOWN, DEPRESSED OR HOPELESS: NOT AT ALL
1. LITTLE INTEREST OR PLEASURE IN DOING THINGS: NOT AT ALL
SUM OF ALL RESPONSES TO PHQ QUESTIONS 1-9: 0
SUM OF ALL RESPONSES TO PHQ9 QUESTIONS 1 & 2: 0
SUM OF ALL RESPONSES TO PHQ QUESTIONS 1-9: 0

## 2024-04-19 NOTE — ASSESSMENT & PLAN NOTE
This problem is well-controlled on current regimen.  - Continue Farxiga  - Patient does not take Lantus on a regular basis, he takes it as needed based on his blood sugars which he checks every morning  - Continue atorvastatin

## 2024-04-19 NOTE — PATIENT INSTRUCTIONS
Xarelto- hold per cardiology recs  Aspirin - hold per cardiology recs   Sulfasalazine- ask rheumatology what they would like you to do with this medication  Lipitor: Continue taking up to and including day of procedure  Lasix: Do not take on morning of (or night before) procedure, may resume after procedure.   Spironolactone: Do not take on morning of (or night before) procedure, may resume after procedure.   Amitriptyline: Continue taking up to and including day of procedure.   Cymbalta: Continue taking up to and including day of procedure.   Farxiga: Stop 4 days before surgery  Semaglutide: Hold 1 week prior to surgery  Metoprolol: Continue taking up to and including day of procedure.  Protonix: Continue taking up to and including day of procedure.  Lantus: Reduce basal insulin dose to 13 U nightly lantus the night before procedure Do not take prandial insulin on the morning of the procedure. May resume normal dosing after the procedure.  Lyrica: ok to continue  Cialis: Stop taking 2 days before surgery  Flomax: Ok to continue

## 2024-04-19 NOTE — ASSESSMENT & PLAN NOTE
Follows with cardiology for this problem.  Has not had to be hospitalized for exacerbation since CardioMEMS placement.  - Continue Lasix, spironolactone, metoprolol, Lipitor  - Cardiac clearance already obtained

## 2024-04-19 NOTE — ASSESSMENT & PLAN NOTE
Follows with rheumatology (Dr. Stratton). Current Meds: IVIG infusions q 4 weeks, sulfasalazine 2 g a day.  Disease is stable at this time.  - Rheumatology told patent to stop sulfasalazine 3 days before surgery- they are aware of surgery and OK with timing

## 2024-04-19 NOTE — ASSESSMENT & PLAN NOTE
Patient has had multiple DVTs, both upper and lower extremity.  - Continue Xarelto  - Early ambulation

## 2024-04-19 NOTE — PROGRESS NOTES
shortness of breath.  Follows with cardiology for this problem.  Has not had to be hospitalized for exacerbation since CardioMEMS placement.  - Continue Lasix, spironolactone, metoprolol, Lipitor  - Cardiac clearance already obtained    Diabetes mellitus (HCC)   This problem is well-controlled on current regimen.  - Continue Farxiga  - Patient does not take Lantus on a regular basis, he takes it as needed based on his blood sugars which he checks every morning  - Continue atorvastatin    Hx of deep venous thrombosis   Patient has had multiple DVTs, both upper and lower extremity.  - Continue Xarelto  - Early ambulation    Paroxysmal atrial fibrillation (HCC)  Patient follows with cardiology.  Normal sinus rhythm.  - Continue Xarelto  - Continue metoprolol     ======================  PRE-OP MEDICATION INSTRUCTIONS:    Xarelto- hold per cardiology recs  Aspirin - hold per cardiology recs   Lipitor: Continue taking up to and including day of procedure  Lasix: Do not take on morning of (or night before) procedure, may resume after procedure.   Spironolactone: Do not take on morning of (or night before) procedure, may resume after procedure.   Amitriptyline: Continue taking up to and including day of procedure.   Cymbalta: Continue taking up to and including day of procedure.   Farxiga: Stop 4 days before surgery  Semaglutide: Hold 1 week prior to surgery  Metoprolol: Continue taking up to and including day of procedure.  Protonix: Continue taking up to and including day of procedure.  Lantus: Reduce basal insulin dose to 13 U nightly lantus the night before procedure  Lyrica: ok to continue  Cialis: Stop taking 2 days before surgery  Flomax: Ok to continue

## 2024-04-29 RX ORDER — INSULIN GLARGINE 100 [IU]/ML
INJECTION, SOLUTION SUBCUTANEOUS
Qty: 15 ML | Refills: 0 | Status: SHIPPED | OUTPATIENT
Start: 2024-04-29

## 2024-08-22 DIAGNOSIS — I50.22 CHRONIC SYSTOLIC (CONGESTIVE) HEART FAILURE (HCC): ICD-10-CM

## 2024-08-22 DIAGNOSIS — E11.69 TYPE 2 DIABETES MELLITUS WITH OTHER SPECIFIED COMPLICATION, WITHOUT LONG-TERM CURRENT USE OF INSULIN (HCC): Chronic | ICD-10-CM

## 2024-08-22 RX ORDER — DAPAGLIFLOZIN 10 MG/1
10 TABLET, FILM COATED ORAL EVERY MORNING
Qty: 90 TABLET | Refills: 0 | Status: SHIPPED | OUTPATIENT
Start: 2024-08-22

## 2024-09-27 ENCOUNTER — TELEPHONE (OUTPATIENT)
Dept: INTERNAL MEDICINE CLINIC | Age: 64
End: 2024-09-27

## 2024-09-30 NOTE — TELEPHONE ENCOUNTER
I have not seen the patient in close to a year.  He needs to schedule a Medicare annual wellness visit with me at the next available.  The last labs were not ordered by me (the ones done on 9/16) I would have to discuss with him to figure out what is going on.  There are multiple abnormalities.

## 2024-10-02 ENCOUNTER — OFFICE VISIT (OUTPATIENT)
Dept: INTERNAL MEDICINE CLINIC | Age: 64
End: 2024-10-02

## 2024-10-02 VITALS
DIASTOLIC BLOOD PRESSURE: 68 MMHG | TEMPERATURE: 97.5 F | SYSTOLIC BLOOD PRESSURE: 122 MMHG | BODY MASS INDEX: 41.79 KG/M2 | WEIGHT: 299.6 LBS

## 2024-10-02 DIAGNOSIS — E11.69 TYPE 2 DIABETES MELLITUS WITH OTHER SPECIFIED COMPLICATION, WITHOUT LONG-TERM CURRENT USE OF INSULIN (HCC): Chronic | ICD-10-CM

## 2024-10-02 DIAGNOSIS — I50.22 CHRONIC SYSTOLIC (CONGESTIVE) HEART FAILURE (HCC): ICD-10-CM

## 2024-10-02 DIAGNOSIS — Z00.00 INITIAL MEDICARE ANNUAL WELLNESS VISIT: Primary | ICD-10-CM

## 2024-10-02 DIAGNOSIS — I50.43 ACUTE ON CHRONIC COMBINED SYSTOLIC AND DIASTOLIC CONGESTIVE HEART FAILURE (HCC): ICD-10-CM

## 2024-10-02 DIAGNOSIS — Z23 NEED FOR PNEUMOCOCCAL VACCINATION: ICD-10-CM

## 2024-10-02 DIAGNOSIS — E78.2 MIXED HYPERLIPIDEMIA: ICD-10-CM

## 2024-10-02 DIAGNOSIS — E66.01 OBESITY, CLASS III, BMI 40-49.9 (MORBID OBESITY): ICD-10-CM

## 2024-10-02 DIAGNOSIS — I77.819 AORTIC DILATATION (HCC): ICD-10-CM

## 2024-10-02 DIAGNOSIS — Z23 NEED FOR INFLUENZA VACCINATION: ICD-10-CM

## 2024-10-02 DIAGNOSIS — F19.10 SUBSTANCE ABUSE (HCC): ICD-10-CM

## 2024-10-02 RX ORDER — METOLAZONE 5 MG/1
5 TABLET ORAL PRN
Qty: 15 TABLET | Refills: 3 | Status: SHIPPED | OUTPATIENT
Start: 2024-10-02

## 2024-10-02 RX ORDER — DAPAGLIFLOZIN 10 MG/1
10 TABLET, FILM COATED ORAL EVERY MORNING
Qty: 90 TABLET | Refills: 0 | Status: SHIPPED | OUTPATIENT
Start: 2024-10-02

## 2024-10-02 RX ORDER — FUROSEMIDE 80 MG
80 TABLET ORAL 2 TIMES DAILY
Qty: 180 TABLET | Refills: 1 | Status: SHIPPED | OUTPATIENT
Start: 2024-10-02 | End: 2025-03-31

## 2024-10-02 RX ORDER — ATORVASTATIN CALCIUM 40 MG/1
40 TABLET, FILM COATED ORAL DAILY
Qty: 90 TABLET | Refills: 1 | Status: SHIPPED | OUTPATIENT
Start: 2024-10-02

## 2024-10-02 RX ORDER — METOPROLOL SUCCINATE 25 MG/1
TABLET, EXTENDED RELEASE ORAL
Qty: 90 TABLET | Refills: 1 | Status: SHIPPED | OUTPATIENT
Start: 2024-10-02

## 2024-10-02 RX ORDER — SEMAGLUTIDE 1.34 MG/ML
2 INJECTION, SOLUTION SUBCUTANEOUS WEEKLY
Qty: 6 ML | Refills: 5 | Status: SHIPPED | OUTPATIENT
Start: 2024-10-02

## 2024-10-02 SDOH — HEALTH STABILITY: PHYSICAL HEALTH
ON AVERAGE, HOW MANY DAYS PER WEEK DO YOU ENGAGE IN MODERATE TO STRENUOUS EXERCISE (LIKE A BRISK WALK)?: PATIENT DECLINED

## 2024-10-02 ASSESSMENT — PATIENT HEALTH QUESTIONNAIRE - PHQ9
8. MOVING OR SPEAKING SO SLOWLY THAT OTHER PEOPLE COULD HAVE NOTICED. OR THE OPPOSITE, BEING SO FIGETY OR RESTLESS THAT YOU HAVE BEEN MOVING AROUND A LOT MORE THAN USUAL: SEVERAL DAYS
1. LITTLE INTEREST OR PLEASURE IN DOING THINGS: MORE THAN HALF THE DAYS
9. THOUGHTS THAT YOU WOULD BE BETTER OFF DEAD, OR OF HURTING YOURSELF: NOT AT ALL
5. POOR APPETITE OR OVEREATING: SEVERAL DAYS
2. FEELING DOWN, DEPRESSED OR HOPELESS: SEVERAL DAYS
SUM OF ALL RESPONSES TO PHQ9 QUESTIONS 1 & 2: 3
3. TROUBLE FALLING OR STAYING ASLEEP: MORE THAN HALF THE DAYS
SUM OF ALL RESPONSES TO PHQ QUESTIONS 1-9: 10
SUM OF ALL RESPONSES TO PHQ QUESTIONS 1-9: 10
10. IF YOU CHECKED OFF ANY PROBLEMS, HOW DIFFICULT HAVE THESE PROBLEMS MADE IT FOR YOU TO DO YOUR WORK, TAKE CARE OF THINGS AT HOME, OR GET ALONG WITH OTHER PEOPLE: SOMEWHAT DIFFICULT
4. FEELING TIRED OR HAVING LITTLE ENERGY: NEARLY EVERY DAY
7. TROUBLE CONCENTRATING ON THINGS, SUCH AS READING THE NEWSPAPER OR WATCHING TELEVISION: NOT AT ALL
SUM OF ALL RESPONSES TO PHQ QUESTIONS 1-9: 10
6. FEELING BAD ABOUT YOURSELF - OR THAT YOU ARE A FAILURE OR HAVE LET YOURSELF OR YOUR FAMILY DOWN: NOT AT ALL
SUM OF ALL RESPONSES TO PHQ QUESTIONS 1-9: 10

## 2024-10-02 ASSESSMENT — LIFESTYLE VARIABLES
HOW MANY STANDARD DRINKS CONTAINING ALCOHOL DO YOU HAVE ON A TYPICAL DAY: 1 OR 2
HOW MANY STANDARD DRINKS CONTAINING ALCOHOL DO YOU HAVE ON A TYPICAL DAY: 1
HOW OFTEN DO YOU HAVE SIX OR MORE DRINKS ON ONE OCCASION: 1
HOW OFTEN DO YOU HAVE A DRINK CONTAINING ALCOHOL: 2-4 TIMES A MONTH
HOW OFTEN DO YOU HAVE A DRINK CONTAINING ALCOHOL: 3

## 2024-10-02 NOTE — PROGRESS NOTES
Medicare Annual Wellness Visit    Richard Macdonald is here for Medicare AWV    Assessment & Plan   Initial Medicare annual wellness visit  -     Ambulatory Referral to Care Management  Mixed hyperlipidemia  -     atorvastatin (LIPITOR) 40 MG tablet; Take 1 tablet by mouth daily, Disp-90 tablet, R-1Normal  Substance abuse (HCC)  Acute on chronic combined systolic and diastolic congestive heart failure (HCC)  -     FARXIGA 10 MG tablet; Take 1 tablet by mouth every morning, Disp-90 tablet, R-0, DAWNormal  Obesity, Class III, BMI 40-49.9 (morbid obesity) (HCC)  Aortic dilatation (HCC)  Chronic systolic (congestive) heart failure  -     FARXIGA 10 MG tablet; Take 1 tablet by mouth every morning, Disp-90 tablet, R-0, DAWNormal  -     furosemide (LASIX) 80 MG tablet; Take 1 tablet by mouth 2 times daily Take 2 tablets daily ( 160 mg ), Disp-180 tablet, R-1Normal  -     metoprolol succinate (TOPROL XL) 25 MG extended release tablet; TAKE 1 TABLET BY MOUTH ONCE DAILY, Disp-90 tablet, R-1Normal  -     metOLazone (ZAROXOLYN) 5 MG tablet; Take 1 tablet by mouth as needed (Take 1 tablet by mouth daily as needed) Take 1 tablet by mouth daily as needed, Disp-15 tablet, R-3Normal  Type 2 diabetes mellitus with other specified complication, without long-term current use of insulin (HCC)  -     FARXIGA 10 MG tablet; Take 1 tablet by mouth every morning, Disp-90 tablet, R-0, DAWNormal  -     Semaglutide, 1 MG/DOSE, (OZEMPIC, 1 MG/DOSE,) 4 MG/3ML SOPN sc injection; Inject 2 mg into the skin once a week, Disp-6 mL, R-5Normal  -     Diabetic Foot Exam    Recommendations for Preventive Services Due: see orders and patient instructions/AVS.  Recommended screening schedule for the next 5-10 years is provided to the patient in written form: see Patient Instructions/AVS.     Return in about 6 months (around 4/2/2025) for AWV.     Subjective   The following acute and/or chronic problems were also addressed today:    64 year old gentleman with

## 2024-10-02 NOTE — PATIENT INSTRUCTIONS
appointments, and call your doctor if you are having problems. It's also a good idea to know your test results and keep a list of the medicines you take.  Where can you learn more?  Go to https://www.[employer].net/patientEd and enter M676 to learn more about \"Learning About Mindfulness for Stress.\"  Current as of: June 24, 2023  Content Version: 14.1  © 2006-2024 [employer].   Care instructions adapted under license by [employer]. If you have questions about a medical condition or this instruction, always ask your healthcare professional. [employer] disclaims any warranty or liability for your use of this information.           Learning About Emotional Support  When do you need emotional support?     You might find getting support from others helpful when you have a long-term health problem. Often people feel alone, confused, or scared when coping with an illness. But you aren't alone. Other people are going through the same thing you are and know how you feel.  Talking with others about your feelings can help you feel better.  Your family and friends can give you support. So can your doctor, a support group, or a Restorationist. If you have a support network, you will not feel as alone. You will learn new ways to deal with your situation, and you may try harder to overcome it.  Where you can get support  Family and friends: They can help you cope by giving you comfort and encouragement.  Counseling: Professional counseling can help you cope with situations that interfere with your life and cause stress. Counseling can help you understand and deal with your illness.  Your doctor: Find a doctor you trust and feel comfortable with. Be open and honest about your fears and concerns. Your doctor can help you get the right medical treatments, including counseling.  Spiritual or Rastafari groups: They can provide comfort and may be able to help you find counseling or other social support

## 2024-10-04 ENCOUNTER — CARE COORDINATION (OUTPATIENT)
Dept: CARE COORDINATION | Age: 64
End: 2024-10-04

## 2024-10-04 NOTE — CARE COORDINATION
Ambulatory Care Coordination Note     10/4/2024 12:48 PM     Patient outreach attempt by this ACM today to offer care management services. ACM was unable to reach the patient by telephone today; [employer] message sent requesting patient to contact this ACM.     ACM: Christina Summerlin     Care Summary Note: First attempt.    PCP/Specialist follow up:   Future Appointments         Provider Specialty Dept Phone    10/14/2024 1:00 PM Patricia Irving, PhD Psychology [phone]    4/1/2025 10:30 AM Karime Reed MD Internal Medicine [phone]            Follow Up:   Plan for next ACM outreach in approximately 1-2 days  to complete:  - outreach attempt to offer care management services  - RPM.

## 2024-10-08 ENCOUNTER — CARE COORDINATION (OUTPATIENT)
Dept: CARE COORDINATION | Age: 64
End: 2024-10-08

## 2024-10-08 DIAGNOSIS — I50.22 CHRONIC SYSTOLIC (CONGESTIVE) HEART FAILURE (HCC): ICD-10-CM

## 2024-10-08 DIAGNOSIS — I10 PRIMARY HYPERTENSION: Primary | Chronic | ICD-10-CM

## 2024-10-08 DIAGNOSIS — E11.69 TYPE 2 DIABETES MELLITUS WITH OTHER SPECIFIED COMPLICATION, WITHOUT LONG-TERM CURRENT USE OF INSULIN (HCC): Chronic | ICD-10-CM

## 2024-10-08 SDOH — ECONOMIC STABILITY: FOOD INSECURITY: WITHIN THE PAST 12 MONTHS, THE FOOD YOU BOUGHT JUST DIDN'T LAST AND YOU DIDN'T HAVE MONEY TO GET MORE.: NEVER TRUE

## 2024-10-08 SDOH — HEALTH STABILITY: PHYSICAL HEALTH: ON AVERAGE, HOW MANY MINUTES DO YOU ENGAGE IN EXERCISE AT THIS LEVEL?: 0 MIN

## 2024-10-08 SDOH — ECONOMIC STABILITY: INCOME INSECURITY: IN THE LAST 12 MONTHS, WAS THERE A TIME WHEN YOU WERE NOT ABLE TO PAY THE MORTGAGE OR RENT ON TIME?: NO

## 2024-10-08 SDOH — HEALTH STABILITY: PHYSICAL HEALTH: ON AVERAGE, HOW MANY DAYS PER WEEK DO YOU ENGAGE IN MODERATE TO STRENUOUS EXERCISE (LIKE A BRISK WALK)?: 0 DAYS

## 2024-10-08 SDOH — ECONOMIC STABILITY: TRANSPORTATION INSECURITY
IN THE PAST 12 MONTHS, HAS THE LACK OF TRANSPORTATION KEPT YOU FROM MEDICAL APPOINTMENTS OR FROM GETTING MEDICATIONS?: NO

## 2024-10-08 SDOH — ECONOMIC STABILITY: FOOD INSECURITY: WITHIN THE PAST 12 MONTHS, YOU WORRIED THAT YOUR FOOD WOULD RUN OUT BEFORE YOU GOT MONEY TO BUY MORE.: NEVER TRUE

## 2024-10-08 SDOH — ECONOMIC STABILITY: TRANSPORTATION INSECURITY
IN THE PAST 12 MONTHS, HAS LACK OF TRANSPORTATION KEPT YOU FROM MEETINGS, WORK, OR FROM GETTING THINGS NEEDED FOR DAILY LIVING?: NO

## 2024-10-08 SDOH — ECONOMIC STABILITY: INCOME INSECURITY: HOW HARD IS IT FOR YOU TO PAY FOR THE VERY BASICS LIKE FOOD, HOUSING, MEDICAL CARE, AND HEATING?: NOT HARD AT ALL

## 2024-10-08 NOTE — CARE COORDINATION
Ambulatory Care Coordination Note     10/8/2024 10:00 AM     Patient Current Location:  Home: [address] [place] [address]  [address]  [facility] [address]     This patient was received as a referral from Provider.    ACM contacted the patient by telephone. Verified name and  with patient as identifiers. Provided introduction to self, and explanation of the ACM role.   Patient accepted care management services at this time.          ACM: Christina Summerlin     Challenges to be reviewed by the provider   Additional needs identified to be addressed with provider No  none               Method of communication with provider: none.    Has the patient been seen in the ED since your last call? no    Care Summary Note: ACM completed enrollment outreach call to patient. ACM offered care management services to patient and he accepted. Follow up outreach completed today with the patient. Patient has no c/o SOB, FRAZIER, fatigue, chest pains, palpitations, increased urination, fevers, or swelling. ACM's assessment reveals no signs of exacerbation of any chronic conditions. Patient is taking all medications as prescribed without any concerns. He reports that he is interested in options for support groups for his chronic illnesses. This ACM will contact the SW to options. The patient has a CardiacMEMS device for monitoring his heart pressure. He also reports monitoring his VS at least 4 times a weeks. His A1C is 5.7, BP ranges in the 110's/ 50-60s, and his weight is 294 lbs ( he is still trying to lose more weight; down from 398lbs), per patient report. This ACM discussed RPM today and he enrolled.   The patient reports having grab bars in his bathroom and a medical alert device. He's uses a walker or can when ambulating and has had both shoulders and his right knee replaced, he still needs his left knee replaced. He has help with his meals from [employer]. Finally he reports being homeless 5 years ago prior to finding the apartment he currently lives

## 2024-10-08 NOTE — PROGRESS NOTES
Remote Patient Monitoring Treatment Plan    Received request from ACM/CTN Summerlin, Christina   to order remote patient monitoring for in home monitoring of CHF; Condition managed by Dr. Dario Larson ([facility] [place] [facility]) .  Diabetes; Condition managed by PCP.  HTN; Condition managed by PCP.  and order completed.     Patient will be monitoring blood pressure   glucose  pulse ox   weight.      Patient will engage in Remote Patient Monitoring each day to develop the skills necessary for self management.       RPM Care Team Responsibilities:   Alerts will be reviewed daily and addressed within 2-4 hours during operational hours (Monday -Friday 9 am-4 pm)  Alert response and intervention documented in patient medical record  Alert response escalated to PCP per protocol and documented in patient medical record  Patient monitored over approximately  days  Discharge from program based on self-management readiness    See care coordination encounters for additional details.

## 2024-10-09 ENCOUNTER — CARE COORDINATION (OUTPATIENT)
Dept: PRIMARY CARE CLINIC | Age: 64
End: 2024-10-09

## 2024-10-09 NOTE — CARE COORDINATION
Remote Patient Kit Ordering Note      Date/Time:  10/9/2024 9:43 AM      [facility]family today to notify of RPM kit order; patient/family was unavailable; Left HIPAA compliant voicemail regarding RPM kit.    [] [facility] confirmed patient shipping address  [x] Patient will receive package over the next 1-3 business days. Someone 21 years or older must be present to sign for UPS delivery.  [x] HRS will contact patient within 24 hours, an HRS  will call the patient directly: If the patient does not answer, HRS will follow up with the clinical team notifying them about the unsuccessful attempt to contact the patient. HRS will make three call attempts to the patient.Provide patient with [facility] Virtual install number is: [phone].  [x] LPN will contact patient once equipment is active to welcome them to the program.                                                         [x] Hours of RPM monitoring - Monday-Friday [id number]; encourage patient to get vitals entered by Noon each day to have the alert addressed same day.  [x][facility] mailed RPM Patient flyer to patient.                      LPN made aware the RPM kit has been ordered.

## 2024-10-10 ENCOUNTER — CARE COORDINATION (OUTPATIENT)
Dept: CARE COORDINATION | Age: 64
End: 2024-10-10

## 2024-10-10 NOTE — CARE COORDINATION
Contacted Richard Macdonald and left voicemail regarding Dietitian referral. Left call back number and will follow up as appropriate.       Kelli Suresh RDN, LD  [phone]

## 2024-10-11 ENCOUNTER — CARE COORDINATION (OUTPATIENT)
Dept: CARE COORDINATION | Age: 64
End: 2024-10-11

## 2024-10-11 NOTE — CARE COORDINATION
SW received referral from [facility] for assistance with community resource needs. SW placed call to patient; left voicemail and requested return call. Will continue to follow.    Isabella Hagan MSW, LSW     [phone]

## 2024-10-14 ENCOUNTER — TELEMEDICINE (OUTPATIENT)
Age: 64
End: 2024-10-14
Payer: MEDICAID

## 2024-10-14 DIAGNOSIS — F33.1 MODERATE EPISODE OF RECURRENT MAJOR DEPRESSIVE DISORDER (HCC): Primary | ICD-10-CM

## 2024-10-14 PROCEDURE — 90791 PSYCH DIAGNOSTIC EVALUATION: CPT | Performed by: PSYCHOLOGIST

## 2024-10-14 PROCEDURE — 1123F ACP DISCUSS/DSCN MKR DOCD: CPT | Performed by: PSYCHOLOGIST

## 2024-10-14 NOTE — PROGRESS NOTES
Behavioral Health Consultation   Patricia Irving, PhD  Clinical Psychologist  10/14/2024      Time spent with Patient: 40 minutes  1:03- 1:43  This is patient's 1st  [facility] appointment.  Note: Life context incomplete as patient moved directly to referral issue.    Reason for Consult:   Chief Complaint   Patient presents with    Depression       Referring Provider: Karime Reed MD    TELEHEALTH VISIT -- Audio/Visual     Richard Torres was evaluated through a synchronous (real-time) audio-video encounter (via [employer]). The patient (or guardian, if applicable) is aware that this is a billable service, which includes applicable co-pays. This Virtual Visit was conducted with patient's (and/or legal guardian's) consent. Patient identification was verified, and a caregiver was present when appropriate. The patient was located in a state where the provider was licensed to provide care.     Conducted a risk-benefit analysis and determined that the patient's presenting problems are consistent with the use of telepsychology. Determined that the patient has sufficient knowledge and skills in the use of technology enabling them to adequately benefit from telepsychology. It was determined that this patient was able to be properly treated without an in-person session. Patient verified that they were currently located at the [place] address that was provided during registration or another address, if noted below.    Verified the following information:  Patient's identification: yes  Patient location: [address] [place] [address] [address]  [facility] [address]   Patient's call back number: [phone]   Patient's emergency contact's name and number, as well as permission to contact them if needed: Extended Emergency Contact Information  Primary Emergency Contact: Briana Macdonald  Mobile Phone: [phone]  Relation: Niece/Nephew     Provider location: [place], [place]    Pt provided informed consent for the behavioral health program. Discussed with

## 2024-10-15 DIAGNOSIS — F19.10 SUBSTANCE ABUSE (HCC): ICD-10-CM

## 2024-10-15 DIAGNOSIS — M79.18 MYOFASCIAL PAIN: ICD-10-CM

## 2024-10-15 DIAGNOSIS — G89.4 CHRONIC PAIN SYNDROME: ICD-10-CM

## 2024-10-15 RX ORDER — PREGABALIN 75 MG/1
75 CAPSULE ORAL 2 TIMES DAILY
Qty: 180 CAPSULE | Refills: 0 | Status: SHIPPED | OUTPATIENT
Start: 2024-10-15 | End: 2025-01-13

## 2024-10-17 ENCOUNTER — CARE COORDINATION (OUTPATIENT)
Dept: CARE COORDINATION | Age: 64
End: 2024-10-17

## 2024-10-17 NOTE — CARE COORDINATION
Ambulatory Care Coordination Note     10/17/2024 9:33 AM     Patient outreach attempt by this ACM today to perform care management follow up . ACM was unable to reach the patient by telephone today; left voice message requesting a return phone call to this ACM.     ACM: Christina Summerlin     Care Summary Note: First attempt.    PCP/Specialist follow up:   Future Appointments         Provider Specialty Dept Phone    10/28/2024 9:30 AM Patricia Irving, PhD Psychology [phone]    4/1/2025 10:30 AM Karime Reed MD Internal Medicine [phone]            Follow Up:   Plan for next AC outreach in approximately 1 week to complete:  - CC Protocol assessments  - disease specific assessments  - advance care planning  - goal progression  - education   - RPM.

## 2024-10-18 ENCOUNTER — TELEPHONE (OUTPATIENT)
Dept: INTERNAL MEDICINE CLINIC | Age: 64
End: 2024-10-18

## 2024-10-18 DIAGNOSIS — N18.4 CKD (CHRONIC KIDNEY DISEASE) STAGE 4, GFR 15-29 ML/MIN (HCC): Primary | ICD-10-CM

## 2024-10-18 DIAGNOSIS — I13.0 CARDIORENAL SYNDROME WITH RENAL FAILURE, STAGE 1-4 OR UNSPECIFIED CHRONIC KIDNEY DISEASE, WITH HEART FAILURE (HCC): ICD-10-CM

## 2024-10-18 NOTE — TELEPHONE ENCOUNTER
Pt is concerned regarding his creatine levels   10/16: 2.11  9/19 1.7   8/13 1.36   Has it check every month at [facility].    Would like a referral to a Nephrologist and mitzi a possible port for labs

## 2024-10-18 NOTE — TELEPHONE ENCOUNTER
Tianna from [facility] needs orders for re certification to be placed and signed ASAP    [phone] (Tianna)

## 2024-10-22 ENCOUNTER — CARE COORDINATION (OUTPATIENT)
Dept: CARE COORDINATION | Age: 64
End: 2024-10-22

## 2024-10-22 SDOH — SOCIAL STABILITY: SOCIAL NETWORK: ARE YOU MARRIED, WIDOWED, DIVORCED, SEPARATED, NEVER MARRIED, OR LIVING WITH A PARTNER?: NEVER MARRIED

## 2024-10-22 SDOH — SOCIAL STABILITY: SOCIAL NETWORK: HOW OFTEN DO YOU GET TOGETHER WITH FRIENDS OR RELATIVES?: NEVER

## 2024-10-22 SDOH — HEALTH STABILITY: MENTAL HEALTH
STRESS IS WHEN SOMEONE FEELS TENSE, NERVOUS, ANXIOUS, OR CAN'T SLEEP AT NIGHT BECAUSE THEIR MIND IS TROUBLED. HOW STRESSED ARE YOU?: ONLY A LITTLE

## 2024-10-22 SDOH — SOCIAL STABILITY: SOCIAL NETWORK: ARE YOU MARRIED, WIDOWED, DIVORCED, SEPARATED, NEVER MARRIED, OR LIVING WITH A PARTNER?: DIVORCED

## 2024-10-22 SDOH — HEALTH STABILITY: MENTAL HEALTH
STRESS IS WHEN SOMEONE FEELS TENSE, NERVOUS, ANXIOUS, OR CAN'T SLEEP AT NIGHT BECAUSE THEIR MIND IS TROUBLED. HOW STRESSED ARE YOU?: TO SOME EXTENT

## 2024-10-22 SDOH — SOCIAL STABILITY: SOCIAL NETWORK: IN A TYPICAL WEEK, HOW MANY TIMES DO YOU TALK ON THE PHONE WITH FAMILY, FRIENDS, OR NEIGHBORS?: TWICE A WEEK

## 2024-10-22 SDOH — SOCIAL STABILITY: SOCIAL NETWORK
DO YOU BELONG TO ANY CLUBS OR ORGANIZATIONS SUCH AS CHURCH GROUPS UNIONS, FRATERNAL OR ATHLETIC GROUPS, OR SCHOOL GROUPS?: NO

## 2024-10-22 SDOH — SOCIAL STABILITY: SOCIAL NETWORK: HOW OFTEN DO YOU ATTENT MEETINGS OF THE CLUB OR ORGANIZATION YOU BELONG TO?: NEVER

## 2024-10-22 SDOH — SOCIAL STABILITY: SOCIAL NETWORK: HOW OFTEN DO YOU ATTEND CHURCH OR RELIGIOUS SERVICES?: NEVER

## 2024-10-22 NOTE — ACP (ADVANCE CARE PLANNING)
Advance Care Planning   Healthcare Decision Maker:    Primary Decision Maker: PIERRESALOMON - Ex-Spouse - [phone]    Secondary Decision Maker: Pierre Thomason,Richard - Child - [phone]    Supplemental (Other) Decision Maker: PierreBriana - Niece/Nephew - [phone]    Click here to complete Healthcare Decision Makers including selection of the Healthcare Decision Maker Relationship (ie \"Primary\").  Today we discussed Healthcare Decision Makers. Changes to Healthcare Decisions Makers were updated today.

## 2024-10-22 NOTE — CARE COORDINATION
Ambulatory Care Coordination Note     10/22/2024 12:25 PM     Patient Current Location:  Home: [address] [place] [address] Rd  [address]  [facility] [address]     ACM contacted the patient by telephone. Verified name and  with patient as identifiers.         ACM: Christina Summerlin     Challenges to be reviewed by the provider   Additional needs identified to be addressed with provider No  none               Method of communication with provider: none.    Has the patient been seen in the ED since your last call? no    Care Summary Note: Follow up outreach completed today with the patient. Patient has no c/o SOB, FRAZIER, fatigue, chest pains, palpitations, fevers, or swelling. ACM's assessment reveals no signs of exacerbation of any chronic conditions. He is concerned with his most recent lab results. He has an appointment scheduled in Nov with nephrology. During this call, his other appointments were discussed as well. Healthcare decision makers were updated as well. He declined receiving POA forms.  RPM expectations discussed with the patient during this call. He verbalized that he will start monitoring VS daily. He stated that he was busy arranging infusions with his rheumatologist and insurance company. Patient denies any other concerns or needs at this time.     Offered patient enrollment in the Remote Patient Monitoring (RPM) program for in-home monitoring: Yes, patient enrolled; current status is activated and monitoring.     Assessments Completed:   Diabetes Assessment    Medic Alert ID: No  Meal Planning: Avoidance of concentrated sweets, Carb counting, Plate Method   How often do you test your blood sugar?: Daily, Other (Comment: 4 times weekly)   Do you have barriers with adherence to non-pharmacologic self-management interventions? (Nutrition/Exercise/Self-Monitoring): No   Have you ever had to go to the ED for symptoms of low blood sugar?: No       No patient-reported symptoms   Do you have hyperglycemia symptoms?: No

## 2024-10-23 ENCOUNTER — CARE COORDINATION (OUTPATIENT)
Dept: CARE COORDINATION | Age: 64
End: 2024-10-23

## 2024-10-23 NOTE — CARE COORDINATION
Contacted Richard Macdonald and left voicemail regarding Dietitian referral. Left call back number and will follow up as appropriate.       Kelli Suresh RDN, LD  [phone]

## 2024-10-25 ENCOUNTER — CARE COORDINATION (OUTPATIENT)
Dept: CARE COORDINATION | Age: 64
End: 2024-10-25

## 2024-10-25 NOTE — CARE COORDINATION
Attempted to reach pt to verify pt/equipment, left VM. Pt did try to return call, writer tried to call back but it went back to voicemail.

## 2024-10-28 ENCOUNTER — TELEMEDICINE (OUTPATIENT)
Age: 64
End: 2024-10-28
Payer: MEDICAID

## 2024-10-28 ENCOUNTER — CARE COORDINATION (OUTPATIENT)
Dept: CARE COORDINATION | Age: 64
End: 2024-10-28

## 2024-10-28 ENCOUNTER — CARE COORDINATION (OUTPATIENT)
Dept: CASE MANAGEMENT | Age: 64
End: 2024-10-28

## 2024-10-28 DIAGNOSIS — G89.29 OTHER CHRONIC PAIN: ICD-10-CM

## 2024-10-28 DIAGNOSIS — F33.1 MODERATE EPISODE OF RECURRENT MAJOR DEPRESSIVE DISORDER (HCC): Primary | ICD-10-CM

## 2024-10-28 PROCEDURE — 90832 PSYTX W PT 30 MINUTES: CPT | Performed by: PSYCHOLOGIST

## 2024-10-28 PROCEDURE — 1123F ACP DISCUSS/DSCN MKR DOCD: CPT | Performed by: PSYCHOLOGIST

## 2024-10-28 NOTE — CARE COORDINATION
SW placed call to patient to discuss resource needs and support. Per patient, he is juggling multiple things at this time and requests call back next week. He is taking a computer class at , working on getting prior auths back in place due to recent change in insurance, and is getting an aide back into the home to help him.    JILLIAN provided him with this worker's contact number. Will continue to follow.    Isabella Hagan MSW, LSW     [phone]

## 2024-10-28 NOTE — CARE COORDINATION
Remote Patient Monitoring Note      Date/Time:  10/28/2024 4:05 PM  Richard Macdonald , I am a nurse with the remote patient monitoring team;  reaching out to you today to remind you to please take your vitals. Important: Please try to take your vitals each day before 12pm. This ensures the monitoring team will have time to connect with your providers and get back to you with any new orders or instructions.     Please enter all of your vitals each day. Please ensure that your tablet is plugged in, turned on and charging. If you are having issues with your equipment, please call: [phone].    Be well,    Adriana Govea LPN, [facility]  PH: [phone]

## 2024-10-29 ENCOUNTER — CARE COORDINATION (OUTPATIENT)
Dept: CARE COORDINATION | Age: 64
End: 2024-10-29

## 2024-10-29 ENCOUNTER — CARE COORDINATION (OUTPATIENT)
Dept: CASE MANAGEMENT | Age: 64
End: 2024-10-29

## 2024-10-29 NOTE — CARE COORDINATION
Remote Patient Monitoring Welcome Note                    Date/Time:  10/29/2024 3:06 PM    This writer contacted pt in an effort to make a Welcome Call today. This pt cursed at writer and proceeded to advise, \" I already got a call today. Somebody already called earlier about this. If I don't get to it every day, then so be it. I have other pressing stuff to worry about with my throat and other things if you check my chart.\" If you are going to call me to bug me every time I don't do it, then come get this sh&t, I don't need it.\"   Writer then attempted to advise pt that we are aware that he is very busy and that we do understand his concerns. Pt advised again, \"well somebody already called me today about it. I have priorities and if I don't get to this monitoring then so be it. Matter of fact, come get this sh&t, I don't need it.\" Writer advised ACM.      Tawnya Govea LPN, [facility], Remote Patient Monitoring    PH: [phone]  Email: [email or web address]    Patient Current Location:   Verified patients name and  as identifiers.       Completed and confirmed the following:    [] Patient received all RPM equipment (tablet, scale, blood pressure device and cuff, and pulse oximeter)  Cuff Size:                                    [] Reviewed Patient Welcome Letter with patient    []  Reviewed Consent Form  Copy of consent form in chart.                 [] Reviewed expectations for patient and care team  Monitoring hours M-F 9-4pm  It is important to take your vitals every day, even on the weekends,to keep your care team aware of how you are doing every day of the week.  Completing monitoring by 12pm on  so that alerts can be responded to in the same day  Patient weighs self at same time every day (or after urinating and waking up)  Take blood pressure 1-2 hrs after medications   RPM team may have different phone area code (including [place], [place], [place] or [place])                              [] Instructed patient to

## 2024-10-29 NOTE — CARE COORDINATION
Ambulatory Care Coordination Note     10/29/2024 11:45 AM     Patient Current Location:  Home: [address] [place] [address] Rd  [address]  [facility] [address]     ACM contacted the patient by telephone. Verified name and  with patient as identifiers.         ACM: Christina Summerlin     Challenges to be reviewed by the provider   Additional needs identified to be addressed with provider No  none               Method of communication with provider: none.    Has the patient been seen in the ED since your last call? no    Care Summary Note: Follow up outreach completed today with the patient. Patient has no c/o SOB, FRAZIER, fatigue, chest pains, palpitations, fevers, or swelling. Patient is taking all medications as prescribed. ACM's assessment reveals no signs of exacerbation of any chronic conditions. ACM addressed RPM program with the patient and he states that he still wants to continue. He reports that he has been busy with obtaining authorizations with his insurance company, scheduling and completing clearance appointments for procedures, and securing a new HH aide. He completed his cardiac clearance appointment to have his throat stretch and that he will contact [facility] ENT dept to schedule this procedure. He also reports that his new HH aide started as well. Patient denies any other concerns or needs at this time. Patient can verbalize understanding of when to seek help for emergent needs.    Offered patient enrollment in the Remote Patient Monitoring (RPM) program for in-home monitoring: Yes, patient enrolled; current status is activated and monitoring.    RPM Vital Signs:            Assessments Completed:   Diabetes Assessment    Medic Alert ID: No  Meal Planning: Avoidance of concentrated sweets, Carb counting, Plate Method   How often do you test your blood sugar?: Daily, Other (Comment: 4 times weekly)   Do you have barriers with adherence to non-pharmacologic self-management interventions?

## 2024-10-30 ENCOUNTER — CARE COORDINATION (OUTPATIENT)
Dept: CARE COORDINATION | Age: 64
End: 2024-10-30

## 2024-10-30 VITALS
SYSTOLIC BLOOD PRESSURE: 106 MMHG | OXYGEN SATURATION: 97 % | HEART RATE: 71 BPM | BODY MASS INDEX: 41.7 KG/M2 | DIASTOLIC BLOOD PRESSURE: 73 MMHG | WEIGHT: 299 LBS

## 2024-10-30 NOTE — CARE COORDINATION
Remote Patient Monitoring Disenrollment      Date/Time:  10/30/2024 5:24 PM  Patient Current Location: [place]  Patient has been dis-enrolled from Remote Patient Monitoring program on 10/30/2024 due to no longer Interested. Email received stated that the patient no longer wants to participate and requested that the equipment is picked up. Patient has been provided instruction on process to return RPM equipment and RPM has been deactivated.     Patient has ACM's contact information for any further questions, concerns, or needs.

## 2024-10-31 ENCOUNTER — CARE COORDINATION (OUTPATIENT)
Dept: PRIMARY CARE CLINIC | Age: 64
End: 2024-10-31

## 2024-10-31 DIAGNOSIS — E11.69 TYPE 2 DIABETES MELLITUS WITH OTHER SPECIFIED COMPLICATION, WITHOUT LONG-TERM CURRENT USE OF INSULIN (HCC): Chronic | ICD-10-CM

## 2024-10-31 DIAGNOSIS — I10 PRIMARY HYPERTENSION: Primary | Chronic | ICD-10-CM

## 2024-10-31 DIAGNOSIS — I50.33 ACUTE ON CHRONIC DIASTOLIC HEART FAILURE (HCC): ICD-10-CM

## 2024-10-31 NOTE — CARE COORDINATION
Richard Reid  10/31/24    Care Coordination  placed call to Patient  to arrange RPM kit  through [employer].     CCSS spoke to Patient reviewed with Patient how to pack equipment in original packing. [employer] will  equipment in 2-4 days.   All questions and concerns answered.    Patient became verbally abusive during the call and then the call was disconnected.

## 2024-10-31 NOTE — PROGRESS NOTES
Remote Patient Order Discontinued    Received request from Summerlin, Christina   to discontinue order for remote patient monitoring of CHF, Diabetes, and HTN and order completed.

## 2024-11-04 ENCOUNTER — CARE COORDINATION (OUTPATIENT)
Dept: CARE COORDINATION | Age: 64
End: 2024-11-04

## 2024-11-07 ENCOUNTER — CARE COORDINATION (OUTPATIENT)
Dept: CARE COORDINATION | Age: 64
End: 2024-11-07

## 2024-11-07 NOTE — CARE COORDINATION
SW placed call to patient to discuss resource needs. Left voicemail and requested return call. Will continue to follow.    Isabella Hagan MSW, LSW     [phone]     oral

## 2024-11-11 ENCOUNTER — TELEMEDICINE (OUTPATIENT)
Age: 64
End: 2024-11-11
Payer: MEDICARE

## 2024-11-11 DIAGNOSIS — F33.1 MODERATE EPISODE OF RECURRENT MAJOR DEPRESSIVE DISORDER (HCC): Primary | ICD-10-CM

## 2024-11-11 DIAGNOSIS — G89.29 OTHER CHRONIC PAIN: ICD-10-CM

## 2024-11-11 PROCEDURE — 90832 PSYTX W PT 30 MINUTES: CPT | Performed by: PSYCHOLOGIST

## 2024-11-11 PROCEDURE — 1123F ACP DISCUSS/DSCN MKR DOCD: CPT | Performed by: PSYCHOLOGIST

## 2024-11-11 RX ORDER — SEMAGLUTIDE 2.68 MG/ML
INJECTION, SOLUTION SUBCUTANEOUS
Qty: 3 ML | Refills: 0 | Status: SHIPPED | OUTPATIENT
Start: 2024-11-11

## 2024-11-11 NOTE — PROGRESS NOTES
Health issues persist - balancing cardiac and kidney issues.Visit spent discussing recent events, he is working to navigate ongoing stressors, but it is very challenging at this time.  Irritability and reactivity are increased, and beginning to affect his interactions and relationships.  He is requesting assistance with this, will address at next visit    Strengths: Pt is high functioning      Recommendations to Patient:   1. Request Nephrologist consult with cardiology re: medications     Recommendations to PCP:   1. Reinforce recommendations to pt.    Follow-up plan: To see in 2 weeks    Referrals: None    Patricia Irving, PhD     This note was generated completely or in part utilizing Dragon dictation speech recognition software.  Occasionally, words are mistranscribed and despite editing, the text may contain inaccuracies due to incorrect word recognition.  If further clarification is needed please contact the office at [phone]

## 2024-11-13 ENCOUNTER — CARE COORDINATION (OUTPATIENT)
Dept: CARE COORDINATION | Age: 64
End: 2024-11-13

## 2024-11-13 NOTE — CARE COORDINATION
Ambulatory Care Coordination Note     11/13/2024 9:02 AM     Patient outreach attempt by this ACM today to perform care management follow up . [facility] was unable to reach the patient by telephone today; left voice message requesting a return phone call to this ACM.     ACM: Christina Summerlin     Care Summary Note: First attempt    PCP/Specialist follow up:   Future Appointments         Provider Specialty Dept Phone    11/13/2024 4:00 PM Irene Serrano MD Nephrology [phone]    11/27/2024 8:30 AM Patricia Irving, PhD Psychology [phone]    4/1/2025 10:30 AM Karime Reed MD Internal Medicine [phone]            Follow Up:   Plan for next AC outreach in approximately 1 week to complete:  - CC Protocol assessments  - disease specific assessments  - goal progression  - education   - CHF winter edu received and complete note .

## 2024-11-18 LAB
ESTIMATED AVERAGE GLUCOSE: 163 MG/DL (ref 68–114)
HBA1C MFR BLD: 7.3 % (ref 4–5.6)

## 2024-11-20 ENCOUNTER — CARE COORDINATION (OUTPATIENT)
Dept: CARE COORDINATION | Age: 64
End: 2024-11-20

## 2024-11-20 NOTE — CARE COORDINATION
Ambulatory Care Coordination Note     2024 9:47 AM     Patient Current Location:  Home: [address] [place] [address] Rd  [address]  [facility] [address]     ACM contacted the patient by telephone. Verified name and  with patient as identifiers.         ACM: Christina Summerlin     Challenges to be reviewed by the provider   Additional needs identified to be addressed with provider No  None               Method of communication with provider: none.    Utilization: Patient has not had any utilization since our last call.     Care Summary Note: Follow up outreach completed today with the patient was brief. Patient had no c/o exacerbation of his chronic conditions. Patient is taking all medications as prescribed without any concerns. Patient denies any other concerns or needs at this time. Patient can verbalize understanding of when to seek help for emergent needs. CHF edu reviewed.  This conversation was brief d/t the patient getting ready for his HA to arrive. He stated that he would call this ACM later in the day.    Heart Failure Education outreach Date/Time: 2024 9:48 AM    Ambulatory Care Manager (ACM) contacted the patient by telephone to perform Ambulatory Care Coordination. Verified name and  with patient as identifiers. Provided introduction to self, and explanation of the Ambulatory Care Manager's role.     ACM reviewed that a Heart Healthy tips for the  Winter/Holiday  packet has been mailed to the them and sent via [employer]. ACM reviewed CHF zones, daily weights, fluid restriction, the importance of low sodium diet, and healthy tips packet with the patient. Instructed patient to call their Cardiologist if they have a weight gain of 3 lbs in 2 days or 5 lbs in a week.     Patient reminded that there is a physician on call 24 hours a day / 7 days a week should the patient have questions or concerns. The patient verbalized understanding.    Offered patient enrollment in the Remote Patient Monitoring (RPM)

## 2024-11-21 ENCOUNTER — CARE COORDINATION (OUTPATIENT)
Dept: CARE COORDINATION | Age: 64
End: 2024-11-21

## 2024-11-21 NOTE — CARE COORDINATION
JILLIAN placed call to patient to discuss resource needs and support. Patient reports he has a copy of HCPOA forms and intends to complete them. SW encouraged him to let this worker know if he needs any help filling them out.   Patient has a HH aide for 12 hours per week and he is currently working with his insurance to increase his hours. Patient stated he is not enrolled in Passport through [facility], but receives aide services through his insurance. Patient utilizes transport through insurance to get to needed destinations such as medical appointments and the grocery store.     Patient reports he is able to pay for medications. He cites that his budget is tighter with the rising cost of living and food. He is aware of local food pantries and knows how to access them.     He reports he was only able to pay a portion of his electric bill this past month and is trying to get caught up. SW discussed enrolling in PIPP or other payment program and he is agreeable to receive information via e-mail address on file. He thinks he received assistance from [facility] partner [facility] [phone] to pay a previous bill, but he is agreeable to receive contact info via e-mail.     SW to follow up with patient in two weeks.     E-mail sent to patient:   Kat Mr. Macdonald,     Thank you for speaking with me today. As discussed, the [facility] partner [employer] in your area is [facility] and they can be reached at [phone] to potentially receive assistance with paying your outstanding [facility] bill.     To enroll in PIPP you can contact the [facility] Community Action Agency by calling [phone]. I have attached a copy of the application to this e-mail. You can send the application by mail to the address listed at the end of the application.     Isabella Hagan MSW, LSW     [phone]

## 2024-11-27 ENCOUNTER — TELEMEDICINE (OUTPATIENT)
Age: 64
End: 2024-11-27

## 2024-11-27 DIAGNOSIS — G89.29 OTHER CHRONIC PAIN: ICD-10-CM

## 2024-11-27 DIAGNOSIS — F33.1 MODERATE EPISODE OF RECURRENT MAJOR DEPRESSIVE DISORDER (HCC): Primary | ICD-10-CM

## 2024-11-27 NOTE — PROGRESS NOTES
Behavioral Health Consultation   Patricia Irving, PhD  Clinical Psychologist  11/27/2024      Time spent with Patient: 32 minutes 8:33-9:05  This is patient's 4th  [facility] appointment.    Reason for Consult:   Chief Complaint   Patient presents with    Stress    Chronic Pain    Depression       Referring Provider: Karime Reed MD    TELEHEALTH VISIT -- Audio/Visual    Richard Torres was evaluated through a synchronous (real-time) audio-video encounter (via [employer]). The patient (or guardian, if applicable) is aware that this is a billable service, which includes applicable co-pays. This Virtual Visit was conducted with patient's (and/or legal guardian's) consent. Patient identification was verified, and a caregiver was present when appropriate. The patient was located in a state where the provider was licensed to provide care.     Conducted a risk-benefit analysis and determined that the patient's presenting problems are consistent with the use of telepsychology. Determined that the patient has sufficient knowledge and skills in the use of technology enabling them to adequately benefit from telepsychology. It was determined that this patient was able to be properly treated without an in-person session. Patient verified that they were currently located at the [place] address that was provided during registration or another address, if noted below.    Verified the following information:  Patient's identification: yes  Patient location: [address] [place] [address] [address] [id number]   Patient's call back number: [phone]   Patient's emergency contact's name and number, as well as permission to contact them if needed: Extended Emergency Contact Information  Primary Emergency Contact: Briana Macdonald  Mobile Phone: [phone]  Relation: Niece/Nephew  Secondary Emergency Contact: SALOMON MACDONALD  Home Phone: [phone]  Mobile Phone: [phone]  Relation: Ex-Spouse   needed? No     Provider location: [facility]

## 2024-12-04 ENCOUNTER — CARE COORDINATION (OUTPATIENT)
Dept: CARE COORDINATION | Age: 64
End: 2024-12-04

## 2024-12-04 NOTE — CARE COORDINATION
Ambulatory Care Coordination Note     12/4/2024 9:31 AM     Patient outreach attempt by this ACM today to perform care management follow up . ACM was unable to reach the patient by telephone today;   left voice message requesting a return phone call to this ACM.     ACM: Christina Summerlin     Care Summary Note: First attempt.    PCP/Specialist follow up:   Future Appointments         Provider Specialty Dept Phone    1/22/2025 1:00 PM Irene Serrano MD Nephrology [phone]    4/1/2025 10:30 AM Karime Reed MD Internal Medicine [phone]            Follow Up:   Plan for next ACM outreach in approximately 1 week to complete:  - CC Protocol assessments  - disease specific assessments  - goal progression  - education .

## 2024-12-05 ENCOUNTER — CARE COORDINATION (OUTPATIENT)
Dept: CARE COORDINATION | Age: 64
End: 2024-12-05

## 2024-12-05 NOTE — CARE COORDINATION
SW placed follow up call to patient to discuss previously discussed resources. Patient has not yet reviewed e-mail due to focusing on medical appointments and preparing for upcoming procedure. SW encouraged patient to review e-mail from 11/21 when able and to call this worker with any questions. No additional questions or concerns at this time. SW to sign off.    Isabella Hagan MSW, LSW     [phone]

## 2024-12-06 DIAGNOSIS — E66.01 OBESITY, CLASS III, BMI 40-49.9 (MORBID OBESITY): Primary | ICD-10-CM

## 2024-12-06 RX ORDER — SEMAGLUTIDE 2.68 MG/ML
INJECTION, SOLUTION SUBCUTANEOUS
Qty: 3 ML | Refills: 0 | Status: SHIPPED | OUTPATIENT
Start: 2024-12-06

## 2024-12-06 NOTE — TELEPHONE ENCOUNTER
Last appointment: 10/2/2024  Next appointment: 4/1/2025  Last refill:   Requested Prescriptions     Pending Prescriptions Disp Refills    OZEMPIC, 2 MG/DOSE, 8 MG/3ML SOPN sc injection [Pharmacy Med Name: Ozempic (2 MG/DOSE) 8 MG/3ML Subcutaneous Solution Pen-injector] 3 mL 0     Sig: INJECT 2 MG SUBCUTANEOUSLY  ONCE A WEEK

## 2024-12-11 ENCOUNTER — CARE COORDINATION (OUTPATIENT)
Dept: CARE COORDINATION | Age: 64
End: 2024-12-11

## 2024-12-11 NOTE — CARE COORDINATION
non-pharmacologic self-management interventions? (Nutrition/Exercise/Self-Monitoring): No   Have you ever had to go to the ED for symptoms of low blood sugar?: No       No patient-reported symptoms   Do you have hyperglycemia symptoms?: No   Do you have hypoglycemia symptoms?: No   Blood Sugar Trends: No Change         ,   Congestive Heart Failure Assessment    Are you currently restricting fluids?: Other (Comment: 1 liter)  Do you understand a low sodium diet?: Yes  Do you understand how to read food labels?: Yes  How many restaurant meals do you eat per week?: 0  Do you salt your food before tasting it?: No     No patient-reported symptoms      Symptoms:  None: Yes      Weight trend: stable (Comment: per patient)  Salt intake watch compared to last visit: stable      ,   Hypertension - Encounter Level          ,   General Assessment    Do you have any symptoms that are causing concern?: No          Medications Reviewed:   Patient denies any changes with medications and reports taking all medications as prescribed.    Advance Care Planning:   Reviewed during previous call      Care Planning:   Education Documentation  Educate diabetic sick day management, taught by Summerlin, Christina at 12/11/2024 11:07 AM.  Learner: Patient  Readiness: Acceptance  Method: Explanation  Response: Needs Reinforcement    Chronic Complications, taught by Summerlin, Christina at 12/11/2024 11:07 AM.  Learner: Patient  Readiness: Acceptance  Method: Explanation  Response: Needs Reinforcement    Prevention of Hypoglycemia, taught by Summerlin, Christina at 12/11/2024 11:07 AM.  Learner: Patient  Readiness: Acceptance  Method: Explanation  Response: Needs Reinforcement    Prevention of Hyperglycemia, taught by Summerlin, Christina at 12/11/2024 11:07 AM.  Learner: Patient  Readiness: Acceptance  Method: Explanation  Response: Needs Reinforcement    heart failure self-management, taught by Summerlin, Christina at 12/11/2024 11:07

## 2024-12-24 ENCOUNTER — CARE COORDINATION (OUTPATIENT)
Dept: CARE COORDINATION | Age: 64
End: 2024-12-24

## 2024-12-24 NOTE — CARE COORDINATION
1/8/2025                 PCP/Specialist follow up:   Future Appointments         Provider Specialty Dept Phone    1/22/2025 1:00 PM Irene Serrano MD Nephrology [phone]    4/1/2025 10:30 AM Karime Reed MD Internal Medicine [phone]            Follow Up:   Plan for next ACM outreach in approximately 2 weeks to complete:  - CC Protocol assessments  - disease specific assessments  - goal progression  - education .   Patient  is agreeable to this plan.

## 2025-01-03 DIAGNOSIS — E66.01 OBESITY, CLASS III, BMI 40-49.9 (MORBID OBESITY): ICD-10-CM

## 2025-01-03 RX ORDER — SEMAGLUTIDE 2.68 MG/ML
INJECTION, SOLUTION SUBCUTANEOUS
Qty: 3 ML | Refills: 0 | Status: SHIPPED | OUTPATIENT
Start: 2025-01-03

## 2025-01-07 ENCOUNTER — CARE COORDINATION (OUTPATIENT)
Dept: CARE COORDINATION | Age: 65
End: 2025-01-07

## 2025-01-07 NOTE — CARE COORDINATION
Ambulatory Care Coordination Note     2025 10:08 AM     Patient Current Location:  Home: [address] [place] [address] Rd  [address]  [facility] [address]     ACM contacted the patient by telephone. Verified name and  with patient as identifiers.     Patient graduated from the High Risk Care Management program on 2025.  Patient verbalizes confidence in the ability to self-manage at this time. has the ability to self manage at this time.  Care management goals have been completed. No further Ambulatory Care Manager follow up scheduled.      ACM: Christina Summerlin     Challenges to be reviewed by the provider   Additional needs identified to be addressed with provider Yes  The patient reported that he has fallen twice in the past 2 weeks r/t a Sjogren's flare-up. He said that he did not injury himself but sprained his lower back and right leg. He is currently taking Tylenol with moderate relief. Please place an order for a muscle relaxant as desired. He also needs a referral to a new rheumatologist. I encouraged him to contact the office regarding his concerns. Please advise and thank you.    Patient would like to speak with psychologist for relationship development help.               Method of communication with provider: none.    Utilization: Patient has not had any utilization since our last call.     Care Summary Note: Follow up outreach completed today with the patient. Patient has no c/o SOB, FRAZIER, fatigue, chest pains, or palpitations. ACM's assessment reveals no signs of exacerbation of any chronic conditions. Patient is taking all medications as prescribed. The patient reported that he has fallen twice in the past 2 weeks r/t a Sjogren's flare-up. He stated that he did not injury himself, but sprained his lower back and right leg. He is currently taking Tylenol with moderate relief. This ACM will message his PCP to place an order for a muscle relaxant. Also, he stated that his rheumatologist office closed without

## 2025-02-10 ENCOUNTER — TELEPHONE (OUTPATIENT)
Dept: INTERNAL MEDICINE CLINIC | Age: 65
End: 2025-02-10

## 2025-02-11 DIAGNOSIS — E11.69 TYPE 2 DIABETES MELLITUS WITH OTHER SPECIFIED COMPLICATION, WITHOUT LONG-TERM CURRENT USE OF INSULIN (HCC): Chronic | ICD-10-CM

## 2025-02-11 DIAGNOSIS — I50.43 ACUTE ON CHRONIC COMBINED SYSTOLIC AND DIASTOLIC CONGESTIVE HEART FAILURE (HCC): ICD-10-CM

## 2025-02-11 DIAGNOSIS — M60.9 MYOSITIS OF LOWER EXTREMITY, UNSPECIFIED LATERALITY, UNSPECIFIED MYOSITIS TYPE: Primary | ICD-10-CM

## 2025-02-11 DIAGNOSIS — I50.22 CHRONIC SYSTOLIC (CONGESTIVE) HEART FAILURE (HCC): ICD-10-CM

## 2025-02-11 RX ORDER — DAPAGLIFLOZIN 10 MG/1
10 TABLET, FILM COATED ORAL EVERY MORNING
Qty: 90 TABLET | Refills: 1 | Status: SHIPPED | OUTPATIENT
Start: 2025-02-11

## 2025-02-25 ENCOUNTER — OFFICE VISIT (OUTPATIENT)
Dept: INTERNAL MEDICINE CLINIC | Age: 65
End: 2025-02-25

## 2025-02-25 VITALS
BODY MASS INDEX: 40.52 KG/M2 | HEART RATE: 71 BPM | SYSTOLIC BLOOD PRESSURE: 118 MMHG | WEIGHT: 289.4 LBS | DIASTOLIC BLOOD PRESSURE: 74 MMHG | OXYGEN SATURATION: 98 % | HEIGHT: 71 IN | TEMPERATURE: 96.4 F

## 2025-02-25 DIAGNOSIS — E78.2 MIXED HYPERLIPIDEMIA: ICD-10-CM

## 2025-02-25 DIAGNOSIS — E66.813 OBESITY, CLASS 3: ICD-10-CM

## 2025-02-25 DIAGNOSIS — I50.43 ACUTE ON CHRONIC COMBINED SYSTOLIC AND DIASTOLIC CONGESTIVE HEART FAILURE (HCC): ICD-10-CM

## 2025-02-25 DIAGNOSIS — Z00.00 INITIAL MEDICARE ANNUAL WELLNESS VISIT: Primary | ICD-10-CM

## 2025-02-25 DIAGNOSIS — E11.69 TYPE 2 DIABETES MELLITUS WITH OTHER SPECIFIED COMPLICATION, WITHOUT LONG-TERM CURRENT USE OF INSULIN (HCC): Chronic | ICD-10-CM

## 2025-02-25 DIAGNOSIS — I50.22 CHRONIC SYSTOLIC (CONGESTIVE) HEART FAILURE (HCC): ICD-10-CM

## 2025-02-25 RX ORDER — SEMAGLUTIDE 1.34 MG/ML
2 INJECTION, SOLUTION SUBCUTANEOUS WEEKLY
Qty: 6 ML | Refills: 5 | Status: SHIPPED | OUTPATIENT
Start: 2025-02-25

## 2025-02-25 RX ORDER — ATORVASTATIN CALCIUM 40 MG/1
40 TABLET, FILM COATED ORAL DAILY
Qty: 90 TABLET | Refills: 1 | Status: SHIPPED | OUTPATIENT
Start: 2025-02-25

## 2025-02-25 RX ORDER — DAPAGLIFLOZIN 10 MG/1
10 TABLET, FILM COATED ORAL EVERY MORNING
Qty: 90 TABLET | Refills: 1 | Status: SHIPPED | OUTPATIENT
Start: 2025-02-25

## 2025-02-25 ASSESSMENT — PATIENT HEALTH QUESTIONNAIRE - PHQ9
1. LITTLE INTEREST OR PLEASURE IN DOING THINGS: NOT AT ALL
5. POOR APPETITE OR OVEREATING: NOT AT ALL
6. FEELING BAD ABOUT YOURSELF - OR THAT YOU ARE A FAILURE OR HAVE LET YOURSELF OR YOUR FAMILY DOWN: NOT AT ALL
9. THOUGHTS THAT YOU WOULD BE BETTER OFF DEAD, OR OF HURTING YOURSELF: NOT AT ALL
SUM OF ALL RESPONSES TO PHQ QUESTIONS 1-9: 0
8. MOVING OR SPEAKING SO SLOWLY THAT OTHER PEOPLE COULD HAVE NOTICED. OR THE OPPOSITE, BEING SO FIGETY OR RESTLESS THAT YOU HAVE BEEN MOVING AROUND A LOT MORE THAN USUAL: NOT AT ALL
10. IF YOU CHECKED OFF ANY PROBLEMS, HOW DIFFICULT HAVE THESE PROBLEMS MADE IT FOR YOU TO DO YOUR WORK, TAKE CARE OF THINGS AT HOME, OR GET ALONG WITH OTHER PEOPLE: NOT DIFFICULT AT ALL
4. FEELING TIRED OR HAVING LITTLE ENERGY: NOT AT ALL
SUM OF ALL RESPONSES TO PHQ QUESTIONS 1-9: 0
2. FEELING DOWN, DEPRESSED OR HOPELESS: NOT AT ALL
7. TROUBLE CONCENTRATING ON THINGS, SUCH AS READING THE NEWSPAPER OR WATCHING TELEVISION: NOT AT ALL
SUM OF ALL RESPONSES TO PHQ9 QUESTIONS 1 & 2: 0
3. TROUBLE FALLING OR STAYING ASLEEP: NOT AT ALL

## 2025-02-25 ASSESSMENT — LIFESTYLE VARIABLES
HOW OFTEN DO YOU HAVE A DRINK CONTAINING ALCOHOL: MONTHLY OR LESS
HOW MANY STANDARD DRINKS CONTAINING ALCOHOL DO YOU HAVE ON A TYPICAL DAY: 1 OR 2

## 2025-02-25 NOTE — PATIENT INSTRUCTIONS
questions about a medical condition or this instruction, always ask your healthcare professional. [employer] disclaims any warranty or liability for your use of this information.         Learning About Being Active as an Older Adult  Why is being active important as you get older?     Being active is one of the best things you can do for your health. And it's never too late to start. Being active--or getting active, if you aren't already--has definite benefits. It can:  Give you more energy,  Keep your mind sharp.  Improve balance to reduce your risk of falls.  Help you manage chronic illness with fewer medicines.  No matter how old you are, how fit you are, or what health problems you have, there is a form of activity that will work for you. And the more physical activity you can do, the better your overall health will be.  What kinds of activity can help you stay healthy?  Being more active will make your daily activities easier. Physical activity includes planned exercise and things you do in daily life. There are four types of activity:  Aerobic.  Doing aerobic activity makes your heart and lungs strong.  Includes walking, dancing, and gardening.  Aim for at least 2½ hours spread throughout the week.  It improves your energy and can help you sleep better.  Muscle-strengthening.  This type of activity can help maintain muscle and strengthen bones.  Includes climbing stairs, using resistance bands, and lifting or carrying heavy loads.  Aim for at least twice a week.  It can help protect the knees and other joints.  Stretching.  Stretching gives you better range of motion in joints and muscles.  Includes upper arm stretches, calf stretches, and gentle yoga.  Aim for at least twice a week, preferably after your muscles are warmed up from other activities.  It can help you function better in daily life.  Balancing.  This helps you stay coordinated and have good posture.  Includes heel-to-toe walking, ashley

## 2025-02-25 NOTE — PROGRESS NOTES
Medicare Annual Wellness Visit    Richard Macdonald is here for Medicare AWV and Other (Discuss recent falls and spine )    Assessment & Plan   Initial Medicare annual wellness visit  Acute on chronic combined systolic and diastolic congestive heart failure (HCC)  -     FARXIGA 10 MG tablet; Take 1 tablet by mouth every morning, Disp-90 tablet, R-1, DAWNormal  Chronic systolic (congestive) heart failure  -     FARXIGA 10 MG tablet; Take 1 tablet by mouth every morning, Disp-90 tablet, R-1, DAWNormal  Type 2 diabetes mellitus with other specified complication, without long-term current use of insulin (HCC)  -     FARXIGA 10 MG tablet; Take 1 tablet by mouth every morning, Disp-90 tablet, R-1, DAWNormal  -     Semaglutide, 1 MG/DOSE, (OZEMPIC, 1 MG/DOSE,) 4 MG/3ML SOPN sc injection; Inject 2 mg into the skin once a week, Disp-6 mL, R-5Normal  -     Hemoglobin A1C; Future  -     Comprehensive Metabolic Panel; Future  -     Albumin/Creatinine Ratio, Urine; Future  Mixed hyperlipidemia  -     atorvastatin (LIPITOR) 40 MG tablet; Take 1 tablet by mouth daily, Disp-90 tablet, R-1Normal  -     Lipid, Fasting; Future  -     Comprehensive Metabolic Panel; Future  Obesity, class 3 (E66.813)  Body mass index [BMI] 40.0-44.9, adult (Z68.41)       Return in about 6 months (around 8/25/2025) for Chronic Conditions.     Subjective   The following acute and/or chronic problems were also addressed today:    64 year old gentleman with with a PMHx of CHF, DMT2, Atrial fibrillation, aortic aneurysm, HTN, HLD, CAD, poliomyelitis, Sjogren's and R knee replacement (2/24/2022) with subsequent DVT of of the R soleal vein.     -Congestive heart failure: The patient has nonischemic cardiomyopathy.  Nonobstructive coronary artery disease by coronary angiography in August 2022.  His ejection fraction was approximately 35% at that time. He had a limited echo 11/10/2022 with improvement in ejection fraction to 55 to 60%.  The patient has had

## 2025-03-04 ENCOUNTER — TELEPHONE (OUTPATIENT)
Dept: INTERNAL MEDICINE CLINIC | Age: 65
End: 2025-03-04

## 2025-03-04 NOTE — TELEPHONE ENCOUNTER
Pt is requesting his Aids hours be increased through [address]. [address] was supposed to contact the office to get this okayed.  Pt wasn't sure if they were going to call or fax but he is reaching out to follow up.

## 2025-03-18 LAB
A/G RATIO: 0.9 (ref 1–2.1)
ALBUMIN: 3.9 G/DL (ref 3.5–5)
ALP BLD-CCNC: 116 U/L (ref 33–140)
ALT SERPL-CCNC: 7 U/L (ref 0–60)
ANION GAP SERPL CALCULATED.3IONS-SCNC: 12 MMOL/L (ref 5–13)
AST SERPL-CCNC: 22 U/L (ref 0–40)
BILIRUB SERPL-MCNC: 0.6 MG/DL (ref 0.2–1.2)
BUN / CREAT RATIO: 27
BUN BLDV-MCNC: 40 MG/DL (ref 7–25)
CALCIUM SERPL-MCNC: 8.8 MG/DL (ref 8.5–10.5)
CHLORIDE BLD-SCNC: 94 MMOL/L (ref 98–110)
CHOLESTEROL, TOTAL: 146 MG/DL (ref 125–199)
CO2: 31 MMOL/L (ref 22–29)
CREAT SERPL-MCNC: 1.46 MG/DL (ref 0.5–1.3)
CREATINE URINE MG/24 HR: 99.9 MG/DL (ref 30–310)
EGFR (CKD-EPI): 53 SEE NOTE
ESTIMATED AVERAGE GLUCOSE: 180 MG/DL (ref 68–114)
GLOBULIN: 4.5 G/DL (ref 2–3.7)
GLUCOSE BLD-MCNC: 222 MG/DL (ref 71–99)
HBA1C MFR BLD: 7.9 % (ref 4–5.6)
HDLC SERPL-MCNC: 30 MG/DL (ref 40–180)
LDL CHOLESTEROL: 69 MG/DL (ref 0–100)
MICROALBUMIN/CREAT 24H UR: 4 MG/DL
MICROALBUMIN/CREAT UR-RTO: 40 MG/G CREAT (ref 0–30)
NONHDLC SERPL-MCNC: 116 MG/DL (ref 0–129)
POTASSIUM SERPL-SCNC: 3.2 MMOL/L (ref 3.5–5.1)
PROTEIN FLUID: 8.4 G/DL (ref 6–8)
SODIUM BLD-SCNC: 137 MMOL/L (ref 135–146)
TRIGL SERPL-MCNC: 235 MG/DL (ref 0–149)

## 2025-03-27 ENCOUNTER — TELEPHONE (OUTPATIENT)
Dept: INTERNAL MEDICINE CLINIC | Age: 65
End: 2025-03-27

## 2025-03-27 NOTE — TELEPHONE ENCOUNTER
Pt is having some side effects from the recent infusion medication tired, sick , unsteady focus, left foot dragging, he's worried about it  due to these being the same symptoms he had awhile ago when he got super sick   Plz call and advise Phone: [phone]

## 2025-03-27 NOTE — TELEPHONE ENCOUNTER
I think he would best be evaluated the emergency department.  Left foot dragging is concerning for a possible stroke.

## 2025-04-04 ENCOUNTER — OFFICE VISIT (OUTPATIENT)
Dept: INTERNAL MEDICINE CLINIC | Age: 65
End: 2025-04-04
Payer: MEDICARE

## 2025-04-04 DIAGNOSIS — M79.676 CHRONIC PAIN OF TOE, UNSPECIFIED LATERALITY: ICD-10-CM

## 2025-04-04 DIAGNOSIS — M21.41 PES PLANUS OF BOTH FEET: ICD-10-CM

## 2025-04-04 DIAGNOSIS — M21.6X2 EQUINUS DEFORMITY OF BOTH FEET: ICD-10-CM

## 2025-04-04 DIAGNOSIS — E11.69 TYPE 2 DIABETES MELLITUS WITH OTHER SPECIFIED COMPLICATION, WITHOUT LONG-TERM CURRENT USE OF INSULIN: Primary | ICD-10-CM

## 2025-04-04 DIAGNOSIS — G89.29 CHRONIC PAIN OF TOE, UNSPECIFIED LATERALITY: ICD-10-CM

## 2025-04-04 DIAGNOSIS — B35.1 ONYCHOMYCOSIS: ICD-10-CM

## 2025-04-04 DIAGNOSIS — M21.6X1 EQUINUS DEFORMITY OF BOTH FEET: ICD-10-CM

## 2025-04-04 DIAGNOSIS — M21.42 PES PLANUS OF BOTH FEET: ICD-10-CM

## 2025-04-04 PROCEDURE — 99213 OFFICE O/P EST LOW 20 MIN: CPT

## 2025-04-04 PROCEDURE — 11721 DEBRIDE NAIL 6 OR MORE: CPT

## 2025-04-04 RX ORDER — GABAPENTIN 100 MG/1
300 CAPSULE ORAL NIGHTLY
Qty: 183 CAPSULE | Refills: 1 | Status: SHIPPED | OUTPATIENT
Start: 2025-04-04 | End: 2025-06-04

## 2025-04-04 NOTE — PROGRESS NOTES
Department of Podiatry  Resident Progress Note    Richard Macdonald  Allergies: Lisinopril, Bee venom, and Other    SUBJECTIVE  The patient is a 64 y.o. male who presents for evaluation of painfully elongated and dystrophic toenails.  Patient states his neuropathic pain has increased over the last several months, he is also unable to reach and safely trim his own nails.  Patient notes to have increased burning/tingling at night.  Patient notes he has cut himself in the past.  Patient's A1c has increased to around 7, higher than his normal which is around 5, per patient.    Past Medical History:        Diagnosis Date    Arthritis     Depression     Diabetes mellitus (HCC)     Diastolic CHF (HCC)     Difficult intubation     throat swelled    Dyslipidemia     Herniated disc, cervical     Hypertension     Osteoporosis     Peripheral neuropathy        REVIEW OF SYSTEMS:  Review of Systems: Pertinent positive and negative findings as documented in the HPI, otherwise all other systems were reviewed and were negative.     OBJECTIVE  Patient presents unassisted unaccompanied, ambulating in tennis shoes    VASCULAR: DP and PT pulses are palpable 2/4 b/l. CFT is brisk to the digits of the foot b/l. Skin temperature is warm to cool from proximal to distal with no focal calor noted. No edema noted. No pain with calf compression b/l.    NEUROLOGIC: Gross and epicritic sensation is intact b/l. Protective sensation is appreciated at all pedal sites b/l.    DERMATOLOGIC: Nails 1-5 b/l are elongated, thickened, discolored. Webspaces 1-4 b/l are clean, dry, and intact. No hyperkeratosis noted. No open wounds noted. No subcutaneous nodules, rashes, or other skin lesions noted.  Significant xerosis noted circumferentially around the forefoot extended up to the level of the knee    MUSCULOSKELETAL: Muscle strength is 5/5 for all pedal groups tested. No pain with palpation of the foot or ankle b/l. Ankle joint ROM is decreased in

## 2025-04-07 ENCOUNTER — TELEPHONE (OUTPATIENT)
Dept: INTERNAL MEDICINE CLINIC | Age: 65
End: 2025-04-07

## 2025-04-07 DIAGNOSIS — M25.561 ACUTE PAIN OF RIGHT KNEE: Primary | ICD-10-CM

## 2025-04-07 NOTE — TELEPHONE ENCOUNTER
Pt states that he does not like the Rheumatologist Dr.Deepa Stratton and wants another referral for Rheumatologist. Pt states the level care is not good.    [phone]  Pls call and advise

## 2025-04-18 ENCOUNTER — TELEPHONE (OUTPATIENT)
Dept: INTERNAL MEDICINE CLINIC | Age: 65
End: 2025-04-18

## 2025-04-18 NOTE — TELEPHONE ENCOUNTER
Spoke with the patient and informed him Dr. Reed is out on Friday's, as well he is booked next week.  I will talk to Dr. Reed to established if he can make an  available appointment for this patient.   Patient is available on Thursday next week (is this possible)?

## 2025-04-18 NOTE — TELEPHONE ENCOUNTER
Pt is calling in due to having scabs on an old incision from surgery and pt is concerned and is requesting an appt   Plz call and advise

## 2025-04-19 DIAGNOSIS — E66.813 OBESITY, CLASS III, BMI 40-49.9 (MORBID OBESITY) (HCC): ICD-10-CM

## 2025-04-21 RX ORDER — SEMAGLUTIDE 2.68 MG/ML
INJECTION, SOLUTION SUBCUTANEOUS
Qty: 3 ML | Refills: 0 | Status: SHIPPED | OUTPATIENT
Start: 2025-04-21

## 2025-04-23 ENCOUNTER — OFFICE VISIT (OUTPATIENT)
Dept: INTERNAL MEDICINE CLINIC | Age: 65
End: 2025-04-23
Payer: MEDICARE

## 2025-04-23 VITALS
OXYGEN SATURATION: 98 % | HEART RATE: 77 BPM | WEIGHT: 274 LBS | HEIGHT: 65 IN | BODY MASS INDEX: 45.65 KG/M2 | SYSTOLIC BLOOD PRESSURE: 132 MMHG | DIASTOLIC BLOOD PRESSURE: 84 MMHG | TEMPERATURE: 98.2 F

## 2025-04-23 DIAGNOSIS — F19.10 SUBSTANCE ABUSE (HCC): ICD-10-CM

## 2025-04-23 DIAGNOSIS — F33.1 MODERATE EPISODE OF RECURRENT MAJOR DEPRESSIVE DISORDER (HCC): ICD-10-CM

## 2025-04-23 DIAGNOSIS — M33.20 POLYMYOSITIS (HCC): Primary | ICD-10-CM

## 2025-04-23 DIAGNOSIS — I48.0 PAROXYSMAL ATRIAL FIBRILLATION (HCC): ICD-10-CM

## 2025-04-23 PROCEDURE — 3075F SYST BP GE 130 - 139MM HG: CPT | Performed by: HOSPITALIST

## 2025-04-23 PROCEDURE — 1159F MED LIST DOCD IN RCRD: CPT | Performed by: HOSPITALIST

## 2025-04-23 PROCEDURE — 3079F DIAST BP 80-89 MM HG: CPT | Performed by: HOSPITALIST

## 2025-04-23 PROCEDURE — 99214 OFFICE O/P EST MOD 30 MIN: CPT | Performed by: HOSPITALIST

## 2025-04-23 PROCEDURE — 1123F ACP DISCUSS/DSCN MKR DOCD: CPT | Performed by: HOSPITALIST

## 2025-04-23 PROCEDURE — G2211 COMPLEX E/M VISIT ADD ON: HCPCS | Performed by: HOSPITALIST

## 2025-04-23 ASSESSMENT — ENCOUNTER SYMPTOMS
COUGH: 0
CONSTIPATION: 0
CHEST TIGHTNESS: 0
VOMITING: 0
DIARRHEA: 0
NAUSEA: 0
ABDOMINAL DISTENTION: 0
SORE THROAT: 0
BACK PAIN: 0
TROUBLE SWALLOWING: 0
SINUS PAIN: 0
ABDOMINAL PAIN: 0
WHEEZING: 0
VOICE CHANGE: 0
SHORTNESS OF BREATH: 0
BLOOD IN STOOL: 0
SINUS PRESSURE: 0

## 2025-05-15 ENCOUNTER — TELEPHONE (OUTPATIENT)
Dept: INTERNAL MEDICINE CLINIC | Age: 65
End: 2025-05-15

## 2025-05-15 NOTE — TELEPHONE ENCOUNTER
Mamie  from [facility] is calling to check on the fax request they sent on May 9th for the pts full complete medication list. Mamie will refax request today.     Mamie [phone]

## 2025-05-21 ENCOUNTER — TELEPHONE (OUTPATIENT)
Dept: INTERNAL MEDICINE CLINIC | Age: 65
End: 2025-05-21

## 2025-05-21 DIAGNOSIS — M25.561 ACUTE PAIN OF RIGHT KNEE: Primary | ICD-10-CM

## 2025-05-21 NOTE — TELEPHONE ENCOUNTER
Patient is asking for a referral to a different rheumatologist:    [facility]  PH:  [phone]  FAX:  [phone]    Please call and advise.

## 2025-06-09 ENCOUNTER — TELEPHONE (OUTPATIENT)
Dept: INTERNAL MEDICINE CLINIC | Age: 65
End: 2025-06-09

## 2025-06-09 NOTE — TELEPHONE ENCOUNTER
Pt is calling to request a referral to a rheumatology sent to    [phone]

## 2025-06-11 NOTE — PROGRESS NOTES
Emotionally Abused: No     Physically Abused: No     Sexually Abused: No   Housing Stability: Low Risk  (10/8/2024)    Housing Stability Vital Sign     Unable to Pay for Housing in the Last Year: No     Number of Times Moved in the Last Year: 0     Homeless in the Last Year: No      Family History   Problem Relation Age of Onset    Heart Disease Mother     High Blood Pressure Mother     High Blood Pressure Father     High Blood Pressure Sister     Breast Cancer Brother     Heart Disease Brother     High Blood Pressure Brother        PE  Vitals:    04/23/25 1320   BP: 132/84   Pulse: 77   Temp: 98.2 °F (36.8 °C)   TempSrc: Temporal   SpO2: 98%   Weight: 124.3 kg (274 lb)   Height: 1.651 m (5' 5\")     Estimated body mass index is 45.6 kg/m² as calculated from the following:    Height as of this encounter: 1.651 m (5' 5\").    Weight as of this encounter: 124.3 kg (274 lb).    Physical Exam  Vitals reviewed.   Constitutional:       General: He is not in acute distress.     Appearance: Normal appearance.   HENT:      Head: Normocephalic and atraumatic.      Mouth/Throat:      Pharynx: Oropharynx is clear.   Eyes:      Conjunctiva/sclera: Conjunctivae normal.      Pupils: Pupils are equal, round, and reactive to light.   Cardiovascular:      Rate and Rhythm: Normal rate and regular rhythm.      Pulses: Normal pulses.      Heart sounds: Normal heart sounds.   Pulmonary:      Effort: Pulmonary effort is normal. No respiratory distress.      Breath sounds: Normal breath sounds. No wheezing or rales.   Abdominal:      Palpations: Abdomen is soft.      Tenderness: There is no abdominal tenderness. There is no rebound.   Musculoskeletal:         General: No signs of injury. Normal range of motion.      Cervical back: Normal range of motion and neck supple.   Skin:     General: Skin is warm and dry.      Coloration: Skin is not jaundiced.   Neurological:      General: No focal deficit present.      Mental Status: He is alert and 
138

## 2025-06-15 DIAGNOSIS — E66.813 OBESITY, CLASS III, BMI 40-49.9 (MORBID OBESITY) (HCC): ICD-10-CM

## 2025-06-16 RX ORDER — SEMAGLUTIDE 2.68 MG/ML
INJECTION, SOLUTION SUBCUTANEOUS
Qty: 3 ML | Refills: 0 | Status: SHIPPED | OUTPATIENT
Start: 2025-06-16

## 2025-06-22 DIAGNOSIS — E66.813 OBESITY, CLASS III, BMI 40-49.9 (MORBID OBESITY) (HCC): ICD-10-CM

## 2025-06-23 RX ORDER — SEMAGLUTIDE 2.68 MG/ML
INJECTION, SOLUTION SUBCUTANEOUS
Qty: 3 ML | Refills: 0 | Status: SHIPPED | OUTPATIENT
Start: 2025-06-23

## 2025-07-02 DIAGNOSIS — E66.813 OBESITY, CLASS III, BMI 40-49.9 (MORBID OBESITY) (HCC): ICD-10-CM

## 2025-07-02 NOTE — TELEPHONE ENCOUNTER
They are asking if we would give this patient a 3 month supply of the following:    OZEMPIC, 2 MG/DOSE, 8 MG/3ML SOPN sc injection     Please call and advise.      [facility] [address] [place] [address] RD - P [phone] - F [phone]

## 2025-07-03 RX ORDER — SEMAGLUTIDE 2.68 MG/ML
2 INJECTION, SOLUTION SUBCUTANEOUS
Qty: 3 ML | Refills: 2 | Status: SHIPPED | OUTPATIENT
Start: 2025-07-03

## 2025-07-14 ENCOUNTER — TELEPHONE (OUTPATIENT)
Dept: PHARMACY | Facility: CLINIC | Age: 65
End: 2025-07-14

## 2025-07-14 NOTE — TELEPHONE ENCOUNTER
[facility]: ADHERENCE REVIEW  Identified care gap per [place]: fills at [facility]: Statin adherence    [facility] [place] - [address] - P [phone] - F [phone]  [address]  [place] [address]  Phone: [phone] Fax: [phone]      Patient also appears to be prescribed: Diabetes and Statin     Current Outpatient Medications   Medication Instructions    acetaminophen (TYLENOL) 500 mg, Oral, 4 TIMES DAILY PRN    amoxicillin (AMOXIL) 500 mg, 2 TIMES DAILY    atorvastatin (LIPITOR) 40 mg, Oral, DAILY    Blood Glucose Monitoring Suppl (ONE TOUCH ULTRA 2) w/Device KIT Use as directed to test blood sugar TID. Dx- E11.9    blood glucose test strips (ONE TOUCH ULTRA TEST) strip 1 each, In Vitro, 3 TIMES DAILY, As needed.    Farxiga 10 mg, Oral, EVERY MORNING    furosemide (LASIX) 80 mg, Oral, 2 TIMES DAILY, Take 2 tablets daily ( 160 mg )    gabapentin (NEURONTIN) 300 mg, Oral, Nightly, Intended supply: 90 days    Lancets MISC Use as directed TID-  Dx: E11.9    LANTUS SOLOSTAR 100 UNIT/ML injection pen INJECT 15 UNITS SUBCUTANEOUSLY NIGHTLY    metOLazone (ZAROXOLYN) 5 mg, Oral, PRN, Take 1 tablet by mouth daily as needed    metoprolol succinate (TOPROL XL) 25 MG extended release tablet TAKE 1 TABLET BY MOUTH ONCE DAILY    Ozempic (1 MG/DOSE) 2 mg, SubCUTAneous, WEEKLY    Ozempic (2 MG/DOSE) 2 mg, SubCUTAneous, EVERY 7 DAYS    potassium chloride (KLOR-CON M) 10 MEQ extended release tablet 10 mEq, Oral, DAILY    pregabalin (LYRICA) 75 mg, Oral, 2 TIMES DAILY    rivaroxaban (XARELTO) 20 mg, Oral, DAILY    spironolactone (ALDACTONE) 25 mg, DAILY    tamsulosin (FLOMAX) 0.4 mg, DAILY         ASSESSMENT    DIABETES ADHERENCE  Insurance Records claims through 7/7/25  YTD PDC = 85.5%; Potential Fail Date: 8/28/25:   Ozempic last filled for 28 days supply on 6/16/25.  Next refill due: 7/14/25  Has new rx at [facility] dated 7/3/25 for 2 mg dosing  May be excluded (on insulin)?

## (undated) DEVICE — APPLIER CLP L9.38IN M LIG TI DISP STR RNG HNDL LIGACLP

## (undated) DEVICE — SURGICAL SET UP - SURE SET: Brand: MEDLINE INDUSTRIES, INC.

## (undated) DEVICE — CHLORAPREP 26ML ORANGE

## (undated) DEVICE — COVER LT HNDL BLU PLAS

## (undated) DEVICE — SKIN AFFIX SURG ADHESIVE 72/CS 0.55ML: Brand: MEDLINE

## (undated) DEVICE — GARMENT,MEDLINE,DVT,INT,CALF,MED, GEN2: Brand: MEDLINE

## (undated) DEVICE — ELECTROSURGICAL PENCIL ROCKER SWITCH NON COATED BLADE ELECTRODE 10 FT (3 M) CORD HOLSTER: Brand: MEGADYNE

## (undated) DEVICE — PLATE ES AD W 9FT CRD 2

## (undated) DEVICE — SOLUTION IV IRRIG 250ML ST LF 0.9% SODIUM 2F7122

## (undated) DEVICE — SUTURE VCRL SZ 3-0 L18IN ABSRB UD L26MM SH 1/2 CIR J864D

## (undated) DEVICE — SUTURE VCRL SZ 3-0 L27IN ABSRB UD L26MM SH 1/2 CIR J416H

## (undated) DEVICE — BANDAGE COMPR W2INXL5YD TAN BRTH SELF ADH WRP W/ HND TEAR

## (undated) DEVICE — SHEET,DRAPE,53X77,STERILE: Brand: MEDLINE

## (undated) DEVICE — T-DRAPE,EXTREMITY,STERILE: Brand: MEDLINE

## (undated) DEVICE — GLOVE SURG SZ 7 L12IN FNGR THK87MIL DK GRN LTX POLYMER W

## (undated) DEVICE — SUTURE MCRYL SZ 4-0 L27IN ABSRB UD L19MM PS-2 1/2 CIR PRIM Y426H

## (undated) DEVICE — TURNOVER KIT RM INF CTRL TECH

## (undated) DEVICE — GLOVE SURG SZ 7 L12IN FNGR THK75MIL WHT LTX POLYMER BEAD

## (undated) DEVICE — SPONGE,LAP,18"X18",DLX,XR,ST,5/PK,40/PK: Brand: MEDLINE

## (undated) DEVICE — WILLIS PACK: Brand: MEDLINE INDUSTRIES, INC.

## (undated) DEVICE — E-Z CLEAN, NON-STICK, PTFE COATED, ELECTROSURGICAL BLADE ELECTRODE, MODIFIED EXTENDED INSULATION, 2.5 INCH (6.35 CM): Brand: MEGADYNE

## (undated) DEVICE — SYSTEM SKIN CLSR 22CM DERMBND PRINEO

## (undated) DEVICE — STANDARD HYPODERMIC NEEDLE,POLYPROPYLENE HUB: Brand: MONOJECT

## (undated) DEVICE — SURE SET-DOUBLE BASIN-LF: Brand: MEDLINE INDUSTRIES, INC.

## (undated) DEVICE — DRAPE,T,LAPARO,TRANS,STERILE: Brand: MEDLINE

## (undated) DEVICE — GOWN,SIRUS,POLYRNF,BRTHSLV,LG,30/CS: Brand: MEDLINE

## (undated) DEVICE — BLANKET WRM W29.9XL79.1IN UP BODY FORC AIR MISTRAL-AIR

## (undated) DEVICE — INTENDED FOR TISSUE SEPARATION, AND OTHER PROCEDURES THAT REQUIRE A SHARP SURGICAL BLADE TO PUNCTURE OR CUT.: Brand: BARD-PARKER ® CARBON RIB-BACK BLADES

## (undated) DEVICE — SPONGE SURG W3 8IN WHT COT RND PNUT DISECT RADPQ C 5 HLDR

## (undated) DEVICE — WRAP COHESIVE W2INXL5YD TAN SELF ADH BNDG HND NON STERILE TEAR CARING

## (undated) DEVICE — E-Z CLEAN, NON-STICK, PTFE COATED, ELECTROSURGICAL BLADE ELECTRODE, 4 INCH (10.2 CM): Brand: MEGADYNE

## (undated) DEVICE — APPLIER LIG CLP M L11IN TI STR RNG HNDL FOR 20 CLP DISP

## (undated) DEVICE — SUTURE PERMA-HAND SZ 2-0 L30IN NONABSORBABLE BLK L26MM SH K833H

## (undated) DEVICE — SUTURE MCRYL SZ 4-0 L18IN ABSRB UD L19MM PS-2 3/8 CIR PRIM Y496G